# Patient Record
Sex: MALE | Race: WHITE | NOT HISPANIC OR LATINO | Employment: OTHER | URBAN - METROPOLITAN AREA
[De-identification: names, ages, dates, MRNs, and addresses within clinical notes are randomized per-mention and may not be internally consistent; named-entity substitution may affect disease eponyms.]

---

## 2024-03-27 ENCOUNTER — PREP FOR SURGERY (OUTPATIENT)
Dept: OTHER | Facility: HOSPITAL | Age: 69
End: 2024-03-27
Payer: MEDICARE

## 2024-03-27 ENCOUNTER — OFFICE VISIT (OUTPATIENT)
Dept: GASTROENTEROLOGY | Facility: CLINIC | Age: 69
End: 2024-03-27
Payer: MEDICARE

## 2024-03-27 VITALS
SYSTOLIC BLOOD PRESSURE: 170 MMHG | DIASTOLIC BLOOD PRESSURE: 90 MMHG | OXYGEN SATURATION: 93 % | HEIGHT: 72 IN | HEART RATE: 80 BPM

## 2024-03-27 DIAGNOSIS — R11.2 NAUSEA AND VOMITING, UNSPECIFIED VOMITING TYPE: Primary | ICD-10-CM

## 2024-03-27 DIAGNOSIS — R19.8 BORBORYGMI: ICD-10-CM

## 2024-03-27 DIAGNOSIS — K62.5 BLOOD PER RECTUM: ICD-10-CM

## 2024-03-27 DIAGNOSIS — R10.9 ABDOMINAL PAIN, UNSPECIFIED ABDOMINAL LOCATION: ICD-10-CM

## 2024-03-27 DIAGNOSIS — Z86.010 HISTORY OF COLONIC POLYPS: ICD-10-CM

## 2024-03-27 DIAGNOSIS — R12 HEARTBURN: ICD-10-CM

## 2024-03-27 DIAGNOSIS — K59.00 CONSTIPATION, UNSPECIFIED CONSTIPATION TYPE: Primary | ICD-10-CM

## 2024-03-27 DIAGNOSIS — R11.2 NAUSEA AND VOMITING, UNSPECIFIED VOMITING TYPE: ICD-10-CM

## 2024-03-27 PROBLEM — C44.91 BASAL CELL CARCINOMA OF SKIN: Status: ACTIVE | Noted: 2022-12-15

## 2024-03-27 PROBLEM — I10 ESSENTIAL HYPERTENSION: Status: ACTIVE | Noted: 2022-12-15

## 2024-03-27 PROBLEM — I48.91 ATRIAL FIBRILLATION: Status: ACTIVE | Noted: 2022-12-15

## 2024-03-27 PROBLEM — E87.6 HYPOKALEMIA: Status: ACTIVE | Noted: 2023-01-06

## 2024-03-27 PROBLEM — U09.9 CHRONIC POST-COVID-19 SYNDROME: Status: ACTIVE | Noted: 2023-05-02

## 2024-03-27 PROBLEM — J44.9 CHRONIC OBSTRUCTIVE LUNG DISEASE: Status: ACTIVE | Noted: 2023-05-02

## 2024-03-27 PROBLEM — R30.0 DIFFICULT OR PAINFUL URINATION: Status: ACTIVE | Noted: 2023-04-19

## 2024-03-27 PROBLEM — R00.1 BRADYCARDIA: Status: ACTIVE | Noted: 2024-03-13

## 2024-03-27 PROBLEM — M54.50 CHRONIC LOW BACK PAIN: Status: ACTIVE | Noted: 2023-05-02

## 2024-03-27 PROBLEM — J18.9 COMMUNITY ACQUIRED PNEUMONIA: Status: ACTIVE | Noted: 2023-01-06

## 2024-03-27 PROBLEM — G89.29 CHRONIC LOW BACK PAIN: Status: ACTIVE | Noted: 2023-05-02

## 2024-03-27 PROBLEM — R60.0 PERIPHERAL EDEMA: Status: ACTIVE | Noted: 2024-03-04

## 2024-03-27 PROBLEM — M17.9 OSTEOARTHRITIS OF KNEE: Status: ACTIVE | Noted: 2023-02-03

## 2024-03-27 PROBLEM — R07.81 RIB PAIN: Status: ACTIVE | Noted: 2023-04-19

## 2024-03-27 PROBLEM — B02.9 HERPES ZOSTER: Status: ACTIVE | Noted: 2023-02-03

## 2024-03-27 PROBLEM — K02.9 DENTAL CARIES: Status: ACTIVE | Noted: 2022-12-15

## 2024-03-27 PROBLEM — R14.0 ABDOMINAL BLOATING: Status: ACTIVE | Noted: 2023-01-06

## 2024-03-27 RX ORDER — ALBUTEROL SULFATE 2.5 MG/3ML
2.5 SOLUTION RESPIRATORY (INHALATION) EVERY 4 HOURS PRN
COMMUNITY

## 2024-03-27 RX ORDER — FLECAINIDE ACETATE 150 MG/1
0.5 TABLET ORAL 2 TIMES DAILY
COMMUNITY

## 2024-03-27 RX ORDER — BUDESONIDE AND FORMOTEROL FUMARATE DIHYDRATE 160; 4.5 UG/1; UG/1
2 AEROSOL RESPIRATORY (INHALATION)
COMMUNITY

## 2024-03-27 RX ORDER — POTASSIUM CHLORIDE 20 MEQ/1
20 TABLET, EXTENDED RELEASE ORAL DAILY
COMMUNITY

## 2024-03-27 RX ORDER — ROSUVASTATIN CALCIUM 40 MG/1
1 TABLET, COATED ORAL DAILY
COMMUNITY

## 2024-03-27 RX ORDER — OMEPRAZOLE 20 MG/1
20 CAPSULE, DELAYED RELEASE ORAL DAILY
COMMUNITY

## 2024-03-27 RX ORDER — LOSARTAN POTASSIUM 100 MG/1
1 TABLET ORAL DAILY
COMMUNITY

## 2024-03-27 RX ORDER — FUROSEMIDE 20 MG/1
1 TABLET ORAL DAILY
COMMUNITY

## 2024-03-27 RX ORDER — CHOLECALCIFEROL (VITAMIN D3) 125 MCG
500 CAPSULE ORAL DAILY
COMMUNITY

## 2024-03-27 RX ORDER — GABAPENTIN 300 MG/1
3 CAPSULE ORAL 3 TIMES DAILY
COMMUNITY

## 2024-03-27 RX ORDER — CARVEDILOL 25 MG/1
12.5 TABLET ORAL 2 TIMES DAILY WITH MEALS
COMMUNITY

## 2024-03-27 RX ORDER — FINASTERIDE 5 MG/1
5 TABLET, FILM COATED ORAL DAILY
COMMUNITY

## 2024-03-27 RX ORDER — POLYETHYLENE GLYCOL 3350 17 G/17G
17 POWDER, FOR SOLUTION ORAL DAILY
Qty: 1530 G | Refills: 3 | Status: SHIPPED | OUTPATIENT
Start: 2024-03-27

## 2024-03-27 RX ORDER — ISOSORBIDE MONONITRATE 30 MG/1
TABLET, EXTENDED RELEASE ORAL
COMMUNITY

## 2024-03-27 NOTE — Clinical Note
Please schedule EGD due to nausea with vomiting, heartburn at the hospital due to coexisting medical conditions. Thank you! Blood thinner NOT listed but please verify that he does not take blood thinner (aspirin is on med list which is okay) due to history of atrial fibrillation. Thank you!

## 2024-03-27 NOTE — PATIENT INSTRUCTIONS
Follow a diet as recommended by your health care provider. This may involve avoiding foods and drinks such as:  Coffee and tea (with or without caffeine).  Drinks that contain alcohol.  Energy drinks and sports drinks.  Carbonated drinks or sodas.  Chocolate and cocoa.  Peppermint and mint flavorings.  Garlic and onions.  Horseradish.  Spicy and acidic foods, including peppers, chili powder, chaney powder, vinegar, hot sauces, and barbecue sauce.  Citrus fruit juices and citrus fruits, such as oranges, glenn, and limes.  Tomato-based foods, such as red sauce, chili, salsa, and pizza with red sauce.  Fried and fatty foods, such as donuts, french fries, potato chips, and high-fat dressings.    Eat small, frequent meals instead of large meals.  Avoid drinking large amounts of liquid with your meals.  Avoid eating meals during the 2-3 hours before bedtime.  Avoid lying down right after you eat.    Take miralax daily. Mix in 8 ounce of any liquid. Continue senna as needed.     Continue omeprazole daily, 30 minutes prior to a meal.

## 2024-03-27 NOTE — PROGRESS NOTES
"GASTROENTEROLOGY OFFICE NOTE    Phan Daniels  [id number]  1955    CARE TEAM  Patient Care Team:  Cesia Diaz APRN as PCP - General (Nurse Practitioner)    Referring Provider: Cesia Diaz AP*    Chief Complaint   Patient presents with    Abdominal Pain     New patient in with c/o abdominal pain after eating fatty foods.        HISTORY OF PRESENT ILLNESS:   Phan Daniels is a 68 y.o. male who presents to the clinic today for as a referral from Cesia Diaz NP for evaluation regarding abdominal pain, nausea with vomiting, diarrhea alternating with constipation. Patient reported occasional blood per rectum, [facility], he is unsure of when he should repeat colonoscopy.  He reports prior imaging likely at [facility] that revealed gallstones or was concerning for gallstones, history is unclear but referral mentioned gallstones.    He expresses concern about \"knot\" in mid upper abdomen with prior evaluation in [place] approximately 5 to 7 years ago with conversation with provider that seems to suggest diastases recti.  He reports there was no treatment recommended at the time.  He reports occasional pain in the area.    He also reports rumbling noise from abdomen after eating with twisting sensation all over abdomen.  He has a bowel movement most days that is described as hard to pass at times, has history of skipping 3 days without a bowel movement, takes at least 1 dose of senna daily but at times takes senna twice daily due to constipation.  He has not previously tried MiraLAX.    He takes omeprazole 20 mg daily reports history of hiatal hernia, prior EGD 30 years ago, heartburn. Omeprazole is helpful for heartburn but he experiences heartburn when he has a trigger such as soda, juice    He reports overall GI symptoms seem worse over the past few months but he has history of GI symptoms that began prior to a few months ago.     He " is in a motorized chair during today's visit reportedly due to degenerative disc disease and left knee replacement.  He reports he is able to walk short distance and can bear weight but he does have breathing issues with history of COPD as well.    Past Medical History:   Diagnosis Date    Abdominal bloating 01/06/2023    Atrial fibrillation 12/15/2022    Basal cell carcinoma of skin 12/15/2022    Bradycardia 03/13/2024    Chronic low back pain 05/02/2023    Chronic obstructive lung disease 05/02/2023    Chronic post-COVID-19 syndrome 05/02/2023    Community acquired pneumonia 01/06/2023    Constipation 08/11/2023    COPD (chronic obstructive pulmonary disease)     Dental caries 12/15/2022    Difficult or painful urination 04/19/2023    Essential hypertension 12/15/2022    GERD (gastroesophageal reflux disease)     Herpes zoster 02/03/2023    Hiatal hernia     History of degenerative disc disease     uses motorized chair    Hyperlipidemia     Hypertension     Hypokalemia 01/06/2023    Osteoarthritis of knee 02/03/2023    Peripheral edema 03/04/2024    Rib pain 04/19/2023        Past Surgical History:   Procedure Laterality Date    CATARACT EXTRACTION      [facility]      [facility]    [facility] TOTAL KNEE Left         Current Outpatient Medications on File Prior to Visit   Medication Sig    albuterol (PROVENTIL) (2.5 MG/3ML) 0.083% nebulizer solution Take 2.5 mg by nebulization Every 4 (Four) Hours As Needed for Wheezing.    Albuterol Sulfate, sensor, 108 (90 Base) MCG/ACT aerosol powder  Inhale 2 puffs every 4 hours by inhalation route.    Aspirin 81 MG capsule Take 1 capsule by mouth Daily.    budesonide-formoterol (Symbicort) 160-4.5 MCG/ACT inhaler Inhale 2 puffs 2 (Two) Times a Day.    carvedilol (COREG) 25 MG tablet Take 0.5 tablets by mouth 2 (Two) Times a Day With Meals.    finasteride (PROSCAR) 5 MG tablet Take 1 tablet by mouth Daily.    flecainide (TAMBOCOR) 150 MG tablet Take 0.5 tablets by mouth 2 (Two)  "Times a Day.    furosemide (LASIX) 20 MG tablet Take 1 tablet by mouth Daily.    gabapentin (NEURONTIN) 300 MG capsule Take 3 capsules by mouth 3 (Three) Times a Day.    isosorbide mononitrate (IMDUR) 30 MG 24 hr tablet isosorbide mononitrate ER 30 mg tablet,extended release 24 hr    losartan (COZAAR) 100 MG tablet Take 1 tablet by mouth Daily.    omeprazole (priLOSEC) 20 MG capsule Take 1 capsule by mouth Daily.    potassium chloride (KLOR-CON M20) 20 MEQ CR tablet Take 1 tablet by mouth Daily.    rosuvastatin (CRESTOR) 40 MG tablet Take 1 tablet by mouth Daily.    vitamin B-12 (CYANOCOBALAMIN) 500 MCG tablet Take 1 tablet by mouth Daily.     No current facility-administered medications on file prior to visit.       Allergies   Allergen Reactions    Indomethacin Er Nausea And Vomiting    Isoniazid Rash    Penicillins Rash       History reviewed. No pertinent family history.    Social History     Socioeconomic History    Marital status:    Tobacco Use    Smoking status: Every Day     Types: Cigarettes    Smokeless tobacco: Never   Vaping Use    Vaping status: Never Used   Substance and Sexual Activity    Alcohol use: Yes     Comment: occasionally    Drug use: Never    Sexual activity: Defer       PHYSICAL EXAM   /90 (BP Location: Left arm, Patient Position: Sitting, Cuff Size: Adult)   Pulse 80   Ht 181.6 cm (71.5\")   SpO2 93%   Physical Exam  Constitutional:       General: He is not in acute distress.     Appearance: He is not toxic-appearing.   HENT:      Head: Normocephalic and atraumatic. No contusion.      Right Ear: External ear normal.      Left Ear: External ear normal.   Eyes:      General: Lids are normal. No scleral icterus.        Right eye: No discharge.         Left eye: No discharge.      Extraocular Movements: Extraocular movements intact.   Neck:      Trachea: Trachea normal.      Comments: No visible mass  No visible adenopathy  Cardiovascular:      Rate and Rhythm: Normal rate. "   Pulmonary:      Effort: No respiratory distress.      Comments: Symmetrical expansion    Abdominal:      Palpations: Abdomen is soft. There is no mass.   Musculoskeletal:      Comments: Symmetrical movement of upper extremities  Sitting in motorized chair during office visit   Skin:     General: Skin is warm and dry.      Coloration: Skin is not jaundiced.   Neurological:      General: No focal deficit present.      Mental Status: He is alert and oriented to person, place, and time.   Psychiatric:         Mood and Affect: Mood normal.         Behavior: Behavior normal.         Thought Content: Thought content normal.     Results Review:  2/16/2023  CBC okay.  B12 elevated.  PSA normal.  Hemoglobin A1c elevated at 6.6    ASSESSMENT / PLAN  1. Constipation, unspecified constipation type  2. Borborygmi  3. Abdominal pain, unspecified abdominal location  -Suspect likely irritable bowel syndrome with constipation for which I recommend patient start MiraLAX daily.  He has history of taking senna 1-2 times per day.  He may continue senna as needed with daily use of MiraLAX.  - polyethylene glycol (MiraLax) 17 GM/SCOOP powder; Take 17 g by mouth Daily. Mix in 8 oz of any liquid once daily  Dispense: 1530 g; Refill: 3      4. Nausea and vomiting, unspecified vomiting type  -Plan for EGD for additional evaluation.  Treat constipation in the event constipation is contributing to nausea with vomiting  -Continue omeprazole 20 mg daily as it does seem as though he has improvement with use of omeprazole  -Recommend lifestyle modifications for reflux  -He may have symptomatic cholelithiasis contributing, recommend he avoid greasy, fatty, fried foods.  Release of information form signed to review prior imaging that may have suggested gallstones, history unclear.    5. Blood per rectum  -I would like to review prior colonoscopy report.  Recommend patient start MiraLAX daily.  I am concerned that constipation is likely contributing  to blood per rectum.    6. Heartburn  -Omeprazole 20 mg daily seems helpful for heartburn unless he has triggers such as soda, juice.  Lifestyle modification for reflux provided in the office.  Continue omeprazole 20 mg daily 30 minutes prior to a meal.  Plan for EGD for additional evaluation.    7. History of colonic polyps  -Release of information authorization form signed during today's office visit to [facility] to obtain [facility] that was reportedly completed last [facility]    Phan Daniels  reports that he has been smoking cigarettes. He has never used smokeless tobacco. I have educated him on the risk of diseases from using tobacco products such as cancer, COPD, and heart disease. Due to concern for reflux, it was recommended he avoid tobacco.          Return in about 3 months (around 6/27/2024) for Follow-up after EGD.    Cari Panchal, APRN  03/27/2024

## 2024-03-28 PROBLEM — R12 HEARTBURN: Status: ACTIVE | Noted: 2024-03-27

## 2024-03-28 PROBLEM — R11.2 NAUSEA AND VOMITING: Status: ACTIVE | Noted: 2024-03-27

## 2024-06-06 ENCOUNTER — PRE-ADMISSION TESTING (OUTPATIENT)
Dept: PREADMISSION TESTING | Facility: HOSPITAL | Age: 69
End: 2024-06-06
Payer: MEDICARE

## 2024-06-06 VITALS — BODY MASS INDEX: 30.56 KG/M2 | HEIGHT: 72 IN | WEIGHT: 225.64 LBS

## 2024-06-06 LAB
DEPRECATED RDW RBC AUTO: 44.4 FL (ref 37–54)
ERYTHROCYTE [DISTWIDTH] IN BLOOD BY AUTOMATED COUNT: 14 % (ref 12.3–15.4)
HCT VFR BLD AUTO: 47.6 % (ref 37.5–51)
HGB BLD-MCNC: 15.5 G/DL (ref 13–17.7)
MCH RBC QN AUTO: 28 PG (ref 26.6–33)
MCHC RBC AUTO-ENTMCNC: 32.6 G/DL (ref 31.5–35.7)
MCV RBC AUTO: 86.1 FL (ref 79–97)
PLATELET # BLD AUTO: 205 10*3/MM3 (ref 140–450)
PMV BLD AUTO: 9.9 FL (ref 6–12)
POTASSIUM SERPL-SCNC: 3.5 MMOL/L (ref 3.5–5.2)
QT INTERVAL: 406 MS
QTC INTERVAL: 471 MS
RBC # BLD AUTO: 5.53 10*6/MM3 (ref 4.14–5.8)
WBC NRBC COR # BLD AUTO: 9.48 10*3/MM3 (ref 3.4–10.8)

## 2024-06-06 PROCEDURE — 84132 ASSAY OF SERUM POTASSIUM: CPT

## 2024-06-06 PROCEDURE — 93005 ELECTROCARDIOGRAM TRACING: CPT

## 2024-06-06 PROCEDURE — 85027 COMPLETE CBC AUTOMATED: CPT

## 2024-06-06 PROCEDURE — 36415 COLL VENOUS BLD VENIPUNCTURE: CPT

## 2024-06-06 PROCEDURE — 93010 ELECTROCARDIOGRAM REPORT: CPT | Performed by: INTERNAL MEDICINE

## 2024-06-06 NOTE — PAT
PATIENT HAD ABNORMAL EKG IN PAT, ATTEMPTED TO OBTAIN CARDIAC CLEARANCE AND PREVIOUS EKG FROM [facility] BUT COULD NOT REACH CARDIOLOGY DEPARTMENT AFTER SEVERAL ATTEMPTS. EKG THEN FAXED TO DR. SONG WHO WOULD NOT CLEAR PATIENT WITHOUT PREVIOUS EKG. NOTIFIED EULALIO IN JENNIFER'S OFFICE THAT PATIENT WILL NEED CLEARANCE.

## 2024-06-10 ENCOUNTER — TELEPHONE (OUTPATIENT)
Dept: GASTROENTEROLOGY | Facility: CLINIC | Age: 69
End: 2024-06-10
Payer: MEDICARE

## 2024-06-12 ENCOUNTER — ANESTHESIA EVENT (OUTPATIENT)
Dept: GASTROENTEROLOGY | Facility: HOSPITAL | Age: 69
End: 2024-06-12
Payer: MEDICARE

## 2024-06-12 RX ORDER — SODIUM CHLORIDE, SODIUM LACTATE, POTASSIUM CHLORIDE, CALCIUM CHLORIDE 600; 310; 30; 20 MG/100ML; MG/100ML; MG/100ML; MG/100ML
9 INJECTION, SOLUTION INTRAVENOUS CONTINUOUS
Status: CANCELLED | OUTPATIENT
Start: 2024-06-12

## 2024-06-12 RX ORDER — SODIUM CHLORIDE 0.9 % (FLUSH) 0.9 %
10 SYRINGE (ML) INJECTION AS NEEDED
Status: CANCELLED | OUTPATIENT
Start: 2024-06-12

## 2024-06-12 RX ORDER — MIDAZOLAM HYDROCHLORIDE 1 MG/ML
0.5 INJECTION INTRAMUSCULAR; INTRAVENOUS
Status: CANCELLED | OUTPATIENT
Start: 2024-06-12

## 2024-06-12 RX ORDER — LIDOCAINE HYDROCHLORIDE 10 MG/ML
0.5 INJECTION, SOLUTION EPIDURAL; INFILTRATION; INTRACAUDAL; PERINEURAL ONCE AS NEEDED
Status: CANCELLED | OUTPATIENT
Start: 2024-06-12

## 2024-06-12 RX ORDER — FAMOTIDINE 20 MG/1
20 TABLET, FILM COATED ORAL ONCE
Status: CANCELLED | OUTPATIENT
Start: 2024-06-12 | End: 2024-06-12

## 2024-06-12 RX ORDER — FAMOTIDINE 10 MG/ML
20 INJECTION, SOLUTION INTRAVENOUS ONCE
Status: CANCELLED | OUTPATIENT
Start: 2024-06-12 | End: 2024-06-12

## 2024-06-12 RX ORDER — SODIUM CHLORIDE 9 MG/ML
40 INJECTION, SOLUTION INTRAVENOUS AS NEEDED
Status: CANCELLED | OUTPATIENT
Start: 2024-06-12

## 2024-06-12 RX ORDER — SODIUM CHLORIDE 0.9 % (FLUSH) 0.9 %
10 SYRINGE (ML) INJECTION EVERY 12 HOURS SCHEDULED
Status: CANCELLED | OUTPATIENT
Start: 2024-06-12

## 2024-06-12 NOTE — PAT
Verified patient previously completed cardiology visit for cardiac risk assessment in preparation for upcoming procedure, completion of 12-lead ECG within six months, and risk assessment letter reviewed. No further interventions required.     Cleared by Dr Abbott on 6/10/24 as acceptable risk.  [facility]

## 2024-06-13 ENCOUNTER — ANESTHESIA (OUTPATIENT)
Dept: GASTROENTEROLOGY | Facility: HOSPITAL | Age: 69
End: 2024-06-13
Payer: MEDICARE

## 2024-06-13 ENCOUNTER — HOSPITAL ENCOUNTER (OUTPATIENT)
Facility: HOSPITAL | Age: 69
Setting detail: HOSPITAL OUTPATIENT SURGERY
Discharge: HOME | End: 2024-06-13
Attending: INTERNAL MEDICINE | Admitting: INTERNAL MEDICINE
Payer: MEDICARE

## 2024-06-13 VITALS
OXYGEN SATURATION: 93 % | SYSTOLIC BLOOD PRESSURE: 160 MMHG | DIASTOLIC BLOOD PRESSURE: 100 MMHG | RESPIRATION RATE: 16 BRPM | TEMPERATURE: 98.1 F | HEART RATE: 73 BPM

## 2024-06-13 DIAGNOSIS — R11.2 NAUSEA AND VOMITING, UNSPECIFIED VOMITING TYPE: ICD-10-CM

## 2024-06-13 DIAGNOSIS — R12 HEARTBURN: ICD-10-CM

## 2024-06-13 PROCEDURE — C1725 CATH, TRANSLUMIN NON-LASER: HCPCS | Performed by: INTERNAL MEDICINE

## 2024-06-13 PROCEDURE — 25010000002 PROPOFOL 10 MG/ML EMULSION

## 2024-06-13 PROCEDURE — 88305 TISSUE EXAM BY PATHOLOGIST: CPT | Performed by: INTERNAL MEDICINE

## 2024-06-13 PROCEDURE — 43249 ESOPH EGD DILATION <30 MM: CPT | Performed by: INTERNAL MEDICINE

## 2024-06-13 PROCEDURE — 43239 EGD BIOPSY SINGLE/MULTIPLE: CPT | Performed by: INTERNAL MEDICINE

## 2024-06-13 PROCEDURE — 25810000003 LACTATED RINGERS PER 1000 ML

## 2024-06-13 RX ORDER — SODIUM CHLORIDE, SODIUM LACTATE, POTASSIUM CHLORIDE, CALCIUM CHLORIDE 600; 310; 30; 20 MG/100ML; MG/100ML; MG/100ML; MG/100ML
INJECTION, SOLUTION INTRAVENOUS CONTINUOUS PRN
Status: DISCONTINUED | OUTPATIENT
Start: 2024-06-13 | End: 2024-06-13 | Stop reason: SURG

## 2024-06-13 RX ORDER — LIDOCAINE HYDROCHLORIDE 10 MG/ML
INJECTION, SOLUTION EPIDURAL; INFILTRATION; INTRACAUDAL; PERINEURAL AS NEEDED
Status: DISCONTINUED | OUTPATIENT
Start: 2024-06-13 | End: 2024-06-13 | Stop reason: SURG

## 2024-06-13 RX ORDER — ONDANSETRON 2 MG/ML
4 INJECTION INTRAMUSCULAR; INTRAVENOUS ONCE AS NEEDED
Status: DISCONTINUED | OUTPATIENT
Start: 2024-06-13 | End: 2024-06-19 | Stop reason: HOSPADM

## 2024-06-13 RX ORDER — IPRATROPIUM BROMIDE AND ALBUTEROL SULFATE 2.5; .5 MG/3ML; MG/3ML
3 SOLUTION RESPIRATORY (INHALATION) ONCE AS NEEDED
Status: DISCONTINUED | OUTPATIENT
Start: 2024-06-13 | End: 2024-06-19 | Stop reason: HOSPADM

## 2024-06-13 RX ORDER — OMEPRAZOLE 40 MG/1
40 CAPSULE, DELAYED RELEASE ORAL
Qty: 60 CAPSULE | Refills: 11 | Status: SHIPPED | OUTPATIENT
Start: 2024-06-13

## 2024-06-13 RX ORDER — PROPOFOL 10 MG/ML
VIAL (ML) INTRAVENOUS AS NEEDED
Status: DISCONTINUED | OUTPATIENT
Start: 2024-06-13 | End: 2024-06-13 | Stop reason: SURG

## 2024-06-13 RX ADMIN — PROPOFOL 50 MG: 10 INJECTION, EMULSION INTRAVENOUS at 14:33

## 2024-06-13 RX ADMIN — LIDOCAINE HYDROCHLORIDE 100 MG: 10 INJECTION, SOLUTION EPIDURAL; INFILTRATION; INTRACAUDAL; PERINEURAL at 14:28

## 2024-06-13 RX ADMIN — SODIUM CHLORIDE, POTASSIUM CHLORIDE, SODIUM LACTATE AND CALCIUM CHLORIDE: 600; 310; 30; 20 INJECTION, SOLUTION INTRAVENOUS at 14:27

## 2024-06-13 RX ADMIN — PROPOFOL 50 MG: 10 INJECTION, EMULSION INTRAVENOUS at 14:30

## 2024-06-13 RX ADMIN — PROPOFOL 100 MG: 10 INJECTION, EMULSION INTRAVENOUS at 14:28

## 2024-06-13 NOTE — ANESTHESIA POSTPROCEDURE EVALUATION
Patient: Phan Daniels    Procedure Summary       Date: 06/13/24 Room / Location:  [facility] /  [facility]    Anesthesia Start: 1427 Anesthesia Stop: 1443    Procedure: ESOPHAGOGASTRODUODENOSCOPY Diagnosis:       Nausea and vomiting, unspecified vomiting type      Heartburn      (Nausea and vomiting, unspecified vomiting type [R11.2])      (Heartburn [R12])    Surgeons: Morris Wynne MD Provider: Ashutosh Rodriguez MD    Anesthesia Type: general ASA Status: 3            Anesthesia Type: general    Vitals  Vitals Value Taken Time   /93 06/13/24 1443   Temp 98.1 °F (36.7 °C) 06/13/24 1443   Pulse 78 06/13/24 1443   Resp     SpO2 96 % 06/13/24 1443           Post Anesthesia Care and Evaluation    Patient location during evaluation: PACU  Patient participation: complete - patient participated  Level of consciousness: awake and alert  Pain management: adequate    Airway patency: patent  Anesthetic complications: No anesthetic complications  PONV Status: none  Cardiovascular status: hemodynamically stable and acceptable  Respiratory status: nonlabored ventilation, acceptable and room air  Hydration status: acceptable

## 2024-06-13 NOTE — H&P
GASTROENTEROLOGY OFFICE NOTE  Phan Daniels  [id number]  1955      CHIEF COMPLAINT  Nausea vomiting  GERD  Dysphagia    HISTORY OF PRESENT ILLNESS:  68-year-old white male who was recently seen in our GI clinic by GAGE Jay on March 27, 2024 when he presented with various GI complaints most notably recurrent problems with nausea and then vomiting sometimes to the point of dry heaving.  Some very mild intermittent problems with dysphagia to solids which she states he does feel like foods get stuck sometimes esophagus but very rarely.  He has chronic GERD and despite taking omeprazole 20 mg p.o. twice daily seems to be ineffective in resolving his reflux symptoms.    As part of his workup EGD was recommended and therefore he presents for that endoscopy.  He states his last upper endoscopy maybe 3 to years ago showed a small sliding hernia.    PAST MEDICAL HISTORY  Past Medical History:    Abdominal bloating    Atrial fibrillation    Basal cell carcinoma of skin    Bradycardia    Chronic low back pain    Chronic obstructive lung disease    Chronic post-COVID-19 syndrome    Community acquired pneumonia    Constipation    COPD (chronic obstructive pulmonary disease)    Dental caries    Difficult or painful urination    Essential hypertension    GERD (gastroesophageal reflux disease)    Herpes zoster    Hiatal hernia    History of degenerative disc disease    uses motorized chair    Hyperlipidemia    Hypertension    Hypokalemia    Osteoarthritis of knee    Peripheral edema    PONV (postoperative nausea and vomiting)    Rib pain        PAST SURGICAL HISTORY  Past Surgical History:    CATARACT EXTRACTION    [facility]    [facility] T[facility]EE        MEDICATIONS:  Prior to Admission medications    Medication Sig Start Date End Date Taking? Authorizing Provider   Aspirin 81 MG capsule Take 1 capsule by mouth Daily.   Yes Provider, MD Yarely   carvedilol (COREG) 25 MG tablet Take 0.5 tablets by  mouth 2 (Two) Times a Day With Meals.   Yes Yarely Brewster MD   Cholecalciferol (VITAMIN D-3 PO) Take 1 tablet/day by mouth Daily.   Yes Yarely Brewster MD   finasteride (PROSCAR) 5 MG tablet Take 1 tablet by mouth Daily.   Yes Yarely Brewster MD   flecainide (TAMBOCOR) 150 MG tablet Take 0.5 tablets by mouth 2 (Two) Times a Day.   Yes Yarely Brewster MD   furosemide (LASIX) 20 MG tablet Take 1 tablet by mouth Daily As Needed (SWELLING).   Yes Yarely Brewster MD   gabapentin (NEURONTIN) 300 MG capsule Take 3 capsules by mouth 3 (Three) Times a Day.   Yes Yarely Brewster MD   losartan (COZAAR) 100 MG tablet Take 1 tablet by mouth 2 (Two) Times a Day.   Yes Yarely Brewster MD   omeprazole (priLOSEC) 20 MG capsule Take 1 capsule by mouth 2 (Two) Times a Day.   Yes Yarely Brewster MD   vitamin B-12 (CYANOCOBALAMIN) 500 MCG tablet Take 1 tablet by mouth Daily.   Yes Yarely Brewster MD   albuterol (PROVENTIL) (2.5 MG/3ML) 0.083% nebulizer solution Take 2.5 mg by nebulization Every 4 (Four) Hours As Needed for Wheezing.    Yarely Brewster MD   Albuterol Sulfate, sensor, 108 (90 Base) MCG/ACT aerosol powder  Inhale 2 Puffs/kg Daily As Needed (SHORTNESS OF AIR).    Yarely Brewster MD   budesonide-formoterol (Symbicort) 160-4.5 MCG/ACT inhaler Inhale 2 puffs 2 (Two) Times a Day.    Yarely Brewster MD   rosuvastatin (CRESTOR) 40 MG tablet Take 1 tablet by mouth Daily.    Yarely Brewster MD        ALLERGIES  is allergic to indomethacin er, isoniazid, and penicillins.    FAMILY HISTORY:  Cancer-related family history is not on file.  Colon Cancer-related family history is not on file.    SOCIAL HISTORY  He  reports that he has been smoking cigarettes. He has never used smokeless tobacco. He reports current alcohol use. He reports that he does not use drugs.       REVIEW OF SYSTEMS  Cardiovascular, pulmonary and generalized review systems are pertinent  as reviewed above    PHYSICAL EXAM   BP (!) 173/101 (BP Location: Left arm, Patient Position: Lying)   Pulse 85   Temp 98.2 °F (36.8 °C) (Temporal)   Resp 16   SpO2 94%   General: Alert and oriented x 3. In no apparent or acute distress.  and No stigmata of chronic liver disease  HEENT: Anicteric sclerae. Normal oropharynx  Neck: Supple. Without lymphadenopathy  CV: Regular rate and rhythm, S1, S2  Lungs: Clear to ausculation. Without rales, rhonchi and wheezing  Abdomen:  Soft,non-distended without palpable masses or hepatosplenomeagaly, areas of rebound tenderness or guarding.   Extremeties: without clubbing, cyanosis or edema  Neurologic:  Alert and oriented x 3 without focal motor or sensory deficits  Rectal exam: deferred       ASSESSMENT  1.-Chronic GERD.  Medically refractory.  Would likely benefit from omeprazole 40 mg p.o. twice daily and if continues to have reflux with this consider trial of Voquezna  2.-Recurrent nausea vomiting.  EGD for occlusion of erosive esophagitis upper GI neoplasia, gastric outlet obstruction, chronic gastritis etc. is reasonable and therefore offered  3.-History of adenomatous colonic polyps.  Last colonoscopy was February 2023 at which time polyps were removed and for which a 3-year surveillance colonoscopy was recommended (February 2025    PLAN  1.-Proceed with EGD today.  Risk, benefits, options reviewed.  He is agreeable to proceeding with further recommendations deferred pending findings of today's upper endoscopy      Morris Wynne MD  6/13/2024   14:22 EDT    Addendum  EGD was unremarkable.  There is a very small sliding hiatal hernia.  Esophagus was empirically dilated to 20 mm with a through-the-scope balloon across upper and lower esophageal sphincters and proximal/distal esophagus.  Gastric antral biopsies taken to rule out H. pylori.  Recommendation is to increase omeprazole to 40 mg p.o. twice daily.  Await pathology report.  Further  recommendation deferred pending clinical progress

## 2024-06-17 LAB
CYTO UR: NORMAL
LAB AP CASE REPORT: NORMAL
LAB AP CLINICAL INFORMATION: NORMAL
PATH REPORT.FINAL DX SPEC: NORMAL
PATH REPORT.GROSS SPEC: NORMAL

## 2025-01-01 ENCOUNTER — APPOINTMENT (OUTPATIENT)
Dept: RESPIRATORY THERAPY | Facility: HOSPITAL | Age: 70
DRG: 870 | End: 2025-01-01
Payer: MEDICARE

## 2025-01-01 ENCOUNTER — APPOINTMENT (OUTPATIENT)
Dept: GENERAL RADIOLOGY | Facility: HOSPITAL | Age: 70
DRG: 870 | End: 2025-01-01
Payer: MEDICARE

## 2025-01-01 ENCOUNTER — APPOINTMENT (OUTPATIENT)
Dept: CARDIOLOGY | Facility: HOSPITAL | Age: 70
DRG: 870 | End: 2025-01-01
Payer: MEDICARE

## 2025-01-01 ENCOUNTER — APPOINTMENT (OUTPATIENT)
Dept: CT IMAGING | Facility: HOSPITAL | Age: 70
DRG: 870 | End: 2025-01-01
Payer: MEDICARE

## 2025-01-01 ENCOUNTER — HOSPITAL ENCOUNTER (INPATIENT)
Facility: HOSPITAL | Age: 70
LOS: 14 days | DRG: 870 | End: 2025-02-22
Attending: FAMILY MEDICINE | Admitting: INTERNAL MEDICINE
Payer: MEDICARE

## 2025-01-01 ENCOUNTER — APPOINTMENT (OUTPATIENT)
Dept: ULTRASOUND IMAGING | Facility: HOSPITAL | Age: 70
DRG: 870 | End: 2025-01-01
Payer: MEDICARE

## 2025-01-01 VITALS
OXYGEN SATURATION: 83 % | WEIGHT: 247.8 LBS | HEART RATE: 116 BPM | BODY MASS INDEX: 33.56 KG/M2 | RESPIRATION RATE: 28 BRPM | HEIGHT: 72 IN | SYSTOLIC BLOOD PRESSURE: 156 MMHG | DIASTOLIC BLOOD PRESSURE: 85 MMHG | TEMPERATURE: 98.8 F

## 2025-01-01 DIAGNOSIS — N17.9 ACUTE RENAL FAILURE, UNSPECIFIED ACUTE RENAL FAILURE TYPE: ICD-10-CM

## 2025-01-01 DIAGNOSIS — J18.9 MULTIFOCAL PNEUMONIA: ICD-10-CM

## 2025-01-01 DIAGNOSIS — A41.9 SEPSIS, DUE TO UNSPECIFIED ORGANISM, UNSPECIFIED WHETHER ACUTE ORGAN DYSFUNCTION PRESENT: Primary | ICD-10-CM

## 2025-01-01 DIAGNOSIS — R79.89 ELEVATED TROPONIN: ICD-10-CM

## 2025-01-01 LAB
25(OH)D3 SERPL-MCNC: 33 NG/ML (ref 30–100)
A-A DO2: 150.7 MMHG (ref 0–300)
A-A DO2: 155.1 MMHG (ref 0–300)
A-A DO2: 158.1 MMHG (ref 0–300)
A-A DO2: 190.2 MMHG (ref 0–300)
A-A DO2: 205.6 MMHG (ref 0–300)
A-A DO2: 268.7 MMHG (ref 0–300)
A-A DO2: 383.6 MMHG (ref 0–300)
A-A DO2: 520.9 MMHG (ref 0–300)
ALBUMIN SERPL-MCNC: 2.2 G/DL (ref 3.5–5.2)
ALBUMIN SERPL-MCNC: 2.4 G/DL (ref 3.5–5.2)
ALBUMIN SERPL-MCNC: 2.8 G/DL (ref 3.5–5.2)
ALBUMIN SERPL-MCNC: 3 G/DL (ref 3.5–5.2)
ALBUMIN SERPL-MCNC: 3.2 G/DL (ref 3.5–5.2)
ALBUMIN/GLOB SERPL: 0.5 G/DL
ALBUMIN/GLOB SERPL: 0.5 G/DL
ALBUMIN/GLOB SERPL: 0.6 G/DL
ALBUMIN/GLOB SERPL: 0.8 G/DL
ALBUMIN/GLOB SERPL: 1 G/DL
ALP SERPL-CCNC: 127 U/L (ref 39–117)
ALP SERPL-CCNC: 158 U/L (ref 39–117)
ALP SERPL-CCNC: 160 U/L (ref 39–117)
ALP SERPL-CCNC: 204 U/L (ref 39–117)
ALP SERPL-CCNC: 45 U/L (ref 39–117)
ALP SERPL-CCNC: 45 U/L (ref 39–117)
ALP SERPL-CCNC: 46 U/L (ref 39–117)
ALP SERPL-CCNC: 50 U/L (ref 39–117)
ALT SERPL W P-5'-P-CCNC: 24 U/L (ref 1–41)
ALT SERPL W P-5'-P-CCNC: 31 U/L (ref 1–41)
ALT SERPL W P-5'-P-CCNC: 31 U/L (ref 1–41)
ALT SERPL W P-5'-P-CCNC: 35 U/L (ref 1–41)
ALT SERPL W P-5'-P-CCNC: 37 U/L (ref 1–41)
ALT SERPL W P-5'-P-CCNC: 59 U/L (ref 1–41)
ALT SERPL W P-5'-P-CCNC: 70 U/L (ref 1–41)
ALT SERPL W P-5'-P-CCNC: 73 U/L (ref 1–41)
AMMONIA BLD-SCNC: 23 UMOL/L (ref 16–60)
AMMONIA BLD-SCNC: 40 UMOL/L (ref 16–60)
AMMONIA BLD-SCNC: 46 UMOL/L (ref 16–60)
AMPHET+METHAMPHET UR QL: NEGATIVE
AMPHETAMINES UR QL: NEGATIVE
ANION GAP SERPL CALCULATED.3IONS-SCNC: 14.7 MMOL/L (ref 5–15)
ANION GAP SERPL CALCULATED.3IONS-SCNC: 17 MMOL/L (ref 5–15)
ANION GAP SERPL CALCULATED.3IONS-SCNC: 17.4 MMOL/L (ref 5–15)
ANION GAP SERPL CALCULATED.3IONS-SCNC: 17.6 MMOL/L (ref 5–15)
ANION GAP SERPL CALCULATED.3IONS-SCNC: 17.8 MMOL/L (ref 5–15)
ANION GAP SERPL CALCULATED.3IONS-SCNC: 18.5 MMOL/L (ref 5–15)
ANION GAP SERPL CALCULATED.3IONS-SCNC: 19.2 MMOL/L (ref 5–15)
ANION GAP SERPL CALCULATED.3IONS-SCNC: 20.8 MMOL/L (ref 5–15)
ANION GAP SERPL CALCULATED.3IONS-SCNC: 20.9 MMOL/L (ref 5–15)
ANION GAP SERPL CALCULATED.3IONS-SCNC: 21.6 MMOL/L (ref 5–15)
ANION GAP SERPL CALCULATED.3IONS-SCNC: 22.7 MMOL/L (ref 5–15)
ANION GAP SERPL CALCULATED.3IONS-SCNC: 23.2 MMOL/L (ref 5–15)
ANION GAP SERPL CALCULATED.3IONS-SCNC: 24.7 MMOL/L (ref 5–15)
ANION GAP SERPL CALCULATED.3IONS-SCNC: 27 MMOL/L (ref 5–15)
ANISOCYTOSIS BLD QL: ABNORMAL
APTT PPP: 28.7 SECONDS (ref 24.5–35.9)
APTT PPP: 35.2 SECONDS (ref 24.5–35.9)
APTT PPP: 35.8 SECONDS (ref 24.5–35.9)
ARTERIAL PATENCY WRIST A: ABNORMAL
ARTERIAL PATENCY WRIST A: POSITIVE
AST SERPL-CCNC: 136 U/L (ref 1–40)
AST SERPL-CCNC: 238 U/L (ref 1–40)
AST SERPL-CCNC: 271 U/L (ref 1–40)
AST SERPL-CCNC: 44 U/L (ref 1–40)
AST SERPL-CCNC: 52 U/L (ref 1–40)
AST SERPL-CCNC: 57 U/L (ref 1–40)
AST SERPL-CCNC: 69 U/L (ref 1–40)
AST SERPL-CCNC: 72 U/L (ref 1–40)
ATMOSPHERIC PRESS: 718 MMHG
ATMOSPHERIC PRESS: 721 MMHG
ATMOSPHERIC PRESS: 722 MMHG
ATMOSPHERIC PRESS: 723 MMHG
ATMOSPHERIC PRESS: 724 MMHG
ATMOSPHERIC PRESS: 728 MMHG
ATMOSPHERIC PRESS: 730 MMHG
ATMOSPHERIC PRESS: 730 MMHG
ATMOSPHERIC PRESS: 731 MMHG
ATMOSPHERIC PRESS: 732 MMHG
ATMOSPHERIC PRESS: 733 MMHG
ATMOSPHERIC PRESS: 734 MMHG
ATMOSPHERIC PRESS: 739 MMHG
ATMOSPHERIC PRESS: 740 MMHG
AV MEAN PRESS GRAD SYS DOP V1V2: 4 MMHG
AV VMAX SYS DOP: 123 CM/SEC
B PARAPERT DNA SPEC QL NAA+PROBE: NOT DETECTED
B PERT DNA SPEC QL NAA+PROBE: NOT DETECTED
BACTERIA BLD CULT: ABNORMAL
BACTERIA BLD CULT: ABNORMAL
BACTERIA SPEC AEROBE CULT: ABNORMAL
BACTERIA SPEC AEROBE CULT: ABNORMAL
BACTERIA SPEC AEROBE CULT: NO GROWTH
BACTERIA SPEC AEROBE CULT: NORMAL
BACTERIA SPEC RESP CULT: ABNORMAL
BACTERIA UR QL AUTO: ABNORMAL /HPF
BACTERIA UR QL AUTO: ABNORMAL /HPF
BARBITURATES UR QL SCN: NEGATIVE
BASE EXCESS BLDA CALC-SCNC: -0.1 MMOL/L (ref 0–2)
BASE EXCESS BLDA CALC-SCNC: -1.5 MMOL/L (ref 0–2)
BASE EXCESS BLDA CALC-SCNC: -2.2 MMOL/L (ref 0–2)
BASE EXCESS BLDA CALC-SCNC: -2.3 MMOL/L (ref 0–2)
BASE EXCESS BLDA CALC-SCNC: -3.3 MMOL/L (ref 0–2)
BASE EXCESS BLDA CALC-SCNC: -6.4 MMOL/L (ref 0–2)
BASE EXCESS BLDA CALC-SCNC: 0.1 MMOL/L (ref 0–2)
BASE EXCESS BLDA CALC-SCNC: 0.5 MMOL/L (ref 0–2)
BASE EXCESS BLDV CALC-SCNC: -5.5 MMOL/L (ref 0–2)
BASE EXCESS BLDV CALC-SCNC: -5.8 MMOL/L (ref 0–2)
BASE EXCESS BLDV CALC-SCNC: -6.3 MMOL/L (ref 0–2)
BASE EXCESS BLDV CALC-SCNC: -8.4 MMOL/L (ref 0–2)
BASE EXCESS BLDV CALC-SCNC: 1.4 MMOL/L (ref 0–2)
BASE EXCESS BLDV CALC-SCNC: 2.6 MMOL/L (ref 0–2)
BASE EXCESS BLDV CALC-SCNC: 2.7 MMOL/L (ref 0–2)
BASE EXCESS BLDV CALC-SCNC: 3.8 MMOL/L (ref 0–2)
BASOPHILS # BLD AUTO: 0.01 10*3/MM3 (ref 0–0.2)
BASOPHILS # BLD AUTO: 0.02 10*3/MM3 (ref 0–0.2)
BASOPHILS # BLD AUTO: 0.03 10*3/MM3 (ref 0–0.2)
BASOPHILS # BLD AUTO: 0.03 10*3/MM3 (ref 0–0.2)
BASOPHILS # BLD AUTO: 0.04 10*3/MM3 (ref 0–0.2)
BASOPHILS NFR BLD AUTO: 0.1 % (ref 0–1.5)
BASOPHILS NFR BLD AUTO: 0.1 % (ref 0–1.5)
BASOPHILS NFR BLD AUTO: 0.2 % (ref 0–1.5)
BASOPHILS NFR BLD AUTO: 0.3 % (ref 0–1.5)
BDY SITE: ABNORMAL
BENZODIAZ UR QL SCN: NEGATIVE
BH CV ECHO MEAS - ACS: 1.7 CM
BH CV ECHO MEAS - AO MAX PG: 6.1 MMHG
BH CV ECHO MEAS - AO ROOT DIAM: 3.9 CM
BH CV ECHO MEAS - AO V2 VTI: 23.8 CM
BH CV ECHO MEAS - AVA(I,D): 2.8 CM2
BH CV ECHO MEAS - EDV(CUBED): 110.6 ML
BH CV ECHO MEAS - EDV(MOD-SP2): 147 ML
BH CV ECHO MEAS - EDV(MOD-SP4): 160 ML
BH CV ECHO MEAS - EF(MOD-SP2): 57.5 %
BH CV ECHO MEAS - EF(MOD-SP4): 57.6 %
BH CV ECHO MEAS - ESV(CUBED): 42.9 ML
BH CV ECHO MEAS - ESV(MOD-SP2): 62.5 ML
BH CV ECHO MEAS - ESV(MOD-SP4): 67.8 ML
BH CV ECHO MEAS - FS: 27.1 %
BH CV ECHO MEAS - IVS/LVPW: 1 CM
BH CV ECHO MEAS - IVSD: 1.2 CM
BH CV ECHO MEAS - LA DIMENSION: 3.3 CM
BH CV ECHO MEAS - LAT PEAK E' VEL: 14.4 CM/SEC
BH CV ECHO MEAS - LV DIASTOLIC VOL/BSA (35-75): 72.5 CM2
BH CV ECHO MEAS - LV MASS(C)D: 219.1 GRAMS
BH CV ECHO MEAS - LV MAX PG: 2.7 MMHG
BH CV ECHO MEAS - LV MEAN PG: 2 MMHG
BH CV ECHO MEAS - LV SYSTOLIC VOL/BSA (12-30): 30.7 CM2
BH CV ECHO MEAS - LV V1 MAX: 82.2 CM/SEC
BH CV ECHO MEAS - LV V1 VTI: 19 CM
BH CV ECHO MEAS - LVIDD: 4.8 CM
BH CV ECHO MEAS - LVIDS: 3.5 CM
BH CV ECHO MEAS - LVOT AREA: 3.5 CM2
BH CV ECHO MEAS - LVOT DIAM: 2.1 CM
BH CV ECHO MEAS - LVPWD: 1.2 CM
BH CV ECHO MEAS - MED PEAK E' VEL: 10.9 CM/SEC
BH CV ECHO MEAS - MR MAX PG: 26 MMHG
BH CV ECHO MEAS - MR MAX VEL: 255 CM/SEC
BH CV ECHO MEAS - MV A MAX VEL: 81.8 CM/SEC
BH CV ECHO MEAS - MV DEC SLOPE: 330 CM/SEC2
BH CV ECHO MEAS - MV DEC TIME: 0.27 SEC
BH CV ECHO MEAS - MV E MAX VEL: 89.1 CM/SEC
BH CV ECHO MEAS - MV E/A: 1.09
BH CV ECHO MEAS - MV MAX PG: 5 MMHG
BH CV ECHO MEAS - MV MEAN PG: 1 MMHG
BH CV ECHO MEAS - MV V2 VTI: 29.1 CM
BH CV ECHO MEAS - MVA(VTI): 2.26 CM2
BH CV ECHO MEAS - PA ACC TIME: 0.15 SEC
BH CV ECHO MEAS - PA V2 MAX: 80.3 CM/SEC
BH CV ECHO MEAS - RAP SYSTOLE: 10 MMHG
BH CV ECHO MEAS - RVSP: 45 MMHG
BH CV ECHO MEAS - SV(LVOT): 65.8 ML
BH CV ECHO MEAS - SV(MOD-SP2): 84.5 ML
BH CV ECHO MEAS - SV(MOD-SP4): 92.2 ML
BH CV ECHO MEAS - SVI(LVOT): 29.8 ML/M2
BH CV ECHO MEAS - SVI(MOD-SP2): 38.3 ML/M2
BH CV ECHO MEAS - SVI(MOD-SP4): 41.8 ML/M2
BH CV ECHO MEAS - TAPSE (>1.6): 2.41 CM
BH CV ECHO MEAS - TR MAX PG: 35 MMHG
BH CV ECHO MEAS - TR MAX VEL: 296 CM/SEC
BH CV ECHO MEASUREMENTS AVERAGE E/E' RATIO: 7.04
BILIRUB CONJ SERPL-MCNC: 0.4 MG/DL (ref 0–0.3)
BILIRUB INDIRECT SERPL-MCNC: 0.1 MG/DL
BILIRUB SERPL-MCNC: 0.5 MG/DL (ref 0–1.2)
BILIRUB SERPL-MCNC: 0.5 MG/DL (ref 0–1.2)
BILIRUB SERPL-MCNC: 0.6 MG/DL (ref 0–1.2)
BILIRUB SERPL-MCNC: 0.6 MG/DL (ref 0–1.2)
BILIRUB SERPL-MCNC: 0.8 MG/DL (ref 0–1.2)
BILIRUB SERPL-MCNC: 0.8 MG/DL (ref 0–1.2)
BILIRUB SERPL-MCNC: 1.1 MG/DL (ref 0–1.2)
BILIRUB SERPL-MCNC: 1.6 MG/DL (ref 0–1.2)
BILIRUB UR QL STRIP: ABNORMAL
BILIRUB UR QL STRIP: NEGATIVE
BOTTLE TYPE: ABNORMAL
BOTTLE TYPE: ABNORMAL
BUN SERPL-MCNC: 101 MG/DL (ref 8–23)
BUN SERPL-MCNC: 110 MG/DL (ref 8–23)
BUN SERPL-MCNC: 111 MG/DL (ref 8–23)
BUN SERPL-MCNC: 62 MG/DL (ref 8–23)
BUN SERPL-MCNC: 68 MG/DL (ref 8–23)
BUN SERPL-MCNC: 70 MG/DL (ref 8–23)
BUN SERPL-MCNC: 73 MG/DL (ref 8–23)
BUN SERPL-MCNC: 76 MG/DL (ref 8–23)
BUN SERPL-MCNC: 76 MG/DL (ref 8–23)
BUN SERPL-MCNC: 77 MG/DL (ref 8–23)
BUN SERPL-MCNC: 83 MG/DL (ref 8–23)
BUN SERPL-MCNC: 89 MG/DL (ref 8–23)
BUN SERPL-MCNC: 90 MG/DL (ref 8–23)
BUN SERPL-MCNC: 96 MG/DL (ref 8–23)
BUN/CREAT SERPL: 10.1 (ref 7–25)
BUN/CREAT SERPL: 11.1 (ref 7–25)
BUN/CREAT SERPL: 12.2 (ref 7–25)
BUN/CREAT SERPL: 13.4 (ref 7–25)
BUN/CREAT SERPL: 15.6 (ref 7–25)
BUN/CREAT SERPL: 17.6 (ref 7–25)
BUN/CREAT SERPL: 6.3 (ref 7–25)
BUN/CREAT SERPL: 6.4 (ref 7–25)
BUN/CREAT SERPL: 7.1 (ref 7–25)
BUN/CREAT SERPL: 7.4 (ref 7–25)
BUN/CREAT SERPL: 7.6 (ref 7–25)
BUN/CREAT SERPL: 7.9 (ref 7–25)
BUN/CREAT SERPL: 8.2 (ref 7–25)
BUN/CREAT SERPL: 9.8 (ref 7–25)
BUPRENORPHINE SERPL-MCNC: NEGATIVE NG/ML
C PNEUM DNA NPH QL NAA+NON-PROBE: NOT DETECTED
CALCIUM SPEC-SCNC: 5.9 MG/DL (ref 8.6–10.5)
CALCIUM SPEC-SCNC: 6.1 MG/DL (ref 8.6–10.5)
CALCIUM SPEC-SCNC: 6.5 MG/DL (ref 8.6–10.5)
CALCIUM SPEC-SCNC: 6.8 MG/DL (ref 8.6–10.5)
CALCIUM SPEC-SCNC: 6.9 MG/DL (ref 8.6–10.5)
CALCIUM SPEC-SCNC: 7 MG/DL (ref 8.6–10.5)
CALCIUM SPEC-SCNC: 7.4 MG/DL (ref 8.6–10.5)
CALCIUM SPEC-SCNC: 7.5 MG/DL (ref 8.6–10.5)
CALCIUM SPEC-SCNC: 7.5 MG/DL (ref 8.6–10.5)
CALCIUM SPEC-SCNC: 7.6 MG/DL (ref 8.6–10.5)
CALCIUM SPEC-SCNC: 8 MG/DL (ref 8.6–10.5)
CALCIUM SPEC-SCNC: 8.2 MG/DL (ref 8.6–10.5)
CALCIUM SPEC-SCNC: 8.4 MG/DL (ref 8.6–10.5)
CALCIUM SPEC-SCNC: 8.4 MG/DL (ref 8.6–10.5)
CANNABINOIDS SERPL QL: NEGATIVE
CHLORIDE SERPL-SCNC: 100 MMOL/L (ref 98–107)
CHLORIDE SERPL-SCNC: 101 MMOL/L (ref 98–107)
CHLORIDE SERPL-SCNC: 103 MMOL/L (ref 98–107)
CHLORIDE SERPL-SCNC: 93 MMOL/L (ref 98–107)
CHLORIDE SERPL-SCNC: 94 MMOL/L (ref 98–107)
CHLORIDE SERPL-SCNC: 95 MMOL/L (ref 98–107)
CHLORIDE SERPL-SCNC: 96 MMOL/L (ref 98–107)
CHLORIDE SERPL-SCNC: 97 MMOL/L (ref 98–107)
CHLORIDE SERPL-SCNC: 97 MMOL/L (ref 98–107)
CHLORIDE SERPL-SCNC: 98 MMOL/L (ref 98–107)
CHLORIDE SERPL-SCNC: 98 MMOL/L (ref 98–107)
CHLORIDE SERPL-SCNC: 99 MMOL/L (ref 98–107)
CK SERPL-CCNC: 2404 U/L (ref 20–200)
CK SERPL-CCNC: 2687 U/L (ref 20–200)
CK SERPL-CCNC: 3260 U/L (ref 20–200)
CK SERPL-CCNC: 3281 U/L (ref 20–200)
CLARITY UR: ABNORMAL
CLARITY UR: CLEAR
CO2 BLDA-SCNC: 20.7 MMOL/L (ref 22–33)
CO2 BLDA-SCNC: 21.1 MMOL/L (ref 22–33)
CO2 BLDA-SCNC: 22.2 MMOL/L (ref 22–33)
CO2 BLDA-SCNC: 22.3 MMOL/L (ref 22–33)
CO2 BLDA-SCNC: 22.4 MMOL/L (ref 22–33)
CO2 BLDA-SCNC: 24.5 MMOL/L (ref 22–33)
CO2 BLDA-SCNC: 24.8 MMOL/L (ref 22–33)
CO2 BLDA-SCNC: 24.8 MMOL/L (ref 22–33)
CO2 BLDA-SCNC: 25.5 MMOL/L (ref 22–33)
CO2 BLDA-SCNC: 25.8 MMOL/L (ref 22–33)
CO2 BLDA-SCNC: 26.6 MMOL/L (ref 22–33)
CO2 BLDA-SCNC: 27 MMOL/L (ref 22–33)
CO2 BLDA-SCNC: 28.2 MMOL/L (ref 22–33)
CO2 BLDA-SCNC: 28.6 MMOL/L (ref 22–33)
CO2 BLDA-SCNC: 29.4 MMOL/L (ref 22–33)
CO2 BLDA-SCNC: 31.4 MMOL/L (ref 22–33)
CO2 SERPL-SCNC: 16 MMOL/L (ref 22–29)
CO2 SERPL-SCNC: 16.3 MMOL/L (ref 22–29)
CO2 SERPL-SCNC: 18.3 MMOL/L (ref 22–29)
CO2 SERPL-SCNC: 21.4 MMOL/L (ref 22–29)
CO2 SERPL-SCNC: 21.8 MMOL/L (ref 22–29)
CO2 SERPL-SCNC: 22.2 MMOL/L (ref 22–29)
CO2 SERPL-SCNC: 22.2 MMOL/L (ref 22–29)
CO2 SERPL-SCNC: 22.4 MMOL/L (ref 22–29)
CO2 SERPL-SCNC: 22.5 MMOL/L (ref 22–29)
CO2 SERPL-SCNC: 24.1 MMOL/L (ref 22–29)
CO2 SERPL-SCNC: 24.3 MMOL/L (ref 22–29)
CO2 SERPL-SCNC: 24.8 MMOL/L (ref 22–29)
CO2 SERPL-SCNC: 25 MMOL/L (ref 22–29)
CO2 SERPL-SCNC: 26.6 MMOL/L (ref 22–29)
COCAINE UR QL: NEGATIVE
COHGB MFR BLD: 1.3 % (ref 0–5)
COHGB MFR BLD: 1.5 % (ref 0–5)
COHGB MFR BLD: 1.6 % (ref 0–5)
COHGB MFR BLD: 1.7 % (ref 0–5)
COHGB MFR BLD: 1.8 % (ref 0–5)
COHGB MFR BLD: 2.1 % (ref 0–5)
COHGB MFR BLD: 2.3 % (ref 0–5)
COLOR UR: ABNORMAL
COLOR UR: YELLOW
CREAT SERPL-MCNC: 10.21 MG/DL (ref 0.76–1.27)
CREAT SERPL-MCNC: 10.76 MG/DL (ref 0.76–1.27)
CREAT SERPL-MCNC: 10.76 MG/DL (ref 0.76–1.27)
CREAT SERPL-MCNC: 10.92 MG/DL (ref 0.76–1.27)
CREAT SERPL-MCNC: 11.82 MG/DL (ref 0.76–1.27)
CREAT SERPL-MCNC: 5.72 MG/DL (ref 0.76–1.27)
CREAT SERPL-MCNC: 6.25 MG/DL (ref 0.76–1.27)
CREAT SERPL-MCNC: 7.56 MG/DL (ref 0.76–1.27)
CREAT SERPL-MCNC: 7.57 MG/DL (ref 0.76–1.27)
CREAT SERPL-MCNC: 7.66 MG/DL (ref 0.76–1.27)
CREAT SERPL-MCNC: 8.49 MG/DL (ref 0.76–1.27)
CREAT SERPL-MCNC: 8.65 MG/DL (ref 0.76–1.27)
CREAT SERPL-MCNC: 9.12 MG/DL (ref 0.76–1.27)
CREAT SERPL-MCNC: 9.19 MG/DL (ref 0.76–1.27)
CRP SERPL-MCNC: 9.12 MG/DL (ref 0–0.5)
D DIMER PPP FEU-MCNC: 10.03 MCGFEU/ML (ref 0–0.68)
D-LACTATE SERPL-SCNC: 0.8 MMOL/L (ref 0.5–2)
D-LACTATE SERPL-SCNC: 0.8 MMOL/L (ref 0.5–2)
D-LACTATE SERPL-SCNC: 0.9 MMOL/L (ref 0.5–2)
D-LACTATE SERPL-SCNC: 1.2 MMOL/L (ref 0.5–2)
DEPRECATED RDW RBC AUTO: 48.8 FL (ref 37–54)
DEPRECATED RDW RBC AUTO: 49 FL (ref 37–54)
DEPRECATED RDW RBC AUTO: 50.2 FL (ref 37–54)
DEPRECATED RDW RBC AUTO: 51.3 FL (ref 37–54)
DEPRECATED RDW RBC AUTO: 51.3 FL (ref 37–54)
DEPRECATED RDW RBC AUTO: 51.7 FL (ref 37–54)
DEPRECATED RDW RBC AUTO: 52.3 FL (ref 37–54)
DEPRECATED RDW RBC AUTO: 53.5 FL (ref 37–54)
DEPRECATED RDW RBC AUTO: 55.6 FL (ref 37–54)
EGFRCR SERPLBLD CKD-EPI 2021: 10.1 ML/MIN/1.73
EGFRCR SERPLBLD CKD-EPI 2021: 4.2 ML/MIN/1.73
EGFRCR SERPLBLD CKD-EPI 2021: 4.7 ML/MIN/1.73
EGFRCR SERPLBLD CKD-EPI 2021: 5 ML/MIN/1.73
EGFRCR SERPLBLD CKD-EPI 2021: 5.7 ML/MIN/1.73
EGFRCR SERPLBLD CKD-EPI 2021: 5.8 ML/MIN/1.73
EGFRCR SERPLBLD CKD-EPI 2021: 6.2 ML/MIN/1.73
EGFRCR SERPLBLD CKD-EPI 2021: 6.3 ML/MIN/1.73
EGFRCR SERPLBLD CKD-EPI 2021: 7.1 ML/MIN/1.73
EGFRCR SERPLBLD CKD-EPI 2021: 7.2 ML/MIN/1.73
EGFRCR SERPLBLD CKD-EPI 2021: 7.2 ML/MIN/1.73
EGFRCR SERPLBLD CKD-EPI 2021: 9.1 ML/MIN/1.73
EOSINOPHIL # BLD AUTO: 0 10*3/MM3 (ref 0–0.4)
EOSINOPHIL # BLD AUTO: 0.01 10*3/MM3 (ref 0–0.4)
EOSINOPHIL # BLD AUTO: 0.03 10*3/MM3 (ref 0–0.4)
EOSINOPHIL # BLD AUTO: 0.12 10*3/MM3 (ref 0–0.4)
EOSINOPHIL # BLD AUTO: 0.18 10*3/MM3 (ref 0–0.4)
EOSINOPHIL # BLD MANUAL: 0.18 10*3/MM3 (ref 0–0.4)
EOSINOPHIL NFR BLD AUTO: 0 % (ref 0.3–6.2)
EOSINOPHIL NFR BLD AUTO: 0.1 % (ref 0.3–6.2)
EOSINOPHIL NFR BLD AUTO: 0.2 % (ref 0.3–6.2)
EOSINOPHIL NFR BLD AUTO: 1 % (ref 0.3–6.2)
EOSINOPHIL NFR BLD AUTO: 1.4 % (ref 0.3–6.2)
EOSINOPHIL NFR BLD MANUAL: 1 % (ref 0.3–6.2)
ERYTHROCYTE [DISTWIDTH] IN BLOOD BY AUTOMATED COUNT: 13.6 % (ref 12.3–15.4)
ERYTHROCYTE [DISTWIDTH] IN BLOOD BY AUTOMATED COUNT: 13.7 % (ref 12.3–15.4)
ERYTHROCYTE [DISTWIDTH] IN BLOOD BY AUTOMATED COUNT: 13.8 % (ref 12.3–15.4)
ERYTHROCYTE [DISTWIDTH] IN BLOOD BY AUTOMATED COUNT: 13.8 % (ref 12.3–15.4)
ERYTHROCYTE [DISTWIDTH] IN BLOOD BY AUTOMATED COUNT: 13.9 % (ref 12.3–15.4)
ERYTHROCYTE [DISTWIDTH] IN BLOOD BY AUTOMATED COUNT: 13.9 % (ref 12.3–15.4)
ERYTHROCYTE [DISTWIDTH] IN BLOOD BY AUTOMATED COUNT: 14.6 % (ref 12.3–15.4)
ERYTHROCYTE [DISTWIDTH] IN BLOOD BY AUTOMATED COUNT: 14.7 % (ref 12.3–15.4)
ERYTHROCYTE [DISTWIDTH] IN BLOOD BY AUTOMATED COUNT: 15 % (ref 12.3–15.4)
ETHANOL BLD-MCNC: <10 MG/DL (ref 0–10)
ETHANOL UR QL: <0.01 %
FENTANYL UR-MCNC: NEGATIVE NG/ML
FIBRINOGEN PPP-MCNC: 867 MG/DL (ref 173–524)
FINE GRAN CASTS URNS QL MICRO: ABNORMAL /LPF
FLUAV H3 RNA NPH QL NAA+PROBE: DETECTED
FLUBV RNA ISLT QL NAA+PROBE: NOT DETECTED
FOLATE SERPL-MCNC: 10.2 NG/ML (ref 4.78–24.2)
FSP PPP-MCNC: 10 UG/ML
GEN 5 1HR TROPONIN T REFLEX: 750 NG/L
GLOBULIN UR ELPH-MCNC: 3.2 GM/DL
GLOBULIN UR ELPH-MCNC: 3.6 GM/DL
GLOBULIN UR ELPH-MCNC: 3.9 GM/DL
GLOBULIN UR ELPH-MCNC: 4 GM/DL
GLOBULIN UR ELPH-MCNC: 4.1 GM/DL
GLOBULIN UR ELPH-MCNC: 4.2 GM/DL
GLOBULIN UR ELPH-MCNC: 4.6 GM/DL
GLUCOSE BLDC GLUCOMTR-MCNC: 100 MG/DL (ref 70–130)
GLUCOSE BLDC GLUCOMTR-MCNC: 106 MG/DL (ref 70–130)
GLUCOSE BLDC GLUCOMTR-MCNC: 110 MG/DL (ref 70–130)
GLUCOSE BLDC GLUCOMTR-MCNC: 110 MG/DL (ref 70–130)
GLUCOSE BLDC GLUCOMTR-MCNC: 111 MG/DL (ref 70–130)
GLUCOSE BLDC GLUCOMTR-MCNC: 112 MG/DL (ref 70–130)
GLUCOSE BLDC GLUCOMTR-MCNC: 112 MG/DL (ref 70–130)
GLUCOSE BLDC GLUCOMTR-MCNC: 116 MG/DL (ref 70–130)
GLUCOSE BLDC GLUCOMTR-MCNC: 117 MG/DL (ref 70–130)
GLUCOSE BLDC GLUCOMTR-MCNC: 118 MG/DL (ref 70–130)
GLUCOSE BLDC GLUCOMTR-MCNC: 119 MG/DL (ref 70–130)
GLUCOSE BLDC GLUCOMTR-MCNC: 120 MG/DL (ref 70–130)
GLUCOSE BLDC GLUCOMTR-MCNC: 121 MG/DL (ref 70–130)
GLUCOSE BLDC GLUCOMTR-MCNC: 121 MG/DL (ref 70–130)
GLUCOSE BLDC GLUCOMTR-MCNC: 122 MG/DL (ref 70–130)
GLUCOSE BLDC GLUCOMTR-MCNC: 123 MG/DL (ref 70–130)
GLUCOSE BLDC GLUCOMTR-MCNC: 124 MG/DL (ref 70–130)
GLUCOSE BLDC GLUCOMTR-MCNC: 125 MG/DL (ref 70–130)
GLUCOSE BLDC GLUCOMTR-MCNC: 126 MG/DL (ref 70–130)
GLUCOSE BLDC GLUCOMTR-MCNC: 128 MG/DL (ref 70–130)
GLUCOSE BLDC GLUCOMTR-MCNC: 131 MG/DL (ref 70–130)
GLUCOSE BLDC GLUCOMTR-MCNC: 136 MG/DL (ref 70–130)
GLUCOSE BLDC GLUCOMTR-MCNC: 136 MG/DL (ref 70–130)
GLUCOSE BLDC GLUCOMTR-MCNC: 139 MG/DL (ref 70–130)
GLUCOSE BLDC GLUCOMTR-MCNC: 141 MG/DL (ref 70–130)
GLUCOSE BLDC GLUCOMTR-MCNC: 141 MG/DL (ref 70–130)
GLUCOSE BLDC GLUCOMTR-MCNC: 143 MG/DL (ref 70–130)
GLUCOSE BLDC GLUCOMTR-MCNC: 144 MG/DL (ref 70–130)
GLUCOSE BLDC GLUCOMTR-MCNC: 147 MG/DL (ref 70–130)
GLUCOSE BLDC GLUCOMTR-MCNC: 152 MG/DL (ref 70–130)
GLUCOSE BLDC GLUCOMTR-MCNC: 152 MG/DL (ref 70–130)
GLUCOSE BLDC GLUCOMTR-MCNC: 154 MG/DL (ref 70–130)
GLUCOSE BLDC GLUCOMTR-MCNC: 156 MG/DL (ref 70–130)
GLUCOSE BLDC GLUCOMTR-MCNC: 157 MG/DL (ref 70–130)
GLUCOSE BLDC GLUCOMTR-MCNC: 157 MG/DL (ref 70–130)
GLUCOSE BLDC GLUCOMTR-MCNC: 168 MG/DL (ref 70–130)
GLUCOSE BLDC GLUCOMTR-MCNC: 170 MG/DL (ref 70–130)
GLUCOSE BLDC GLUCOMTR-MCNC: 170 MG/DL (ref 70–130)
GLUCOSE BLDC GLUCOMTR-MCNC: 171 MG/DL (ref 70–130)
GLUCOSE BLDC GLUCOMTR-MCNC: 175 MG/DL (ref 70–130)
GLUCOSE BLDC GLUCOMTR-MCNC: 177 MG/DL (ref 70–130)
GLUCOSE BLDC GLUCOMTR-MCNC: 178 MG/DL (ref 70–130)
GLUCOSE BLDC GLUCOMTR-MCNC: 181 MG/DL (ref 70–130)
GLUCOSE BLDC GLUCOMTR-MCNC: 184 MG/DL (ref 70–130)
GLUCOSE BLDC GLUCOMTR-MCNC: 185 MG/DL (ref 70–130)
GLUCOSE BLDC GLUCOMTR-MCNC: 187 MG/DL (ref 70–130)
GLUCOSE BLDC GLUCOMTR-MCNC: 188 MG/DL (ref 70–130)
GLUCOSE BLDC GLUCOMTR-MCNC: 189 MG/DL (ref 70–130)
GLUCOSE BLDC GLUCOMTR-MCNC: 192 MG/DL (ref 70–130)
GLUCOSE BLDC GLUCOMTR-MCNC: 193 MG/DL (ref 70–130)
GLUCOSE BLDC GLUCOMTR-MCNC: 199 MG/DL (ref 70–130)
GLUCOSE BLDC GLUCOMTR-MCNC: 199 MG/DL (ref 70–130)
GLUCOSE BLDC GLUCOMTR-MCNC: 203 MG/DL (ref 70–130)
GLUCOSE BLDC GLUCOMTR-MCNC: 206 MG/DL (ref 70–130)
GLUCOSE BLDC GLUCOMTR-MCNC: 210 MG/DL (ref 70–130)
GLUCOSE BLDC GLUCOMTR-MCNC: 219 MG/DL (ref 70–130)
GLUCOSE BLDC GLUCOMTR-MCNC: 227 MG/DL (ref 70–130)
GLUCOSE BLDC GLUCOMTR-MCNC: 263 MG/DL (ref 70–130)
GLUCOSE BLDC GLUCOMTR-MCNC: 308 MG/DL (ref 70–130)
GLUCOSE BLDC GLUCOMTR-MCNC: 326 MG/DL (ref 70–130)
GLUCOSE BLDC GLUCOMTR-MCNC: 346 MG/DL (ref 70–130)
GLUCOSE BLDC GLUCOMTR-MCNC: 347 MG/DL (ref 70–130)
GLUCOSE BLDC GLUCOMTR-MCNC: 350 MG/DL (ref 70–130)
GLUCOSE BLDC GLUCOMTR-MCNC: 85 MG/DL (ref 70–130)
GLUCOSE BLDC GLUCOMTR-MCNC: 85 MG/DL (ref 70–130)
GLUCOSE BLDC GLUCOMTR-MCNC: 86 MG/DL (ref 70–130)
GLUCOSE BLDC GLUCOMTR-MCNC: 89 MG/DL (ref 70–130)
GLUCOSE BLDC GLUCOMTR-MCNC: 90 MG/DL (ref 70–130)
GLUCOSE BLDC GLUCOMTR-MCNC: 92 MG/DL (ref 70–130)
GLUCOSE BLDC GLUCOMTR-MCNC: 92 MG/DL (ref 70–130)
GLUCOSE BLDC GLUCOMTR-MCNC: 98 MG/DL (ref 70–130)
GLUCOSE BLDC GLUCOMTR-MCNC: 99 MG/DL (ref 70–130)
GLUCOSE SERPL-MCNC: 111 MG/DL (ref 65–99)
GLUCOSE SERPL-MCNC: 121 MG/DL (ref 65–99)
GLUCOSE SERPL-MCNC: 132 MG/DL (ref 65–99)
GLUCOSE SERPL-MCNC: 132 MG/DL (ref 65–99)
GLUCOSE SERPL-MCNC: 134 MG/DL (ref 65–99)
GLUCOSE SERPL-MCNC: 137 MG/DL (ref 65–99)
GLUCOSE SERPL-MCNC: 176 MG/DL (ref 65–99)
GLUCOSE SERPL-MCNC: 179 MG/DL (ref 65–99)
GLUCOSE SERPL-MCNC: 189 MG/DL (ref 65–99)
GLUCOSE SERPL-MCNC: 215 MG/DL (ref 65–99)
GLUCOSE SERPL-MCNC: 282 MG/DL (ref 65–99)
GLUCOSE SERPL-MCNC: 308 MG/DL (ref 65–99)
GLUCOSE SERPL-MCNC: 94 MG/DL (ref 65–99)
GLUCOSE SERPL-MCNC: 99 MG/DL (ref 65–99)
GLUCOSE UR STRIP-MCNC: NEGATIVE MG/DL
GLUCOSE UR STRIP-MCNC: NEGATIVE MG/DL
GRAM STN SPEC: ABNORMAL
HADV DNA SPEC NAA+PROBE: NOT DETECTED
HAV IGM SERPL QL IA: ABNORMAL
HBV CORE IGM SERPL QL IA: ABNORMAL
HBV SURFACE AG SERPL QL IA: ABNORMAL
HCO3 BLDA-SCNC: 19.9 MMOL/L (ref 20–26)
HCO3 BLDA-SCNC: 23.2 MMOL/L (ref 20–26)
HCO3 BLDA-SCNC: 23.3 MMOL/L (ref 20–26)
HCO3 BLDA-SCNC: 23.5 MMOL/L (ref 20–26)
HCO3 BLDA-SCNC: 24.1 MMOL/L (ref 20–26)
HCO3 BLDA-SCNC: 24.6 MMOL/L (ref 20–26)
HCO3 BLDA-SCNC: 25.3 MMOL/L (ref 20–26)
HCO3 BLDA-SCNC: 25.7 MMOL/L (ref 20–26)
HCO3 BLDV-SCNC: 19.2 MMOL/L (ref 22–28)
HCO3 BLDV-SCNC: 20.9 MMOL/L (ref 22–28)
HCO3 BLDV-SCNC: 20.9 MMOL/L (ref 22–28)
HCO3 BLDV-SCNC: 21 MMOL/L (ref 22–28)
HCO3 BLDV-SCNC: 26.9 MMOL/L (ref 22–28)
HCO3 BLDV-SCNC: 27.3 MMOL/L (ref 22–28)
HCO3 BLDV-SCNC: 28 MMOL/L (ref 22–28)
HCO3 BLDV-SCNC: 29.8 MMOL/L (ref 22–28)
HCOV 229E RNA SPEC QL NAA+PROBE: NOT DETECTED
HCOV HKU1 RNA SPEC QL NAA+PROBE: NOT DETECTED
HCOV NL63 RNA SPEC QL NAA+PROBE: NOT DETECTED
HCOV OC43 RNA SPEC QL NAA+PROBE: NOT DETECTED
HCT VFR BLD AUTO: 26.1 % (ref 37.5–51)
HCT VFR BLD AUTO: 27 % (ref 37.5–51)
HCT VFR BLD AUTO: 29.6 % (ref 37.5–51)
HCT VFR BLD AUTO: 29.8 % (ref 37.5–51)
HCT VFR BLD AUTO: 31.1 % (ref 37.5–51)
HCT VFR BLD AUTO: 31.9 % (ref 37.5–51)
HCT VFR BLD AUTO: 33.5 % (ref 37.5–51)
HCT VFR BLD AUTO: 34.3 % (ref 37.5–51)
HCT VFR BLD AUTO: 35.1 % (ref 37.5–51)
HCT VFR BLD AUTO: 36.1 % (ref 37.5–51)
HCT VFR BLD AUTO: 36.3 % (ref 37.5–51)
HCT VFR BLD CALC: 23.5 % (ref 38–51)
HCT VFR BLD CALC: 26.2 % (ref 38–51)
HCT VFR BLD CALC: 26.4 % (ref 38–51)
HCT VFR BLD CALC: 26.4 % (ref 38–51)
HCT VFR BLD CALC: 27.4 % (ref 38–51)
HCT VFR BLD CALC: 33.4 % (ref 38–51)
HCT VFR BLD CALC: 35.3 % (ref 38–51)
HCT VFR BLD CALC: 37.6 % (ref 38–51)
HCV AB SER QL: REACTIVE
HGB BLD-MCNC: 10.3 G/DL (ref 13–17.7)
HGB BLD-MCNC: 10.6 G/DL (ref 13–17.7)
HGB BLD-MCNC: 11.2 G/DL (ref 13–17.7)
HGB BLD-MCNC: 11.3 G/DL (ref 13–17.7)
HGB BLD-MCNC: 11.8 G/DL (ref 13–17.7)
HGB BLD-MCNC: 11.9 G/DL (ref 13–17.7)
HGB BLD-MCNC: 12 G/DL (ref 13–17.7)
HGB BLD-MCNC: 8.3 G/DL (ref 13–17.7)
HGB BLD-MCNC: 8.5 G/DL (ref 13–17.7)
HGB BLD-MCNC: 9.4 G/DL (ref 13–17.7)
HGB BLD-MCNC: 9.4 G/DL (ref 13–17.7)
HGB BLDA-MCNC: 10.4 G/DL (ref 14–18)
HGB BLDA-MCNC: 10.5 G/DL (ref 14–18)
HGB BLDA-MCNC: 10.9 G/DL (ref 14–18)
HGB BLDA-MCNC: 11.4 G/DL (ref 14–18)
HGB BLDA-MCNC: 11.5 G/DL (ref 14–18)
HGB BLDA-MCNC: 11.7 G/DL (ref 14–18)
HGB BLDA-MCNC: 12 G/DL (ref 14–18)
HGB BLDA-MCNC: 12.2 G/DL (ref 14–18)
HGB BLDA-MCNC: 12.3 G/DL (ref 14–18)
HGB BLDA-MCNC: 12.4 G/DL (ref 14–18)
HGB BLDA-MCNC: 12.5 G/DL (ref 14–18)
HGB BLDA-MCNC: 7.7 G/DL (ref 14–18)
HGB BLDA-MCNC: 8.5 G/DL (ref 14–18)
HGB BLDA-MCNC: 8.6 G/DL (ref 14–18)
HGB BLDA-MCNC: 8.6 G/DL (ref 14–18)
HGB BLDA-MCNC: 8.9 G/DL (ref 14–18)
HGB UR QL STRIP.AUTO: ABNORMAL
HGB UR QL STRIP.AUTO: ABNORMAL
HMPV RNA NPH QL NAA+NON-PROBE: NOT DETECTED
HOLD SPECIMEN: NORMAL
HPIV1 RNA ISLT QL NAA+PROBE: NOT DETECTED
HPIV2 RNA SPEC QL NAA+PROBE: NOT DETECTED
HPIV3 RNA NPH QL NAA+PROBE: NOT DETECTED
HPIV4 P GENE NPH QL NAA+PROBE: NOT DETECTED
HYALINE CASTS UR QL AUTO: ABNORMAL /LPF
HYALINE CASTS UR QL AUTO: ABNORMAL /LPF
IMM GRANULOCYTES # BLD AUTO: 0.06 10*3/MM3 (ref 0–0.05)
IMM GRANULOCYTES # BLD AUTO: 0.2 10*3/MM3 (ref 0–0.05)
IMM GRANULOCYTES # BLD AUTO: 0.24 10*3/MM3 (ref 0–0.05)
IMM GRANULOCYTES # BLD AUTO: 0.29 10*3/MM3 (ref 0–0.05)
IMM GRANULOCYTES # BLD AUTO: 0.37 10*3/MM3 (ref 0–0.05)
IMM GRANULOCYTES NFR BLD AUTO: 0.7 % (ref 0–0.5)
IMM GRANULOCYTES NFR BLD AUTO: 1.1 % (ref 0–0.5)
IMM GRANULOCYTES NFR BLD AUTO: 1.4 % (ref 0–0.5)
IMM GRANULOCYTES NFR BLD AUTO: 1.7 % (ref 0–0.5)
IMM GRANULOCYTES NFR BLD AUTO: 1.7 % (ref 0–0.5)
IMM GRANULOCYTES NFR BLD AUTO: 1.9 % (ref 0–0.5)
IMM GRANULOCYTES NFR BLD AUTO: 2.3 % (ref 0–0.5)
INHALED O2 CONCENTRATION: 100 %
INHALED O2 CONCENTRATION: 40 %
INHALED O2 CONCENTRATION: 50 %
INHALED O2 CONCENTRATION: 60 %
INHALED O2 CONCENTRATION: 65 %
INHALED O2 CONCENTRATION: 65 %
INHALED O2 CONCENTRATION: 75 %
INHALED O2 CONCENTRATION: 80 %
INR PPP: 0.98 (ref 0.9–1.1)
INR PPP: 1.16 (ref 0.9–1.1)
INR PPP: 1.21 (ref 0.9–1.1)
IRON 24H UR-MRATE: 22 MCG/DL (ref 59–158)
IRON SATN MFR SERPL: 8 % (ref 20–50)
ISOLATED FROM: ABNORMAL
ISOLATED FROM: ABNORMAL
KETONES UR QL STRIP: NEGATIVE
KETONES UR QL STRIP: NEGATIVE
L PNEUMO1 AG UR QL IA: NEGATIVE
LEUKOCYTE ESTERASE UR QL STRIP.AUTO: ABNORMAL
LEUKOCYTE ESTERASE UR QL STRIP.AUTO: ABNORMAL
LV EF BIPLANE MOD: 58.4 %
LYMPHOCYTES # BLD AUTO: 0.44 10*3/MM3 (ref 0.7–3.1)
LYMPHOCYTES # BLD AUTO: 0.44 10*3/MM3 (ref 0.7–3.1)
LYMPHOCYTES # BLD AUTO: 0.52 10*3/MM3 (ref 0.7–3.1)
LYMPHOCYTES # BLD AUTO: 0.53 10*3/MM3 (ref 0.7–3.1)
LYMPHOCYTES # BLD AUTO: 0.64 10*3/MM3 (ref 0.7–3.1)
LYMPHOCYTES # BLD AUTO: 0.98 10*3/MM3 (ref 0.7–3.1)
LYMPHOCYTES # BLD AUTO: 1.01 10*3/MM3 (ref 0.7–3.1)
LYMPHOCYTES # BLD MANUAL: 0.19 10*3/MM3 (ref 0.7–3.1)
LYMPHOCYTES # BLD MANUAL: 0.41 10*3/MM3 (ref 0.7–3.1)
LYMPHOCYTES # BLD MANUAL: 1.27 10*3/MM3 (ref 0.7–3.1)
LYMPHOCYTES NFR BLD AUTO: 12.3 % (ref 19.6–45.3)
LYMPHOCYTES NFR BLD AUTO: 2.4 % (ref 19.6–45.3)
LYMPHOCYTES NFR BLD AUTO: 2.7 % (ref 19.6–45.3)
LYMPHOCYTES NFR BLD AUTO: 3.4 % (ref 19.6–45.3)
LYMPHOCYTES NFR BLD AUTO: 4.4 % (ref 19.6–45.3)
LYMPHOCYTES NFR BLD AUTO: 4.6 % (ref 19.6–45.3)
LYMPHOCYTES NFR BLD AUTO: 6.8 % (ref 19.6–45.3)
LYMPHOCYTES NFR BLD MANUAL: 3 % (ref 5–12)
LYMPHOCYTES NFR BLD MANUAL: 6 % (ref 5–12)
Lab: ABNORMAL
M PNEUMO IGG SER IA-ACNC: NOT DETECTED
MAGNESIUM SERPL-MCNC: 1.2 MG/DL (ref 1.6–2.4)
MAGNESIUM SERPL-MCNC: 1.9 MG/DL (ref 1.6–2.4)
MAGNESIUM SERPL-MCNC: 2 MG/DL (ref 1.6–2.4)
MCH RBC QN AUTO: 32.1 PG (ref 26.6–33)
MCH RBC QN AUTO: 32.1 PG (ref 26.6–33)
MCH RBC QN AUTO: 32.2 PG (ref 26.6–33)
MCH RBC QN AUTO: 32.3 PG (ref 26.6–33)
MCH RBC QN AUTO: 32.5 PG (ref 26.6–33)
MCH RBC QN AUTO: 32.6 PG (ref 26.6–33)
MCH RBC QN AUTO: 32.7 PG (ref 26.6–33)
MCH RBC QN AUTO: 32.8 PG (ref 26.6–33)
MCHC RBC AUTO-ENTMCNC: 31.5 G/DL (ref 31.5–35.7)
MCHC RBC AUTO-ENTMCNC: 31.5 G/DL (ref 31.5–35.7)
MCHC RBC AUTO-ENTMCNC: 31.8 G/DL (ref 31.5–35.7)
MCHC RBC AUTO-ENTMCNC: 31.8 G/DL (ref 31.5–35.7)
MCHC RBC AUTO-ENTMCNC: 32.3 G/DL (ref 31.5–35.7)
MCHC RBC AUTO-ENTMCNC: 32.9 G/DL (ref 31.5–35.7)
MCHC RBC AUTO-ENTMCNC: 33 G/DL (ref 31.5–35.7)
MCHC RBC AUTO-ENTMCNC: 33.1 G/DL (ref 31.5–35.7)
MCHC RBC AUTO-ENTMCNC: 33.4 G/DL (ref 31.5–35.7)
MCHC RBC AUTO-ENTMCNC: 33.6 G/DL (ref 31.5–35.7)
MCHC RBC AUTO-ENTMCNC: 34.1 G/DL (ref 31.5–35.7)
MCV RBC AUTO: 101.2 FL (ref 79–97)
MCV RBC AUTO: 102.7 FL (ref 79–97)
MCV RBC AUTO: 103.1 FL (ref 79–97)
MCV RBC AUTO: 103.5 FL (ref 79–97)
MCV RBC AUTO: 95.7 FL (ref 79–97)
MCV RBC AUTO: 97.1 FL (ref 79–97)
MCV RBC AUTO: 97.2 FL (ref 79–97)
MCV RBC AUTO: 97.4 FL (ref 79–97)
MCV RBC AUTO: 98.6 FL (ref 79–97)
MCV RBC AUTO: 98.9 FL (ref 79–97)
MCV RBC AUTO: 99.4 FL (ref 79–97)
METAMYELOCYTES NFR BLD MANUAL: 2 % (ref 0–0)
METAMYELOCYTES NFR BLD MANUAL: 2 % (ref 0–0)
METHADONE UR QL SCN: NEGATIVE
METHGB BLD QL: 0 % (ref 0–3)
METHGB BLD QL: 0.3 % (ref 0–3)
METHGB BLD QL: 0.3 % (ref 0–3)
METHGB BLD QL: 0.4 % (ref 0–3)
METHGB BLD QL: 0.4 % (ref 0–3)
METHGB BLD QL: 0.5 % (ref 0–3)
METHGB BLD QL: 0.6 % (ref 0–3)
METHGB BLD QL: 0.8 % (ref 0–3)
METHGB BLD QL: 0.9 % (ref 0–3)
METHGB BLD QL: 0.9 % (ref 0–3)
METHGB BLD QL: 1 % (ref 0–3)
METHGB BLD QL: 1 % (ref 0–3)
METHGB BLD QL: <-0.1 % (ref 0–3)
MODALITY: ABNORMAL
MONOCYTES # BLD AUTO: 0.59 10*3/MM3 (ref 0.1–0.9)
MONOCYTES # BLD AUTO: 0.6 10*3/MM3 (ref 0.1–0.9)
MONOCYTES # BLD AUTO: 0.65 10*3/MM3 (ref 0.1–0.9)
MONOCYTES # BLD AUTO: 0.66 10*3/MM3 (ref 0.1–0.9)
MONOCYTES # BLD AUTO: 0.98 10*3/MM3 (ref 0.1–0.9)
MONOCYTES # BLD AUTO: 1.02 10*3/MM3 (ref 0.1–0.9)
MONOCYTES # BLD AUTO: 1.25 10*3/MM3 (ref 0.1–0.9)
MONOCYTES # BLD: 0.49 10*3/MM3 (ref 0.1–0.9)
MONOCYTES # BLD: 0.55 10*3/MM3 (ref 0.1–0.9)
MONOCYTES NFR BLD AUTO: 3.3 % (ref 5–12)
MONOCYTES NFR BLD AUTO: 3.3 % (ref 5–12)
MONOCYTES NFR BLD AUTO: 4.1 % (ref 5–12)
MONOCYTES NFR BLD AUTO: 8 % (ref 5–12)
MONOCYTES NFR BLD AUTO: 8.1 % (ref 5–12)
MONOCYTES NFR BLD AUTO: 8.1 % (ref 5–12)
MONOCYTES NFR BLD AUTO: 8.9 % (ref 5–12)
MYELOCYTES NFR BLD MANUAL: 4 % (ref 0–0)
NEUTROPHILS # BLD AUTO: 15.83 10*3/MM3 (ref 1.7–7)
NEUTROPHILS # BLD AUTO: 18.68 10*3/MM3 (ref 1.7–7)
NEUTROPHILS # BLD AUTO: 6.8 10*3/MM3 (ref 1.7–7)
NEUTROPHILS NFR BLD AUTO: 10.23 10*3/MM3 (ref 1.7–7)
NEUTROPHILS NFR BLD AUTO: 10.81 10*3/MM3 (ref 1.7–7)
NEUTROPHILS NFR BLD AUTO: 11.81 10*3/MM3 (ref 1.7–7)
NEUTROPHILS NFR BLD AUTO: 12.48 10*3/MM3 (ref 1.7–7)
NEUTROPHILS NFR BLD AUTO: 17.17 10*3/MM3 (ref 1.7–7)
NEUTROPHILS NFR BLD AUTO: 18 10*3/MM3 (ref 1.7–7)
NEUTROPHILS NFR BLD AUTO: 6.44 10*3/MM3 (ref 1.7–7)
NEUTROPHILS NFR BLD AUTO: 78.8 % (ref 42.7–76)
NEUTROPHILS NFR BLD AUTO: 84.5 % (ref 42.7–76)
NEUTROPHILS NFR BLD AUTO: 84.6 % (ref 42.7–76)
NEUTROPHILS NFR BLD AUTO: 84.7 % (ref 42.7–76)
NEUTROPHILS NFR BLD AUTO: 87.2 % (ref 42.7–76)
NEUTROPHILS NFR BLD AUTO: 92 % (ref 42.7–76)
NEUTROPHILS NFR BLD AUTO: 93 % (ref 42.7–76)
NEUTROPHILS NFR BLD MANUAL: 78 % (ref 42.7–76)
NEUTROPHILS NFR BLD MANUAL: 86 % (ref 42.7–76)
NEUTROPHILS NFR BLD MANUAL: 94 % (ref 42.7–76)
NEUTS BAND NFR BLD MANUAL: 1 % (ref 0–5)
NEUTS BAND NFR BLD MANUAL: 5 % (ref 0–5)
NEUTS BAND NFR BLD MANUAL: 5 % (ref 0–5)
NITRITE UR QL STRIP: NEGATIVE
NITRITE UR QL STRIP: NEGATIVE
NOTIFIED BY: ABNORMAL
NOTIFIED WHO: ABNORMAL
NRBC BLD AUTO-RTO: 0 /100 WBC (ref 0–0.2)
NRBC BLD AUTO-RTO: 0.1 /100 WBC (ref 0–0.2)
NRBC BLD AUTO-RTO: 0.4 /100 WBC (ref 0–0.2)
NRBC BLD AUTO-RTO: 0.5 /100 WBC (ref 0–0.2)
OPIATES UR QL: NEGATIVE
OXYCODONE UR QL SCN: NEGATIVE
OXYHGB MFR BLDV: 67.7 % (ref 45–75)
OXYHGB MFR BLDV: 72 % (ref 45–75)
OXYHGB MFR BLDV: 73.6 % (ref 45–75)
OXYHGB MFR BLDV: 74.6 % (ref 45–75)
OXYHGB MFR BLDV: 75.5 % (ref 45–75)
OXYHGB MFR BLDV: 75.8 % (ref 45–75)
OXYHGB MFR BLDV: 78.7 % (ref 45–75)
OXYHGB MFR BLDV: 82 % (ref 45–75)
OXYHGB MFR BLDV: 90.2 % (ref 94–99)
OXYHGB MFR BLDV: 92.1 % (ref 94–99)
OXYHGB MFR BLDV: 92.2 % (ref 94–99)
OXYHGB MFR BLDV: 94.4 % (ref 94–99)
OXYHGB MFR BLDV: 94.4 % (ref 94–99)
OXYHGB MFR BLDV: 94.7 % (ref 94–99)
OXYHGB MFR BLDV: 94.9 % (ref 94–99)
OXYHGB MFR BLDV: 96.2 % (ref 94–99)
PCO2 BLDA: 38.6 MM HG (ref 35–45)
PCO2 BLDA: 40.2 MM HG (ref 35–45)
PCO2 BLDA: 41.4 MM HG (ref 35–45)
PCO2 BLDA: 42.2 MM HG (ref 35–45)
PCO2 BLDA: 42.9 MM HG (ref 35–45)
PCO2 BLDA: 43.9 MM HG (ref 35–45)
PCO2 BLDA: 46.9 MM HG (ref 35–45)
PCO2 BLDA: 49.2 MM HG (ref 35–45)
PCO2 BLDV: 41.9 MM HG (ref 41–51)
PCO2 BLDV: 43.4 MM HG (ref 41–51)
PCO2 BLDV: 45.1 MM HG (ref 41–51)
PCO2 BLDV: 45.2 MM HG (ref 41–51)
PCO2 BLDV: 45.5 MM HG (ref 41–51)
PCO2 BLDV: 47 MM HG (ref 41–51)
PCO2 BLDV: 47.3 MM HG (ref 41–51)
PCO2 BLDV: 50.6 MM HG (ref 41–51)
PCO2 TEMP ADJ BLD: ABNORMAL MM[HG]
PCP UR QL SCN: NEGATIVE
PEEP RESPIRATORY: 10 CM[H2O]
PEEP RESPIRATORY: 12 CM[H2O]
PEEP RESPIRATORY: 5 CM[H2O]
PEEP RESPIRATORY: 5 CM[H2O]
PEEP RESPIRATORY: 8 CM[H2O]
PF4 HEPARIN CMPLX IGG SERPL IA: 0.05 OD (ref 0–0.4)
PH BLDA: 7.28 PH UNITS (ref 7.35–7.45)
PH BLDA: 7.29 PH UNITS (ref 7.35–7.45)
PH BLDA: 7.32 PH UNITS (ref 7.35–7.45)
PH BLDA: 7.35 PH UNITS (ref 7.35–7.45)
PH BLDA: 7.37 PH UNITS (ref 7.35–7.45)
PH BLDA: 7.38 PH UNITS (ref 7.35–7.45)
PH BLDA: 7.39 PH UNITS (ref 7.35–7.45)
PH BLDA: 7.41 PH UNITS (ref 7.35–7.45)
PH BLDV: 7.22 PH UNITS (ref 7.32–7.42)
PH BLDV: 7.25 PH UNITS (ref 7.32–7.42)
PH BLDV: 7.27 PH UNITS (ref 7.32–7.42)
PH BLDV: 7.29 PH UNITS (ref 7.32–7.42)
PH BLDV: 7.38 PH UNITS (ref 7.32–7.42)
PH BLDV: 7.38 PH UNITS (ref 7.32–7.42)
PH BLDV: 7.4 PH UNITS (ref 7.32–7.42)
PH BLDV: 7.42 PH UNITS (ref 7.32–7.42)
PH UR STRIP.AUTO: 5.5 [PH] (ref 5–8)
PH UR STRIP.AUTO: 5.5 [PH] (ref 5–8)
PH, TEMP CORRECTED: ABNORMAL
PHOSPHATE SERPL-MCNC: 6.1 MG/DL (ref 2.5–4.5)
PHOSPHATE SERPL-MCNC: 6.7 MG/DL (ref 2.5–4.5)
PHOSPHATE SERPL-MCNC: 8.3 MG/DL (ref 2.5–4.5)
PLAT MORPH BLD: NORMAL
PLATELET # BLD AUTO: 115 10*3/MM3 (ref 140–450)
PLATELET # BLD AUTO: 117 10*3/MM3 (ref 140–450)
PLATELET # BLD AUTO: 142 10*3/MM3 (ref 140–450)
PLATELET # BLD AUTO: 144 10*3/MM3 (ref 140–450)
PLATELET # BLD AUTO: 155 10*3/MM3 (ref 140–450)
PLATELET # BLD AUTO: 179 10*3/MM3 (ref 140–450)
PLATELET # BLD AUTO: 180 10*3/MM3 (ref 140–450)
PLATELET # BLD AUTO: 189 10*3/MM3 (ref 140–450)
PLATELET # BLD AUTO: 190 10*3/MM3 (ref 140–450)
PLATELET # BLD AUTO: 82 10*3/MM3 (ref 140–450)
PLATELET # BLD AUTO: 89 10*3/MM3 (ref 140–450)
PMV BLD AUTO: 10.8 FL (ref 6–12)
PMV BLD AUTO: 10.9 FL (ref 6–12)
PMV BLD AUTO: 11 FL (ref 6–12)
PMV BLD AUTO: 11.1 FL (ref 6–12)
PMV BLD AUTO: 11.1 FL (ref 6–12)
PMV BLD AUTO: 11.2 FL (ref 6–12)
PMV BLD AUTO: 11.4 FL (ref 6–12)
PMV BLD AUTO: 11.5 FL (ref 6–12)
PMV BLD AUTO: 11.8 FL (ref 6–12)
PMV BLD AUTO: 12.2 FL (ref 6–12)
PMV BLD AUTO: 12.4 FL (ref 6–12)
PO2 BLDA: 106 MM HG (ref 83–108)
PO2 BLDA: 109 MM HG (ref 83–108)
PO2 BLDA: 68 MM HG (ref 83–108)
PO2 BLDA: 72.9 MM HG (ref 83–108)
PO2 BLDA: 74.8 MM HG (ref 83–108)
PO2 BLDA: 85.2 MM HG (ref 83–108)
PO2 BLDA: 90.8 MM HG (ref 83–108)
PO2 BLDA: 90.9 MM HG (ref 83–108)
PO2 BLDV: 40.9 MM HG (ref 27–53)
PO2 BLDV: 41.6 MM HG (ref 27–53)
PO2 BLDV: 42.6 MM HG (ref 27–53)
PO2 BLDV: 43.4 MM HG (ref 27–53)
PO2 BLDV: 45 MM HG (ref 27–53)
PO2 BLDV: 48.4 MM HG (ref 27–53)
PO2 BLDV: 48.7 MM HG (ref 27–53)
PO2 BLDV: 52.8 MM HG (ref 27–53)
PO2 TEMP ADJ BLD: ABNORMAL MM[HG]
POTASSIUM SERPL-SCNC: 2.2 MMOL/L (ref 3.5–5.2)
POTASSIUM SERPL-SCNC: 2.7 MMOL/L (ref 3.5–5.2)
POTASSIUM SERPL-SCNC: 3 MMOL/L (ref 3.5–5.2)
POTASSIUM SERPL-SCNC: 3.1 MMOL/L (ref 3.5–5.2)
POTASSIUM SERPL-SCNC: 3.3 MMOL/L (ref 3.5–5.2)
POTASSIUM SERPL-SCNC: 3.4 MMOL/L (ref 3.5–5.2)
POTASSIUM SERPL-SCNC: 3.5 MMOL/L (ref 3.5–5.2)
POTASSIUM SERPL-SCNC: 3.8 MMOL/L (ref 3.5–5.2)
POTASSIUM SERPL-SCNC: 3.9 MMOL/L (ref 3.5–5.2)
POTASSIUM SERPL-SCNC: 4 MMOL/L (ref 3.5–5.2)
POTASSIUM SERPL-SCNC: 4.1 MMOL/L (ref 3.5–5.2)
POTASSIUM SERPL-SCNC: 4.1 MMOL/L (ref 3.5–5.2)
POTASSIUM SERPL-SCNC: 4.2 MMOL/L (ref 3.5–5.2)
POTASSIUM SERPL-SCNC: 4.3 MMOL/L (ref 3.5–5.2)
POTASSIUM SERPL-SCNC: 4.8 MMOL/L (ref 3.5–5.2)
PROCALCITONIN SERPL-MCNC: 5.9 NG/ML (ref 0–0.25)
PROCALCITONIN SERPL-MCNC: 6.34 NG/ML (ref 0–0.25)
PROCALCITONIN SERPL-MCNC: 9.49 NG/ML (ref 0–0.25)
PROT SERPL-MCNC: 6.3 G/DL (ref 6–8.5)
PROT SERPL-MCNC: 6.3 G/DL (ref 6–8.5)
PROT SERPL-MCNC: 6.4 G/DL (ref 6–8.5)
PROT SERPL-MCNC: 6.6 G/DL (ref 6–8.5)
PROT SERPL-MCNC: 7 G/DL (ref 6–8.5)
PROT UR QL STRIP: ABNORMAL
PROT UR QL STRIP: ABNORMAL
PROTHROMBIN TIME: 13.1 SECONDS (ref 11.6–15.1)
PROTHROMBIN TIME: 15 SECONDS (ref 11.6–15.1)
PROTHROMBIN TIME: 15.4 SECONDS (ref 11.6–15.1)
QT INTERVAL: 330 MS
QT INTERVAL: 342 MS
QT INTERVAL: 344 MS
QT INTERVAL: 356 MS
QT INTERVAL: 398 MS
QT INTERVAL: 472 MS
QTC INTERVAL: 460 MS
QTC INTERVAL: 470 MS
QTC INTERVAL: 471 MS
QTC INTERVAL: 485 MS
QTC INTERVAL: 509 MS
QTC INTERVAL: 526 MS
RBC # BLD AUTO: 2.58 10*6/MM3 (ref 4.14–5.8)
RBC # BLD AUTO: 2.63 10*6/MM3 (ref 4.14–5.8)
RBC # BLD AUTO: 2.87 10*6/MM3 (ref 4.14–5.8)
RBC # BLD AUTO: 2.88 10*6/MM3 (ref 4.14–5.8)
RBC # BLD AUTO: 3.21 10*6/MM3 (ref 4.14–5.8)
RBC # BLD AUTO: 3.25 10*6/MM3 (ref 4.14–5.8)
RBC # BLD AUTO: 3.44 10*6/MM3 (ref 4.14–5.8)
RBC # BLD AUTO: 3.48 10*6/MM3 (ref 4.14–5.8)
RBC # BLD AUTO: 3.61 10*6/MM3 (ref 4.14–5.8)
RBC # BLD AUTO: 3.65 10*6/MM3 (ref 4.14–5.8)
RBC # BLD AUTO: 3.74 10*6/MM3 (ref 4.14–5.8)
RBC # UR STRIP: ABNORMAL /HPF
RBC # UR STRIP: ABNORMAL /HPF
REF LAB TEST METHOD: ABNORMAL
REF LAB TEST METHOD: ABNORMAL
REF LAB TEST METHOD: NORMAL
RHINOVIRUS RNA SPEC NAA+PROBE: NOT DETECTED
RSV RNA NPH QL NAA+NON-PROBE: NOT DETECTED
S PNEUM AG SPEC QL LA: NEGATIVE
SAO2 % BLDCOA: 91.2 % (ref 94–99)
SAO2 % BLDCOA: 94.4 % (ref 94–99)
SAO2 % BLDCOA: 94.5 % (ref 94–99)
SAO2 % BLDCOA: 95.8 % (ref 94–99)
SAO2 % BLDCOA: 96.4 % (ref 94–99)
SAO2 % BLDCOA: 97.1 % (ref 94–99)
SAO2 % BLDCOA: 97.2 % (ref 94–99)
SAO2 % BLDCOA: 98.3 % (ref 94–99)
SAO2 % BLDCOV: 69.5 % (ref 45–75)
SAO2 % BLDCOV: 73.6 % (ref 45–75)
SAO2 % BLDCOV: 75.4 % (ref 45–75)
SAO2 % BLDCOV: 76.4 % (ref 45–75)
SAO2 % BLDCOV: 77 % (ref 45–75)
SAO2 % BLDCOV: 77.4 % (ref 45–75)
SAO2 % BLDCOV: 80.6 % (ref 45–75)
SAO2 % BLDCOV: 83.9 % (ref 45–75)
SARS-COV-2 RNA RESP QL NAA+PROBE: NOT DETECTED
SET MECH RESP RATE: 18
SET MECH RESP RATE: 20
SET MECH RESP RATE: 20
SET MECH RESP RATE: 22
SET MECH RESP RATE: 24
SET MECH RESP RATE: 26
SET MECH RESP RATE: 28
SODIUM SERPL-SCNC: 134 MMOL/L (ref 136–145)
SODIUM SERPL-SCNC: 136 MMOL/L (ref 136–145)
SODIUM SERPL-SCNC: 138 MMOL/L (ref 136–145)
SODIUM SERPL-SCNC: 139 MMOL/L (ref 136–145)
SODIUM SERPL-SCNC: 140 MMOL/L (ref 136–145)
SODIUM SERPL-SCNC: 141 MMOL/L (ref 136–145)
SODIUM SERPL-SCNC: 141 MMOL/L (ref 136–145)
SODIUM SERPL-SCNC: 142 MMOL/L (ref 136–145)
SODIUM SERPL-SCNC: 142 MMOL/L (ref 136–145)
SODIUM SERPL-SCNC: 144 MMOL/L (ref 136–145)
SODIUM SERPL-SCNC: 146 MMOL/L (ref 136–145)
SP GR UR STRIP: 1.01 (ref 1–1.03)
SP GR UR STRIP: 1.01 (ref 1–1.03)
SQUAMOUS #/AREA URNS HPF: ABNORMAL /HPF
SQUAMOUS #/AREA URNS HPF: ABNORMAL /HPF
T4 FREE SERPL-MCNC: 0.85 NG/DL (ref 0.92–1.68)
TIBC SERPL-MCNC: 289 MCG/DL (ref 298–536)
TOTAL RATE: 25 BREATHS/MINUTE
TOTAL RATE: 28 BREATHS/MINUTE
TRANSFERRIN SERPL-MCNC: 194 MG/DL (ref 200–360)
TRICYCLICS UR QL SCN: NEGATIVE
TRIGL SERPL-MCNC: 225 MG/DL (ref 0–150)
TROPONIN T % DELTA: 1
TROPONIN T NUMERIC DELTA: 5 NG/L
TROPONIN T SERPL HS-MCNC: 745 NG/L
TSH SERPL DL<=0.05 MIU/L-ACNC: 0.2 UIU/ML (ref 0.27–4.2)
TSH SERPL DL<=0.05 MIU/L-ACNC: 0.32 UIU/ML (ref 0.27–4.2)
UFH PPP CHRO-ACNC: 0.1 IU/ML (ref 0.3–0.7)
UFH PPP CHRO-ACNC: 0.19 IU/ML (ref 0.3–0.7)
UFH PPP CHRO-ACNC: 0.2 IU/ML (ref 0.3–0.7)
UFH PPP CHRO-ACNC: 0.21 IU/ML (ref 0.3–0.7)
UFH PPP CHRO-ACNC: 0.23 IU/ML (ref 0.3–0.7)
UFH PPP CHRO-ACNC: 0.24 IU/ML (ref 0.3–0.7)
UFH PPP CHRO-ACNC: 0.25 IU/ML (ref 0.3–0.7)
UFH PPP CHRO-ACNC: 0.26 IU/ML (ref 0.3–0.7)
UFH PPP CHRO-ACNC: 0.3 IU/ML (ref 0.3–0.7)
UFH PPP CHRO-ACNC: 0.32 IU/ML (ref 0.3–0.7)
UFH PPP CHRO-ACNC: 0.41 IU/ML (ref 0.3–0.7)
UFH PPP CHRO-ACNC: 0.49 IU/ML (ref 0.3–0.7)
UFH PPP CHRO-ACNC: 0.53 IU/ML (ref 0.3–0.7)
UFH PPP CHRO-ACNC: 0.64 IU/ML (ref 0.3–0.7)
UFH PPP CHRO-ACNC: <0.1 IU/ML (ref 0.3–0.7)
UROBILINOGEN UR QL STRIP: ABNORMAL
UROBILINOGEN UR QL STRIP: ABNORMAL
VANCOMYCIN SERPL-MCNC: 27.9 MCG/ML (ref 5–40)
VANCOMYCIN SERPL-MCNC: 36.8 MCG/ML (ref 5–40)
VARIANT LYMPHS NFR BLD MANUAL: 1 % (ref 19.6–45.3)
VARIANT LYMPHS NFR BLD MANUAL: 5 % (ref 19.6–45.3)
VARIANT LYMPHS NFR BLD MANUAL: 7 % (ref 19.6–45.3)
VENTILATOR MODE: ABNORMAL
VIT B12 BLD-MCNC: >2000 PG/ML (ref 211–946)
VT ON VENT VENT: 500 ML
VT ON VENT VENT: 550 ML
WBC # UR STRIP: ABNORMAL /HPF
WBC # UR STRIP: ABNORMAL /HPF
WBC NRBC COR # BLD AUTO: 10.52 10*3/MM3 (ref 3.4–10.8)
WBC NRBC COR # BLD AUTO: 12.08 10*3/MM3 (ref 3.4–10.8)
WBC NRBC COR # BLD AUTO: 12.76 10*3/MM3 (ref 3.4–10.8)
WBC NRBC COR # BLD AUTO: 13.98 10*3/MM3 (ref 3.4–10.8)
WBC NRBC COR # BLD AUTO: 14.32 10*3/MM3 (ref 3.4–10.8)
WBC NRBC COR # BLD AUTO: 18.19 10*3/MM3 (ref 3.4–10.8)
WBC NRBC COR # BLD AUTO: 18.44 10*3/MM3 (ref 3.4–10.8)
WBC NRBC COR # BLD AUTO: 18.87 10*3/MM3 (ref 3.4–10.8)
WBC NRBC COR # BLD AUTO: 19.56 10*3/MM3 (ref 3.4–10.8)
WBC NRBC COR # BLD AUTO: 8.18 10*3/MM3 (ref 3.4–10.8)
WBC NRBC COR # BLD AUTO: 8.19 10*3/MM3 (ref 3.4–10.8)
WHOLE BLOOD HOLD COAG: NORMAL
WHOLE BLOOD HOLD SPECIMEN: NORMAL
YEAST URNS QL MICRO: ABNORMAL /HPF

## 2025-01-01 PROCEDURE — 94761 N-INVAS EAR/PLS OXIMETRY MLT: CPT

## 2025-01-01 PROCEDURE — 25010000002 HYDROCORTISONE SOD SUC (PF) 100 MG RECONSTITUTED SOLUTION: Performed by: INTERNAL MEDICINE

## 2025-01-01 PROCEDURE — 99222 1ST HOSP IP/OBS MODERATE 55: CPT | Performed by: PSYCHIATRY & NEUROLOGY

## 2025-01-01 PROCEDURE — 99222 1ST HOSP IP/OBS MODERATE 55: CPT | Performed by: INTERNAL MEDICINE

## 2025-01-01 PROCEDURE — 71045 X-RAY EXAM CHEST 1 VIEW: CPT | Performed by: RADIOLOGY

## 2025-01-01 PROCEDURE — 99233 SBSQ HOSP IP/OBS HIGH 50: CPT | Performed by: STUDENT IN AN ORGANIZED HEALTH CARE EDUCATION/TRAINING PROGRAM

## 2025-01-01 PROCEDURE — 71250 CT THORAX DX C-: CPT

## 2025-01-01 PROCEDURE — 94799 UNLISTED PULMONARY SVC/PX: CPT

## 2025-01-01 PROCEDURE — 83050 HGB METHEMOGLOBIN QUAN: CPT

## 2025-01-01 PROCEDURE — 85520 HEPARIN ASSAY: CPT | Performed by: INTERNAL MEDICINE

## 2025-01-01 PROCEDURE — 99232 SBSQ HOSP IP/OBS MODERATE 35: CPT | Performed by: INTERNAL MEDICINE

## 2025-01-01 PROCEDURE — 36600 WITHDRAWAL OF ARTERIAL BLOOD: CPT

## 2025-01-01 PROCEDURE — 99233 SBSQ HOSP IP/OBS HIGH 50: CPT | Performed by: INTERNAL MEDICINE

## 2025-01-01 PROCEDURE — 25010000002 HEPARIN (PORCINE) 25000-0.45 UT/250ML-% SOLUTION: Performed by: INTERNAL MEDICINE

## 2025-01-01 PROCEDURE — 25010000002 PROPOFOL 10 MG/ML EMULSION: Performed by: FAMILY MEDICINE

## 2025-01-01 PROCEDURE — 99291 CRITICAL CARE FIRST HOUR: CPT | Performed by: INTERNAL MEDICINE

## 2025-01-01 PROCEDURE — 85027 COMPLETE CBC AUTOMATED: CPT | Performed by: INTERNAL MEDICINE

## 2025-01-01 PROCEDURE — 25010000002 MIDAZOLAM PER 1 MG: Performed by: STUDENT IN AN ORGANIZED HEALTH CARE EDUCATION/TRAINING PROGRAM

## 2025-01-01 PROCEDURE — 74018 RADEX ABDOMEN 1 VIEW: CPT | Performed by: RADIOLOGY

## 2025-01-01 PROCEDURE — 82820 HEMOGLOBIN-OXYGEN AFFINITY: CPT

## 2025-01-01 PROCEDURE — 25810000003 LACTATED RINGERS PER 1000 ML: Performed by: INTERNAL MEDICINE

## 2025-01-01 PROCEDURE — 82805 BLOOD GASES W/O2 SATURATION: CPT

## 2025-01-01 PROCEDURE — 25010000002 MIDAZOLAM 1 MG/ML 100ML NS 100 MG/100ML SOLUTION: Performed by: INTERNAL MEDICINE

## 2025-01-01 PROCEDURE — 94003 VENT MGMT INPAT SUBQ DAY: CPT

## 2025-01-01 PROCEDURE — 63710000001 INSULIN GLARGINE PER 5 UNITS: Performed by: INTERNAL MEDICINE

## 2025-01-01 PROCEDURE — 70450 CT HEAD/BRAIN W/O DYE: CPT

## 2025-01-01 PROCEDURE — 76705 ECHO EXAM OF ABDOMEN: CPT

## 2025-01-01 PROCEDURE — 25010000002 CALCIUM GLUCONATE 2-0.675 GM/100ML-% SOLUTION: Performed by: INTERNAL MEDICINE

## 2025-01-01 PROCEDURE — 25810000003 SODIUM CHLORIDE 0.9 % SOLUTION: Performed by: INTERNAL MEDICINE

## 2025-01-01 PROCEDURE — 85025 COMPLETE CBC W/AUTO DIFF WBC: CPT | Performed by: INTERNAL MEDICINE

## 2025-01-01 PROCEDURE — 87147 CULTURE TYPE IMMUNOLOGIC: CPT | Performed by: INTERNAL MEDICINE

## 2025-01-01 PROCEDURE — 25010000002 POTASSIUM CHLORIDE 10 MEQ/100ML SOLUTION: Performed by: INTERNAL MEDICINE

## 2025-01-01 PROCEDURE — 71250 CT THORAX DX C-: CPT | Performed by: RADIOLOGY

## 2025-01-01 PROCEDURE — 25010000002 VANCOMYCIN 1.75-0.9 GM/500ML-% SOLUTION: Performed by: FAMILY MEDICINE

## 2025-01-01 PROCEDURE — 25010000002 FENTANYL CITRATE (PF) 50 MCG/ML SOLUTION: Performed by: INTERNAL MEDICINE

## 2025-01-01 PROCEDURE — 71045 X-RAY EXAM CHEST 1 VIEW: CPT

## 2025-01-01 PROCEDURE — 93005 ELECTROCARDIOGRAM TRACING: CPT | Performed by: INTERNAL MEDICINE

## 2025-01-01 PROCEDURE — 99291 CRITICAL CARE FIRST HOUR: CPT

## 2025-01-01 PROCEDURE — 80053 COMPREHEN METABOLIC PANEL: CPT | Performed by: INTERNAL MEDICINE

## 2025-01-01 PROCEDURE — 99223 1ST HOSP IP/OBS HIGH 75: CPT | Performed by: INTERNAL MEDICINE

## 2025-01-01 PROCEDURE — 84478 ASSAY OF TRIGLYCERIDES: CPT

## 2025-01-01 PROCEDURE — 25010000002 AMIODARONE IN DEXTROSE 5% 360-4.14 MG/200ML-% SOLUTION: Performed by: INTERNAL MEDICINE

## 2025-01-01 PROCEDURE — 25010000002 MIDAZOLAM PER 1 MG: Performed by: INTERNAL MEDICINE

## 2025-01-01 PROCEDURE — 25010000002 HEPARIN (PORCINE) 25000-0.45 UT/250ML-% SOLUTION

## 2025-01-01 PROCEDURE — 94664 DEMO&/EVAL PT USE INHALER: CPT

## 2025-01-01 PROCEDURE — 87154 CUL TYP ID BLD PTHGN 6+ TRGT: CPT | Performed by: FAMILY MEDICINE

## 2025-01-01 PROCEDURE — 82375 ASSAY CARBOXYHB QUANT: CPT

## 2025-01-01 PROCEDURE — 25010000002 CEFTRIAXONE PER 250 MG: Performed by: INTERNAL MEDICINE

## 2025-01-01 PROCEDURE — 84100 ASSAY OF PHOSPHORUS: CPT | Performed by: INTERNAL MEDICINE

## 2025-01-01 PROCEDURE — 83540 ASSAY OF IRON: CPT | Performed by: INTERNAL MEDICINE

## 2025-01-01 PROCEDURE — 82746 ASSAY OF FOLIC ACID SERUM: CPT | Performed by: INTERNAL MEDICINE

## 2025-01-01 PROCEDURE — 25010000002 AMIODARONE IN DEXTROSE 5% 360-4.14 MG/200ML-% SOLUTION: Performed by: NURSE PRACTITIONER

## 2025-01-01 PROCEDURE — 82140 ASSAY OF AMMONIA: CPT | Performed by: INTERNAL MEDICINE

## 2025-01-01 PROCEDURE — 85025 COMPLETE CBC W/AUTO DIFF WBC: CPT | Performed by: FAMILY MEDICINE

## 2025-01-01 PROCEDURE — 82550 ASSAY OF CK (CPK): CPT | Performed by: INTERNAL MEDICINE

## 2025-01-01 PROCEDURE — 85520 HEPARIN ASSAY: CPT

## 2025-01-01 PROCEDURE — 5A1955Z RESPIRATORY VENTILATION, GREATER THAN 96 CONSECUTIVE HOURS: ICD-10-PCS | Performed by: FAMILY MEDICINE

## 2025-01-01 PROCEDURE — 74176 CT ABD & PELVIS W/O CONTRAST: CPT | Performed by: RADIOLOGY

## 2025-01-01 PROCEDURE — 84484 ASSAY OF TROPONIN QUANT: CPT | Performed by: FAMILY MEDICINE

## 2025-01-01 PROCEDURE — 99221 1ST HOSP IP/OBS SF/LOW 40: CPT

## 2025-01-01 PROCEDURE — 25010000002 THIAMINE HCL 200 MG/2ML SOLUTION 2 ML VIAL: Performed by: INTERNAL MEDICINE

## 2025-01-01 PROCEDURE — 85730 THROMBOPLASTIN TIME PARTIAL: CPT | Performed by: FAMILY MEDICINE

## 2025-01-01 PROCEDURE — 25010000002 HYDROMORPHONE PER 4 MG: Performed by: NURSE PRACTITIONER

## 2025-01-01 PROCEDURE — 25010000002 CEFEPIME PER 500 MG: Performed by: INTERNAL MEDICINE

## 2025-01-01 PROCEDURE — 87070 CULTURE OTHR SPECIMN AEROBIC: CPT | Performed by: INTERNAL MEDICINE

## 2025-01-01 PROCEDURE — 84132 ASSAY OF SERUM POTASSIUM: CPT | Performed by: INTERNAL MEDICINE

## 2025-01-01 PROCEDURE — 85730 THROMBOPLASTIN TIME PARTIAL: CPT | Performed by: INTERNAL MEDICINE

## 2025-01-01 PROCEDURE — 25010000002 CALCIUM GLUCONATE-NACL 1-0.675 GM/50ML-% SOLUTION: Performed by: INTERNAL MEDICINE

## 2025-01-01 PROCEDURE — 82948 REAGENT STRIP/BLOOD GLUCOSE: CPT

## 2025-01-01 PROCEDURE — 63710000001 INSULIN REGULAR HUMAN PER 5 UNITS: Performed by: NURSE PRACTITIONER

## 2025-01-01 PROCEDURE — 02HV33Z INSERTION OF INFUSION DEVICE INTO SUPERIOR VENA CAVA, PERCUTANEOUS APPROACH: ICD-10-PCS

## 2025-01-01 PROCEDURE — 83735 ASSAY OF MAGNESIUM: CPT | Performed by: FAMILY MEDICINE

## 2025-01-01 PROCEDURE — 86140 C-REACTIVE PROTEIN: CPT | Performed by: FAMILY MEDICINE

## 2025-01-01 PROCEDURE — 25010000002 HEPARIN (PORCINE) PER 1000 UNITS: Performed by: INTERNAL MEDICINE

## 2025-01-01 PROCEDURE — 25010000002 SUCCINYLCHOLINE PER 20 MG: Performed by: FAMILY MEDICINE

## 2025-01-01 PROCEDURE — 85610 PROTHROMBIN TIME: CPT | Performed by: INTERNAL MEDICINE

## 2025-01-01 PROCEDURE — 80048 BASIC METABOLIC PNL TOTAL CA: CPT | Performed by: INTERNAL MEDICINE

## 2025-01-01 PROCEDURE — 93970 EXTREMITY STUDY: CPT | Performed by: RADIOLOGY

## 2025-01-01 PROCEDURE — 99231 SBSQ HOSP IP/OBS SF/LOW 25: CPT | Performed by: INTERNAL MEDICINE

## 2025-01-01 PROCEDURE — 82607 VITAMIN B-12: CPT | Performed by: INTERNAL MEDICINE

## 2025-01-01 PROCEDURE — 63710000001 INSULIN REGULAR HUMAN PER 5 UNITS: Performed by: INTERNAL MEDICINE

## 2025-01-01 PROCEDURE — 84466 ASSAY OF TRANSFERRIN: CPT | Performed by: INTERNAL MEDICINE

## 2025-01-01 PROCEDURE — 87154 CUL TYP ID BLD PTHGN 6+ TRGT: CPT | Performed by: INTERNAL MEDICINE

## 2025-01-01 PROCEDURE — 25010000002 PHENYLEPHRINE 10 MG/ML SOLUTION: Performed by: INTERNAL MEDICINE

## 2025-01-01 PROCEDURE — 87147 CULTURE TYPE IMMUNOLOGIC: CPT | Performed by: FAMILY MEDICINE

## 2025-01-01 PROCEDURE — 84145 PROCALCITONIN (PCT): CPT | Performed by: INTERNAL MEDICINE

## 2025-01-01 PROCEDURE — 87205 SMEAR GRAM STAIN: CPT | Performed by: INTERNAL MEDICINE

## 2025-01-01 PROCEDURE — 25010000002 VANCOMYCIN 2-0.9 GM/500ML-% SOLUTION: Performed by: INTERNAL MEDICINE

## 2025-01-01 PROCEDURE — 84443 ASSAY THYROID STIM HORMONE: CPT | Performed by: INTERNAL MEDICINE

## 2025-01-01 PROCEDURE — 74018 RADEX ABDOMEN 1 VIEW: CPT

## 2025-01-01 PROCEDURE — 93010 ELECTROCARDIOGRAM REPORT: CPT | Performed by: INTERNAL MEDICINE

## 2025-01-01 PROCEDURE — 25810000003 SODIUM CHLORIDE 0.9 % SOLUTION: Performed by: FAMILY MEDICINE

## 2025-01-01 PROCEDURE — 85384 FIBRINOGEN ACTIVITY: CPT | Performed by: INTERNAL MEDICINE

## 2025-01-01 PROCEDURE — 25010000002 METRONIDAZOLE 500 MG/100ML SOLUTION: Performed by: INTERNAL MEDICINE

## 2025-01-01 PROCEDURE — 80053 COMPREHEN METABOLIC PANEL: CPT | Performed by: FAMILY MEDICINE

## 2025-01-01 PROCEDURE — 0202U NFCT DS 22 TRGT SARS-COV-2: CPT | Performed by: INTERNAL MEDICINE

## 2025-01-01 PROCEDURE — 84145 PROCALCITONIN (PCT): CPT | Performed by: FAMILY MEDICINE

## 2025-01-01 PROCEDURE — 93306 TTE W/DOPPLER COMPLETE: CPT

## 2025-01-01 PROCEDURE — 99232 SBSQ HOSP IP/OBS MODERATE 35: CPT | Performed by: SPECIALIST

## 2025-01-01 PROCEDURE — 80307 DRUG TEST PRSMV CHEM ANLYZR: CPT | Performed by: FAMILY MEDICINE

## 2025-01-01 PROCEDURE — 83735 ASSAY OF MAGNESIUM: CPT | Performed by: INTERNAL MEDICINE

## 2025-01-01 PROCEDURE — 5A1D70Z PERFORMANCE OF URINARY FILTRATION, INTERMITTENT, LESS THAN 6 HOURS PER DAY: ICD-10-PCS | Performed by: INTERNAL MEDICINE

## 2025-01-01 PROCEDURE — 25010000002 AMIODARONE IN DEXTROSE 5% 150-4.21 MG/100ML-% SOLUTION: Performed by: NURSE PRACTITIONER

## 2025-01-01 PROCEDURE — C1751 CATH, INF, PER/CENT/MIDLINE: HCPCS

## 2025-01-01 PROCEDURE — 36556 INSERT NON-TUNNEL CV CATH: CPT

## 2025-01-01 PROCEDURE — 86022 PLATELET ANTIBODIES: CPT | Performed by: INTERNAL MEDICINE

## 2025-01-01 PROCEDURE — 0BH17EZ INSERTION OF ENDOTRACHEAL AIRWAY INTO TRACHEA, VIA NATURAL OR ARTIFICIAL OPENING: ICD-10-PCS | Performed by: FAMILY MEDICINE

## 2025-01-01 PROCEDURE — 25010000002 CALCIUM GLUCONATE-NACL 1-0.675 GM/50ML-% SOLUTION: Performed by: FAMILY MEDICINE

## 2025-01-01 PROCEDURE — 70450 CT HEAD/BRAIN W/O DYE: CPT | Performed by: RADIOLOGY

## 2025-01-01 PROCEDURE — 82077 ASSAY SPEC XCP UR&BREATH IA: CPT | Performed by: FAMILY MEDICINE

## 2025-01-01 PROCEDURE — 85007 BL SMEAR W/DIFF WBC COUNT: CPT | Performed by: INTERNAL MEDICINE

## 2025-01-01 PROCEDURE — 80202 ASSAY OF VANCOMYCIN: CPT

## 2025-01-01 PROCEDURE — 99232 SBSQ HOSP IP/OBS MODERATE 35: CPT | Performed by: PSYCHIATRY & NEUROLOGY

## 2025-01-01 PROCEDURE — 25010000002 CEFEPIME PER 500 MG: Performed by: FAMILY MEDICINE

## 2025-01-01 PROCEDURE — 25010000002 MAGNESIUM SULFATE IN D5W 1G/100ML (PREMIX) 1-5 GM/100ML-% SOLUTION: Performed by: FAMILY MEDICINE

## 2025-01-01 PROCEDURE — 31500 INSERT EMERGENCY AIRWAY: CPT

## 2025-01-01 PROCEDURE — 94002 VENT MGMT INPAT INIT DAY: CPT

## 2025-01-01 PROCEDURE — 25010000002 METRONIDAZOLE 500 MG/100ML SOLUTION: Performed by: FAMILY MEDICINE

## 2025-01-01 PROCEDURE — 95819 EEG AWAKE AND ASLEEP: CPT

## 2025-01-01 PROCEDURE — 25010000002 ONDANSETRON PER 1 MG: Performed by: NURSE PRACTITIONER

## 2025-01-01 PROCEDURE — 85362 FIBRIN DEGRADATION PRODUCTS: CPT | Performed by: INTERNAL MEDICINE

## 2025-01-01 PROCEDURE — 81001 URINALYSIS AUTO W/SCOPE: CPT | Performed by: FAMILY MEDICINE

## 2025-01-01 PROCEDURE — 84439 ASSAY OF FREE THYROXINE: CPT | Performed by: INTERNAL MEDICINE

## 2025-01-01 PROCEDURE — 74176 CT ABD & PELVIS W/O CONTRAST: CPT

## 2025-01-01 PROCEDURE — 76705 ECHO EXAM OF ABDOMEN: CPT | Performed by: RADIOLOGY

## 2025-01-01 PROCEDURE — 99232 SBSQ HOSP IP/OBS MODERATE 35: CPT | Performed by: SURGERY

## 2025-01-01 PROCEDURE — 80074 ACUTE HEPATITIS PANEL: CPT | Performed by: INTERNAL MEDICINE

## 2025-01-01 PROCEDURE — 36415 COLL VENOUS BLD VENIPUNCTURE: CPT

## 2025-01-01 PROCEDURE — 25010000002 FENTANYL CITRATE (PF) 2500 MCG/50ML SOLUTION: Performed by: FAMILY MEDICINE

## 2025-01-01 PROCEDURE — 85379 FIBRIN DEGRADATION QUANT: CPT | Performed by: INTERNAL MEDICINE

## 2025-01-01 PROCEDURE — 94640 AIRWAY INHALATION TREATMENT: CPT

## 2025-01-01 PROCEDURE — 83605 ASSAY OF LACTIC ACID: CPT | Performed by: FAMILY MEDICINE

## 2025-01-01 PROCEDURE — P9612 CATHETERIZE FOR URINE SPEC: HCPCS

## 2025-01-01 PROCEDURE — 99232 SBSQ HOSP IP/OBS MODERATE 35: CPT | Performed by: NURSE PRACTITIONER

## 2025-01-01 PROCEDURE — 93970 EXTREMITY STUDY: CPT

## 2025-01-01 PROCEDURE — 84100 ASSAY OF PHOSPHORUS: CPT | Performed by: FAMILY MEDICINE

## 2025-01-01 PROCEDURE — 25010000002 POTASSIUM CHLORIDE PER 2 MEQ: Performed by: INTERNAL MEDICINE

## 2025-01-01 PROCEDURE — 93005 ELECTROCARDIOGRAM TRACING: CPT | Performed by: FAMILY MEDICINE

## 2025-01-01 PROCEDURE — 84100 ASSAY OF PHOSPHORUS: CPT | Performed by: NURSE PRACTITIONER

## 2025-01-01 PROCEDURE — 25010000003 DEXTROSE 5 % SOLUTION 1,000 ML FLEX CONT: Performed by: INTERNAL MEDICINE

## 2025-01-01 PROCEDURE — 81001 URINALYSIS AUTO W/SCOPE: CPT | Performed by: INTERNAL MEDICINE

## 2025-01-01 PROCEDURE — 25010000002 CALCIUM GLUCONATE 2-0.675 GM/100ML-% SOLUTION: Performed by: FAMILY MEDICINE

## 2025-01-01 PROCEDURE — 76937 US GUIDE VASCULAR ACCESS: CPT

## 2025-01-01 PROCEDURE — 87040 BLOOD CULTURE FOR BACTERIA: CPT | Performed by: INTERNAL MEDICINE

## 2025-01-01 PROCEDURE — 83605 ASSAY OF LACTIC ACID: CPT | Performed by: INTERNAL MEDICINE

## 2025-01-01 PROCEDURE — 25010000002 GLYCOPYRROLATE 0.4 MG/2ML SOLUTION: Performed by: NURSE PRACTITIONER

## 2025-01-01 PROCEDURE — 36415 COLL VENOUS BLD VENIPUNCTURE: CPT | Performed by: INTERNAL MEDICINE

## 2025-01-01 PROCEDURE — 87186 SC STD MICRODIL/AGAR DIL: CPT | Performed by: INTERNAL MEDICINE

## 2025-01-01 PROCEDURE — 02HV33Z INSERTION OF INFUSION DEVICE INTO SUPERIOR VENA CAVA, PERCUTANEOUS APPROACH: ICD-10-PCS | Performed by: FAMILY MEDICINE

## 2025-01-01 PROCEDURE — 25010000002 CEFTRIAXONE PER 250 MG

## 2025-01-01 PROCEDURE — 82550 ASSAY OF CK (CPK): CPT

## 2025-01-01 PROCEDURE — 25010000002 POTASSIUM CHLORIDE 10 MEQ/100ML SOLUTION: Performed by: FAMILY MEDICINE

## 2025-01-01 PROCEDURE — 80076 HEPATIC FUNCTION PANEL: CPT | Performed by: INTERNAL MEDICINE

## 2025-01-01 PROCEDURE — 87086 URINE CULTURE/COLONY COUNT: CPT | Performed by: INTERNAL MEDICINE

## 2025-01-01 PROCEDURE — 95819 EEG AWAKE AND ASLEEP: CPT | Performed by: PSYCHIATRY & NEUROLOGY

## 2025-01-01 PROCEDURE — 87899 AGENT NOS ASSAY W/OPTIC: CPT | Performed by: NURSE PRACTITIONER

## 2025-01-01 PROCEDURE — 93306 TTE W/DOPPLER COMPLETE: CPT | Performed by: INTERNAL MEDICINE

## 2025-01-01 PROCEDURE — 87040 BLOOD CULTURE FOR BACTERIA: CPT | Performed by: FAMILY MEDICINE

## 2025-01-01 PROCEDURE — 82550 ASSAY OF CK (CPK): CPT | Performed by: FAMILY MEDICINE

## 2025-01-01 PROCEDURE — 25010000002 PROPOFOL 1000 MG/100ML EMULSION

## 2025-01-01 PROCEDURE — 85610 PROTHROMBIN TIME: CPT | Performed by: FAMILY MEDICINE

## 2025-01-01 PROCEDURE — 82140 ASSAY OF AMMONIA: CPT | Performed by: NURSE PRACTITIONER

## 2025-01-01 PROCEDURE — 82306 VITAMIN D 25 HYDROXY: CPT | Performed by: INTERNAL MEDICINE

## 2025-01-01 PROCEDURE — 87449 NOS EACH ORGANISM AG IA: CPT | Performed by: NURSE PRACTITIONER

## 2025-01-01 RX ORDER — NICOTINE POLACRILEX 4 MG
15 LOZENGE BUCCAL
Status: DISCONTINUED | OUTPATIENT
Start: 2025-01-01 | End: 2025-01-01

## 2025-01-01 RX ORDER — PANTOPRAZOLE SODIUM 40 MG/10ML
40 INJECTION, POWDER, LYOPHILIZED, FOR SOLUTION INTRAVENOUS
Status: DISCONTINUED | OUTPATIENT
Start: 2025-01-01 | End: 2025-01-01

## 2025-01-01 RX ORDER — NOREPINEPHRINE BITARTRATE 0.03 MG/ML
.02-.3 INJECTION, SOLUTION INTRAVENOUS
Status: DISCONTINUED | OUTPATIENT
Start: 2025-01-01 | End: 2025-01-01

## 2025-01-01 RX ORDER — SODIUM CHLORIDE 0.9 % (FLUSH) 0.9 %
10 SYRINGE (ML) INJECTION AS NEEDED
Status: DISCONTINUED | OUTPATIENT
Start: 2025-01-01 | End: 2025-01-01

## 2025-01-01 RX ORDER — FENTANYL CITRATE-0.9 % NACL/PF 10 MCG/ML
50-300 PLASTIC BAG, INJECTION (ML) INTRAVENOUS
Status: DISPENSED | OUTPATIENT
Start: 2025-01-01 | End: 2025-01-01

## 2025-01-01 RX ORDER — POTASSIUM CHLORIDE 7.45 MG/ML
10 INJECTION INTRAVENOUS
Status: COMPLETED | OUTPATIENT
Start: 2025-01-01 | End: 2025-01-01

## 2025-01-01 RX ORDER — LORATADINE 10 MG/1
10 TABLET ORAL DAILY PRN
COMMUNITY

## 2025-01-01 RX ORDER — DEXTROSE MONOHYDRATE 25 G/50ML
25 INJECTION, SOLUTION INTRAVENOUS
Status: DISCONTINUED | OUTPATIENT
Start: 2025-01-01 | End: 2025-01-01

## 2025-01-01 RX ORDER — OMEPRAZOLE 20 MG/1
40 CAPSULE, DELAYED RELEASE ORAL
COMMUNITY

## 2025-01-01 RX ORDER — SODIUM CHLORIDE 0.9 % (FLUSH) 0.9 %
10 SYRINGE (ML) INJECTION EVERY 12 HOURS SCHEDULED
Status: DISCONTINUED | OUTPATIENT
Start: 2025-01-01 | End: 2025-01-01

## 2025-01-01 RX ORDER — SODIUM CHLORIDE 9 MG/ML
40 INJECTION, SOLUTION INTRAVENOUS AS NEEDED
Status: DISCONTINUED | OUTPATIENT
Start: 2025-01-01 | End: 2025-01-01

## 2025-01-01 RX ORDER — AMIODARONE HYDROCHLORIDE 200 MG/1
200 TABLET ORAL
Status: DISCONTINUED | OUTPATIENT
Start: 2025-02-28 | End: 2025-01-01

## 2025-01-01 RX ORDER — GABAPENTIN 400 MG/1
1200 CAPSULE ORAL EVERY 8 HOURS SCHEDULED
Status: CANCELLED | OUTPATIENT
Start: 2025-01-01

## 2025-01-01 RX ORDER — SODIUM CHLORIDE 0.9 % (FLUSH) 0.9 %
20 SYRINGE (ML) INJECTION AS NEEDED
Status: DISCONTINUED | OUTPATIENT
Start: 2025-01-01 | End: 2025-01-01

## 2025-01-01 RX ORDER — POTASSIUM CHLORIDE 1.5 G/1.58G
40 POWDER, FOR SOLUTION ORAL ONCE
Status: COMPLETED | OUTPATIENT
Start: 2025-01-01 | End: 2025-01-01

## 2025-01-01 RX ORDER — PROPOFOL 10 MG/ML
INJECTION, EMULSION INTRAVENOUS
Status: COMPLETED
Start: 2025-01-01 | End: 2025-01-01

## 2025-01-01 RX ORDER — HEPARIN SODIUM 5000 [USP'U]/ML
4000 INJECTION, SOLUTION INTRAVENOUS; SUBCUTANEOUS ONCE
Status: COMPLETED | OUTPATIENT
Start: 2025-01-01 | End: 2025-01-01

## 2025-01-01 RX ORDER — METOPROLOL TARTRATE 1 MG/ML
5 INJECTION, SOLUTION INTRAVENOUS ONCE
Status: COMPLETED | OUTPATIENT
Start: 2025-01-01 | End: 2025-01-01

## 2025-01-01 RX ORDER — MIDAZOLAM HYDROCHLORIDE 1 MG/ML
2 INJECTION, SOLUTION INTRAMUSCULAR; INTRAVENOUS
Status: DISCONTINUED | OUTPATIENT
Start: 2025-01-01 | End: 2025-01-01

## 2025-01-01 RX ORDER — ACETAMINOPHEN 160 MG/5ML
650 SOLUTION ORAL ONCE
Status: COMPLETED | OUTPATIENT
Start: 2025-01-01 | End: 2025-01-01

## 2025-01-01 RX ORDER — LORAZEPAM 2 MG/ML
0.5 INJECTION INTRAMUSCULAR
Status: DISCONTINUED | OUTPATIENT
Start: 2025-01-01 | End: 2025-01-01 | Stop reason: HOSPADM

## 2025-01-01 RX ORDER — POTASSIUM CHLORIDE 7.45 MG/ML
10 INJECTION INTRAVENOUS ONCE
Status: COMPLETED | OUTPATIENT
Start: 2025-01-01 | End: 2025-01-01

## 2025-01-01 RX ORDER — DEXTROSE MONOHYDRATE 25 G/50ML
10-50 INJECTION, SOLUTION INTRAVENOUS
Status: DISCONTINUED | OUTPATIENT
Start: 2025-01-01 | End: 2025-01-01

## 2025-01-01 RX ORDER — SODIUM CHLORIDE, SODIUM LACTATE, POTASSIUM CHLORIDE, CALCIUM CHLORIDE 600; 310; 30; 20 MG/100ML; MG/100ML; MG/100ML; MG/100ML
250 INJECTION, SOLUTION INTRAVENOUS CONTINUOUS
Status: ACTIVE | OUTPATIENT
Start: 2025-01-01 | End: 2025-01-01

## 2025-01-01 RX ORDER — HEPARIN SODIUM 10000 [USP'U]/100ML
8.8 INJECTION, SOLUTION INTRAVENOUS
Status: DISCONTINUED | OUTPATIENT
Start: 2025-01-01 | End: 2025-01-01

## 2025-01-01 RX ORDER — SENNOSIDES A AND B 8.6 MG/1
1 TABLET, FILM COATED ORAL DAILY PRN
Status: DISCONTINUED | OUTPATIENT
Start: 2025-01-01 | End: 2025-01-01

## 2025-01-01 RX ORDER — MIDAZOLAM HYDROCHLORIDE 1 MG/ML
2 INJECTION, SOLUTION INTRAMUSCULAR; INTRAVENOUS EVERY 4 HOURS PRN
Status: DISCONTINUED | OUTPATIENT
Start: 2025-01-01 | End: 2025-01-01

## 2025-01-01 RX ORDER — CETIRIZINE HYDROCHLORIDE 10 MG/1
5 TABLET ORAL DAILY
Status: DISCONTINUED | OUTPATIENT
Start: 2025-01-01 | End: 2025-01-01

## 2025-01-01 RX ORDER — VANCOMYCIN 1.75 GRAM/500 ML IN 0.9 % SODIUM CHLORIDE INTRAVENOUS
20 ONCE
Status: COMPLETED | OUTPATIENT
Start: 2025-01-01 | End: 2025-01-01

## 2025-01-01 RX ORDER — POTASSIUM CHLORIDE 1.5 G/1.58G
20 POWDER, FOR SOLUTION ORAL ONCE
Status: COMPLETED | OUTPATIENT
Start: 2025-01-01 | End: 2025-01-01

## 2025-01-01 RX ORDER — HYDROCORTISONE SODIUM SUCCINATE 100 MG/2ML
50 INJECTION INTRAMUSCULAR; INTRAVENOUS EVERY 8 HOURS
Status: DISCONTINUED | OUTPATIENT
Start: 2025-01-01 | End: 2025-01-01

## 2025-01-01 RX ORDER — OSELTAMIVIR PHOSPHATE 30 MG/1
30 CAPSULE ORAL 3 TIMES WEEKLY
Status: COMPLETED | OUTPATIENT
Start: 2025-01-01 | End: 2025-01-01

## 2025-01-01 RX ORDER — LOSARTAN POTASSIUM 50 MG/1
50 TABLET ORAL DAILY
Status: CANCELLED | OUTPATIENT
Start: 2025-01-01

## 2025-01-01 RX ORDER — HEPARIN SODIUM 5000 [USP'U]/ML
2000 INJECTION, SOLUTION INTRAVENOUS; SUBCUTANEOUS ONCE
Status: COMPLETED | OUTPATIENT
Start: 2025-01-01 | End: 2025-01-01

## 2025-01-01 RX ORDER — SODIUM CHLORIDE 0.9 % (FLUSH) 0.9 %
10 SYRINGE (ML) INJECTION EVERY 12 HOURS SCHEDULED
Status: DISCONTINUED | OUTPATIENT
Start: 2025-01-01 | End: 2025-01-01 | Stop reason: HOSPADM

## 2025-01-01 RX ORDER — FLECAINIDE ACETATE 50 MG/1
75 TABLET ORAL 2 TIMES DAILY
Status: DISCONTINUED | OUTPATIENT
Start: 2025-01-01 | End: 2025-01-01

## 2025-01-01 RX ORDER — GABAPENTIN 600 MG/1
1200 TABLET ORAL 3 TIMES DAILY
COMMUNITY

## 2025-01-01 RX ORDER — DULOXETIN HYDROCHLORIDE 30 MG/1
30 CAPSULE, DELAYED RELEASE ORAL DAILY
Status: CANCELLED | OUTPATIENT
Start: 2025-01-01

## 2025-01-01 RX ORDER — ETOMIDATE 2 MG/ML
20 INJECTION INTRAVENOUS ONCE
Status: COMPLETED | OUTPATIENT
Start: 2025-01-01 | End: 2025-01-01

## 2025-01-01 RX ORDER — AMOXICILLIN 250 MG
2 CAPSULE ORAL 2 TIMES DAILY PRN
Status: DISCONTINUED | OUTPATIENT
Start: 2025-01-01 | End: 2025-01-01

## 2025-01-01 RX ORDER — CALCIUM GLUCONATE 20 MG/ML
2000 INJECTION, SOLUTION INTRAVENOUS
Status: COMPLETED | OUTPATIENT
Start: 2025-01-01 | End: 2025-01-01

## 2025-01-01 RX ORDER — CALCIUM GLUCONATE 20 MG/ML
1000 INJECTION, SOLUTION INTRAVENOUS
Status: COMPLETED | OUTPATIENT
Start: 2025-01-01 | End: 2025-01-01

## 2025-01-01 RX ORDER — HEPARIN SODIUM 5000 [USP'U]/ML
4000 INJECTION, SOLUTION INTRAVENOUS; SUBCUTANEOUS AS NEEDED
Status: DISCONTINUED | OUTPATIENT
Start: 2025-01-01 | End: 2025-01-01

## 2025-01-01 RX ORDER — ONDANSETRON 4 MG/1
4 TABLET, ORALLY DISINTEGRATING ORAL EVERY 4 HOURS PRN
Status: DISCONTINUED | OUTPATIENT
Start: 2025-01-01 | End: 2025-01-01 | Stop reason: HOSPADM

## 2025-01-01 RX ORDER — POTASSIUM CHLORIDE 29.8 MG/ML
20 INJECTION INTRAVENOUS
Status: COMPLETED | OUTPATIENT
Start: 2025-01-01 | End: 2025-01-01

## 2025-01-01 RX ORDER — HEPARIN SODIUM 10000 [USP'U]/100ML
17.8 INJECTION, SOLUTION INTRAVENOUS
Status: DISCONTINUED | OUTPATIENT
Start: 2025-01-01 | End: 2025-01-01

## 2025-01-01 RX ORDER — PANTOPRAZOLE SODIUM 40 MG/1
40 TABLET, DELAYED RELEASE ORAL
Status: DISCONTINUED | OUTPATIENT
Start: 2025-01-01 | End: 2025-01-01 | Stop reason: SDUPTHER

## 2025-01-01 RX ORDER — FLUTICASONE PROPIONATE AND SALMETEROL 250; 50 UG/1; UG/1
1 POWDER RESPIRATORY (INHALATION)
COMMUNITY

## 2025-01-01 RX ORDER — PHENYLEPHRINE HCL IN 0.9% NACL 0.5 MG/5ML
.5-3 SYRINGE (ML) INTRAVENOUS
Status: DISCONTINUED | OUTPATIENT
Start: 2025-01-01 | End: 2025-01-01

## 2025-01-01 RX ORDER — MIDODRINE HYDROCHLORIDE 2.5 MG/1
10 TABLET ORAL
Status: DISCONTINUED | OUTPATIENT
Start: 2025-01-01 | End: 2025-01-01

## 2025-01-01 RX ORDER — HYDROCORTISONE SODIUM SUCCINATE 100 MG/2ML
25 INJECTION INTRAMUSCULAR; INTRAVENOUS EVERY 8 HOURS
Status: DISCONTINUED | OUTPATIENT
Start: 2025-01-01 | End: 2025-01-01

## 2025-01-01 RX ORDER — VANCOMYCIN 2 GRAM/500 ML IN 0.9 % SODIUM CHLORIDE INTRAVENOUS
20 ONCE
Status: COMPLETED | OUTPATIENT
Start: 2025-01-01 | End: 2025-01-01

## 2025-01-01 RX ORDER — POTASSIUM CHLORIDE 1500 MG/1
20 TABLET, EXTENDED RELEASE ORAL ONCE
Status: COMPLETED | OUTPATIENT
Start: 2025-01-01 | End: 2025-01-01

## 2025-01-01 RX ORDER — IPRATROPIUM BROMIDE AND ALBUTEROL SULFATE 2.5; .5 MG/3ML; MG/3ML
3 SOLUTION RESPIRATORY (INHALATION)
Status: DISCONTINUED | OUTPATIENT
Start: 2025-01-01 | End: 2025-01-01

## 2025-01-01 RX ORDER — GLUCAGON 1 MG/ML
1 KIT INJECTION
Status: DISCONTINUED | OUTPATIENT
Start: 2025-01-01 | End: 2025-01-01

## 2025-01-01 RX ORDER — SENNOSIDES A AND B 8.6 MG/1
1 TABLET, FILM COATED ORAL DAILY PRN
COMMUNITY

## 2025-01-01 RX ORDER — ACETAMINOPHEN 325 MG/1
650 TABLET ORAL 3 TIMES DAILY PRN
COMMUNITY

## 2025-01-01 RX ORDER — ROSUVASTATIN CALCIUM 20 MG/1
10 TABLET, COATED ORAL NIGHTLY
Status: DISCONTINUED | OUTPATIENT
Start: 2025-01-01 | End: 2025-01-01

## 2025-01-01 RX ORDER — VECURONIUM BROMIDE 1 MG/ML
5 INJECTION, POWDER, LYOPHILIZED, FOR SOLUTION INTRAVENOUS ONCE
Status: COMPLETED | OUTPATIENT
Start: 2025-01-01 | End: 2025-01-01

## 2025-01-01 RX ORDER — ROCURONIUM BROMIDE 10 MG/ML
50 INJECTION, SOLUTION INTRAVENOUS ONCE
Status: COMPLETED | OUTPATIENT
Start: 2025-01-01 | End: 2025-01-01

## 2025-01-01 RX ORDER — FENTANYL CITRATE 50 UG/ML
25 INJECTION, SOLUTION INTRAMUSCULAR; INTRAVENOUS
Status: DISCONTINUED | OUTPATIENT
Start: 2025-01-01 | End: 2025-01-01 | Stop reason: HOSPADM

## 2025-01-01 RX ORDER — FUROSEMIDE 20 MG/1
20 TABLET ORAL DAILY PRN
Status: CANCELLED | OUTPATIENT
Start: 2025-01-01

## 2025-01-01 RX ORDER — BISACODYL 5 MG/1
5 TABLET, DELAYED RELEASE ORAL DAILY PRN
Status: DISCONTINUED | OUTPATIENT
Start: 2025-01-01 | End: 2025-01-01

## 2025-01-01 RX ORDER — METHOCARBAMOL 750 MG/1
750 TABLET, FILM COATED ORAL 2 TIMES DAILY PRN
Status: CANCELLED | OUTPATIENT
Start: 2025-01-01

## 2025-01-01 RX ORDER — HYOSCYAMINE SULFATE 0.12 MG/1
125 TABLET SUBLINGUAL EVERY 4 HOURS PRN
Status: DISCONTINUED | OUTPATIENT
Start: 2025-01-01 | End: 2025-01-01 | Stop reason: HOSPADM

## 2025-01-01 RX ORDER — HEPARIN SODIUM 5000 [USP'U]/ML
2000 INJECTION, SOLUTION INTRAVENOUS; SUBCUTANEOUS AS NEEDED
Status: DISCONTINUED | OUTPATIENT
Start: 2025-01-01 | End: 2025-01-01

## 2025-01-01 RX ORDER — METRONIDAZOLE 500 MG/100ML
500 INJECTION, SOLUTION INTRAVENOUS EVERY 8 HOURS
Status: COMPLETED | OUTPATIENT
Start: 2025-01-01 | End: 2025-01-01

## 2025-01-01 RX ORDER — METRONIDAZOLE 500 MG/100ML
500 INJECTION, SOLUTION INTRAVENOUS ONCE
Status: COMPLETED | OUTPATIENT
Start: 2025-01-01 | End: 2025-01-01

## 2025-01-01 RX ORDER — MAGNESIUM SULFATE 1 G/100ML
1 INJECTION INTRAVENOUS
Status: COMPLETED | OUTPATIENT
Start: 2025-01-01 | End: 2025-01-01

## 2025-01-01 RX ORDER — ALBUTEROL SULFATE 90 UG/1
2 INHALANT RESPIRATORY (INHALATION) EVERY 6 HOURS PRN
COMMUNITY

## 2025-01-01 RX ORDER — METOPROLOL TARTRATE 1 MG/ML
5 INJECTION, SOLUTION INTRAVENOUS EVERY 6 HOURS PRN
Status: DISCONTINUED | OUTPATIENT
Start: 2025-01-01 | End: 2025-01-01 | Stop reason: HOSPADM

## 2025-01-01 RX ORDER — FLUTICASONE PROPIONATE 50 MCG
1 SPRAY, SUSPENSION (ML) NASAL 2 TIMES DAILY PRN
COMMUNITY

## 2025-01-01 RX ORDER — MIDAZOLAM HYDROCHLORIDE 1 MG/ML
4 INJECTION, SOLUTION INTRAMUSCULAR; INTRAVENOUS ONCE
Status: COMPLETED | OUTPATIENT
Start: 2025-01-01 | End: 2025-01-01

## 2025-01-01 RX ORDER — CARVEDILOL 6.25 MG/1
12.5 TABLET ORAL 2 TIMES DAILY WITH MEALS
Status: CANCELLED | OUTPATIENT
Start: 2025-01-01

## 2025-01-01 RX ORDER — POTASSIUM CHLORIDE 29.8 MG/ML
20 INJECTION INTRAVENOUS ONCE
Status: COMPLETED | OUTPATIENT
Start: 2025-01-01 | End: 2025-01-01

## 2025-01-01 RX ORDER — METOPROLOL TARTRATE 25 MG/1
12.5 TABLET, FILM COATED ORAL EVERY 12 HOURS SCHEDULED
Status: DISCONTINUED | OUTPATIENT
Start: 2025-01-01 | End: 2025-01-01

## 2025-01-01 RX ORDER — BISACODYL 10 MG
10 SUPPOSITORY, RECTAL RECTAL DAILY PRN
Status: DISCONTINUED | OUTPATIENT
Start: 2025-01-01 | End: 2025-01-01 | Stop reason: HOSPADM

## 2025-01-01 RX ORDER — HYDROMORPHONE HYDROCHLORIDE 1 MG/ML
0.5 INJECTION, SOLUTION INTRAMUSCULAR; INTRAVENOUS; SUBCUTANEOUS
Status: DISCONTINUED | OUTPATIENT
Start: 2025-01-01 | End: 2025-01-01 | Stop reason: HOSPADM

## 2025-01-01 RX ORDER — MIDODRINE HYDROCHLORIDE 2.5 MG/1
5 TABLET ORAL
Status: DISCONTINUED | OUTPATIENT
Start: 2025-01-01 | End: 2025-01-01

## 2025-01-01 RX ORDER — FLUTICASONE PROPIONATE 50 MCG
1 SPRAY, SUSPENSION (ML) NASAL 2 TIMES DAILY PRN
Status: DISCONTINUED | OUTPATIENT
Start: 2025-01-01 | End: 2025-01-01

## 2025-01-01 RX ORDER — LANOLIN ALCOHOL/MO/W.PET/CERES
1000 CREAM (GRAM) TOPICAL DAILY
Status: DISCONTINUED | OUTPATIENT
Start: 2025-01-01 | End: 2025-01-01

## 2025-01-01 RX ORDER — HEPARIN SODIUM 5000 [USP'U]/ML
5000 INJECTION, SOLUTION INTRAVENOUS; SUBCUTANEOUS EVERY 8 HOURS SCHEDULED
Status: DISCONTINUED | OUTPATIENT
Start: 2025-01-01 | End: 2025-01-01

## 2025-01-01 RX ORDER — TRAMADOL HYDROCHLORIDE 50 MG/1
50 TABLET ORAL 3 TIMES DAILY PRN
Status: CANCELLED | OUTPATIENT
Start: 2025-01-01

## 2025-01-01 RX ORDER — FINASTERIDE 5 MG/1
5 TABLET, FILM COATED ORAL DAILY
Status: DISCONTINUED | OUTPATIENT
Start: 2025-01-01 | End: 2025-01-01

## 2025-01-01 RX ORDER — SODIUM CHLORIDE, SODIUM LACTATE, POTASSIUM CHLORIDE, CALCIUM CHLORIDE 600; 310; 30; 20 MG/100ML; MG/100ML; MG/100ML; MG/100ML
150 INJECTION, SOLUTION INTRAVENOUS CONTINUOUS
Status: DISCONTINUED | OUTPATIENT
Start: 2025-01-01 | End: 2025-01-01

## 2025-01-01 RX ORDER — FLUMAZENIL 0.1 MG/ML
0.5 INJECTION INTRAVENOUS ONCE
Status: DISCONTINUED | OUTPATIENT
Start: 2025-01-01 | End: 2025-01-01

## 2025-01-01 RX ORDER — ONDANSETRON 2 MG/ML
4 INJECTION INTRAMUSCULAR; INTRAVENOUS EVERY 4 HOURS PRN
Status: DISCONTINUED | OUTPATIENT
Start: 2025-01-01 | End: 2025-01-01 | Stop reason: HOSPADM

## 2025-01-01 RX ORDER — AMIODARONE HYDROCHLORIDE 200 MG/1
400 TABLET ORAL
Status: DISCONTINUED | OUTPATIENT
Start: 2025-01-01 | End: 2025-01-01

## 2025-01-01 RX ORDER — METRONIDAZOLE 500 MG/100ML
500 INJECTION, SOLUTION INTRAVENOUS EVERY 8 HOURS
Status: DISCONTINUED | OUTPATIENT
Start: 2025-01-01 | End: 2025-01-01

## 2025-01-01 RX ORDER — HYDROCORTISONE SODIUM SUCCINATE 100 MG/2ML
100 INJECTION INTRAMUSCULAR; INTRAVENOUS EVERY 8 HOURS
Status: DISCONTINUED | OUTPATIENT
Start: 2025-01-01 | End: 2025-01-01

## 2025-01-01 RX ORDER — POLYETHYLENE GLYCOL 3350 17 G/17G
17 POWDER, FOR SOLUTION ORAL DAILY PRN
Status: DISCONTINUED | OUTPATIENT
Start: 2025-01-01 | End: 2025-01-01

## 2025-01-01 RX ORDER — TRAMADOL HYDROCHLORIDE 50 MG/1
50 TABLET ORAL 3 TIMES DAILY PRN
COMMUNITY

## 2025-01-01 RX ORDER — ALBUTEROL SULFATE 90 UG/1
2 INHALANT RESPIRATORY (INHALATION) EVERY 6 HOURS PRN
Status: CANCELLED | OUTPATIENT
Start: 2025-01-01

## 2025-01-01 RX ORDER — AMLODIPINE BESYLATE 5 MG/1
5 TABLET ORAL EVERY MORNING
COMMUNITY

## 2025-01-01 RX ORDER — POLYVINYL ALCOHOL 14 MG/ML
1 SOLUTION/ DROPS OPHTHALMIC 4 TIMES DAILY PRN
COMMUNITY

## 2025-01-01 RX ORDER — ACETAMINOPHEN 325 MG/1
650 TABLET ORAL EVERY 6 HOURS PRN
Status: DISCONTINUED | OUTPATIENT
Start: 2025-01-01 | End: 2025-01-01

## 2025-01-01 RX ORDER — AMLODIPINE BESYLATE 5 MG/1
5 TABLET ORAL EVERY MORNING
Status: CANCELLED | OUTPATIENT
Start: 2025-01-01

## 2025-01-01 RX ORDER — DULOXETIN HYDROCHLORIDE 30 MG/1
30 CAPSULE, DELAYED RELEASE ORAL DAILY
COMMUNITY

## 2025-01-01 RX ORDER — FOLIC ACID 1 MG/1
1 TABLET ORAL DAILY
Status: DISCONTINUED | OUTPATIENT
Start: 2025-01-01 | End: 2025-01-01

## 2025-01-01 RX ORDER — MIDODRINE HYDROCHLORIDE 2.5 MG/1
15 TABLET ORAL
Status: DISCONTINUED | OUTPATIENT
Start: 2025-01-01 | End: 2025-01-01

## 2025-01-01 RX ORDER — NOREPINEPHRINE BITARTRATE 0.03 MG/ML
INJECTION, SOLUTION INTRAVENOUS
Status: COMPLETED
Start: 2025-01-01 | End: 2025-01-01

## 2025-01-01 RX ORDER — MIDAZOLAM IN NACL,ISO-OSMOT/PF 50 MG/50ML
1-10 INFUSION BOTTLE (ML) INTRAVENOUS
Status: DISCONTINUED | OUTPATIENT
Start: 2025-01-01 | End: 2025-01-01

## 2025-01-01 RX ORDER — AMIODARONE HYDROCHLORIDE 200 MG/1
200 TABLET ORAL
Status: DISCONTINUED | OUTPATIENT
Start: 2025-02-26 | End: 2025-01-01

## 2025-01-01 RX ORDER — ASPIRIN 81 MG/1
81 TABLET ORAL DAILY
Status: CANCELLED | OUTPATIENT
Start: 2025-01-01

## 2025-01-01 RX ORDER — CHLORHEXIDINE GLUCONATE 0.12 MG/ML
15 RINSE ORAL EVERY 12 HOURS SCHEDULED
Status: DISCONTINUED | OUTPATIENT
Start: 2025-01-01 | End: 2025-01-01 | Stop reason: HOSPADM

## 2025-01-01 RX ORDER — METHOCARBAMOL 750 MG/1
750 TABLET, FILM COATED ORAL 2 TIMES DAILY PRN
COMMUNITY

## 2025-01-01 RX ORDER — LOSARTAN POTASSIUM 50 MG/1
50 TABLET ORAL DAILY
COMMUNITY

## 2025-01-01 RX ORDER — SCOPOLAMINE 1 MG/3D
1 PATCH, EXTENDED RELEASE TRANSDERMAL
Status: DISCONTINUED | OUTPATIENT
Start: 2025-01-01 | End: 2025-01-01 | Stop reason: HOSPADM

## 2025-01-01 RX ORDER — OXYCODONE HYDROCHLORIDE 5 MG/1
5 TABLET ORAL EVERY 8 HOURS PRN
Status: DISCONTINUED | OUTPATIENT
Start: 2025-01-01 | End: 2025-01-01

## 2025-01-01 RX ORDER — GLYCOPYRROLATE 0.2 MG/ML
0.4 INJECTION INTRAMUSCULAR; INTRAVENOUS EVERY 4 HOURS PRN
Status: DISCONTINUED | OUTPATIENT
Start: 2025-01-01 | End: 2025-01-01 | Stop reason: HOSPADM

## 2025-01-01 RX ORDER — SUCCINYLCHOLINE CHLORIDE 20 MG/ML
70 INJECTION INTRAMUSCULAR; INTRAVENOUS ONCE
Status: COMPLETED | OUTPATIENT
Start: 2025-01-01 | End: 2025-01-01

## 2025-01-01 RX ADMIN — IPRATROPIUM BROMIDE AND ALBUTEROL SULFATE 3 ML: .5; 2.5 SOLUTION RESPIRATORY (INHALATION) at 06:30

## 2025-01-01 RX ADMIN — GLYCOPYRROLATE 0.4 MG: 0.2 INJECTION INTRAMUSCULAR; INTRAVENOUS at 14:42

## 2025-01-01 RX ADMIN — HEPARIN SODIUM 4000 UNITS: 5000 INJECTION INTRAVENOUS; SUBCUTANEOUS at 16:41

## 2025-01-01 RX ADMIN — AMIODARONE HYDROCHLORIDE 0.5 MG/MIN: 1.8 INJECTION, SOLUTION INTRAVENOUS at 07:13

## 2025-01-01 RX ADMIN — SODIUM CHLORIDE 2 MG/KG/HR: 9 INJECTION, SOLUTION INTRAVENOUS at 22:14

## 2025-01-01 RX ADMIN — SODIUM CHLORIDE 1.5 MCG/KG/HR: 9 INJECTION, SOLUTION INTRAVENOUS at 04:30

## 2025-01-01 RX ADMIN — METOPROLOL TARTRATE 5 MG: 1 INJECTION, SOLUTION INTRAVENOUS at 14:24

## 2025-01-01 RX ADMIN — CETIRIZINE HYDROCHLORIDE 5 MG: 10 TABLET, FILM COATED ORAL at 08:00

## 2025-01-01 RX ADMIN — HYDROMORPHONE HYDROCHLORIDE 0.5 MG: 1 INJECTION, SOLUTION INTRAMUSCULAR; INTRAVENOUS; SUBCUTANEOUS at 04:01

## 2025-01-01 RX ADMIN — CHLORHEXIDINE GLUCONATE 15 ML: 1.2 RINSE ORAL at 08:34

## 2025-01-01 RX ADMIN — HEPARIN SODIUM 5000 UNITS: 5000 INJECTION INTRAVENOUS; SUBCUTANEOUS at 06:29

## 2025-01-01 RX ADMIN — FOLIC ACID 1 MG: 1 TABLET ORAL at 08:25

## 2025-01-01 RX ADMIN — HYDROCORTISONE SODIUM SUCCINATE 100 MG: 100 INJECTION, POWDER, FOR SOLUTION INTRAMUSCULAR; INTRAVENOUS at 00:18

## 2025-01-01 RX ADMIN — PROPOFOL 25 MCG/KG/MIN: 10 INJECTION, EMULSION INTRAVENOUS at 18:43

## 2025-01-01 RX ADMIN — Medication 200 MG: at 08:00

## 2025-01-01 RX ADMIN — CETIRIZINE HYDROCHLORIDE 5 MG: 10 TABLET, FILM COATED ORAL at 09:11

## 2025-01-01 RX ADMIN — MAGNESIUM SULFATE HEPTAHYDRATE 1 G: 1 INJECTION, SOLUTION INTRAVENOUS at 18:40

## 2025-01-01 RX ADMIN — IPRATROPIUM BROMIDE AND ALBUTEROL SULFATE 3 ML: .5; 2.5 SOLUTION RESPIRATORY (INHALATION) at 00:12

## 2025-01-01 RX ADMIN — Medication 10 ML: at 09:02

## 2025-01-01 RX ADMIN — PROPOFOL 20 MCG/KG/MIN: 10 INJECTION, EMULSION INTRAVENOUS at 03:11

## 2025-01-01 RX ADMIN — INSULIN GLARGINE 20 UNITS: 100 INJECTION, SOLUTION SUBCUTANEOUS at 17:26

## 2025-01-01 RX ADMIN — CHLORHEXIDINE GLUCONATE 15 ML: 1.2 RINSE ORAL at 20:37

## 2025-01-01 RX ADMIN — MUPIROCIN 1 APPLICATION: 20 OINTMENT TOPICAL at 21:53

## 2025-01-01 RX ADMIN — THIAMINE HYDROCHLORIDE 500 MG: 100 INJECTION, SOLUTION INTRAMUSCULAR; INTRAVENOUS at 20:38

## 2025-01-01 RX ADMIN — HEPARIN SODIUM 5000 UNITS: 5000 INJECTION INTRAVENOUS; SUBCUTANEOUS at 21:05

## 2025-01-01 RX ADMIN — SODIUM CHLORIDE 1.5 MCG/KG/HR: 9 INJECTION, SOLUTION INTRAVENOUS at 18:26

## 2025-01-01 RX ADMIN — PHENYLEPHRINE HYDROCHLORIDE 0.8 MCG/KG/MIN: 10 INJECTION INTRAVENOUS at 06:01

## 2025-01-01 RX ADMIN — Medication 10 ML: at 08:20

## 2025-01-01 RX ADMIN — Medication 10 ML: at 20:55

## 2025-01-01 RX ADMIN — CALCIUM GLUCONATE 2000 MG: 20 INJECTION, SOLUTION INTRAVENOUS at 21:29

## 2025-01-01 RX ADMIN — NOREPINEPHRINE BITARTRATE 0.2 MCG/KG/MIN: 0.03 INJECTION, SOLUTION INTRAVENOUS at 15:16

## 2025-01-01 RX ADMIN — Medication 10 ML: at 21:24

## 2025-01-01 RX ADMIN — HYDROCORTISONE SODIUM SUCCINATE 100 MG: 100 INJECTION, POWDER, FOR SOLUTION INTRAMUSCULAR; INTRAVENOUS at 00:15

## 2025-01-01 RX ADMIN — MIDODRINE HYDROCHLORIDE 15 MG: 2.5 TABLET ORAL at 16:55

## 2025-01-01 RX ADMIN — IPRATROPIUM BROMIDE AND ALBUTEROL SULFATE 3 ML: .5; 2.5 SOLUTION RESPIRATORY (INHALATION) at 00:54

## 2025-01-01 RX ADMIN — MIDODRINE HYDROCHLORIDE 15 MG: 2.5 TABLET ORAL at 17:07

## 2025-01-01 RX ADMIN — ONDANSETRON 4 MG: 2 INJECTION INTRAMUSCULAR; INTRAVENOUS at 14:42

## 2025-01-01 RX ADMIN — Medication 10 ML: at 20:12

## 2025-01-01 RX ADMIN — IPRATROPIUM BROMIDE AND ALBUTEROL SULFATE 3 ML: .5; 2.5 SOLUTION RESPIRATORY (INHALATION) at 06:52

## 2025-01-01 RX ADMIN — AMIODARONE HYDROCHLORIDE 400 MG: 200 TABLET ORAL at 08:30

## 2025-01-01 RX ADMIN — INSULIN HUMAN 1 UNITS/HR: 1 INJECTION, SOLUTION INTRAVENOUS at 10:58

## 2025-01-01 RX ADMIN — CHLORHEXIDINE GLUCONATE 15 ML: 1.2 RINSE ORAL at 08:30

## 2025-01-01 RX ADMIN — PHENYLEPHRINE HYDROCHLORIDE 1.4 MCG/KG/MIN: 10 INJECTION INTRAVENOUS at 18:53

## 2025-01-01 RX ADMIN — IPRATROPIUM BROMIDE AND ALBUTEROL SULFATE 3 ML: .5; 2.5 SOLUTION RESPIRATORY (INHALATION) at 12:14

## 2025-01-01 RX ADMIN — Medication 10 ML: at 08:48

## 2025-01-01 RX ADMIN — FOLIC ACID 1 MG: 1 TABLET ORAL at 16:32

## 2025-01-01 RX ADMIN — IPRATROPIUM BROMIDE AND ALBUTEROL SULFATE 3 ML: .5; 2.5 SOLUTION RESPIRATORY (INHALATION) at 12:44

## 2025-01-01 RX ADMIN — CALCIUM GLUCONATE 1000 MG: 20 INJECTION, SOLUTION INTRAVENOUS at 11:42

## 2025-01-01 RX ADMIN — DOXYCYCLINE 100 MG: 100 INJECTION, POWDER, LYOPHILIZED, FOR SOLUTION INTRAVENOUS at 21:18

## 2025-01-01 RX ADMIN — SODIUM CHLORIDE 1.5 MCG/KG/HR: 9 INJECTION, SOLUTION INTRAVENOUS at 08:09

## 2025-01-01 RX ADMIN — SODIUM CHLORIDE 1 MCG/KG/HR: 9 INJECTION, SOLUTION INTRAVENOUS at 21:59

## 2025-01-01 RX ADMIN — CEFEPIME HYDROCHLORIDE 1000 MG: 1 INJECTION, POWDER, FOR SOLUTION INTRAMUSCULAR; INTRAVENOUS at 05:49

## 2025-01-01 RX ADMIN — PROPOFOL 25 MCG/KG/MIN: 10 INJECTION, EMULSION INTRAVENOUS at 05:14

## 2025-01-01 RX ADMIN — FINASTERIDE 5 MG: 5 TABLET, FILM COATED ORAL at 08:18

## 2025-01-01 RX ADMIN — THIAMINE HYDROCHLORIDE 500 MG: 100 INJECTION, SOLUTION INTRAMUSCULAR; INTRAVENOUS at 16:17

## 2025-01-01 RX ADMIN — Medication 1750 MG: at 15:52

## 2025-01-01 RX ADMIN — INSULIN HUMAN 2 UNITS: 100 INJECTION, SOLUTION PARENTERAL at 13:14

## 2025-01-01 RX ADMIN — INSULIN HUMAN 2 UNITS: 100 INJECTION, SOLUTION PARENTERAL at 17:36

## 2025-01-01 RX ADMIN — INSULIN HUMAN 2 UNITS: 100 INJECTION, SOLUTION PARENTERAL at 05:48

## 2025-01-01 RX ADMIN — POTASSIUM CHLORIDE 20 MEQ: 1.5 POWDER, FOR SOLUTION ORAL at 22:40

## 2025-01-01 RX ADMIN — CEFTRIAXONE 1000 MG: 1 INJECTION, POWDER, FOR SOLUTION INTRAMUSCULAR; INTRAVENOUS at 08:46

## 2025-01-01 RX ADMIN — INSULIN HUMAN 4 UNITS: 100 INJECTION, SOLUTION PARENTERAL at 13:23

## 2025-01-01 RX ADMIN — Medication 10 ML: at 08:27

## 2025-01-01 RX ADMIN — IPRATROPIUM BROMIDE AND ALBUTEROL SULFATE 3 ML: .5; 2.5 SOLUTION RESPIRATORY (INHALATION) at 00:18

## 2025-01-01 RX ADMIN — Medication 0.5 MCG/KG/MIN: at 13:44

## 2025-01-01 RX ADMIN — FENTANYL CITRATE 25 MCG: 50 INJECTION, SOLUTION INTRAMUSCULAR; INTRAVENOUS at 19:33

## 2025-01-01 RX ADMIN — CEFTRIAXONE 1000 MG: 1 INJECTION, POWDER, FOR SOLUTION INTRAMUSCULAR; INTRAVENOUS at 09:12

## 2025-01-01 RX ADMIN — MAGNESIUM SULFATE HEPTAHYDRATE 1 G: 1 INJECTION, SOLUTION INTRAVENOUS at 17:29

## 2025-01-01 RX ADMIN — PANTOPRAZOLE SODIUM 40 MG: 40 INJECTION, POWDER, LYOPHILIZED, FOR SOLUTION INTRAVENOUS at 08:30

## 2025-01-01 RX ADMIN — CEFEPIME HYDROCHLORIDE 1000 MG: 1 INJECTION, POWDER, FOR SOLUTION INTRAMUSCULAR; INTRAVENOUS at 05:45

## 2025-01-01 RX ADMIN — METRONIDAZOLE 500 MG: 500 INJECTION, SOLUTION INTRAVENOUS at 12:43

## 2025-01-01 RX ADMIN — FOLIC ACID 1 MG: 1 TABLET ORAL at 08:26

## 2025-01-01 RX ADMIN — IPRATROPIUM BROMIDE AND ALBUTEROL SULFATE 3 ML: .5; 2.5 SOLUTION RESPIRATORY (INHALATION) at 06:39

## 2025-01-01 RX ADMIN — MIDODRINE HYDROCHLORIDE 5 MG: 2.5 TABLET ORAL at 16:30

## 2025-01-01 RX ADMIN — Medication 10 ML: at 08:34

## 2025-01-01 RX ADMIN — INSULIN HUMAN 2.4 UNITS/HR: 1 INJECTION, SOLUTION INTRAVENOUS at 05:35

## 2025-01-01 RX ADMIN — POTASSIUM CHLORIDE 10 MEQ: 7.46 INJECTION, SOLUTION INTRAVENOUS at 16:51

## 2025-01-01 RX ADMIN — SCOPOLAMINE 1 PATCH: 1.5 PATCH, EXTENDED RELEASE TRANSDERMAL at 14:41

## 2025-01-01 RX ADMIN — PANTOPRAZOLE SODIUM 40 MG: 40 INJECTION, POWDER, LYOPHILIZED, FOR SOLUTION INTRAVENOUS at 17:48

## 2025-01-01 RX ADMIN — Medication 10 ML: at 08:19

## 2025-01-01 RX ADMIN — POTASSIUM CHLORIDE 40 MEQ: 1.5 POWDER, FOR SOLUTION ORAL at 16:32

## 2025-01-01 RX ADMIN — FINASTERIDE 5 MG: 5 TABLET, FILM COATED ORAL at 08:33

## 2025-01-01 RX ADMIN — Medication 10 ML: at 20:15

## 2025-01-01 RX ADMIN — IPRATROPIUM BROMIDE AND ALBUTEROL SULFATE 3 ML: .5; 2.5 SOLUTION RESPIRATORY (INHALATION) at 06:14

## 2025-01-01 RX ADMIN — PANTOPRAZOLE SODIUM 40 MG: 40 INJECTION, POWDER, LYOPHILIZED, FOR SOLUTION INTRAVENOUS at 08:20

## 2025-01-01 RX ADMIN — Medication 10 ML: at 20:14

## 2025-01-01 RX ADMIN — ROSUVASTATIN 10 MG: 20 TABLET, FILM COATED ORAL at 21:52

## 2025-01-01 RX ADMIN — INSULIN HUMAN 2 UNITS: 100 INJECTION, SOLUTION PARENTERAL at 11:52

## 2025-01-01 RX ADMIN — DOXYCYCLINE 100 MG: 100 INJECTION, POWDER, LYOPHILIZED, FOR SOLUTION INTRAVENOUS at 08:30

## 2025-01-01 RX ADMIN — INSULIN HUMAN 16 UNITS: 100 INJECTION, SOLUTION PARENTERAL at 05:49

## 2025-01-01 RX ADMIN — INSULIN HUMAN 2 UNITS: 100 INJECTION, SOLUTION PARENTERAL at 05:45

## 2025-01-01 RX ADMIN — Medication 10 ML: at 09:12

## 2025-01-01 RX ADMIN — Medication 10 ML: at 21:20

## 2025-01-01 RX ADMIN — Medication 10 ML: at 08:45

## 2025-01-01 RX ADMIN — HYDROCORTISONE SODIUM SUCCINATE 100 MG: 100 INJECTION, POWDER, FOR SOLUTION INTRAMUSCULAR; INTRAVENOUS at 08:45

## 2025-01-01 RX ADMIN — Medication 10 ML: at 20:22

## 2025-01-01 RX ADMIN — SODIUM CHLORIDE 1.5 MCG/KG/HR: 9 INJECTION, SOLUTION INTRAVENOUS at 11:16

## 2025-01-01 RX ADMIN — INSULIN HUMAN 4 UNITS: 100 INJECTION, SOLUTION PARENTERAL at 23:39

## 2025-01-01 RX ADMIN — Medication 10 ML: at 08:35

## 2025-01-01 RX ADMIN — PHENYLEPHRINE HYDROCHLORIDE 0.8 MCG/KG/MIN: 10 INJECTION INTRAVENOUS at 18:37

## 2025-01-01 RX ADMIN — METOPROLOL TARTRATE 5 MG: 1 INJECTION, SOLUTION INTRAVENOUS at 11:00

## 2025-01-01 RX ADMIN — THIAMINE HYDROCHLORIDE 500 MG: 100 INJECTION, SOLUTION INTRAMUSCULAR; INTRAVENOUS at 08:26

## 2025-01-01 RX ADMIN — IPRATROPIUM BROMIDE AND ALBUTEROL SULFATE 3 ML: .5; 2.5 SOLUTION RESPIRATORY (INHALATION) at 00:20

## 2025-01-01 RX ADMIN — SODIUM CHLORIDE 1.5 MCG/KG/HR: 9 INJECTION, SOLUTION INTRAVENOUS at 00:12

## 2025-01-01 RX ADMIN — CEFTRIAXONE 2000 MG: 2 INJECTION, POWDER, FOR SOLUTION INTRAMUSCULAR; INTRAVENOUS at 08:32

## 2025-01-01 RX ADMIN — Medication 10 ML: at 08:43

## 2025-01-01 RX ADMIN — PROPOFOL 20 MCG/KG/MIN: 10 INJECTION, EMULSION INTRAVENOUS at 19:26

## 2025-01-01 RX ADMIN — CETIRIZINE HYDROCHLORIDE 5 MG: 10 TABLET, FILM COATED ORAL at 08:30

## 2025-01-01 RX ADMIN — ACETAMINOPHEN 650 MG: 325 TABLET ORAL at 17:07

## 2025-01-01 RX ADMIN — Medication 2000 MG: at 21:45

## 2025-01-01 RX ADMIN — SODIUM CHLORIDE 1.46 MG/KG/HR: 9 INJECTION, SOLUTION INTRAVENOUS at 00:08

## 2025-01-01 RX ADMIN — Medication 10 ML: at 21:53

## 2025-01-01 RX ADMIN — HYDROCORTISONE SODIUM SUCCINATE 100 MG: 100 INJECTION, POWDER, FOR SOLUTION INTRAMUSCULAR; INTRAVENOUS at 00:42

## 2025-01-01 RX ADMIN — OSELTAMIVIR PHOSPHATE 30 MG: 30 CAPSULE ORAL at 15:51

## 2025-01-01 RX ADMIN — CALCIUM GLUCONATE 2000 MG: 20 INJECTION, SOLUTION INTRAVENOUS at 15:02

## 2025-01-01 RX ADMIN — PROPOFOL 25 MCG/KG/MIN: 10 INJECTION, EMULSION INTRAVENOUS at 04:57

## 2025-01-01 RX ADMIN — IPRATROPIUM BROMIDE AND ALBUTEROL SULFATE 3 ML: .5; 2.5 SOLUTION RESPIRATORY (INHALATION) at 13:10

## 2025-01-01 RX ADMIN — MIDODRINE HYDROCHLORIDE 15 MG: 2.5 TABLET ORAL at 12:30

## 2025-01-01 RX ADMIN — SUCCINYLCHOLINE CHLORIDE 70 MG: 20 INJECTION, SOLUTION INTRAMUSCULAR; INTRAVENOUS at 13:25

## 2025-01-01 RX ADMIN — Medication 10 ML: at 08:32

## 2025-01-01 RX ADMIN — CALCIUM GLUCONATE 2000 MG: 20 INJECTION, SOLUTION INTRAVENOUS at 18:40

## 2025-01-01 RX ADMIN — SODIUM CHLORIDE 2 MG/KG/HR: 9 INJECTION, SOLUTION INTRAVENOUS at 08:59

## 2025-01-01 RX ADMIN — IPRATROPIUM BROMIDE AND ALBUTEROL SULFATE 3 ML: .5; 2.5 SOLUTION RESPIRATORY (INHALATION) at 00:41

## 2025-01-01 RX ADMIN — AMIODARONE HYDROCHLORIDE 400 MG: 200 TABLET ORAL at 09:11

## 2025-01-01 RX ADMIN — Medication 10 ML: at 20:13

## 2025-01-01 RX ADMIN — INSULIN HUMAN 16 UNITS: 100 INJECTION, SOLUTION PARENTERAL at 17:26

## 2025-01-01 RX ADMIN — THIAMINE HYDROCHLORIDE 500 MG: 100 INJECTION, SOLUTION INTRAMUSCULAR; INTRAVENOUS at 08:45

## 2025-01-01 RX ADMIN — MUPIROCIN 1 APPLICATION: 20 OINTMENT TOPICAL at 20:40

## 2025-01-01 RX ADMIN — PROPOFOL 20 MCG/KG/MIN: 10 INJECTION, EMULSION INTRAVENOUS at 04:26

## 2025-01-01 RX ADMIN — MIDODRINE HYDROCHLORIDE 5 MG: 2.5 TABLET ORAL at 11:17

## 2025-01-01 RX ADMIN — PROPOFOL 40 MCG/KG/MIN: 10 INJECTION, EMULSION INTRAVENOUS at 23:39

## 2025-01-01 RX ADMIN — OXYCODONE 5 MG: 5 TABLET ORAL at 10:46

## 2025-01-01 RX ADMIN — SODIUM CHLORIDE, SODIUM LACTATE, POTASSIUM CHLORIDE, CALCIUM CHLORIDE 150 ML/HR: 20; 30; 600; 310 INJECTION, SOLUTION INTRAVENOUS at 05:59

## 2025-01-01 RX ADMIN — HYDROCORTISONE SODIUM SUCCINATE 100 MG: 100 INJECTION, POWDER, FOR SOLUTION INTRAMUSCULAR; INTRAVENOUS at 16:33

## 2025-01-01 RX ADMIN — POTASSIUM CHLORIDE 20 MEQ: 29.8 INJECTION, SOLUTION INTRAVENOUS at 21:52

## 2025-01-01 RX ADMIN — HEPARIN SODIUM 9.8 UNITS/KG/HR: 10000 INJECTION, SOLUTION INTRAVENOUS at 18:43

## 2025-01-01 RX ADMIN — Medication 100 MCG/HR: at 10:45

## 2025-01-01 RX ADMIN — POTASSIUM CHLORIDE 10 MEQ: 7.46 INJECTION, SOLUTION INTRAVENOUS at 14:46

## 2025-01-01 RX ADMIN — ETOMIDATE 20 MG: 2 INJECTION, SOLUTION INTRAVENOUS at 13:24

## 2025-01-01 RX ADMIN — MIDODRINE HYDROCHLORIDE 15 MG: 2.5 TABLET ORAL at 17:14

## 2025-01-01 RX ADMIN — HEPARIN SODIUM 5000 UNITS: 5000 INJECTION INTRAVENOUS; SUBCUTANEOUS at 21:23

## 2025-01-01 RX ADMIN — FINASTERIDE 5 MG: 5 TABLET, FILM COATED ORAL at 08:46

## 2025-01-01 RX ADMIN — HYDROMORPHONE HYDROCHLORIDE 0.5 MG: 1 INJECTION, SOLUTION INTRAMUSCULAR; INTRAVENOUS; SUBCUTANEOUS at 14:42

## 2025-01-01 RX ADMIN — PANTOPRAZOLE SODIUM 40 MG: 40 INJECTION, POWDER, LYOPHILIZED, FOR SOLUTION INTRAVENOUS at 07:08

## 2025-01-01 RX ADMIN — SODIUM CHLORIDE 1 MCG/KG/HR: 9 INJECTION, SOLUTION INTRAVENOUS at 00:47

## 2025-01-01 RX ADMIN — AMIODARONE HYDROCHLORIDE 150 MG: 1.5 INJECTION, SOLUTION INTRAVENOUS at 16:32

## 2025-01-01 RX ADMIN — IPRATROPIUM BROMIDE AND ALBUTEROL SULFATE 3 ML: .5; 2.5 SOLUTION RESPIRATORY (INHALATION) at 18:29

## 2025-01-01 RX ADMIN — PROPOFOL 40 MCG/KG/MIN: 10 INJECTION, EMULSION INTRAVENOUS at 15:46

## 2025-01-01 RX ADMIN — PANTOPRAZOLE SODIUM 40 MG: 40 INJECTION, POWDER, LYOPHILIZED, FOR SOLUTION INTRAVENOUS at 16:41

## 2025-01-01 RX ADMIN — OXYCODONE 5 MG: 5 TABLET ORAL at 14:30

## 2025-01-01 RX ADMIN — METRONIDAZOLE 500 MG: 500 INJECTION, SOLUTION INTRAVENOUS at 21:00

## 2025-01-01 RX ADMIN — PROPOFOL 25 MCG/KG/MIN: 10 INJECTION, EMULSION INTRAVENOUS at 10:52

## 2025-01-01 RX ADMIN — AMIODARONE HYDROCHLORIDE 1 MG/MIN: 1.8 INJECTION, SOLUTION INTRAVENOUS at 17:01

## 2025-01-01 RX ADMIN — IPRATROPIUM BROMIDE AND ALBUTEROL SULFATE 3 ML: .5; 2.5 SOLUTION RESPIRATORY (INHALATION) at 18:44

## 2025-01-01 RX ADMIN — FOLIC ACID 1 MG: 1 TABLET ORAL at 08:33

## 2025-01-01 RX ADMIN — Medication 10 ML: at 20:40

## 2025-01-01 RX ADMIN — METRONIDAZOLE 500 MG: 500 INJECTION, SOLUTION INTRAVENOUS at 19:27

## 2025-01-01 RX ADMIN — HYDROCORTISONE SODIUM SUCCINATE 100 MG: 100 INJECTION, POWDER, FOR SOLUTION INTRAMUSCULAR; INTRAVENOUS at 18:40

## 2025-01-01 RX ADMIN — CYANOCOBALAMIN TAB 1000 MCG 1000 MCG: 1000 TAB at 08:19

## 2025-01-01 RX ADMIN — POTASSIUM CHLORIDE 40 MEQ: 1.5 POWDER, FOR SOLUTION ORAL at 04:48

## 2025-01-01 RX ADMIN — MUPIROCIN 1 APPLICATION: 20 OINTMENT TOPICAL at 20:22

## 2025-01-01 RX ADMIN — PANTOPRAZOLE SODIUM 40 MG: 40 INJECTION, POWDER, LYOPHILIZED, FOR SOLUTION INTRAVENOUS at 08:46

## 2025-01-01 RX ADMIN — POTASSIUM CHLORIDE 20 MEQ: 29.8 INJECTION, SOLUTION INTRAVENOUS at 02:40

## 2025-01-01 RX ADMIN — Medication 10 ML: at 08:00

## 2025-01-01 RX ADMIN — METRONIDAZOLE 500 MG: 500 INJECTION, SOLUTION INTRAVENOUS at 04:26

## 2025-01-01 RX ADMIN — MIDODRINE HYDROCHLORIDE 10 MG: 2.5 TABLET ORAL at 16:58

## 2025-01-01 RX ADMIN — HEPARIN SODIUM 2000 UNITS: 5000 INJECTION, SOLUTION INTRAVENOUS; SUBCUTANEOUS at 05:06

## 2025-01-01 RX ADMIN — MUPIROCIN 1 APPLICATION: 20 OINTMENT TOPICAL at 08:34

## 2025-01-01 RX ADMIN — INSULIN HUMAN 2 UNITS: 100 INJECTION, SOLUTION PARENTERAL at 11:33

## 2025-01-01 RX ADMIN — PANTOPRAZOLE SODIUM 40 MG: 40 INJECTION, POWDER, LYOPHILIZED, FOR SOLUTION INTRAVENOUS at 17:36

## 2025-01-01 RX ADMIN — FENTANYL CITRATE 25 MCG: 50 INJECTION, SOLUTION INTRAMUSCULAR; INTRAVENOUS at 19:52

## 2025-01-01 RX ADMIN — FOLIC ACID 1 MG: 1 TABLET ORAL at 08:47

## 2025-01-01 RX ADMIN — SODIUM CHLORIDE 1.06 MG/KG/HR: 9 INJECTION, SOLUTION INTRAVENOUS at 15:27

## 2025-01-01 RX ADMIN — FINASTERIDE 5 MG: 5 TABLET, FILM COATED ORAL at 08:26

## 2025-01-01 RX ADMIN — FINASTERIDE 5 MG: 5 TABLET, FILM COATED ORAL at 08:20

## 2025-01-01 RX ADMIN — PHENYLEPHRINE HYDROCHLORIDE 0.5 MCG/KG/MIN: 10 INJECTION INTRAVENOUS at 06:03

## 2025-01-01 RX ADMIN — IPRATROPIUM BROMIDE AND ALBUTEROL SULFATE 3 ML: .5; 2.5 SOLUTION RESPIRATORY (INHALATION) at 18:28

## 2025-01-01 RX ADMIN — MIDODRINE HYDROCHLORIDE 10 MG: 2.5 TABLET ORAL at 07:09

## 2025-01-01 RX ADMIN — CHLORHEXIDINE GLUCONATE 15 ML: 1.2 RINSE ORAL at 20:12

## 2025-01-01 RX ADMIN — Medication 10 ML: at 08:26

## 2025-01-01 RX ADMIN — INSULIN HUMAN 4 UNITS: 100 INJECTION, SOLUTION PARENTERAL at 00:15

## 2025-01-01 RX ADMIN — POTASSIUM CHLORIDE 40 MEQ: 1.5 POWDER, FOR SOLUTION ORAL at 16:39

## 2025-01-01 RX ADMIN — Medication 0.2 MCG/KG/MIN: at 15:16

## 2025-01-01 RX ADMIN — MIDAZOLAM HYDROCHLORIDE 2 MG: 1 INJECTION, SOLUTION INTRAMUSCULAR; INTRAVENOUS at 21:38

## 2025-01-01 RX ADMIN — SODIUM CHLORIDE 0.06 MG/KG/HR: 9 INJECTION, SOLUTION INTRAVENOUS at 21:20

## 2025-01-01 RX ADMIN — IPRATROPIUM BROMIDE AND ALBUTEROL SULFATE 3 ML: .5; 2.5 SOLUTION RESPIRATORY (INHALATION) at 00:25

## 2025-01-01 RX ADMIN — CALCIUM GLUCONATE 2000 MG: 20 INJECTION, SOLUTION INTRAVENOUS at 16:27

## 2025-01-01 RX ADMIN — SODIUM CHLORIDE 2.1 MG/KG/HR: 9 INJECTION, SOLUTION INTRAVENOUS at 11:29

## 2025-01-01 RX ADMIN — INSULIN HUMAN 2 UNITS: 100 INJECTION, SOLUTION PARENTERAL at 17:49

## 2025-01-01 RX ADMIN — PANTOPRAZOLE SODIUM 40 MG: 40 INJECTION, POWDER, LYOPHILIZED, FOR SOLUTION INTRAVENOUS at 16:30

## 2025-01-01 RX ADMIN — CYANOCOBALAMIN TAB 1000 MCG 1000 MCG: 1000 TAB at 08:47

## 2025-01-01 RX ADMIN — VECURONIUM BROMIDE 5 MG: 1 INJECTION, POWDER, LYOPHILIZED, FOR SOLUTION INTRAVENOUS at 00:21

## 2025-01-01 RX ADMIN — THIAMINE HYDROCHLORIDE 500 MG: 100 INJECTION, SOLUTION INTRAMUSCULAR; INTRAVENOUS at 16:33

## 2025-01-01 RX ADMIN — SODIUM CHLORIDE, SODIUM LACTATE, POTASSIUM CHLORIDE, CALCIUM CHLORIDE 150 ML/HR: 20; 30; 600; 310 INJECTION, SOLUTION INTRAVENOUS at 21:46

## 2025-01-01 RX ADMIN — METRONIDAZOLE 500 MG: 500 INJECTION, SOLUTION INTRAVENOUS at 04:04

## 2025-01-01 RX ADMIN — MIDAZOLAM HYDROCHLORIDE 1 MG/HR: 1 INJECTION, SOLUTION INTRAVENOUS at 22:51

## 2025-01-01 RX ADMIN — PANTOPRAZOLE SODIUM 40 MG: 40 INJECTION, POWDER, LYOPHILIZED, FOR SOLUTION INTRAVENOUS at 06:30

## 2025-01-01 RX ADMIN — THIAMINE HYDROCHLORIDE 500 MG: 100 INJECTION, SOLUTION INTRAMUSCULAR; INTRAVENOUS at 21:51

## 2025-01-01 RX ADMIN — SODIUM CHLORIDE 0.76 MG/KG/HR: 9 INJECTION, SOLUTION INTRAVENOUS at 23:07

## 2025-01-01 RX ADMIN — IPRATROPIUM BROMIDE AND ALBUTEROL SULFATE 3 ML: .5; 2.5 SOLUTION RESPIRATORY (INHALATION) at 07:10

## 2025-01-01 RX ADMIN — PROPOFOL 20 MCG/KG/MIN: 10 INJECTION, EMULSION INTRAVENOUS at 12:42

## 2025-01-01 RX ADMIN — Medication 200 MG: at 08:31

## 2025-01-01 RX ADMIN — POTASSIUM CHLORIDE 40 MEQ: 1.5 POWDER, FOR SOLUTION ORAL at 20:59

## 2025-01-01 RX ADMIN — SODIUM CHLORIDE 1.5 MCG/KG/HR: 9 INJECTION, SOLUTION INTRAVENOUS at 18:06

## 2025-01-01 RX ADMIN — Medication 200 MG: at 09:11

## 2025-01-01 RX ADMIN — PROPOFOL 25 MCG/KG/MIN: 10 INJECTION, EMULSION INTRAVENOUS at 00:29

## 2025-01-01 RX ADMIN — MUPIROCIN 1 APPLICATION: 20 OINTMENT TOPICAL at 08:43

## 2025-01-01 RX ADMIN — MIDODRINE HYDROCHLORIDE 15 MG: 2.5 TABLET ORAL at 08:32

## 2025-01-01 RX ADMIN — SODIUM CHLORIDE 1.5 MCG/KG/HR: 9 INJECTION, SOLUTION INTRAVENOUS at 21:19

## 2025-01-01 RX ADMIN — HEPARIN SODIUM 5000 UNITS: 5000 INJECTION INTRAVENOUS; SUBCUTANEOUS at 22:04

## 2025-01-01 RX ADMIN — AMIODARONE HYDROCHLORIDE 0.5 MG/MIN: 1.8 INJECTION, SOLUTION INTRAVENOUS at 11:29

## 2025-01-01 RX ADMIN — Medication 10 ML: at 20:29

## 2025-01-01 RX ADMIN — POTASSIUM CHLORIDE 10 MEQ: 7.46 INJECTION, SOLUTION INTRAVENOUS at 13:08

## 2025-01-01 RX ADMIN — PANTOPRAZOLE SODIUM 40 MG: 40 INJECTION, POWDER, LYOPHILIZED, FOR SOLUTION INTRAVENOUS at 18:18

## 2025-01-01 RX ADMIN — CALCIUM GLUCONATE 2000 MG: 20 INJECTION, SOLUTION INTRAVENOUS at 13:29

## 2025-01-01 RX ADMIN — INSULIN HUMAN 2 UNITS: 100 INJECTION, SOLUTION PARENTERAL at 00:21

## 2025-01-01 RX ADMIN — PROPOFOL 20 MCG/KG/MIN: 10 INJECTION, EMULSION INTRAVENOUS at 19:56

## 2025-01-01 RX ADMIN — METRONIDAZOLE 500 MG: 500 INJECTION, SOLUTION INTRAVENOUS at 03:33

## 2025-01-01 RX ADMIN — Medication 10 ML: at 21:54

## 2025-01-01 RX ADMIN — DOXYCYCLINE 100 MG: 100 INJECTION, POWDER, LYOPHILIZED, FOR SOLUTION INTRAVENOUS at 09:12

## 2025-01-01 RX ADMIN — POTASSIUM CHLORIDE 20 MEQ: 1.5 POWDER, FOR SOLUTION ORAL at 12:31

## 2025-01-01 RX ADMIN — SODIUM CHLORIDE 2 MG/KG/HR: 9 INJECTION, SOLUTION INTRAVENOUS at 06:27

## 2025-01-01 RX ADMIN — IPRATROPIUM BROMIDE AND ALBUTEROL SULFATE 3 ML: .5; 2.5 SOLUTION RESPIRATORY (INHALATION) at 12:24

## 2025-01-01 RX ADMIN — SODIUM CHLORIDE 75 MEQ: 450 INJECTION, SOLUTION INTRAVENOUS at 00:37

## 2025-01-01 RX ADMIN — CEFEPIME HYDROCHLORIDE 1000 MG: 1 INJECTION, POWDER, FOR SOLUTION INTRAMUSCULAR; INTRAVENOUS at 05:52

## 2025-01-01 RX ADMIN — AMIODARONE HYDROCHLORIDE 400 MG: 200 TABLET ORAL at 08:20

## 2025-01-01 RX ADMIN — PROPOFOL 15 MCG/KG/MIN: 10 INJECTION, EMULSION INTRAVENOUS at 17:36

## 2025-01-01 RX ADMIN — METRONIDAZOLE 500 MG: 500 INJECTION, SOLUTION INTRAVENOUS at 11:43

## 2025-01-01 RX ADMIN — IPRATROPIUM BROMIDE AND ALBUTEROL SULFATE 3 ML: .5; 2.5 SOLUTION RESPIRATORY (INHALATION) at 18:38

## 2025-01-01 RX ADMIN — HEPARIN SODIUM 5000 UNITS: 5000 INJECTION INTRAVENOUS; SUBCUTANEOUS at 05:50

## 2025-01-01 RX ADMIN — CHLORHEXIDINE GLUCONATE 15 ML: 1.2 RINSE ORAL at 20:22

## 2025-01-01 RX ADMIN — Medication 10 ML: at 20:38

## 2025-01-01 RX ADMIN — SODIUM CHLORIDE 0.36 MG/KG/HR: 9 INJECTION, SOLUTION INTRAVENOUS at 20:15

## 2025-01-01 RX ADMIN — PHENYLEPHRINE HYDROCHLORIDE 0.8 MCG/KG/MIN: 10 INJECTION INTRAVENOUS at 20:14

## 2025-01-01 RX ADMIN — SODIUM CHLORIDE 1.5 MCG/KG/HR: 9 INJECTION, SOLUTION INTRAVENOUS at 10:44

## 2025-01-01 RX ADMIN — FINASTERIDE 5 MG: 5 TABLET, FILM COATED ORAL at 08:00

## 2025-01-01 RX ADMIN — THIAMINE HYDROCHLORIDE 500 MG: 100 INJECTION, SOLUTION INTRAMUSCULAR; INTRAVENOUS at 15:43

## 2025-01-01 RX ADMIN — FOLIC ACID 1 MG: 1 TABLET ORAL at 08:00

## 2025-01-01 RX ADMIN — CHLORHEXIDINE GLUCONATE 15 ML: 1.2 RINSE ORAL at 08:26

## 2025-01-01 RX ADMIN — FOLIC ACID 1 MG: 1 TABLET ORAL at 09:11

## 2025-01-01 RX ADMIN — IPRATROPIUM BROMIDE AND ALBUTEROL SULFATE 3 ML: .5; 2.5 SOLUTION RESPIRATORY (INHALATION) at 07:05

## 2025-01-01 RX ADMIN — MIDAZOLAM HYDROCHLORIDE 4 MG: 1 INJECTION, SOLUTION INTRAMUSCULAR; INTRAVENOUS at 22:03

## 2025-01-01 RX ADMIN — MIDODRINE HYDROCHLORIDE 10 MG: 2.5 TABLET ORAL at 18:41

## 2025-01-01 RX ADMIN — CYANOCOBALAMIN TAB 1000 MCG 1000 MCG: 1000 TAB at 08:00

## 2025-01-01 RX ADMIN — HEPARIN SODIUM 5000 UNITS: 5000 INJECTION INTRAVENOUS; SUBCUTANEOUS at 21:45

## 2025-01-01 RX ADMIN — PANTOPRAZOLE SODIUM 40 MG: 40 INJECTION, POWDER, LYOPHILIZED, FOR SOLUTION INTRAVENOUS at 17:47

## 2025-01-01 RX ADMIN — IPRATROPIUM BROMIDE AND ALBUTEROL SULFATE 3 ML: .5; 2.5 SOLUTION RESPIRATORY (INHALATION) at 06:47

## 2025-01-01 RX ADMIN — CEFTRIAXONE 2000 MG: 2 INJECTION, POWDER, FOR SOLUTION INTRAMUSCULAR; INTRAVENOUS at 06:04

## 2025-01-01 RX ADMIN — IPRATROPIUM BROMIDE AND ALBUTEROL SULFATE 3 ML: .5; 2.5 SOLUTION RESPIRATORY (INHALATION) at 00:15

## 2025-01-01 RX ADMIN — MIDAZOLAM HYDROCHLORIDE 2 MG: 1 INJECTION, SOLUTION INTRAMUSCULAR; INTRAVENOUS at 20:12

## 2025-01-01 RX ADMIN — FINASTERIDE 5 MG: 5 TABLET, FILM COATED ORAL at 09:11

## 2025-01-01 RX ADMIN — SODIUM CHLORIDE 1.5 MCG/KG/HR: 9 INJECTION, SOLUTION INTRAVENOUS at 02:55

## 2025-01-01 RX ADMIN — METRONIDAZOLE 500 MG: 500 INJECTION, SOLUTION INTRAVENOUS at 21:29

## 2025-01-01 RX ADMIN — MIDODRINE HYDROCHLORIDE 15 MG: 2.5 TABLET ORAL at 08:25

## 2025-01-01 RX ADMIN — OSELTAMIVIR PHOSPHATE 30 MG: 30 CAPSULE ORAL at 08:33

## 2025-01-01 RX ADMIN — IPRATROPIUM BROMIDE AND ALBUTEROL SULFATE 3 ML: .5; 2.5 SOLUTION RESPIRATORY (INHALATION) at 00:09

## 2025-01-01 RX ADMIN — POTASSIUM CHLORIDE 10 MEQ: 7.46 INJECTION, SOLUTION INTRAVENOUS at 13:37

## 2025-01-01 RX ADMIN — PANTOPRAZOLE SODIUM 40 MG: 40 INJECTION, POWDER, LYOPHILIZED, FOR SOLUTION INTRAVENOUS at 17:53

## 2025-01-01 RX ADMIN — METRONIDAZOLE 500 MG: 500 INJECTION, SOLUTION INTRAVENOUS at 20:36

## 2025-01-01 RX ADMIN — HYDROCORTISONE SODIUM SUCCINATE 100 MG: 100 INJECTION, POWDER, FOR SOLUTION INTRAMUSCULAR; INTRAVENOUS at 00:22

## 2025-01-01 RX ADMIN — PANTOPRAZOLE SODIUM 40 MG: 40 INJECTION, POWDER, LYOPHILIZED, FOR SOLUTION INTRAVENOUS at 08:33

## 2025-01-01 RX ADMIN — METRONIDAZOLE 500 MG: 5 INJECTION, SOLUTION INTRAVENOUS at 14:11

## 2025-01-01 RX ADMIN — PANTOPRAZOLE SODIUM 40 MG: 40 INJECTION, POWDER, LYOPHILIZED, FOR SOLUTION INTRAVENOUS at 16:58

## 2025-01-01 RX ADMIN — Medication 200 MG: at 08:19

## 2025-01-01 RX ADMIN — PHENYLEPHRINE HYDROCHLORIDE 1.1 MCG/KG/MIN: 10 INJECTION INTRAVENOUS at 01:08

## 2025-01-01 RX ADMIN — CALCIUM GLUCONATE 1000 MG: 20 INJECTION, SOLUTION INTRAVENOUS at 15:24

## 2025-01-01 RX ADMIN — DOXYCYCLINE 100 MG: 100 INJECTION, POWDER, LYOPHILIZED, FOR SOLUTION INTRAVENOUS at 21:23

## 2025-01-01 RX ADMIN — AMIODARONE HYDROCHLORIDE 400 MG: 200 TABLET ORAL at 17:26

## 2025-01-01 RX ADMIN — Medication 200 MG: at 11:20

## 2025-01-01 RX ADMIN — POLYETHYLENE GLYCOL (3350) 17 G: 17 POWDER, FOR SOLUTION ORAL at 02:18

## 2025-01-01 RX ADMIN — IPRATROPIUM BROMIDE AND ALBUTEROL SULFATE 3 ML: .5; 2.5 SOLUTION RESPIRATORY (INHALATION) at 06:37

## 2025-01-01 RX ADMIN — OSELTAMIVIR PHOSPHATE 30 MG: 30 CAPSULE ORAL at 12:31

## 2025-01-01 RX ADMIN — HEPARIN SODIUM 5000 UNITS: 5000 INJECTION INTRAVENOUS; SUBCUTANEOUS at 05:49

## 2025-01-01 RX ADMIN — SODIUM CHLORIDE 1.5 MCG/KG/HR: 9 INJECTION, SOLUTION INTRAVENOUS at 03:54

## 2025-01-01 RX ADMIN — CEFEPIME HYDROCHLORIDE 1000 MG: 1 INJECTION, POWDER, FOR SOLUTION INTRAMUSCULAR; INTRAVENOUS at 21:45

## 2025-01-01 RX ADMIN — INSULIN HUMAN 2 UNITS: 100 INJECTION, SOLUTION PARENTERAL at 12:43

## 2025-01-01 RX ADMIN — MUPIROCIN 1 APPLICATION: 20 OINTMENT TOPICAL at 20:37

## 2025-01-01 RX ADMIN — Medication 200 MCG/HR: at 23:28

## 2025-01-01 RX ADMIN — FOLIC ACID 1 MG: 1 TABLET ORAL at 08:31

## 2025-01-01 RX ADMIN — MUPIROCIN 1 APPLICATION: 20 OINTMENT TOPICAL at 20:14

## 2025-01-01 RX ADMIN — POTASSIUM CHLORIDE 20 MEQ: 1500 TABLET, EXTENDED RELEASE ORAL at 15:14

## 2025-01-01 RX ADMIN — CHLORHEXIDINE GLUCONATE 15 ML: 1.2 RINSE ORAL at 09:12

## 2025-01-01 RX ADMIN — FENTANYL CITRATE 25 MCG: 50 INJECTION, SOLUTION INTRAMUSCULAR; INTRAVENOUS at 10:46

## 2025-01-01 RX ADMIN — MIDAZOLAM HYDROCHLORIDE 6 MG/HR: 1 INJECTION, SOLUTION INTRAVENOUS at 04:46

## 2025-01-01 RX ADMIN — HEPARIN SODIUM 5000 UNITS: 5000 INJECTION INTRAVENOUS; SUBCUTANEOUS at 14:47

## 2025-01-01 RX ADMIN — SODIUM CHLORIDE 2 MG/KG/HR: 9 INJECTION, SOLUTION INTRAVENOUS at 03:39

## 2025-01-01 RX ADMIN — SODIUM CHLORIDE 1.3 MCG/KG/HR: 9 INJECTION, SOLUTION INTRAVENOUS at 21:10

## 2025-01-01 RX ADMIN — DOXYCYCLINE 100 MG: 100 INJECTION, POWDER, LYOPHILIZED, FOR SOLUTION INTRAVENOUS at 08:18

## 2025-01-01 RX ADMIN — INSULIN HUMAN 2 UNITS: 100 INJECTION, SOLUTION PARENTERAL at 12:30

## 2025-01-01 RX ADMIN — GLYCOPYRROLATE 0.4 MG: 0.2 INJECTION INTRAMUSCULAR; INTRAVENOUS at 04:01

## 2025-01-01 RX ADMIN — SODIUM CHLORIDE 75 MEQ: 450 INJECTION, SOLUTION INTRAVENOUS at 13:43

## 2025-01-01 RX ADMIN — CHLORHEXIDINE GLUCONATE 15 ML: 1.2 RINSE ORAL at 08:17

## 2025-01-01 RX ADMIN — IPRATROPIUM BROMIDE AND ALBUTEROL SULFATE 3 ML: .5; 2.5 SOLUTION RESPIRATORY (INHALATION) at 18:46

## 2025-01-01 RX ADMIN — ACETAMINOPHEN 650 MG: 160 SOLUTION ORAL at 13:45

## 2025-01-01 RX ADMIN — FENTANYL CITRATE 25 MCG: 50 INJECTION, SOLUTION INTRAMUSCULAR; INTRAVENOUS at 12:48

## 2025-01-01 RX ADMIN — DOXYCYCLINE 100 MG: 100 INJECTION, POWDER, LYOPHILIZED, FOR SOLUTION INTRAVENOUS at 20:12

## 2025-01-01 RX ADMIN — PANTOPRAZOLE SODIUM 40 MG: 40 INJECTION, POWDER, LYOPHILIZED, FOR SOLUTION INTRAVENOUS at 17:24

## 2025-01-01 RX ADMIN — MIDODRINE HYDROCHLORIDE 15 MG: 2.5 TABLET ORAL at 11:43

## 2025-01-01 RX ADMIN — HYDROMORPHONE HYDROCHLORIDE 0.5 MG: 1 INJECTION, SOLUTION INTRAMUSCULAR; INTRAVENOUS; SUBCUTANEOUS at 16:33

## 2025-01-01 RX ADMIN — IPRATROPIUM BROMIDE AND ALBUTEROL SULFATE 3 ML: .5; 2.5 SOLUTION RESPIRATORY (INHALATION) at 18:26

## 2025-01-01 RX ADMIN — CHLORHEXIDINE GLUCONATE 15 ML: 1.2 RINSE ORAL at 21:21

## 2025-01-01 RX ADMIN — Medication 200 MG: at 08:33

## 2025-01-01 RX ADMIN — ROSUVASTATIN 10 MG: 20 TABLET, FILM COATED ORAL at 20:36

## 2025-01-01 RX ADMIN — HYDROCORTISONE SODIUM SUCCINATE 100 MG: 100 INJECTION, POWDER, FOR SOLUTION INTRAMUSCULAR; INTRAVENOUS at 16:27

## 2025-01-01 RX ADMIN — CETIRIZINE HYDROCHLORIDE 5 MG: 10 TABLET, FILM COATED ORAL at 08:33

## 2025-01-01 RX ADMIN — Medication 100 MCG/HR: at 12:42

## 2025-01-01 RX ADMIN — Medication 0.14 MCG/KG/MIN: at 21:46

## 2025-01-01 RX ADMIN — Medication 100 MG: at 22:49

## 2025-01-01 RX ADMIN — POTASSIUM CHLORIDE 10 MEQ: 7.46 INJECTION, SOLUTION INTRAVENOUS at 15:47

## 2025-01-01 RX ADMIN — POTASSIUM CHLORIDE 10 MEQ: 7.46 INJECTION, SOLUTION INTRAVENOUS at 14:40

## 2025-01-01 RX ADMIN — IPRATROPIUM BROMIDE AND ALBUTEROL SULFATE 3 ML: .5; 2.5 SOLUTION RESPIRATORY (INHALATION) at 12:09

## 2025-01-01 RX ADMIN — SODIUM CHLORIDE 2 MG/KG/HR: 9 INJECTION, SOLUTION INTRAVENOUS at 00:36

## 2025-01-01 RX ADMIN — SODIUM CHLORIDE, POTASSIUM CHLORIDE, SODIUM LACTATE AND CALCIUM CHLORIDE 250 ML/HR: 600; 310; 30; 20 INJECTION, SOLUTION INTRAVENOUS at 17:35

## 2025-01-01 RX ADMIN — MAGNESIUM SULFATE HEPTAHYDRATE 1 G: 1 INJECTION, SOLUTION INTRAVENOUS at 15:58

## 2025-01-01 RX ADMIN — INSULIN HUMAN 2 UNITS: 100 INJECTION, SOLUTION PARENTERAL at 18:05

## 2025-01-01 RX ADMIN — HEPARIN SODIUM 5000 UNITS: 5000 INJECTION INTRAVENOUS; SUBCUTANEOUS at 21:52

## 2025-01-01 RX ADMIN — POTASSIUM CHLORIDE 20 MEQ: 1.5 POWDER, FOR SOLUTION ORAL at 21:59

## 2025-01-01 RX ADMIN — SODIUM CHLORIDE 1.46 MG/KG/HR: 9 INJECTION, SOLUTION INTRAVENOUS at 03:44

## 2025-01-01 RX ADMIN — CHLORHEXIDINE GLUCONATE 15 ML: 1.2 RINSE ORAL at 21:24

## 2025-01-01 RX ADMIN — AMIODARONE HYDROCHLORIDE 400 MG: 200 TABLET ORAL at 08:18

## 2025-01-01 RX ADMIN — MIDODRINE HYDROCHLORIDE 15 MG: 2.5 TABLET ORAL at 17:26

## 2025-01-01 RX ADMIN — IPRATROPIUM BROMIDE AND ALBUTEROL SULFATE 3 ML: .5; 2.5 SOLUTION RESPIRATORY (INHALATION) at 06:40

## 2025-01-01 RX ADMIN — SODIUM BICARBONATE 50 MEQ: 84 INJECTION INTRAVENOUS at 08:29

## 2025-01-01 RX ADMIN — SODIUM CHLORIDE 1.5 MCG/KG/HR: 9 INJECTION, SOLUTION INTRAVENOUS at 10:59

## 2025-01-01 RX ADMIN — MUPIROCIN 1 APPLICATION: 20 OINTMENT TOPICAL at 05:52

## 2025-01-01 RX ADMIN — CETIRIZINE HYDROCHLORIDE 5 MG: 10 TABLET, FILM COATED ORAL at 08:18

## 2025-01-01 RX ADMIN — IPRATROPIUM BROMIDE AND ALBUTEROL SULFATE 3 ML: .5; 2.5 SOLUTION RESPIRATORY (INHALATION) at 00:03

## 2025-01-01 RX ADMIN — MIDODRINE HYDROCHLORIDE 15 MG: 2.5 TABLET ORAL at 17:24

## 2025-01-01 RX ADMIN — IPRATROPIUM BROMIDE AND ALBUTEROL SULFATE 3 ML: .5; 2.5 SOLUTION RESPIRATORY (INHALATION) at 12:55

## 2025-01-01 RX ADMIN — SODIUM CHLORIDE 0.76 MG/KG/HR: 9 INJECTION, SOLUTION INTRAVENOUS at 06:09

## 2025-01-01 RX ADMIN — POTASSIUM CHLORIDE 10 MEQ: 7.46 INJECTION, SOLUTION INTRAVENOUS at 12:43

## 2025-01-01 RX ADMIN — PROPOFOL 20 MCG/KG/MIN: 10 INJECTION, EMULSION INTRAVENOUS at 05:51

## 2025-01-01 RX ADMIN — MIDAZOLAM HYDROCHLORIDE 2 MG: 1 INJECTION, SOLUTION INTRAMUSCULAR; INTRAVENOUS at 06:56

## 2025-01-01 RX ADMIN — HYDROCORTISONE SODIUM SUCCINATE 25 MG: 100 INJECTION, POWDER, FOR SOLUTION INTRAMUSCULAR; INTRAVENOUS at 16:15

## 2025-01-01 RX ADMIN — PANTOPRAZOLE SODIUM 40 MG: 40 INJECTION, POWDER, LYOPHILIZED, FOR SOLUTION INTRAVENOUS at 17:26

## 2025-01-01 RX ADMIN — Medication 200 MG: at 08:48

## 2025-01-01 RX ADMIN — CETIRIZINE HYDROCHLORIDE 5 MG: 10 TABLET, FILM COATED ORAL at 08:20

## 2025-01-01 RX ADMIN — HEPARIN SODIUM 8.8 UNITS/KG/HR: 10000 INJECTION, SOLUTION INTRAVENOUS at 11:33

## 2025-01-01 RX ADMIN — PANTOPRAZOLE SODIUM 40 MG: 40 INJECTION, POWDER, LYOPHILIZED, FOR SOLUTION INTRAVENOUS at 09:12

## 2025-01-01 RX ADMIN — PHENYLEPHRINE HYDROCHLORIDE 1.4 MCG/KG/MIN: 10 INJECTION INTRAVENOUS at 11:51

## 2025-01-01 RX ADMIN — INSULIN HUMAN 16 UNITS: 100 INJECTION, SOLUTION PARENTERAL at 11:20

## 2025-01-01 RX ADMIN — PANTOPRAZOLE SODIUM 40 MG: 40 INJECTION, POWDER, LYOPHILIZED, FOR SOLUTION INTRAVENOUS at 16:55

## 2025-01-01 RX ADMIN — HYDROCORTISONE SODIUM SUCCINATE 25 MG: 100 INJECTION, POWDER, FOR SOLUTION INTRAMUSCULAR; INTRAVENOUS at 00:12

## 2025-01-01 RX ADMIN — FENTANYL CITRATE 25 MCG: 50 INJECTION, SOLUTION INTRAMUSCULAR; INTRAVENOUS at 00:34

## 2025-01-01 RX ADMIN — IPRATROPIUM BROMIDE AND ALBUTEROL SULFATE 3 ML: .5; 2.5 SOLUTION RESPIRATORY (INHALATION) at 00:16

## 2025-01-01 RX ADMIN — INSULIN HUMAN 2 UNITS: 100 INJECTION, SOLUTION PARENTERAL at 23:15

## 2025-01-01 RX ADMIN — SODIUM CHLORIDE, SODIUM LACTATE, POTASSIUM CHLORIDE, CALCIUM CHLORIDE 150 ML/HR: 20; 30; 600; 310 INJECTION, SOLUTION INTRAVENOUS at 14:21

## 2025-01-01 RX ADMIN — GLYCOPYRROLATE 0.4 MG: 0.2 INJECTION INTRAMUSCULAR; INTRAVENOUS at 20:20

## 2025-01-01 RX ADMIN — CHLORHEXIDINE GLUCONATE 15 ML: 1.2 RINSE ORAL at 20:55

## 2025-01-01 RX ADMIN — CYANOCOBALAMIN TAB 1000 MCG 1000 MCG: 1000 TAB at 09:11

## 2025-01-01 RX ADMIN — INSULIN HUMAN 16 UNITS: 100 INJECTION, SOLUTION PARENTERAL at 00:12

## 2025-01-01 RX ADMIN — CYANOCOBALAMIN TAB 1000 MCG 1000 MCG: 1000 TAB at 08:20

## 2025-01-01 RX ADMIN — SODIUM CHLORIDE 1.5 MCG/KG/HR: 9 INJECTION, SOLUTION INTRAVENOUS at 18:04

## 2025-01-01 RX ADMIN — MIDODRINE HYDROCHLORIDE 15 MG: 2.5 TABLET ORAL at 08:20

## 2025-01-01 RX ADMIN — CHLORHEXIDINE GLUCONATE 15 ML: 1.2 RINSE ORAL at 20:40

## 2025-01-01 RX ADMIN — ACETAMINOPHEN 650 MG: 325 TABLET ORAL at 06:09

## 2025-01-01 RX ADMIN — IPRATROPIUM BROMIDE AND ALBUTEROL SULFATE 3 ML: .5; 2.5 SOLUTION RESPIRATORY (INHALATION) at 18:30

## 2025-01-01 RX ADMIN — CEFTRIAXONE 2000 MG: 2 INJECTION, POWDER, FOR SOLUTION INTRAMUSCULAR; INTRAVENOUS at 08:18

## 2025-01-01 RX ADMIN — Medication 10 ML: at 10:55

## 2025-01-01 RX ADMIN — IPRATROPIUM BROMIDE AND ALBUTEROL SULFATE 3 ML: .5; 2.5 SOLUTION RESPIRATORY (INHALATION) at 07:07

## 2025-01-01 RX ADMIN — IPRATROPIUM BROMIDE AND ALBUTEROL SULFATE 3 ML: .5; 2.5 SOLUTION RESPIRATORY (INHALATION) at 18:39

## 2025-01-01 RX ADMIN — IPRATROPIUM BROMIDE AND ALBUTEROL SULFATE 3 ML: .5; 2.5 SOLUTION RESPIRATORY (INHALATION) at 00:17

## 2025-01-01 RX ADMIN — IPRATROPIUM BROMIDE AND ALBUTEROL SULFATE 3 ML: .5; 2.5 SOLUTION RESPIRATORY (INHALATION) at 13:04

## 2025-01-01 RX ADMIN — Medication 200 MG: at 08:26

## 2025-01-01 RX ADMIN — IPRATROPIUM BROMIDE AND ALBUTEROL SULFATE 3 ML: .5; 2.5 SOLUTION RESPIRATORY (INHALATION) at 18:36

## 2025-01-01 RX ADMIN — CETIRIZINE HYDROCHLORIDE 5 MG: 10 TABLET, FILM COATED ORAL at 08:25

## 2025-01-01 RX ADMIN — POTASSIUM CHLORIDE 20 MEQ: 1.5 POWDER, FOR SOLUTION ORAL at 04:03

## 2025-01-01 RX ADMIN — PROPOFOL 40 MCG/KG/MIN: 10 INJECTION, EMULSION INTRAVENOUS at 20:38

## 2025-01-01 RX ADMIN — AMIODARONE HYDROCHLORIDE 0.5 MG/MIN: 1.8 INJECTION, SOLUTION INTRAVENOUS at 23:39

## 2025-01-01 RX ADMIN — SODIUM CHLORIDE 0.76 MG/KG/HR: 9 INJECTION, SOLUTION INTRAVENOUS at 10:24

## 2025-01-01 RX ADMIN — PROPOFOL 30 MCG/KG/MIN: 10 INJECTION, EMULSION INTRAVENOUS at 10:31

## 2025-01-01 RX ADMIN — SODIUM CHLORIDE 2.2 MG/KG/HR: 9 INJECTION, SOLUTION INTRAVENOUS at 13:53

## 2025-01-01 RX ADMIN — IPRATROPIUM BROMIDE AND ALBUTEROL SULFATE 3 ML: .5; 2.5 SOLUTION RESPIRATORY (INHALATION) at 06:11

## 2025-01-01 RX ADMIN — HEPARIN SODIUM 5000 UNITS: 5000 INJECTION INTRAVENOUS; SUBCUTANEOUS at 05:08

## 2025-01-01 RX ADMIN — SODIUM CHLORIDE 75 MEQ: 450 INJECTION, SOLUTION INTRAVENOUS at 10:11

## 2025-01-01 RX ADMIN — CYANOCOBALAMIN TAB 1000 MCG 1000 MCG: 1000 TAB at 08:25

## 2025-01-01 RX ADMIN — HEPARIN SODIUM 5000 UNITS: 5000 INJECTION INTRAVENOUS; SUBCUTANEOUS at 21:17

## 2025-01-01 RX ADMIN — FENTANYL CITRATE 25 MCG: 50 INJECTION, SOLUTION INTRAMUSCULAR; INTRAVENOUS at 23:07

## 2025-01-01 RX ADMIN — AMIODARONE HYDROCHLORIDE 400 MG: 200 TABLET ORAL at 08:48

## 2025-01-01 RX ADMIN — SODIUM CHLORIDE 1.66 MG/KG/HR: 9 INJECTION, SOLUTION INTRAVENOUS at 18:42

## 2025-01-01 RX ADMIN — MIDODRINE HYDROCHLORIDE 15 MG: 2.5 TABLET ORAL at 11:20

## 2025-01-01 RX ADMIN — HEPARIN SODIUM 9.8 UNITS/KG/HR: 10000 INJECTION, SOLUTION INTRAVENOUS at 08:45

## 2025-01-01 RX ADMIN — DOXYCYCLINE 100 MG: 100 INJECTION, POWDER, LYOPHILIZED, FOR SOLUTION INTRAVENOUS at 08:48

## 2025-01-01 RX ADMIN — MIDAZOLAM HYDROCHLORIDE 2 MG: 1 INJECTION, SOLUTION INTRAMUSCULAR; INTRAVENOUS at 03:59

## 2025-01-01 RX ADMIN — THIAMINE HYDROCHLORIDE 500 MG: 100 INJECTION, SOLUTION INTRAMUSCULAR; INTRAVENOUS at 20:14

## 2025-01-01 RX ADMIN — IPRATROPIUM BROMIDE AND ALBUTEROL SULFATE 3 ML: .5; 2.5 SOLUTION RESPIRATORY (INHALATION) at 12:45

## 2025-01-01 RX ADMIN — CHLORHEXIDINE GLUCONATE 15 ML: 1.2 RINSE ORAL at 08:46

## 2025-01-01 RX ADMIN — MIDODRINE HYDROCHLORIDE 10 MG: 2.5 TABLET ORAL at 08:53

## 2025-01-01 RX ADMIN — CHLORHEXIDINE GLUCONATE 15 ML: 1.2 RINSE ORAL at 08:20

## 2025-01-01 RX ADMIN — AMIODARONE HYDROCHLORIDE 400 MG: 200 TABLET ORAL at 12:31

## 2025-01-01 RX ADMIN — INSULIN GLARGINE 20 UNITS: 100 INJECTION, SOLUTION SUBCUTANEOUS at 08:01

## 2025-01-01 RX ADMIN — PHENYLEPHRINE HYDROCHLORIDE 0.8 MCG/KG/MIN: 10 INJECTION INTRAVENOUS at 09:51

## 2025-01-01 RX ADMIN — POTASSIUM CHLORIDE 20 MEQ: 29.8 INJECTION, SOLUTION INTRAVENOUS at 04:26

## 2025-01-01 RX ADMIN — IPRATROPIUM BROMIDE AND ALBUTEROL SULFATE 3 ML: .5; 2.5 SOLUTION RESPIRATORY (INHALATION) at 12:17

## 2025-01-01 RX ADMIN — FOLIC ACID 1 MG: 1 TABLET ORAL at 08:18

## 2025-01-01 RX ADMIN — FINASTERIDE 5 MG: 5 TABLET, FILM COATED ORAL at 08:30

## 2025-01-01 RX ADMIN — CEFTRIAXONE 2000 MG: 2 INJECTION, POWDER, FOR SOLUTION INTRAMUSCULAR; INTRAVENOUS at 05:09

## 2025-01-01 RX ADMIN — ACETAMINOPHEN 650 MG: 325 TABLET ORAL at 11:45

## 2025-01-01 RX ADMIN — ROSUVASTATIN 10 MG: 20 TABLET, FILM COATED ORAL at 20:14

## 2025-01-01 RX ADMIN — CYANOCOBALAMIN TAB 1000 MCG 1000 MCG: 1000 TAB at 08:33

## 2025-01-01 RX ADMIN — PANTOPRAZOLE SODIUM 40 MG: 40 INJECTION, POWDER, LYOPHILIZED, FOR SOLUTION INTRAVENOUS at 08:01

## 2025-01-01 RX ADMIN — SODIUM CHLORIDE 75 MEQ: 450 INJECTION, SOLUTION INTRAVENOUS at 01:53

## 2025-01-01 RX ADMIN — Medication 150 MCG/HR: at 11:45

## 2025-01-01 RX ADMIN — SODIUM CHLORIDE 1.5 MCG/KG/HR: 9 INJECTION, SOLUTION INTRAVENOUS at 04:41

## 2025-01-01 RX ADMIN — HYDROMORPHONE HYDROCHLORIDE 0.5 MG: 1 INJECTION, SOLUTION INTRAMUSCULAR; INTRAVENOUS; SUBCUTANEOUS at 23:27

## 2025-01-01 RX ADMIN — PROPOFOL 5 MCG/KG/MIN: 10 INJECTION, EMULSION INTRAVENOUS at 13:40

## 2025-01-01 RX ADMIN — SODIUM CHLORIDE 2601 ML: 9 INJECTION, SOLUTION INTRAVENOUS at 13:28

## 2025-01-01 RX ADMIN — BISACODYL 10 MG: 10 SUPPOSITORY RECTAL at 02:12

## 2025-01-01 RX ADMIN — POTASSIUM CHLORIDE 20 MEQ: 29.8 INJECTION, SOLUTION INTRAVENOUS at 01:52

## 2025-01-01 RX ADMIN — OXYCODONE 5 MG: 5 TABLET ORAL at 21:17

## 2025-01-01 RX ADMIN — Medication 50 MCG/HR: at 13:44

## 2025-01-01 RX ADMIN — CHLORHEXIDINE GLUCONATE 15 ML: 1.2 RINSE ORAL at 21:10

## 2025-01-01 RX ADMIN — HEPARIN SODIUM 5000 UNITS: 5000 INJECTION INTRAVENOUS; SUBCUTANEOUS at 15:07

## 2025-01-01 RX ADMIN — HEPARIN SODIUM 5000 UNITS: 5000 INJECTION INTRAVENOUS; SUBCUTANEOUS at 06:31

## 2025-01-01 RX ADMIN — HYDROMORPHONE HYDROCHLORIDE 0.5 MG: 1 INJECTION, SOLUTION INTRAMUSCULAR; INTRAVENOUS; SUBCUTANEOUS at 20:20

## 2025-01-01 RX ADMIN — CETIRIZINE HYDROCHLORIDE 5 MG: 10 TABLET, FILM COATED ORAL at 08:26

## 2025-01-01 RX ADMIN — SODIUM CHLORIDE 1.5 MCG/KG/HR: 9 INJECTION, SOLUTION INTRAVENOUS at 15:06

## 2025-01-01 RX ADMIN — PANTOPRAZOLE SODIUM 40 MG: 40 INJECTION, POWDER, LYOPHILIZED, FOR SOLUTION INTRAVENOUS at 08:25

## 2025-01-01 RX ADMIN — PANTOPRAZOLE SODIUM 40 MG: 40 INJECTION, POWDER, LYOPHILIZED, FOR SOLUTION INTRAVENOUS at 08:43

## 2025-01-01 RX ADMIN — SODIUM CHLORIDE 1.5 MCG/KG/HR: 9 INJECTION, SOLUTION INTRAVENOUS at 19:38

## 2025-01-01 RX ADMIN — HYDROCORTISONE SODIUM SUCCINATE 100 MG: 100 INJECTION, POWDER, FOR SOLUTION INTRAMUSCULAR; INTRAVENOUS at 16:25

## 2025-01-01 RX ADMIN — PROPOFOL 20 MCG/KG/MIN: 10 INJECTION, EMULSION INTRAVENOUS at 18:57

## 2025-01-01 RX ADMIN — MIDAZOLAM HYDROCHLORIDE 1 MG/HR: 1 INJECTION, SOLUTION INTRAVENOUS at 10:52

## 2025-01-01 RX ADMIN — HEPARIN SODIUM 5000 UNITS: 5000 INJECTION INTRAVENOUS; SUBCUTANEOUS at 13:24

## 2025-01-01 RX ADMIN — POTASSIUM CHLORIDE 20 MEQ: 1.5 POWDER, FOR SOLUTION ORAL at 15:51

## 2025-01-01 RX ADMIN — HEPARIN SODIUM 5000 UNITS: 5000 INJECTION INTRAVENOUS; SUBCUTANEOUS at 13:14

## 2025-01-01 RX ADMIN — SODIUM CHLORIDE 0.2 MCG/KG/HR: 9 INJECTION, SOLUTION INTRAVENOUS at 07:36

## 2025-01-01 RX ADMIN — AMIODARONE HYDROCHLORIDE 0.5 MG/MIN: 1.8 INJECTION, SOLUTION INTRAVENOUS at 04:45

## 2025-01-01 RX ADMIN — HEPARIN SODIUM 9.8 UNITS/KG/HR: 10000 INJECTION, SOLUTION INTRAVENOUS at 09:32

## 2025-01-01 RX ADMIN — AMIODARONE HYDROCHLORIDE 400 MG: 200 TABLET ORAL at 08:32

## 2025-01-01 RX ADMIN — AMIODARONE HYDROCHLORIDE 0.5 MG/MIN: 1.8 INJECTION, SOLUTION INTRAVENOUS at 18:43

## 2025-01-01 RX ADMIN — MIDODRINE HYDROCHLORIDE 15 MG: 2.5 TABLET ORAL at 17:47

## 2025-01-01 RX ADMIN — Medication 200 MG: at 08:20

## 2025-01-01 RX ADMIN — FENTANYL CITRATE 25 MCG: 50 INJECTION, SOLUTION INTRAMUSCULAR; INTRAVENOUS at 14:30

## 2025-01-01 RX ADMIN — INSULIN HUMAN 20 UNITS: 100 INJECTION, SOLUTION PARENTERAL at 04:03

## 2025-01-01 RX ADMIN — IPRATROPIUM BROMIDE AND ALBUTEROL SULFATE 3 ML: .5; 2.5 SOLUTION RESPIRATORY (INHALATION) at 19:02

## 2025-01-01 RX ADMIN — MUPIROCIN 1 APPLICATION: 20 OINTMENT TOPICAL at 08:01

## 2025-01-01 RX ADMIN — ACETAMINOPHEN 650 MG: 325 TABLET ORAL at 15:47

## 2025-01-01 RX ADMIN — SODIUM CHLORIDE 0.7 MCG/KG/HR: 9 INJECTION, SOLUTION INTRAVENOUS at 10:24

## 2025-01-01 RX ADMIN — CEFEPIME 2000 MG: 2 INJECTION, POWDER, FOR SOLUTION INTRAVENOUS at 14:25

## 2025-01-01 RX ADMIN — ROCURONIUM BROMIDE 50 MG: 10 SOLUTION INTRAVENOUS at 00:57

## 2025-01-01 RX ADMIN — POTASSIUM CHLORIDE 40 MEQ: 1.5 POWDER, FOR SOLUTION ORAL at 05:41

## 2025-01-01 RX ADMIN — CETIRIZINE HYDROCHLORIDE 5 MG: 10 TABLET, FILM COATED ORAL at 08:47

## 2025-01-01 RX ADMIN — MIDAZOLAM HYDROCHLORIDE 2 MG: 1 INJECTION, SOLUTION INTRAMUSCULAR; INTRAVENOUS at 15:35

## 2025-01-01 RX ADMIN — ACETAMINOPHEN 650 MG: 325 TABLET ORAL at 05:24

## 2025-01-01 RX ADMIN — PANTOPRAZOLE SODIUM 40 MG: 40 INJECTION, POWDER, LYOPHILIZED, FOR SOLUTION INTRAVENOUS at 17:14

## 2025-01-01 RX ADMIN — SODIUM CHLORIDE 0.5 MCG/KG/HR: 9 INJECTION, SOLUTION INTRAVENOUS at 03:03

## 2025-01-01 RX ADMIN — SODIUM CHLORIDE 1.5 MCG/KG/HR: 9 INJECTION, SOLUTION INTRAVENOUS at 12:17

## 2025-01-01 RX ADMIN — MIDODRINE HYDROCHLORIDE 15 MG: 2.5 TABLET ORAL at 11:06

## 2025-01-01 RX ADMIN — IPRATROPIUM BROMIDE AND ALBUTEROL SULFATE 3 ML: .5; 2.5 SOLUTION RESPIRATORY (INHALATION) at 07:00

## 2025-01-01 RX ADMIN — PANTOPRAZOLE SODIUM 40 MG: 40 INJECTION, POWDER, FOR SOLUTION INTRAVENOUS at 05:51

## 2025-01-01 RX ADMIN — CHLORHEXIDINE GLUCONATE 15 ML: 1.2 RINSE ORAL at 20:38

## 2025-01-01 RX ADMIN — SODIUM CHLORIDE 1.5 MCG/KG/HR: 9 INJECTION, SOLUTION INTRAVENOUS at 01:51

## 2025-01-01 RX ADMIN — ACETAMINOPHEN 650 MG: 325 TABLET ORAL at 21:18

## 2025-01-01 RX ADMIN — HEPARIN SODIUM 5000 UNITS: 5000 INJECTION INTRAVENOUS; SUBCUTANEOUS at 05:45

## 2025-01-01 RX ADMIN — MIDAZOLAM HYDROCHLORIDE 2 MG: 1 INJECTION, SOLUTION INTRAMUSCULAR; INTRAVENOUS at 08:08

## 2025-01-01 RX ADMIN — IPRATROPIUM BROMIDE AND ALBUTEROL SULFATE 3 ML: .5; 2.5 SOLUTION RESPIRATORY (INHALATION) at 12:37

## 2025-01-01 RX ADMIN — SODIUM CHLORIDE 0.46 MG/KG/HR: 9 INJECTION, SOLUTION INTRAVENOUS at 15:59

## 2025-01-01 RX ADMIN — Medication 10 ML: at 21:57

## 2025-01-01 RX ADMIN — IPRATROPIUM BROMIDE AND ALBUTEROL SULFATE 3 ML: .5; 2.5 SOLUTION RESPIRATORY (INHALATION) at 12:46

## 2025-01-01 RX ADMIN — AMIODARONE HYDROCHLORIDE 0.5 MG/MIN: 1.8 INJECTION, SOLUTION INTRAVENOUS at 10:57

## 2025-01-01 RX ADMIN — MUPIROCIN 1 APPLICATION: 20 OINTMENT TOPICAL at 08:25

## 2025-01-01 RX ADMIN — OXYCODONE 5 MG: 5 TABLET ORAL at 09:11

## 2025-01-01 RX ADMIN — SODIUM CHLORIDE 0.36 MG/KG/HR: 9 INJECTION, SOLUTION INTRAVENOUS at 05:41

## 2025-01-01 RX ADMIN — IPRATROPIUM BROMIDE AND ALBUTEROL SULFATE 3 ML: .5; 2.5 SOLUTION RESPIRATORY (INHALATION) at 00:19

## 2025-01-01 RX ADMIN — POTASSIUM CHLORIDE 10 MEQ: 7.46 INJECTION, SOLUTION INTRAVENOUS at 12:12

## 2025-01-01 RX ADMIN — METRONIDAZOLE 500 MG: 500 INJECTION, SOLUTION INTRAVENOUS at 12:12

## 2025-01-01 RX ADMIN — HEPARIN SODIUM 13.8 UNITS/KG/HR: 10000 INJECTION, SOLUTION INTRAVENOUS at 08:55

## 2025-01-01 RX ADMIN — FLECAINIDE ACETATE 75 MG: 50 TABLET ORAL at 21:55

## 2025-01-01 RX ADMIN — SODIUM CHLORIDE 1 MCG/KG/HR: 9 INJECTION, SOLUTION INTRAVENOUS at 10:55

## 2025-01-01 RX ADMIN — SODIUM BICARBONATE 150 MEQ: 84 INJECTION, SOLUTION INTRAVENOUS at 03:11

## 2025-01-01 RX ADMIN — HYDROCORTISONE SODIUM SUCCINATE 100 MG: 100 INJECTION, POWDER, FOR SOLUTION INTRAMUSCULAR; INTRAVENOUS at 08:43

## 2025-01-01 RX ADMIN — CEFTRIAXONE 2000 MG: 2 INJECTION, POWDER, FOR SOLUTION INTRAMUSCULAR; INTRAVENOUS at 05:41

## 2025-01-01 RX ADMIN — CHLORHEXIDINE GLUCONATE 15 ML: 1.2 RINSE ORAL at 20:29

## 2025-01-01 RX ADMIN — MIDODRINE HYDROCHLORIDE 15 MG: 2.5 TABLET ORAL at 08:33

## 2025-01-01 RX ADMIN — CHLORHEXIDINE GLUCONATE 15 ML: 1.2 RINSE ORAL at 09:02

## 2025-01-01 RX ADMIN — AMIODARONE HYDROCHLORIDE 0.5 MG/MIN: 1.8 INJECTION, SOLUTION INTRAVENOUS at 20:37

## 2025-01-01 RX ADMIN — SODIUM CHLORIDE 1.06 MG/KG/HR: 9 INJECTION, SOLUTION INTRAVENOUS at 19:56

## 2025-01-01 RX ADMIN — CEFTRIAXONE 2000 MG: 2 INJECTION, POWDER, FOR SOLUTION INTRAMUSCULAR; INTRAVENOUS at 05:06

## 2025-01-01 RX ADMIN — HEPARIN SODIUM 5000 UNITS: 5000 INJECTION INTRAVENOUS; SUBCUTANEOUS at 14:31

## 2025-01-01 RX ADMIN — SODIUM BICARBONATE 100 MEQ: 84 INJECTION INTRAVENOUS at 06:10

## 2025-01-01 RX ADMIN — FOLIC ACID 1 MG: 1 TABLET ORAL at 08:20

## 2025-01-01 RX ADMIN — THIAMINE HYDROCHLORIDE 500 MG: 100 INJECTION, SOLUTION INTRAMUSCULAR; INTRAVENOUS at 16:56

## 2025-01-01 RX ADMIN — METOPROLOL TARTRATE 5 MG: 1 INJECTION, SOLUTION INTRAVENOUS at 00:58

## 2025-01-01 RX ADMIN — AMIODARONE HYDROCHLORIDE 1 MG/MIN: 1.8 INJECTION, SOLUTION INTRAVENOUS at 22:41

## 2025-01-01 RX ADMIN — PANTOPRAZOLE SODIUM 40 MG: 40 INJECTION, POWDER, LYOPHILIZED, FOR SOLUTION INTRAVENOUS at 09:11

## 2025-01-01 RX ADMIN — SODIUM BICARBONATE 100 MEQ: 84 INJECTION INTRAVENOUS at 15:05

## 2025-01-01 RX ADMIN — INSULIN HUMAN 2 UNITS: 100 INJECTION, SOLUTION PARENTERAL at 12:40

## 2025-01-01 RX ADMIN — SODIUM BICARBONATE 50 MEQ: 84 INJECTION INTRAVENOUS at 03:11

## 2025-01-01 RX ADMIN — SODIUM BICARBONATE 150 MEQ: 84 INJECTION, SOLUTION INTRAVENOUS at 16:59

## 2025-01-01 RX ADMIN — CYANOCOBALAMIN TAB 1000 MCG 1000 MCG: 1000 TAB at 08:31

## 2025-01-01 RX ADMIN — CALCIUM GLUCONATE 2000 MG: 20 INJECTION, SOLUTION INTRAVENOUS at 16:24

## 2025-01-01 RX ADMIN — MIDAZOLAM HYDROCHLORIDE 2 MG: 1 INJECTION, SOLUTION INTRAMUSCULAR; INTRAVENOUS at 22:00

## 2025-01-01 RX ADMIN — THIAMINE HYDROCHLORIDE 500 MG: 100 INJECTION, SOLUTION INTRAMUSCULAR; INTRAVENOUS at 08:43

## 2025-01-01 RX ADMIN — PROPOFOL 25 MCG/KG/MIN: 10 INJECTION, EMULSION INTRAVENOUS at 04:49

## 2025-01-01 RX ADMIN — INSULIN HUMAN 2 UNITS: 100 INJECTION, SOLUTION PARENTERAL at 23:40

## 2025-01-01 RX ADMIN — HYDROCORTISONE SODIUM SUCCINATE 100 MG: 100 INJECTION, POWDER, FOR SOLUTION INTRAMUSCULAR; INTRAVENOUS at 08:33

## 2025-01-01 RX ADMIN — PROPOFOL 20 MCG/KG/MIN: 10 INJECTION, EMULSION INTRAVENOUS at 10:43

## 2025-01-01 RX ADMIN — VECURONIUM BROMIDE 5 MG: 1 INJECTION, POWDER, LYOPHILIZED, FOR SOLUTION INTRAVENOUS at 05:28

## 2025-01-01 RX ADMIN — CHLORHEXIDINE GLUCONATE 15 ML: 1.2 RINSE ORAL at 08:01

## 2025-01-01 RX ADMIN — METOPROLOL TARTRATE 5 MG: 1 INJECTION, SOLUTION INTRAVENOUS at 01:40

## 2025-01-01 RX ADMIN — MIDODRINE HYDROCHLORIDE 5 MG: 2.5 TABLET ORAL at 08:31

## 2025-01-01 RX ADMIN — HYDROCORTISONE SODIUM SUCCINATE 100 MG: 100 INJECTION, POWDER, FOR SOLUTION INTRAMUSCULAR; INTRAVENOUS at 08:26

## 2025-02-08 PROBLEM — A41.9 SEPTIC SHOCK: Status: ACTIVE | Noted: 2025-01-01

## 2025-02-08 PROBLEM — R65.21 SEPTIC SHOCK: Status: ACTIVE | Noted: 2025-01-01

## 2025-02-08 NOTE — CASE MANAGEMENT/SOCIAL WORK
Discharge Planning Assessment   [place]     Patient Name: Phan Daniels  MRN: [id number]  Today's Date: 2/8/2025    Admit Date: 2/8/2025    Plan: Unable to speak to pt at this time due to pt being on vent and no family available at this time.   Discharge Needs Assessment    No documentation.                  Discharge Plan       Row Name 02/08/25 1704       Plan    Plan Unable to speak to pt at this time due to pt being on vent and no family available at this time.    Patient/Family in Agreement with Plan unable to assess                  Continued Care and Services - Admitted Since 2/8/2025    No active coordination exists for this encounter.       Expected Discharge Date and Time       Expected Discharge Date Expected Discharge Time    Feb 12, 2025            Demographic Summary       Row Name 02/08/25 1706       General Information    Admission Type inpatient    Arrived From home    Referral Source emergency department    Reason for Consult discharge planning                    Tayler Stallings RN

## 2025-02-08 NOTE — CONSULTS
Nephrology Consultation Note    Referring Provider: Dr. Bowman  Reason for Consultation: Acute renal failure with acute tubular necrosis    Chief complaint brought in unresponsive    Subjective .     History of present illness: There is no history available.  There are no notes on the chart.  Dr. Bowman informed me that the patient was brought in unresponsive.  They do not know how long he has been unresponsive.  At the outset it appeared that his blood pressure was okay but since then the patient despite getting sepsis fluid protocol bolus, has become hypotensive and is currently on pressors.  He is of course intubated and on the ventilator.    Hemodynamic data reviewed and blood pressure when he came was 126/61 and subsequently 75/52.  Patient documented a fever of 104 °F.  I have personally reviewed the chest x-ray and it shows bilateral pneumonia and a formal report was pending when I saw the x-ray.    Labs showed an anion gap of 23 with a normal lactic acid of 1.2 and no significant leukocytosis but the procalcitonin was markedly elevated.  Patient was hypokalemic.  pH was 7.318 and pCO2 was 46 with a bicarb of 24.  The patient's creatinine was 10.9 with a BUN of 70 and calcium 6.1 and phosphorus 6.1    Nurse reports no hematemesis or melena.    ROS not possible      Reviewed labs and comments on pertinent: As above  Radiology reports/image review: Reviewed chest x-ray images and noted CT chest report    History  Past Medical History:   Diagnosis Date    Abdominal bloating 01/06/2023    Atrial fibrillation 12/15/2022    Basal cell carcinoma of skin 12/15/2022    Bradycardia 03/13/2024    Chronic low back pain 05/02/2023    Chronic obstructive lung disease 05/02/2023    Chronic post-COVID-19 syndrome 05/02/2023    Community acquired pneumonia 01/06/2023    Constipation 08/11/2023    COPD (chronic obstructive pulmonary disease)     Dental caries 12/15/2022     Difficult or painful urination 04/19/2023    Essential hypertension 12/15/2022    GERD (gastroesophageal reflux disease)     Herpes zoster 02/03/2023    Hiatal hernia     History of degenerative disc disease     uses motorized chair    Hyperlipidemia     Hypertension     Hypokalemia 01/06/2023    Osteoarthritis of knee 02/03/2023    Peripheral edema 03/04/2024    PONV (postoperative nausea and vomiting)     Rib pain 04/19/2023   ,   Past Surgical History:   Procedure Laterality Date    CATARACT EXTRACTION      [facility]    Procedure: ESOPHAGOGASTRODUODENOSCOPY;  Surgeon: Morris Wynne MD;  Location: [facility] END[facility];  Service: Gastroenterology;  Laterality: N/A;  DISTAL ESOPHAGUS DILATED WITH 18-20 MM BALLOON DILATOR.    REPLACEMENT TOTAL KNEE Left    , No family history on file.,   Social History     Tobacco Use    Smoking status: Every Day     Current packs/day: 1.00     Types: Cigarettes    Smokeless tobacco: Never   Vaping Use    Vaping status: Never Used   Substance Use Topics    Alcohol use: Yes     Comment: occasionally    Drug use: Never    and Allergies:  Indomethacin er, Isoniazid, and Penicillins      Vital Signs   Temp:  [104 °F (40 °C)] 104 °F (40 °C)  Heart Rate:  [86-95] 95  Resp:  [20-28] 24  BP: ()/(52-61) 75/52  FiO2 (%):  [70 %-100 %] 70 %    Physical Exam:     General Appearance:  Sedated   Head:  No facial asymmetry   Eyes:            Conjunctivae and sclerae normal,   Ears:    Ears appear intact    Throat: Intubated   Neck:   No JVD       Lungs:   Breath sounds equal bilateral with coarse scattered crackles    Heart:  Heart rate regular.  No pericardial rub   Chest Wall:    No abnormalities observed   Abdomen:     Normal bowel sounds, no  tenderness.  Not distended   Rectal:     Deferred   Extremities: No edema       Skin:   No petechiae, no purpura on the lower extremities             Laboratory Data :     CBC and coagulation:  Results  from last 7 days   Lab Units 02/08/25  1349   PROCALCITONIN ng/mL 5.90*   LACTATE mmol/L 1.2   CRP mg/dL 9.12*   WBC 10*3/mm3 8.18   HEMOGLOBIN g/dL 10.6*   HEMATOCRIT % 31.1*   MCV fL 95.7   MCHC g/dL 34.1   PLATELETS 10*3/mm3 82*   INR  1.21*     Acid/base balance:  Results from last 7 days   Lab Units 02/08/25  1348   PH, ARTERIAL pH units 7.318*   PO2 ART mm Hg 106.0   PCO2, ARTERIAL mm Hg 46.9*   HCO3 ART mmol/L 24.1     Renal and electrolytes:    Results from last 7 days   Lab Units 02/08/25  1349   SODIUM mmol/L 144   POTASSIUM mmol/L 2.2*   MAGNESIUM mg/dL 1.2*   CHLORIDE mmol/L 99   CO2 mmol/L 21.8*   BUN mg/dL 70*   CREATININE mg/dL 10.92*   CALCIUM mg/dL 6.1*   PHOSPHORUS mg/dL 6.1*     Estimated Creatinine Clearance: 7.9 mL/min (A) (by C-G formula based on SCr of 10.92 mg/dL (H)).  @GFRCG:3@   Liver and pancreatic function:  Results from last 7 days   Lab Units 02/08/25  1349   ALBUMIN g/dL 3.2*   BILIRUBIN mg/dL 1.1   ALK PHOS U/L 45   AST (SGOT) U/L 271*   ALT (SGPT) U/L 70*         Cardiac:      Liver and pancreatic function:  Results from last 7 days   Lab Units 02/08/25  1349   ALBUMIN g/dL 3.2*   BILIRUBIN mg/dL 1.1   ALK PHOS U/L 45   AST (SGOT) U/L 271*   ALT (SGPT) U/L 70*       Medications :     calcium gluconate, 1,000 mg, Intravenous, Q1H   Followed by  calcium gluconate, 2,000 mg, Intravenous, Q2H  magnesium sulfate, 1 g, Intravenous, Q1H  potassium chloride, 40 mEq, Oral, Once  potassium chloride, 10 mEq, Intravenous, Once  potassium chloride, 10 mEq, Intravenous, Once  vancomycin, 20 mg/kg (Adjusted), Intravenous, Once      fentanyl 10 mcg/mL,  mcg/hr, Last Rate: 100 mcg/hr (02/08/25 1418)  norepinephrine, 0.02-0.3 mcg/kg/min (Dosing Weight), Last Rate: 0.2 mcg/kg/min (02/08/25 1516)  propofol, 5-50 mcg/kg/min (Dosing Weight), Last Rate: 5 mcg/kg/min (02/08/25 1340)                 Assessment and Plan:    1.  Acute renal failure with acute tubular necrosis  2.  Oliguria  3.  Acute  hypercapnic hypoxic respiratory failure  4.  Anion gap metabolic acidosis  5.  Pneumonia with sepsis  6.  Hypokalemia  7.  Transaminitis  8.  Hyperpyrexia  9.  Rhabdomyolysis    The patient is anuric currently.  This is probably on account of sepsis with shock.  I expect the CPK to rise and I do think that this may be contributing to his oliguric renal failure  Will hydrate the patient at least at 150 mL/h for now after initial liter of 250 mL/h.  I will use lactated Ringer's as a solution of choice  Need to support his blood pressure and keep systolic BP more than 90 and MAP more than 65 with both parameters to be met.  Nebulizer treatments will be added  I will add high-dose thiamine as we do not know if he drinks alcohol or not  His condition is critical and prognosis very guarded and he is at high risk for dialysis  Discussed with Dr. Bowman and discussed with Dr. Blankenship          Complexity: High complexity    Daniel Martin MD  02/08/25  15:57 EST

## 2025-02-08 NOTE — ED PROVIDER NOTES
Subjective   History of Present Illness  69-year-old male presents the emergency room after being found down unresponsive by neighbors/friends.  EMS reports the patient was found by friends today on the ground unresponsive covered in feces as well as vomit.  Patient was down for unknown amount of time.  Patient was noted to be unresponsive he was given Narcan without improvement of mental status.  Oral airway was placed and then patient was transferred to ER for further evaluation treatment.    Altered Mental Status  Presenting symptoms: partial responsiveness    Episode history:  Continuous  Chronicity:  New      Review of Systems   Unable to perform ROS: Patient unresponsive   All other systems reviewed and are negative.      Past Medical History:   Diagnosis Date    Abdominal bloating 01/06/2023    Atrial fibrillation 12/15/2022    Basal cell carcinoma of skin 12/15/2022    Bradycardia 03/13/2024    Chronic low back pain 05/02/2023    Chronic obstructive lung disease 05/02/2023    Chronic post-COVID-19 syndrome 05/02/2023    Community acquired pneumonia 01/06/2023    Constipation 08/11/2023    COPD (chronic obstructive pulmonary disease)     Dental caries 12/15/2022    Difficult or painful urination 04/19/2023    Essential hypertension 12/15/2022    GERD (gastroesophageal reflux disease)     Herpes zoster 02/03/2023    Hiatal hernia     History of degenerative disc disease     uses motorized chair    Hyperlipidemia     Hypertension     Hypokalemia 01/06/2023    Osteoarthritis of knee 02/03/2023    Peripheral edema 03/04/2024    PONV (postoperative nausea and vomiting)     Rib pain 04/19/2023       Allergies   Allergen Reactions    Indomethacin Er Nausea And Vomiting    Isoniazid Rash    Penicillins Rash       Past Surgical History:   Procedure Laterality Date    CATARACT EXTRACTION      [facility]      [facility]24    Procedure: ESOPHAGOGASTRODUODENOSCOPY;  Surgeon: Morris Wynne,  MD;  Location: [facility] ENDOSCOPY;  Service: Gastroenterology;  Laterality: N/A;  DISTAL ESOPHAGUS DILATED WITH 18-20 MM BALLOON DILATOR.    REPLACEMENT TOTAL KNEE Left        No family history on file.    Social History     Socioeconomic History    Marital status:    Tobacco Use    Smoking status: Every Day     Current packs/day: 1.00     Types: Cigarettes    Smokeless tobacco: Never   Vaping Use    Vaping status: Never Used   Substance and Sexual Activity    Alcohol use: Yes     Comment: occasionally    Drug use: Never    Sexual activity: Defer           Objective   Physical Exam  Vitals and nursing note reviewed.   Constitutional:       Appearance: He is ill-appearing and toxic-appearing.   HENT:      Head: Normocephalic.      Comments: Left eye deviated medially.  No conjunctival injection.     Mouth/Throat:      Comments: Thick yellow-green discharge around mouth.  Oral airway in place.  Eyes:      Conjunctiva/sclera: Conjunctivae normal.   Cardiovascular:      Rate and Rhythm: Regular rhythm. Tachycardia present.   Pulmonary:      Comments: Coarse rhonchi bilateral lungs.  Abdominal:      General: Bowel sounds are normal.      Palpations: Abdomen is soft.      Tenderness: There is no abdominal tenderness. There is no guarding.   Musculoskeletal:      Right lower leg: No edema.      Left lower leg: No edema.   Neurological:      GCS: GCS eye subscore is 1. GCS verbal subscore is 1. GCS motor subscore is 3.   Psychiatric:         Mood and Affect: Mood normal.         Intubation    Date/Time: 2/8/2025 1:37 PM    Performed by: Darren Bowman MD  Authorized by: Darren Bowman MD    Consent:     Consent obtained:  Emergent situation  Universal protocol:     Patient identity confirmed:  Anonymous protocol, patient vented/unresponsive  Pre-procedure details:     Indications: airway protection and altered consciousness      Patient status:  Unresponsive    Look externally: facial hair      Mouth opening  - incisor distance:  3 or more finger widths    Hyoid-mental distance: 2 finger widths      Hyoid-thyroid distance: 1 finger width      Mallampati score:  II    Obstruction comment:  Gastric contents in airway    Pharmacologic strategy: DSI      Induction agents:  Etomidate    Paralytics:  Succinylcholine  Procedure details:     Preoxygenation:  Bag valve mask    CPR in progress: no      Number of attempts:  1  Successful intubation attempt details:     Intubation method:  Oral    Intubation technique: direct      Laryngoscope blade:  Mac 3    Bougie used: no      Tube size (mm):  7.5    Tube type:  Cuffed    Tube visualized through cords: yes    Placement assessment:     ETT at teeth/gumline (cm):  23    Tube secured with:  ETT odell    Breath sounds:  Equal    Placement verification: chest rise, colorimetric ETCO2 and CXR verification    Post-procedure details:     Procedure completion:  Tolerated  Critical Care    Performed by: Darren Bowman MD  Authorized by: Darren Bowman MD    Critical care provider statement:     Critical care time (minutes):  31    Critical care was necessary to treat or prevent imminent or life-threatening deterioration of the following conditions:  Sepsis and respiratory failure    Critical care was time spent personally by me on the following activities:  Blood draw for specimens, ordering and performing treatments and interventions, ordering and review of laboratory studies, examination of patient, ordering and review of radiographic studies, re-evaluation of patient's condition, pulse oximetry and discussions with consultants    Care discussed with: admitting provider    Central Line At Bedside    Date/Time: 2/8/2025 4:08 PM    Performed by: Darren Bowman MD  Authorized by: Darren Bowman MD    Consent:     Consent obtained:  Emergent situation  Universal protocol:     Patient identity confirmed:  Anonymous protocol, patient vented/unresponsive  Pre-procedure  details:     Indication(s): central venous access and insufficient peripheral access      Hand hygiene: Hand hygiene performed prior to insertion      Sterile barrier technique: All elements of maximal sterile technique followed      Skin preparation:  Chlorhexidine  Sedation:     Sedation type:  Deep  Anesthesia:     Anesthesia method:  None  Procedure details:     Location:  R internal jugular    Patient position:  Supine    Procedural supplies:  Triple lumen    Catheter size:  7 Fr    Landmarks identified: yes      Ultrasound guidance: yes      Ultrasound guidance timing: real time      Sterile ultrasound techniques: Sterile gel and sterile probe covers were used      Number of attempts:  1    Successful placement: yes    Post-procedure details:     Post-procedure:  Dressing applied    Assessment:  Blood return through all ports, free fluid flow, placement verified by x-ray and no pneumothorax on x-ray    Procedure completion:  Tolerated             ED Course  ED Course as of 02/08/25 1631   Sat Feb 08, 2025   1335 Sinus tachycardia ventricular 109  QRS 98 QTc 460 T wave inversion inferior lateral leads. [BB]   1335 Patient was noted to be agonal breathing upon presentation to the emergency room patient GCS less than 8 in light of concern for ability to protect his airway have subsequently performed drug assisted intubation.  Patient tolerated procedure. [BB]   1339 Accu-Chek 156.  Patient to be given septic workup.  Will give 30 mL/kg bolus of fluids.  Will initiate sedation.  Will initiate cefepime Vanco and Flagyl for empiric coverage.  Have ordered Tylenol for fever. [BB]   1355 ABG shows pH 7.318 pCO2 46 pO2 106 [BB]   1409 CBC white blood count 8.18 hemoglobin 10.6 hematocrit 31.1. [BB]   1413 INR. 1.21 [BB]   1414 Lactic acid 1.2 [BB]   1420 CRP 9.12. [BB]   1420 Patient's troponin is 745. [BB]   1421 Urinalysis shows small amount of blood trace leukocytes [BB]   1423 Patient chest x-ray shows right  sided infiltrate.  ET tube in place.  G-tube in place. [BB]   1427 Procalcitonin 5.90. [BB]   1431 Patient's potassium 2.2 have initiated electrolyte replacement protocol. [BB]   1434 Patient appears to be in acute renal failure have contacted nephrology awaiting callback. [BB]   1435 Have spoken to Dr. Martin who reports no acute interventions other than electrolyte replacement and fluid support at this time.  [BB]   1439 1+ bacteria on urinalysis. [BB]   1455 CK 2697. [BB]   1536 Patient is noted to have low blood pressure of subsequently placed patient on Levophed.  Have placed central line to right IJ patient tolerated procedure well.  Dr. Martin at bedside  [BB]   1541 Have contacted hospitalist awaiting callback [BB]   1549 Central line in place.  No pneumothorax appreciated. [BB]   1556 Have spoken to Dr. Blankenship who states he will talk to nephrology and callback [BB]      ED Course User Index  [BB] Darren Bowman MD                                                       Medical Decision Making  Problems Addressed:  Acute renal failure, unspecified acute renal failure type: complicated acute illness or injury  Elevated troponin: complicated acute illness or injury  Multifocal pneumonia: complicated acute illness or injury  Sepsis, due to unspecified organism, unspecified whether acute organ dysfunction present: complicated acute illness or injury    Amount and/or Complexity of Data Reviewed  Labs: ordered.  Radiology: ordered and independent interpretation performed.  ECG/medicine tests: ordered and independent interpretation performed.    Risk  OTC drugs.  Prescription drug management.  Decision regarding hospitalization.        Final diagnoses:   Sepsis, due to unspecified organism, unspecified whether acute organ dysfunction present   Acute renal failure, unspecified acute renal failure type   Elevated troponin   Multifocal pneumonia       ED Disposition  ED Disposition       ED Disposition   Decision to  Admit    Condition   --    Comment   --               No follow-up provider specified.       Medication List      No changes were made to your prescriptions during this visit.            Darren Bowman MD  02/08/25 1657

## 2025-02-08 NOTE — ED NOTES
Patient presents to the ER via [facility] after patient was found down in the floor. EMS states that patient was found by patient's neighbors that came and checked on him, reports that patient was covered in stool with irregular breathing and unresponsive. EMS states that patient's oxygen saturation was initially in the 50's, states that patient nonrebreather was placed on patient and patient became combative. EMS states that patient's mental status deteriorated and an oral airway placed. Patient arrived on nonrebreather at 15 L/min, oral airway in place. Patient's GCS is a 5. Dr. Bowman at bedside as well as respiratory therapist.

## 2025-02-08 NOTE — H&P
[facility] HOSPITALIST HISTORY AND PHYSICAL    Patient Identification:  Name:  Phan Daniels  Age:  69 y.o.  Sex:  male  :  1955  MRN:  [id number]   Visit Number:  [id number]  Admit Date: 2025   Room number:  101/01  Primary Care Physician:  Cesia Diaz APRN     Subjective     Chief complaint:    Chief Complaint   Patient presents with    Respiratory Distress       History of presenting illness:  69 y.o. male with hx of afib?, COPD, HTN, HLD, GERD, ETOH abuse who presented after being found unresponsive. History obtained from multiple family members who where bedside and EMS report. Patient was recently released from assisted where he was for 44 years. His residence is in [place] but he has been visiting the last few weeks with his girlfriend who lives in [place]. Patient drinks heavily with reports of over 1/5 of liquor per day. Family also reports that he has frequent hospital visits for pneumonias since being released from assisted. Unfortunately none of the family have talked to patient in last 2 weeks. Patient tried to call his nephew 3 days ago. EMS called today and found patient unresponsive. Patient down for unknown amount of time as no other people were present when EMS arrived per report. Patient with dried feces and appeared to have been down for some time. Patient intubated on arrival to ED for airway protection and hypoxia. Patient also started on levophed for shock. Central line placed in ED as well, personally reviewed chest x-ray appears to be in appropriate location. Sepsis bolus given in ED as well. No reported seizure activity. Nephrology evaluated patient in ED and recommended to our facility based on current hemodynamics. Blood pressure improved and levophed being weaned at current rate of 0.18. Ventilator also weaned to 80%FiO2 and PEEP of 10. Patient has been overbreathing the vent at times per ED staff but no purposeful movements.       ---------------------------------------------------------------------------------------------------------------------   Review of Systems   Constitutional: Negative.    HENT: Negative.     Eyes: Negative.    Respiratory: Negative.     Cardiovascular: Negative.    Gastrointestinal: Negative.    Endocrine: Negative.    Genitourinary: Negative.    Musculoskeletal: Negative.    Skin: Negative.    Allergic/Immunologic: Negative.    Neurological: Negative.    Hematological: Negative.    Psychiatric/Behavioral: Negative.       ---------------------------------------------------------------------------------------------------------------------   Past Medical History:   Diagnosis Date    Abdominal bloating 01/06/2023    Atrial fibrillation 12/15/2022    Basal cell carcinoma of skin 12/15/2022    Bradycardia 03/13/2024    Chronic low back pain 05/02/2023    Chronic obstructive lung disease 05/02/2023    Chronic post-COVID-19 syndrome 05/02/2023    Community acquired pneumonia 01/06/2023    Constipation 08/11/2023    COPD (chronic obstructive pulmonary disease)     Dental caries 12/15/2022    Difficult or painful urination 04/19/2023    Essential hypertension 12/15/2022    GERD (gastroesophageal reflux disease)     Herpes zoster 02/03/2023    Hiatal hernia     History of degenerative disc disease     uses motorized chair    Hyperlipidemia     Hypertension     Hypokalemia 01/06/2023    Osteoarthritis of knee 02/03/2023    Peripheral edema 03/04/2024    PONV (postoperative nausea and vomiting)     Rib pain 04/19/2023     Past Surgical History:   Procedure Laterality Date    CATARACT EXTRACTION      [facility]ONOSCOPY      [facility]13/2024    Procedure: ESOPHAGOGASTRODUODENOSCOPY;  Surgeon: Morris Wynne MD;  Location: [facility] END[facility]COPY;  Service: Gastroenterology;  Laterality: N/A;  DISTAL ESOPHAGUS DILATED WITH 18-20 MM BALLOON DILATOR.    REPLACEMENT TOTAL KNEE Left      No family history on file.  Social  History     Socioeconomic History    Marital status:    Tobacco Use    Smoking status: Every Day     Current packs/day: 1.00     Types: Cigarettes    Smokeless tobacco: Never   Vaping Use    Vaping status: Never Used   Substance and Sexual Activity    Alcohol use: Yes     Comment: occasionally    Drug use: Never    Sexual activity: Defer     ---------------------------------------------------------------------------------------------------------------------   Allergies:  Indomethacin er, Isoniazid, and Penicillins  ---------------------------------------------------------------------------------------------------------------------   Medications below are reported home medications pulling from within the system; at this time, these medications have not been reconciled unless otherwise specified and are in the verification process for further verifcation as current home medications.    Prior to Admission Medications       Prescriptions Last Dose Informant Patient Reported? Taking?    albuterol (PROVENTIL) (2.5 MG/3ML) 0.083% nebulizer solution  Self Yes No    Take 2.5 mg by nebulization Every 4 (Four) Hours As Needed for Wheezing.    Albuterol Sulfate, sensor, 108 (90 Base) MCG/ACT aerosol powder   Self Yes No    Inhale 2 Puffs/kg Daily As Needed (SHORTNESS OF AIR).    Aspirin 81 MG capsule  Self Yes No    Take 1 capsule by mouth Daily.    budesonide-formoterol (Symbicort) 160-4.5 MCG/ACT inhaler  Self Yes No    Inhale 2 puffs 2 (Two) Times a Day.    carvedilol (COREG) 25 MG tablet  Self Yes No    Take 0.5 tablets by mouth 2 (Two) Times a Day With Meals.    Cholecalciferol (VITAMIN D-3 PO)  Self Yes No    Take 1 tablet/day by mouth Daily.    finasteride (PROSCAR) 5 MG tablet  Self Yes No    Take 1 tablet by mouth Daily.    flecainide (TAMBOCOR) 150 MG tablet  Self Yes No    Take 0.5 tablets by mouth 2 (Two) Times a Day.    furosemide (LASIX) 20 MG tablet  Self Yes No    Take 1 tablet by mouth Daily As Needed  (SWELLING).    gabapentin (NEURONTIN) 300 MG capsule  Self Yes No    Take 3 capsules by mouth 3 (Three) Times a Day.    losartan (COZAAR) 100 MG tablet  Self Yes No    Take 1 tablet by mouth 2 (Two) Times a Day.    omeprazole (priLOSEC) 40 MG capsule   No No    Take 1 capsule by mouth 2 (Two) Times a Day Before Meals. Take a half hour before breakfast    rosuvastatin (CRESTOR) 40 MG tablet  Self Yes No    Take 1 tablet by mouth Daily.    vitamin B-12 (CYANOCOBALAMIN) 500 MCG tablet  Self Yes No    Take 1 tablet by mouth Daily.          Objective     Vital Signs:  Temp:  [104 °F (40 °C)] 104 °F (40 °C)  Heart Rate:  [] 78  Resp:  [20-28] 20  BP: ()/(46-79) 117/68  FiO2 (%):  [70 %-100 %] 80 %    Mean Arterial Pressure (Non-Invasive) for the past 24 hrs (Last 3 readings):   Noninvasive MAP (mmHg)   02/08/25 1605 84   02/08/25 1600 87   02/08/25 1555 85     SpO2:  [78 %-100 %] 94 %  on  Flow (L/min) (Oxygen Therapy):  [10-15] 15;   Device (Oxygen Therapy): ventilator  Body mass index is 30.93 kg/m².    Wt Readings from Last 3 Encounters:   02/08/25 102 kg (224 lb 13.9 oz)   06/06/24 102 kg (225 lb 10.3 oz)      ---------------------------------------------------------------------------------------------------------------------   Physical Exam:  GENERAL:  Patient intubated and sedated.   SKIN:  Warm, dry without rashes, purpura or petechiae.  EYES:  Pupils equal, round and reactive to light.  Conjunctivae normal.  HEAD:  Normocephalic. Atraumatic.   EARS/NOSE/MOUTH/THROAT:  Septum midline.  No excoriations or nasal discharge. No stomatitis or ulcers.  Lips normal. Oropharynx without lesions or exudates.  NECK:  Supple with good range of motion; no thyromegaly or masses  LYMPHATICS:  No cervical, supraclavicular, axillary adenopathy.  RESP:  Decreased coarse breath sounds on right.   CARDIAC:  Regular rate and rhythm without murmurs, rubs or gallops. Normal S1,S2. No edema  GI:  Soft, nontender, no  hepatosplenomegaly, normal bowel sounds  MSK:  No clubbing or cyanosis, No joint swelling noted in hands  NEUROLOGICAL:  No focal deficit. Pupils equal and reactive.     ---------------------------------------------------------------------------------------------------------------------  EKG:    ECG 12 Lead Other; Sepsis   Final Result   Test Reason : Other~   Blood Pressure :   */*   mmHG   Vent. Rate : 109 BPM     Atrial Rate : 109 BPM      P-R Int : 168 ms          QRS Dur :  98 ms       QT Int : 342 ms       P-R-T Axes :  36  76 253 degrees     QTcB Int : 460 ms      Sinus tachycardia   ST & T wave abnormality, consider inferior ischemia   ST & T wave abnormality, consider anterolateral ischemia   Abnormal ECG   When compared with ECG of 06-Jun-2024 12:05,   Inverted T waves have replaced nonspecific T wave abnormality in Inferior   leads   Inverted T waves have replaced nonspecific T wave abnormality in Lateral   leads   Confirmed by Jasiel Lema ([id number]) on 2/8/2025 2:38:41 PM      Referred By: SEYMOUR           Confirmed By: Jasiel Lema          Telemetry:      I have personally looked at both the EKG and the telemetry strips.    Last echocardiogram:    --------------------------------------------------------------------------------------------------------------------  Labs:  Results from last 7 days   Lab Units 02/08/25  1349   PROCALCITONIN ng/mL 5.90*   LACTATE mmol/L 1.2   CRP mg/dL 9.12*   WBC 10*3/mm3 8.18   HEMOGLOBIN g/dL 10.6*   HEMATOCRIT % 31.1*   MCV fL 95.7   MCHC g/dL 34.1   PLATELETS 10*3/mm3 82*   INR  1.21*     Results from last 7 days   Lab Units 02/08/25  1348   PH, ARTERIAL pH units 7.318*   PO2 ART mm Hg 106.0   PCO2, ARTERIAL mm Hg 46.9*   HCO3 ART mmol/L 24.1     Results from last 7 days   Lab Units 02/08/25  1349   SODIUM mmol/L 144   POTASSIUM mmol/L 2.2*   MAGNESIUM mg/dL 1.2*   CHLORIDE mmol/L 99   CO2 mmol/L 21.8*   BUN mg/dL 70*   CREATININE mg/dL 10.92*   CALCIUM mg/dL  "6.1*   PHOSPHORUS mg/dL 6.1*   GLUCOSE mg/dL 134*   ALBUMIN g/dL 3.2*   BILIRUBIN mg/dL 1.1   ALK PHOS U/L 45   AST (SGOT) U/L 271*   ALT (SGPT) U/L 70*   Estimated Creatinine Clearance: 7.9 mL/min (A) (by C-G formula based on SCr of 10.92 mg/dL (H)).    No results found for: \"AMMONIA\"  Results from last 7 days   Lab Units 02/08/25  1504 02/08/25  1349   CK TOTAL U/L  --  2,687*   HSTROP T ng/L 750* 745*         Glucose   Date/Time Value Ref Range Status   02/08/2025 1532 152 (H) 70 - 130 mg/dL Final   02/08/2025 1332 156 (H) 70 - 130 mg/dL Final     No results found for: \"TSH\", \"FREET4\"  No results found for: \"PREGTESTUR\", \"PREGSERUM\", \"HCG\", \"HCGQUANT\"  Pain Management Panel          Latest Ref Rng & Units 2/8/2025   Pain Management Panel   Amphetamine, Urine Qual Negative Negative    Barbiturates Screen, Urine Negative Negative    Benzodiazepine Screen, Urine Negative Negative    Buprenorphine, Screen, Urine Negative Negative    Cocaine Screen, Urine Negative Negative    Fentanyl, Urine Negative Negative    Methadone Screen , Urine Negative Negative    Methamphetamine, Ur Negative Negative       Details                 Brief Urine Lab Results  (Last result in the past 365 days)        Color   Clarity   Blood   Leuk Est   Nitrite   Protein   CREAT   Urine HCG        02/08/25 1351 Dark Yellow   Cloudy   Small (1+)   Trace   Negative   100 mg/dL (2+)                 No results found for: \"BLOODCX\"  No results found for: \"URINECX\"  No results found for: \"WOUNDCX\"  No results found for: \"STOOLCX\"    I have personally looked at the labs and they are summarized above.  ----------------------------------------------------------------------------------------------------------------------  Detailed radiology reports for the last 24 hours:    Imaging Results (Last 24 Hours)       Procedure Component Value Units Date/Time    XR Chest AP [[id number]] Resulted: 02/08/25 1604     Updated: 02/08/25 1605    CT Abdomen Pelvis " Without Contrast [[id number]] Collected: 02/08/25 1511     Updated: 02/08/25 1515    Narrative:      INDICATION: Shortness of breath. Abdominal pain.     COMPARISON: No relevant priors available.     TECHNIQUE: Axial CT images of the chest, abdomen and pelvis were  obtained without IV contrast. Coronal and sagittal reformations were  reviewed.      FINDINGS:  Chest:  The thoracic aorta appears normal in caliber. The heart is mildly  enlarged. No pericardial or pleural effusion. No pneumothorax. No  pathologically enlarged mediastinal or hilar lymph nodes. Endotracheal  tube tip approximately 3 cm above the lissette.     Emphysema. Multifocal infiltrates most pronounced in the right upper  lobe.     No acute osseous abnormality evident.     Abdomen and pelvis:  Hepatic steatosis. Gallstones in the gallbladder. The liver, spleen,  pancreas and adrenal glands appear unremarkable. No hydronephrosis.  Right renal cyst measuring 4.3 cm. Left renal cyst measuring 1.6 cm.  Enteric tube in the stomach.     No evidence of bowel obstruction/colitis/appendicitis. No free air. No  drainable fluid collection.     Collazo catheter in the urinary bladder. Small fat-containing bilateral  inguinal hernias.     No acute osseous abnormality evident.       Impression:      1.  Findings concerning for multifocal pneumonia.  2.  Hepatic steatosis.  3.  Cholelithiasis.     This report was finalized on 2/8/2025 3:13 PM by Alex Pallas, DO.       CT Chest Without Contrast Diagnostic [[id number]] Collected: 02/08/25 1511     Updated: 02/08/25 1515    Narrative:      INDICATION: Shortness of breath. Abdominal pain.     COMPARISON: No relevant priors available.     TECHNIQUE: Axial CT images of the chest, abdomen and pelvis were  obtained without IV contrast. Coronal and sagittal reformations were  reviewed.      FINDINGS:  Chest:  The thoracic aorta appears normal in caliber. The heart is mildly  enlarged. No pericardial or pleural effusion. No  pneumothorax. No  pathologically enlarged mediastinal or hilar lymph nodes. Endotracheal  tube tip approximately 3 cm above the lissette.     Emphysema. Multifocal infiltrates most pronounced in the right upper  lobe.     No acute osseous abnormality evident.     Abdomen and pelvis:  Hepatic steatosis. Gallstones in the gallbladder. The liver, spleen,  pancreas and adrenal glands appear unremarkable. No hydronephrosis.  Right renal cyst measuring 4.3 cm. Left renal cyst measuring 1.6 cm.  Enteric tube in the stomach.     No evidence of bowel obstruction/colitis/appendicitis. No free air. No  drainable fluid collection.     Collazo catheter in the urinary bladder. Small fat-containing bilateral  inguinal hernias.     No acute osseous abnormality evident.       Impression:      1.  Findings concerning for multifocal pneumonia.  2.  Hepatic steatosis.  3.  Cholelithiasis.     This report was finalized on 2/8/2025 3:13 PM by Alex Pallas, DO.       CT Head Without Contrast [[id number]] Collected: 02/08/25 1510     Updated: 02/08/25 1513    Narrative:      INDICATION: Altered mental status.     COMPARISON: No relevant priors available     TECHNIQUE: Axial CT images of the head were obtained without IV  contrast. Coronal and sagittal reformations were reviewed.     FINDINGS:  Gray-white differentiation is relatively preserved. No mass, mass effect  or midline shift. And chronic ischemic white matter changes. No evidence  of acute large territorial infarction or acute intracranial hemorrhage.  Ventricles appear normal in size. Basal cisterns are patent. No  depressed calvarial fracture.       Impression:      No acute intracranial process.     This report was finalized on 2/8/2025 3:11 PM by Alex Pallas, DO.       XR Chest 1 View [[id number]] Collected: 02/08/25 1453     Updated: 02/08/25 1457    Narrative:      INDICATION: Tube placement     TECHNIQUE: Frontal radiograph of the chest.     COMPARISON: None.       Impression:       FINDINGS/IMPRESSION:   Endotracheal tube tip approximately 5 cm above the lissette. Enteric tube  in stomach. Mild pulmonary vascular congestion. Right upper lobe  infiltrate concerning for pneumonia. Elevation of the right  hemidiaphragm. No pleural effusion or pneumothorax. No acute fracture.         This report was finalized on 2/8/2025 2:55 PM by Alex Pallas, DO.             Final impressions for the last 30 days of radiology reports:    CT Abdomen Pelvis Without Contrast    Result Date: 2/8/2025  1.  Findings concerning for multifocal pneumonia. 2.  Hepatic steatosis. 3.  Cholelithiasis.  This report was finalized on 2/8/2025 3:13 PM by Alex Pallas, DO.      CT Chest Without Contrast Diagnostic    Result Date: 2/8/2025  1.  Findings concerning for multifocal pneumonia. 2.  Hepatic steatosis. 3.  Cholelithiasis.  This report was finalized on 2/8/2025 3:13 PM by Alex Pallas, DO.      CT Head Without Contrast    Result Date: 2/8/2025  No acute intracranial process.  This report was finalized on 2/8/2025 3:11 PM by Alex Pallas, DO.      XR Chest 1 View    Result Date: 2/8/2025  FINDINGS/IMPRESSION: Endotracheal tube tip approximately 5 cm above the lissette. Enteric tube in stomach. Mild pulmonary vascular congestion. Right upper lobe infiltrate concerning for pneumonia. Elevation of the right hemidiaphragm. No pleural effusion or pneumothorax. No acute fracture.   This report was finalized on 2/8/2025 2:55 PM by Alex Pallas, DO.     I have personally looked at the radiology images and read the final radiology report.    Assessment & Plan       #Septic shock 2/2 multifocal pneumonia   #Acute hypoxic respiratory failure   #ARDS?  - R sided predominance on CT.   - Suspect patient aspirated and was down for a time. Initial insult unclear but possibly related to ETOH use. UDS negative.   - Continue vanc/cefepime/flagyl  - Will get respiratory culture and follow blood cultures.   - Current ventilator settings 80% FiO2, 10  PEEP, , rate 16. P/F ratio appears to be around 100. Repeat ABG in AM.   - Will consult pulmonology. Stress dose steroids started on admission.     #Acute renal failure   #HAGMA  #Mild rhabdomyolysis   - Unclear Cr baseline. Creatinine on presentation 10.92. BUN 70. K. 2.2. Lactate 1.2.   - Creatinine kinase 2.6k. Less than 5k and current level likely not contributing to renal failure.   - Sepsis bolus in ED and nephrology has ordered mIVF  - Repeat labs in AM. Avoid nephrotoxins as able.     #NSTEMI  - HS troponin 745=>750. Not a significant delta.   - ST depressions in later leads  - Unable to ascertain if patient is having chest pain   - Suspect type II NSTEMI. Will consult cardiology.   - Will get echo    #Severe hypokalemia  #Severe hypomagnesemia  #Severe hypokalemia  - Replaced in ED. Will repeat and continue to replace conservatively given renal failure.   - Nephrology consulted     #Reported ETOH abuse  - High dose IV thiamine. Will order CIWA when appropriate.     #Anemia  - Limited data points but appears baseline hemoglobin over 15 and presents with 10. Will get iron studies, vitamin B12, folate, TSH.     F: TFs  A: Fentanyl  S: Propofol  T: SubQ heparin   H: HOB elevated  U: Famotidine   G: PRN  S: Not ready   B: PRN  I: RIJ central line 2/8, ETT 2/8  D: Vanc, cefepime, flagyl      Code status: Full     Dispo: Admit to CCU    Talat Blankenship MD  [facility]ist  02/08/25  16:54 EST

## 2025-02-09 NOTE — PROGRESS NOTES
Nephrology Progress Note    Interval History:     Patient Complaints: Patient on the ventilator and on pressors.  FiO2 70%.  Oligoanuric.    Reviewed notes by hospitalists/consultants: Noted patient on stress steroids and did get a low-dose of metoprolol IV by cardiology and is on flecainide.  Dr. Blankenship as her on cefepime and vancomycin and Flagyl for septic shock with multifocal pneumonia and noted addition of bicarb drip for acidosis with conservative replacement of potassium and magnesium  Reviewed labs and comments on pertinent: non-MRSA/Staph aureus species in 1 blood culture  Radiology reports/image review:    Vital Signs  Temp:  [97.7 °F (36.5 °C)-98.7 °F (37.1 °C)] 98.2 °F (36.8 °C)  Heart Rate:  [] 120  Resp:  [22-28] 28  BP: ()/(47-94) 103/90  FiO2 (%):  [70 %-80 %] 70 %    Physical Exam:    General:           Sedated      HEENT: Intubated               Neck: No JVD       Lungs:   Scattered crackles   Heart:  Regular,  no rub.       Abdomen:   Nondistended and soft with questionable bowel sounds       Extremities: No edema.  Blister burn on right thigh which is as big as my palm       Skin: No petechiae, no new rash.                Laboratory Data :     CBC and coagulation:  Results from last 7 days   Lab Units 02/09/25  0010 02/08/25  1349   PROCALCITONIN ng/mL  --  5.90*   LACTATE mmol/L  --  1.2   CRP mg/dL  --  9.12*   WBC 10*3/mm3 14.32* 8.18   HEMOGLOBIN g/dL 11.8* 10.6*   HEMATOCRIT % 35.1* 31.1*   MCV fL 97.2* 95.7   MCHC g/dL 33.6 34.1   PLATELETS 10*3/mm3 89* 82*   INR   --  1.21*     Acid/base balance:  Results from last 7 days   Lab Units 02/09/25  0742 02/08/25  1348   PH, ARTERIAL pH units 7.289* 7.318*   PO2 ART mm Hg 85.2 106.0   PCO2, ARTERIAL mm Hg 41.4 46.9*   HCO3 ART mmol/L 19.9* 24.1     Renal and electrolytes:    Results from last 7 days   Lab Units 02/09/25  0800 02/09/25  0010 02/08/25  1349   SODIUM mmol/L   --  141 144   POTASSIUM mmol/L 3.0* 2.7* 2.2*   MAGNESIUM mg/dL  --  2.0 1.2*   CHLORIDE mmol/L  --  100 99   CO2 mmol/L  --  16.3* 21.8*   BUN mg/dL  --  68* 70*   CREATININE mg/dL  --  10.76* 10.92*   CALCIUM mg/dL  --  7.4* 6.1*   PHOSPHORUS mg/dL  --  8.3* 6.1*     Estimated Creatinine Clearance: 8 mL/min (A) (by C-G formula based on SCr of 10.76 mg/dL (H)).  @GFRCG:3@   Liver and pancreatic function:  Results from last 7 days   Lab Units 02/09/25  0010 02/08/25  1349   ALBUMIN g/dL 2.8* 3.2*   BILIRUBIN mg/dL 1.6* 1.1   ALK PHOS U/L 45 45   AST (SGOT) U/L 238* 271*   ALT (SGPT) U/L 73* 70*   AMMONIA umol/L 40  --          Cardiac:      Liver and pancreatic function:  Results from last 7 days   Lab Units 02/09/25  0010 02/08/25  1349   ALBUMIN g/dL 2.8* 3.2*   BILIRUBIN mg/dL 1.6* 1.1   ALK PHOS U/L 45 45   AST (SGOT) U/L 238* 271*   ALT (SGPT) U/L 73* 70*   AMMONIA umol/L 40  --        Medications :     cefepime, 1,000 mg, Intravenous, Q24H  cetirizine, 5 mg, Oral, Daily  finasteride, 5 mg, Oral, Daily  flecainide, 75 mg, Oral, BID  heparin (porcine), 5,000 Units, Subcutaneous, Q8H  hydrocortisone sodium succinate, 100 mg, Intravenous, Q8H  insulin regular, 2-7 Units, Subcutaneous, Q6H  ipratropium-albuterol, 3 mL, Nebulization, 4x Daily - RT  metroNIDAZOLE, 500 mg, Intravenous, Q8H  midodrine, 10 mg, Oral, TID AC  mupirocin, 1 Application, Each Nare, BID  [START ON 2/10/2025] pantoprazole, 40 mg, Oral, Q AM  pantoprazole, 40 mg, Intravenous, BID AC  rosuvastatin, 10 mg, Oral, Nightly  sodium chloride, 10 mL, Intravenous, Q12H  sodium chloride, 10 mL, Intravenous, Q12H  sodium chloride, 10 mL, Intravenous, Q12H  sodium chloride, 10 mL, Intravenous, Q12H  sodium chloride, 10 mL, Intravenous, Q12H  thiamine (B-1) IV, 500 mg, Intravenous, TID  Vancomycin Pharmacy Intermittent/Pulse Dosing, , Not Applicable, Daily  vitamin B-12, 1,000 mcg, Oral, Daily      fentanyl 10 mcg/mL,  mcg/hr, Last Rate: 100 mcg/hr  (02/09/25 1242)  norepinephrine, 0.02-0.3 mcg/kg/min (Dosing Weight), Last Rate: Stopped (02/09/25 0724)  Pharmacy Consult - Pharmacy to dose,   phenylephrine, 0.5-3 mcg/kg/min (Dosing Weight), Last Rate: 1.4 mcg/kg/min (02/09/25 1151)  propofol, 5-50 mcg/kg/min (Dosing Weight), Last Rate: 20 mcg/kg/min (02/09/25 1242)  sodium bicarbonate 8.4 % 75 mEq in sodium chloride 0.45 % 1,000 mL infusion (less than/equal to 100 mEq), 75 mEq, Last Rate: 75 mEq (02/09/25 1011)        Assessment and Plan:    1.  Acute renal failure with acute tubular necrosis  2.  Oliguria  3.  Acute hypercapnic hypoxic respiratory failure  4.  Anion gap metabolic acidosis  5.  Pneumonia with sepsis  6.  Hypokalemia  7.  Transaminitis  8.  Hyperpyrexia  9.  Rhabdomyolysis  10.  Non-ST MI  11.  Paroxysmal atrial fibrillation    There is no immediate need for dialysis  We will watch for now  Agree with bicarbonate drip  Stop the lactated Ringer's  Patient will complete 3 days of high-dose thiamine tomorrow at which point a lower dose may be started if indicated  Discussed with nephew        Complexity: Moderate complexity    Daniel Martin MD  02/09/25  18:31 EST

## 2025-02-09 NOTE — PROGRESS NOTES
[facility] HOSPITALIST PROGRESS NOTE     Patient Identification:  Name:  Phan Daniels  Age:  69 y.o.  Sex:  male  :  1955  MRN:  [id number]  Visit Number:  [id number]  ROOM: [address]     Primary Care Provider:  Cesia Diaz APRN    Length of stay in inpatient status:  1    Subjective     Chief Compliant:    Chief Complaint   Patient presents with    Respiratory Distress       History of Presenting Illness:    Overnight patient went into afib w/ RVR. Patient transitioned from levophed to phenylephrine with improvement with heart rates. Patient's nephew supportive bedside. Updated on plan of care and prognosis.     ROS:  Otherwise 10 point ROS negative other than documented above in HPI.     Objective     Current Hospital Meds:cefepime, 1,000 mg, Intravenous, Q24H  heparin (porcine), 5,000 Units, Subcutaneous, Q8H  hydrocortisone sodium succinate, 100 mg, Intravenous, Q8H  ipratropium-albuterol, 3 mL, Nebulization, 4x Daily - RT  metroNIDAZOLE, 500 mg, Intravenous, Q8H  midodrine, 10 mg, Oral, TID AC  mupirocin, 1 Application, Each Nare, BID  pantoprazole, 40 mg, Intravenous, BID AC  sodium chloride, 10 mL, Intravenous, Q12H  sodium chloride, 10 mL, Intravenous, Q12H  sodium chloride, 10 mL, Intravenous, Q12H  sodium chloride, 10 mL, Intravenous, Q12H  sodium chloride, 10 mL, Intravenous, Q12H  thiamine (B-1) IV, 500 mg, Intravenous, TID  Vancomycin Pharmacy Intermittent/Pulse Dosing, , Not Applicable, Daily    fentanyl 10 mcg/mL,  mcg/hr, Last Rate: 100 mcg/hr (25 1418)  lactated ringers, 150 mL/hr, Last Rate: 150 mL/hr (25 0559)  norepinephrine, 0.02-0.3 mcg/kg/min (Dosing Weight), Last Rate: Stopped (25 07)  Pharmacy Consult - Pharmacy to dose,   Pharmacy to dose vancomycin,   phenylephrine, 0.5-3 mcg/kg/min (Dosing Weight), Last Rate: 1.4 mcg/kg/min (25 0810)  propofol, 5-50 mcg/kg/min (Dosing Weight), Last Rate: 20 mcg/kg/min (25  0551)        Current Antimicrobial Therapy:  Anti-Infectives (From admission, onward)      Ordered     Dose/Rate Route Frequency Start Stop    02/08/25 2054  Vancomycin Pharmacy Intermittent/Pulse Dosing        Ordering Provider: Mignon Hanley, PharmD     Not Applicable Daily 02/09/25 1200 02/13/25 0859    02/08/25 1846  cefepime 1000 mg IVPB in 100 mL NS (VTB)        Ordering Provider: Talat Blankenship MD    1,000 mg  over 30 Minutes Intravenous Every 24 Hours 02/09/25 0600 02/14/25 0559    02/08/25 1853  vancomycin IVPB 2000 mg in 0.9% Sodium Chloride 500 mL        Ordering Provider: Talat Blankenship MD    20 mg/kg × 98.8 kg  250 mL/hr over 120 Minutes Intravenous Once 02/08/25 2100 02/08/25 2345    02/08/25 1831  metroNIDAZOLE (FLAGYL) IVPB 500 mg        Ordering Provider: Talat Blankenship MD    500 mg  200 mL/hr over 30 Minutes Intravenous Every 8 Hours 02/08/25 2000 02/13/25 1959    02/08/25 1846  cefepime 1000 mg IVPB in 100 mL NS (VTB)        Ordering Provider: Talat Blankenship MD    1,000 mg  over 30 Minutes Intravenous Once 02/08/25 1945 02/08/25 2215    02/08/25 1831  Pharmacy to dose vancomycin        Ordering Provider: Talat Blankenship MD     Not Applicable Continuous PRN 02/08/25 1831 02/13/25 1830    02/08/25 1331  cefepime 2000 mg IVPB in 100 mL NS (VTB)        Ordering Provider: Darren Bowman MD    2,000 mg  over 30 Minutes Intravenous Once 02/08/25 1347 02/08/25 1501    02/08/25 1331  metroNIDAZOLE (FLAGYL) IVPB 500 mg        Ordering Provider: Darren Bowman MD    500 mg  200 mL/hr over 30 Minutes Intravenous Once 02/08/25 1347 02/08/25 1458    02/08/25 1331  vancomycin IVPB 1750 mg in 0.9% Sodium Chloride (premix) 500 mL        Ordering Provider: Darren Bowman MD    20 mg/kg × 86.7 kg (Adjusted)  285.7 mL/hr over 105 Minutes Intravenous Once 02/08/25 1347 02/08/25 1948          Current Diuretic Therapy:  Diuretics (From admission, onward)      None           ----------------------------------------------------------------------------------------------------------------------  Vital Signs:  Temp:  [97.7 °F (36.5 °C)-104 °F (40 °C)] 98.2 °F (36.8 °C)  Heart Rate:  [] 114  Resp:  [20-28] 26  BP: ()/(46-90) 84/60  FiO2 (%):  [70 %-100 %] 75 %  SpO2:  [78 %-100 %] 99 %  on  Flow (L/min) (Oxygen Therapy):  [10-15] 15;   Device (Oxygen Therapy): ventilator  Body mass index is 29.58 kg/m².    Wt Readings from Last 3 Encounters:   02/09/25 98.9 kg (218 lb 1.1 oz)   06/06/24 102 kg (225 lb 10.3 oz)     Intake & Output (last 3 days)         02/06 0701  02/07 0700 02/07 0701  02/08 0700 02/08 0701  02/09 0700 02/09 0701  02/10 0700    I.V. (mL/kg)   2013.8 (20.4)     IV Piggyback   4551     Total Intake(mL/kg)   6564.8 (66.4)     Urine (mL/kg/hr)   75     Total Output   75     Net   +6489.8                   NPO Diet NPO Type: Strict NPO  ----------------------------------------------------------------------------------------------------------------------  Physical exam:  GENERAL:  Patient intubated and sedated.   SKIN:  Warm, dry without rashes, purpura or petechiae.  EYES:  Pupils equal, round and reactive to light.  Conjunctivae normal.  HEAD:  Normocephalic. Atraumatic.   EARS/NOSE/MOUTH/THROAT:  Septum midline.  No excoriations or nasal discharge. No stomatitis or ulcers.  Lips normal. Oropharynx without lesions or exudates.  NECK:  Supple with good range of motion; no thyromegaly or masses  LYMPHATICS:  No cervical, supraclavicular, axillary adenopathy.  RESP:  Decreased coarse breath sounds on right.   CARDIAC:  Regular rate and rhythm without murmurs, rubs or gallops. Normal S1,S2. No edema  GI:  Soft, nontender, no hepatosplenomegaly, normal bowel sounds  MSK:  No clubbing or cyanosis, No joint swelling noted in hands  NEUROLOGICAL:  No focal deficit. Pupils equal and reactive.     "  ----------------------------------------------------------------------------------------------------------------------  Tele:    ----------------------------------------------------------------------------------------------------------------------  Results from last 7 days   Lab Units 02/09/25 0010 02/08/25  1349   CRP mg/dL  --  9.12*   LACTATE mmol/L  --  1.2   WBC 10*3/mm3 14.32* 8.18   HEMOGLOBIN g/dL 11.8* 10.6*   HEMATOCRIT % 35.1* 31.1*   MCV fL 97.2* 95.7   MCHC g/dL 33.6 34.1   PLATELETS 10*3/mm3 89* 82*   INR   --  1.21*     Results from last 7 days   Lab Units 02/09/25  0742   PH, ARTERIAL pH units 7.289*   PO2 ART mm Hg 85.2   PCO2, ARTERIAL mm Hg 41.4   HCO3 ART mmol/L 19.9*     Results from last 7 days   Lab Units 02/09/25  0800 02/09/25  0010 02/08/25  1349   SODIUM mmol/L  --  141 144   POTASSIUM mmol/L 3.0* 2.7* 2.2*   MAGNESIUM mg/dL  --  2.0 1.2*   CHLORIDE mmol/L  --  100 99   CO2 mmol/L  --  16.3* 21.8*   BUN mg/dL  --  68* 70*   CREATININE mg/dL  --  10.76* 10.92*   CALCIUM mg/dL  --  7.4* 6.1*   PHOSPHORUS mg/dL  --  8.3* 6.1*   GLUCOSE mg/dL  --  215* 134*   ALBUMIN g/dL  --  2.8* 3.2*   BILIRUBIN mg/dL  --  1.6* 1.1   ALK PHOS U/L  --  45 45   AST (SGOT) U/L  --  238* 271*   ALT (SGPT) U/L  --  73* 70*   Estimated Creatinine Clearance: 8 mL/min (A) (by C-G formula based on SCr of 10.76 mg/dL (H)).  Ammonia   Date Value Ref Range Status   02/09/2025 40 16 - 60 umol/L Final     Results from last 7 days   Lab Units 02/09/25  0010 02/08/25  1504 02/08/25  1349   CK TOTAL U/L 3,281*  --  2,687*   HSTROP T ng/L  --  750* 745*             Glucose   Date/Time Value Ref Range Status   02/08/2025 1532 152 (H) 70 - 130 mg/dL Final   02/08/2025 1332 156 (H) 70 - 130 mg/dL Final     Lab Results   Component Value Date    TSH 0.196 (L) 02/09/2025     No results found for: \"PREGTESTUR\", \"PREGSERUM\", \"HCG\", \"HCGQUANT\"  Pain Management Panel          Latest Ref Rng & Units 2/8/2025   Pain Management Panel " "  Amphetamine, Urine Qual Negative Negative    Barbiturates Screen, Urine Negative Negative    Benzodiazepine Screen, Urine Negative Negative    Buprenorphine, Screen, Urine Negative Negative    Cocaine Screen, Urine Negative Negative    Fentanyl, Urine Negative Negative    Methadone Screen , Urine Negative Negative    Methamphetamine, Ur Negative Negative       Details                 Brief Urine Lab Results  (Last result in the past 365 days)        Color   Clarity   Blood   Leuk Est   Nitrite   Protein   CREAT   Urine HCG        02/08/25 1351 Dark Yellow   Cloudy   Small (1+)   Trace   Negative   100 mg/dL (2+)                 No results found for: \"BLOODCX\"  No results found for: \"URINECX\"  No results found for: \"WOUNDCX\"  No results found for: \"STOOLCX\"  No results found for: \"RESPCX\"  No results found for: \"AFBCX\"  Results from last 7 days   Lab Units 02/08/25  1349   PROCALCITONIN ng/mL 5.90*   LACTATE mmol/L 1.2   CRP mg/dL 9.12*       I have personally looked at the labs and they are summarized above.  ----------------------------------------------------------------------------------------------------------------------  Detailed radiology reports for the last 24 hours:    Imaging Results (Last 24 Hours)       Procedure Component Value Units Date/Time    US Abdomen Limited [[id number]] Resulted: 02/09/25 0711     Updated: 02/09/25 0841    XR Chest AP [[id number]] Collected: 02/08/25 1920     Updated: 02/08/25 1924    Narrative:      PROCEDURE: Portable chest x-ray examination performed on February 28, 2025. 2 films.     HISTORY: Post central vascular catheter placement.     COMPARISON: None.     FINDINGS:     Normal heart size.  Enteric drain in place with tip to the stomach  The sidehole is in the distal esophageal lumen.  Endotracheal tube in place with tip 6.3 cm above the lissette.  Right neck central vascular catheter with tip to the SVC.  Coarsened bronchovascular pattern to the lungs  Central pulmonary " vascular congestion.  Hazy opacities in the right upper lobe consistent with right upper lobe  pneumonia.  No pleural effusion or pneumothorax.          Impression:         Normal heart size  Satisfactory position of the endotracheal tube with tip 6.3 cm above the  lissette.  Right neck central vascular catheter with tip to the SVC.  Enteric drain in place with tip to the stomach however the sidehole is  in the distal esophageal lumen.  Multifocal pneumonia with focal consolidation in the right upper lobe.  Slightly elevated right hemidiaphragm.  No pleural effusion. No pneumothorax        This report was finalized on 2/8/2025 7:22 PM by Scott Campbell MD.       CT Abdomen Pelvis Without Contrast [[id number]] Collected: 02/08/25 1511     Updated: 02/08/25 1515    Narrative:      INDICATION: Shortness of breath. Abdominal pain.     COMPARISON: No relevant priors available.     TECHNIQUE: Axial CT images of the chest, abdomen and pelvis were  obtained without IV contrast. Coronal and sagittal reformations were  reviewed.      FINDINGS:  Chest:  The thoracic aorta appears normal in caliber. The heart is mildly  enlarged. No pericardial or pleural effusion. No pneumothorax. No  pathologically enlarged mediastinal or hilar lymph nodes. Endotracheal  tube tip approximately 3 cm above the lissette.     Emphysema. Multifocal infiltrates most pronounced in the right upper  lobe.     No acute osseous abnormality evident.     Abdomen and pelvis:  Hepatic steatosis. Gallstones in the gallbladder. The liver, spleen,  pancreas and adrenal glands appear unremarkable. No hydronephrosis.  Right renal cyst measuring 4.3 cm. Left renal cyst measuring 1.6 cm.  Enteric tube in the stomach.     No evidence of bowel obstruction/colitis/appendicitis. No free air. No  drainable fluid collection.     Collazo catheter in the urinary bladder. Small fat-containing bilateral  inguinal hernias.     No acute osseous abnormality evident.       Impression:       1.  Findings concerning for multifocal pneumonia.  2.  Hepatic steatosis.  3.  Cholelithiasis.     This report was finalized on 2/8/2025 3:13 PM by Alex Pallas, DO.       CT Chest Without Contrast Diagnostic [[id number]] Collected: 02/08/25 1511     Updated: 02/08/25 1515    Narrative:      INDICATION: Shortness of breath. Abdominal pain.     COMPARISON: No relevant priors available.     TECHNIQUE: Axial CT images of the chest, abdomen and pelvis were  obtained without IV contrast. Coronal and sagittal reformations were  reviewed.      FINDINGS:  Chest:  The thoracic aorta appears normal in caliber. The heart is mildly  enlarged. No pericardial or pleural effusion. No pneumothorax. No  pathologically enlarged mediastinal or hilar lymph nodes. Endotracheal  tube tip approximately 3 cm above the lissette.     Emphysema. Multifocal infiltrates most pronounced in the right upper  lobe.     No acute osseous abnormality evident.     Abdomen and pelvis:  Hepatic steatosis. Gallstones in the gallbladder. The liver, spleen,  pancreas and adrenal glands appear unremarkable. No hydronephrosis.  Right renal cyst measuring 4.3 cm. Left renal cyst measuring 1.6 cm.  Enteric tube in the stomach.     No evidence of bowel obstruction/colitis/appendicitis. No free air. No  drainable fluid collection.     Collazo catheter in the urinary bladder. Small fat-containing bilateral  inguinal hernias.     No acute osseous abnormality evident.       Impression:      1.  Findings concerning for multifocal pneumonia.  2.  Hepatic steatosis.  3.  Cholelithiasis.     This report was finalized on 2/8/2025 3:13 PM by Alex Pallas, DO.       CT Head Without Contrast [[id number]] Collected: 02/08/25 1510     Updated: 02/08/25 1513    Narrative:      INDICATION: Altered mental status.     COMPARISON: No relevant priors available     TECHNIQUE: Axial CT images of the head were obtained without IV  contrast. Coronal and sagittal reformations were  reviewed.     FINDINGS:  Gray-white differentiation is relatively preserved. No mass, mass effect  or midline shift. And chronic ischemic white matter changes. No evidence  of acute large territorial infarction or acute intracranial hemorrhage.  Ventricles appear normal in size. Basal cisterns are patent. No  depressed calvarial fracture.       Impression:      No acute intracranial process.     This report was finalized on 2/8/2025 3:11 PM by Alex Pallas, DO.       XR Chest 1 View [[id number]] Collected: 02/08/25 1453     Updated: 02/08/25 1457    Narrative:      INDICATION: Tube placement     TECHNIQUE: Frontal radiograph of the chest.     COMPARISON: None.       Impression:      FINDINGS/IMPRESSION:   Endotracheal tube tip approximately 5 cm above the lissette. Enteric tube  in stomach. Mild pulmonary vascular congestion. Right upper lobe  infiltrate concerning for pneumonia. Elevation of the right  hemidiaphragm. No pleural effusion or pneumothorax. No acute fracture.         This report was finalized on 2/8/2025 2:55 PM by Alex Pallas, DO.             Assessment & Plan    #Septic shock 2/2 multifocal pneumonia   #Acute hypoxic respiratory failure   #ARDS?  - R sided predominance on CT.   - Suspect patient aspirated and was down for a time. Initial insult unclear but possibly related to ETOH use. UDS negative.   - Continue vanc/cefepime/flagyl  - Will get respiratory culture and follow blood cultures.   - Current ventilator settings 75% FiO2, 10 PEEP, , rate 28. P/F ratio appears to be slightly improved around 115. Repeat ABG in AM.   - Will consult pulmonology. Stress dose steroids started on admission. Appreciate recs.   - Wean phenylephrine as able, currently at 1.4.      #Acute renal failure   #HAGMA  #Mild rhabdomyolysis   - Unclear Cr baseline. Creatinine on presentation 10.92. BUN 70. K. 2.2. Lactate 1.2.   - Creatinine kinase 2.6=>3.3k. Less than 5k and current level likely not contributing to renal  failure.   - Sepsis bolus in ED.   - Metabolic acidosis noted on ABG this AM. Will start 1/2bicarb 1/2 NS fluid at 100 cc/hr.  - Labs this AM stable. Cr 10.76. Potassium 2.7. BUM 68. Only 75 cc of output recorded overnight.   - Repeat labs in AM. Avoid nephrotoxins as able.   - Nephrology consulted. Appreciate recs.      #NSTEMI  - HS troponin 745=>750. Not a significant delta.   - ST depressions in later leads  - Unable to ascertain if patient is having chest pain   - Suspect type II NSTEMI. Will consult cardiology.   - Echo revealed normal LVEF. No pericardial effusion.     #Afib w/ RVR  - BB initiated by cardiology. Rate improved now after switching levophed to phenylephrine   - TSH slightly decreased, will get free T4.   - Unable to do heparin gtt currently for signs of bleeding.      #Severe hypokalemia  #Severe hypomagnesemia  #Severe hypokalemia  - Improved. Continue to replace conservatively given renal failure.   - Nephrology consulted      #Reported ETOH abuse  - High dose IV thiamine. Will order CIWA when appropriate.      #ALEKSANDR  - Patient's iron studies consistent ALEKSANDR and AOCD  - Vitamin B12 and folate pending   - Limited data points but appears baseline hemoglobin over 15 and presents with 10.  - When NG tube was hooked to suction 300 cc of dark red coffee ground output.   - Will start IV protonix. Will hold anticoagulation.   - Hemoglobin today improved at 11.8. Any signs of active bleeding will consult surgery for urgent endoscopy.     #PreDM  - A1C 6.4%-  - SSI     F: TFs  A: Fentanyl  S: Propofol  T: SCDs  H: HOB elevated  U: Famotidine   G: PRN  S: Not ready   B: PRN  I: RIJ central line 2/8, ETT 2/8  D: Vanc, cefepime, flagyl        Code status: Full      Dispo: Admit to CCU       Talat Blankenship MD  [facility] Hospitalist  02/09/25  09:16 EST

## 2025-02-09 NOTE — PROGRESS NOTES
Nurse Practitioner - Brief Progress Note  PERMANENT  02/08/2025 20:06    [facility] [place] [place] [facility] [place] [facility] [place] ([facility])    NIRAV WHITNEY.    Date of Service 02/08/2025 20:06    HPI/Events of Note [facility] Provider Assessment Note    69 year old male with PMH of  ETOH abuse (drinks a fifth per day- 750 mls) possible Atrial Fib, COPD, HTN, HLD, GERD,  recurrent pneumonias, admitted to ICU from ED for management of Sepsis with shock,  Respiratory failure, Pneumonia,  ASHLEY /ATN , HAGMA,   Rhabdo,  Severe hypokalemia, Hypomagnesemia, ETOH abuse, Anemia.    Found unresponsive with rregular breathing . Hypoxic. Placed on NRBM and then oral airway placed  Per family he was recently released from care home after 44 years. Not seen by family for the past 2 weeks   Agonal respirations in ED . Intubated VENT SETTINGS: AC/VC 22/500/80%/10+  Bolused 30 mls/KG,  2.6 liters  Central line placed and started on Levophed for goal MAP > 65  Started on IV Hydrocortisone  Sedation:  Propofol + Fentanyl   Started on Thiamine  Potassium, Magnesium and Calcium supplemented  Cultures: Blood CX drawn : Sputum pending   Antibiotics: Vanc, Cefepime + Flagyl    Consults to : Cardiology, Pulmonology, Nephrology    ABG 7.31/46.9/106/07.2  WBC 8.8  HGB 10.6  Platelets 82  Creatinine 10.92  Bicarb 21.8   Potassium 2.2  Mag 1.2  Calcium 6.1    ALT 70  Bili 1  CPK 2687  INR 1.2  Trop 745--750     ETOH < 10   UDS - negative     CT CHEST/ ABDO    1.  Findings concerning for multifocal pneumonia.  2.  Hepatic steatosis.  3.  Cholelithiasis.    CT HEAD : No acute intracranial process.    CXR: Endotracheal tube tip approximately 5 cm above the lissette. Enteric tube  in stomach. Mild pulmonary vascular congestion. Right upper lobe  infiltrate concerning for pneumonia. Elevation of the right  hemidiaphragm. No pleural effusion or pneumothorax. No acute fracture.     Assessment and Plan:  Sepsis with shock,   -s/p sepsis  fluid bolus  - Lactic  normal   - Levophed for goal MAP > 65  - IV Hydrocortisone  - Cultures in progress  -  Vanc, Cefepime + Flagyl     Respiratory failure, Pneumonia,  VENT SETTINGS: AC/VC 22/500/80%/10+  - LTVV for lung protective strategy . 6-8 mls /kg ideal BW - currently 6.4   - Cultures in progress  -  Vanc, Cefepime + Flagyl     ASHLEY /ATN , HAGMA, Rhabdo,   - Nephrology consulted  - Monitor urine output , electrolytes and creatinine trend  - Adjust meds as needed for current CrCl  - Avoid nephrotoxins ( NSAIDS, IV contrast)   - monitor Vanco levels    AMS- secondary to above issues   - CT head negative   - Check ammonia     Severe hypokalemia, Hypomagnesemia,  - supplemented     ETOH abuse  - Thiamine   - check ammonia level     Anemia - iron panel pending  - monitor for possible bleeding  - check stool for blood  - follow transfusion guidelines    Thromboytopenia- ? liver disease and sepsis  - Monitor platelets while on Heparin : hold if platelets < 50,000 or any concern for bleeding     ____y_   Video Assessment performed  __y___   Most recent labs reviewed  ___y__   Vital Signs reviewed  __y___   Best Practices addressed:                 VTE prophylaxis:Heparin                  SUP (when indicated): add PPI                  Current Glucose:156                      Please notify bedside physician when present or [facility] if glc > 180 X 2                 Sepsis guidelines:y                 Lung protective strategy; Y               Contact [facility] for any needs if bedside physician is not present.  Brianne Ellison MSN,ANP-BC,AGACNP-BC      Interventions Intermediate-Other: Sepsis with shock,  Respiratory failure, Pneumonia,  ASHLEY /ATN , HAGMA, Rhabdo,  Severe hypokalemia, Hypomagnesemia, ETOH abuse, Anemia.        Electronically Signed by: Brianne Ellison (NP) on 02/08/2025 21:20    Annotated By: Elan Covarrubias)    Date: 02/08/25 21:26  I have reviewed and agree with the findings,  assessment, and plan of care as documented.

## 2025-02-09 NOTE — PROGRESS NOTES
Pulmonary critical care progress note    Referring Provider: Dr Blankenship  Reason for Consultation: Respiratory failure and ventilator management      Chief complaint -could not be obtained as patient was intubated and sedated on my assessment      Sub-overnight events reviewed.  All the labs medications ins and outs and vitals reviewed.    Bedside rounds done with patient's RT and RN.  Case discussed with patient's family at bedside and answered his questions to his satisfaction.    Vent settings graphics reviewed.  Plateau pressure and auto PEEP reviewed.  Gave 2 A of sodium bicarbonate  Adjusted drips-from Levophed to Saúl-Synephrine  Discontinued beta-blocker  Ordered repeat blood gas  Review of Systems  Could not be obtained as patient on my assessment was intubated and sedated.      History  Past Medical History:   Diagnosis Date    Abdominal bloating 01/06/2023    Atrial fibrillation 12/15/2022    Basal cell carcinoma of skin 12/15/2022    Bradycardia 03/13/2024    Chronic low back pain 05/02/2023    Chronic obstructive lung disease 05/02/2023    Chronic post-COVID-19 syndrome 05/02/2023    Community acquired pneumonia 01/06/2023    Constipation 08/11/2023    COPD (chronic obstructive pulmonary disease)     Dental caries 12/15/2022    Difficult or painful urination 04/19/2023    Essential hypertension 12/15/2022    GERD (gastroesophageal reflux disease)     Herpes zoster 02/03/2023    Hiatal hernia     History of degenerative disc disease     uses motorized chair    Hyperlipidemia     Hypertension     Hypokalemia 01/06/2023    Osteoarthritis of knee 02/03/2023    Peripheral edema 03/04/2024    PONV (postoperative nausea and vomiting)     Rib pain 04/19/2023   ,   Past Surgical History:   Procedure Laterality Date    CATARACT EXTRACTION      COLONOSCOPY      [facility]    Procedure: ESOPHAGOGASTRODUODENOSCOPY;  Surgeon: Morris Wynne MD;  Location: [facility] ENDOSCOPY;  Service:  Gastroenterology;  Laterality: N/A;  DISTAL ESOPHAGUS DILATED WITH 18-20 MM BALLOON DILATOR.    REPLACEMENT TOTAL KNEE Left    , History reviewed. No pertinent family history.,   Social History     Tobacco Use    Smoking status: Every Day     Current packs/day: 2.00     Average packs/day: 2.0 packs/day for 2.1 years (4.2 ttl pk-yrs)     Types: Cigarettes     Start date: 1/1/2023    Smokeless tobacco: Never   Vaping Use    Vaping status: Never Used   Substance Use Topics    Alcohol use: Yes     Comment: 2 pints liquor/day    Drug use: Never   ,   Medications Prior to Admission   Medication Sig Dispense Refill Last Dose/Taking    acetaminophen (TYLENOL) 325 MG tablet Take 2 tablets by mouth 3 (Three) Times a Day As Needed for Mild Pain.   Unknown    albuterol sulfate  (90 Base) MCG/ACT inhaler Inhale 2 puffs Every 6 (Six) Hours As Needed for Shortness of Air.   Unknown    amLODIPine (NORVASC) 5 MG tablet Take 1 tablet by mouth Every Morning.   Unknown    aspirin 81 MG EC tablet Take 1 tablet by mouth Daily.   Unknown    carvedilol (COREG) 25 MG tablet Take 0.5 tablets by mouth 2 (Two) Times a Day With Meals.   Unknown    Diclofenac Sodium (VOLTAREN) 1 % gel gel Apply 4 g topically to the appropriate area as directed Every 6 (Six) Hours As Needed (Joint Pain).   Unknown    DULoxetine (CYMBALTA) 30 MG capsule Take 1 capsule by mouth Daily. For MOOD   Unknown    finasteride (PROSCAR) 5 MG tablet Take 1 tablet by mouth Daily.   Unknown    flecainide (TAMBOCOR) 150 MG tablet Take 0.5 tablets by mouth 2 (Two) Times a Day.   Unknown    fluticasone (FLONASE) 50 MCG/ACT nasal spray Administer 1 spray into the nostril(s) as directed by provider 2 (Two) Times a Day As Needed for Allergies.   Unknown    Fluticasone-Salmeterol (ADVAIR/WIXELA) 250-50 MCG/ACT DISKUS Inhale 1 puff 2 (Two) Times a Day.   Unknown    furosemide (LASIX) 20 MG tablet Take 1 tablet by mouth Daily As Needed (SWELLING).   Unknown    gabapentin  (NEURONTIN) 600 MG tablet Take 2 tablets by mouth 3 times a day.   Unknown    loratadine (CLARITIN) 10 MG tablet Take 1 tablet by mouth Daily As Needed for Allergies.   Unknown    losartan (COZAAR) 50 MG tablet Take 1 tablet by mouth Daily.   Unknown    methocarbamol (ROBAXIN) 750 MG tablet Take 1 tablet by mouth 2 (Two) Times a Day As Needed for Muscle Spasms.   Unknown    nicotine (NICOTROL) 10 MG/ML solution nasal solution 10 sprays by Each Nare route Every 1 (One) Hour As Needed (Smoking Cessation). MAX OF 10 sprays per hour and 80 sprays per day   Unknown    omeprazole (priLOSEC) 20 MG capsule Take 2 capsules by mouth 2 (Two) Times a Day Before Meals.   Unknown    polyvinyl alcohol (LIQUIFILM) 1.4 % ophthalmic solution Administer 1 drop to both eyes 4 (Four) Times a Day As Needed for Dry Eyes.   Unknown    rosuvastatin (CRESTOR) 40 MG tablet Take 1 tablet by mouth Every Night.   Unknown    senna 8.6 MG tablet Take 1 tablet by mouth Daily As Needed for Constipation.   Unknown    tiotropium bromide monohydrate (SPIRIVA RESPIMAT) 2.5 MCG/ACT aerosol solution inhaler Inhale 2 puffs Daily.   Unknown    traMADol (ULTRAM) 50 MG tablet Take 1 tablet by mouth 3 (Three) Times a Day As Needed for Moderate Pain.   Unknown    vitamin B-12 (CYANOCOBALAMIN) 1000 MCG tablet Take 1 tablet by mouth Daily.   Unknown   , Scheduled Meds:  cefepime, 1,000 mg, Intravenous, Q24H  heparin (porcine), 5,000 Units, Subcutaneous, Q8H  hydrocortisone sodium succinate, 100 mg, Intravenous, Q8H  ipratropium-albuterol, 3 mL, Nebulization, 4x Daily - RT  metoprolol tartrate, 12.5 mg, Nasogastric, Q12H  metroNIDAZOLE, 500 mg, Intravenous, Q8H  midodrine, 10 mg, Oral, TID AC  mupirocin, 1 Application, Each Nare, BID  pantoprazole, 40 mg, Intravenous, Q AM  sodium bicarbonate, 100 mEq, Intravenous, Once  sodium chloride, 10 mL, Intravenous, Q12H  sodium chloride, 10 mL, Intravenous, Q12H  sodium chloride, 10 mL, Intravenous, Q12H  sodium chloride,  10 mL, Intravenous, Q12H  sodium chloride, 10 mL, Intravenous, Q12H  thiamine (B-1) IV, 500 mg, Intravenous, TID  Vancomycin Pharmacy Intermittent/Pulse Dosing, , Not Applicable, Daily    , Continuous Infusions:  fentanyl 10 mcg/mL,  mcg/hr, Last Rate: 100 mcg/hr (02/08/25 1418)  lactated ringers, 150 mL/hr, Last Rate: 150 mL/hr (02/09/25 0559)  norepinephrine, 0.02-0.3 mcg/kg/min (Dosing Weight), Last Rate: 0.14 mcg/kg/min (02/08/25 2146)  Pharmacy Consult - Pharmacy to dose,   Pharmacy to dose vancomycin,   phenylephrine, 0.5-3 mcg/kg/min (Dosing Weight), Last Rate: 0.5 mcg/kg/min (02/09/25 0603)  propofol, 5-50 mcg/kg/min (Dosing Weight), Last Rate: 20 mcg/kg/min (02/09/25 0551)     and Allergies:  Indomethacin er, Isoniazid, and Penicillins    Objective     Vital Signs   Temp:  [97.7 °F (36.5 °C)-104 °F (40 °C)] 98.2 °F (36.8 °C)  Heart Rate:  [] 113  Resp:  [20-28] 25  BP: ()/(46-82) 96/73  FiO2 (%):  [70 %-100 %] 80 %    Physical Exam:             Patient was seen and examined via telemedicine.     General-acutely ill in appearance, not in any acute distress, intubated sedated    HEENT- no scleral icterus    Neck-supple    Respiratory-intubated sedated synchronous with the ventilator  Cardiovascular-  NSR-on monitor    GI-grossly normal    CNS-intubated sedated    Musculoskeletal -no visible edema  Extremities- no obvious deformity noticed     Psychiatric-cannot be evaluated  Skin- no visible rash                                                                     Results Review:    LABS:    Lab Results   Component Value Date    GLUCOSE 215 (H) 02/09/2025    BUN 68 (H) 02/09/2025    CREATININE 10.76 (H) 02/09/2025    BCR 6.3 (L) 02/09/2025    CO2 16.3 (L) 02/09/2025    CALCIUM 7.4 (L) 02/09/2025    ALBUMIN 2.8 (L) 02/09/2025     (H) 02/09/2025    ALT 73 (H) 02/09/2025    WBC 14.32 (H) 02/09/2025    HGB 11.8 (L) 02/09/2025    HCT 35.1 (L) 02/09/2025    MCV 97.2 (H) 02/09/2025    PLT 89  (L) 02/09/2025     02/09/2025    K 2.7 (L) 02/09/2025     02/09/2025    ANIONGAP 24.7 (H) 02/09/2025       Lab Results   Component Value Date    INR 1.21 (H) 02/08/2025    PROTIME 15.4 (H) 02/08/2025       Results from last 7 days   Lab Units 02/08/25  1349   INR  1.21*   APTT seconds 35.2          I reviewed the patient's new clinical results.  I reviewed the patient's new imaging results and agree with the interpretation.     Latest Reference Range & Units 02/08/25 13:48   pH, Arterial 7.350 - 7.450 pH units 7.318 (L)   pCO2, Arterial 35.0 - 45.0 mm Hg 46.9 (H)   pO2, Arterial 83.0 - 108.0 mm Hg 106.0   HCO3, Arterial 20.0 - 26.0 mmol/L 24.1   Base Excess 0.0 - 2.0 mmol/L -2.2 (L)   O2 Saturation, Arterial 94.0 - 99.0 % 97.2   CO2 Content 22 - 33 mmol/L 25.5   A-a DO2 0.0 - 300.0 mmHg 520.9 (H)   Carboxyhemoglobin 0 - 5 % 2.3   Methemoglobin 0.00 - 3.00 % 0.30   Oxyhemoglobin 94 - 99 % 94.7   Hematocrit, Blood Gas 38.0 - 51.0 % 33.4 (L)   Hemoglobin, Blood Gas 14 - 18 g/dL 10.9 (L)   Site  Right Radial   Adolph's Test  Positive   Modality  Ventilator   FIO2 % 100   Ventilator Mode  VC/AC   Set Tidal Volume  500.00   Set Mech Resp Rate  20.0   PEEP  10.0   Barometric Pressure for Blood Gas mmHg 721   (L): Data is abnormally low  (H): Data is abnormally high     Latest Reference Range & Units 02/08/25 13:51 02/08/25 15:04   Ethanol % %  <0.010   Ethanol 0 - 10 mg/dL  <10   Amphetamine, Urine Qual Negative  Negative    Barbiturates Screen, Urine Negative  Negative    Benzodiazepine Screen, Urine Negative  Negative    Buprenorphine, Screen, Urine Negative  Negative    Cocaine Screen, Urine Negative  Negative    Fentanyl, Urine Negative  Negative    Methamphetamine, Ur Negative  Negative    Methadone Screen , Urine Negative  Negative    Opiate Screen Negative  Negative    Oxycodone Screen, Urine Negative  Negative    Phencyclidine (PCP), Urine Negative  Negative    THC Screen, Urine Negative  Negative     Tricyclic Antidepressants Screen Negative  Negative      Assessment & Plan     Neurology-intubated and sedated.  Sedated drips reviewed and adjusted for RASS goal of 0 to -1    Will do SAT in the morning to assess patient's mental status-plan discussed with patient's nurse and family members at bedside.   CT of the head reviewed and does not show any acute changes.  Sedation vacation     Narrative & Impression   INDICATION: Altered mental status.     COMPARISON: No relevant priors available     TECHNIQUE: Axial CT images of the head were obtained without IV  contrast. Coronal and sagittal reformations were reviewed.     FINDINGS:  Gray-white differentiation is relatively preserved. No mass, mass effect  or midline shift. And chronic ischemic white matter changes. No evidence  of acute large territorial infarction or acute intracranial hemorrhage.  Ventricles appear normal in size. Basal cisterns are patent. No  depressed calvarial fracture.     IMPRESSION:  No acute intracranial process.      Latest Reference Range & Units 02/09/25 00:10   Ammonia 16 - 60 umol/L 40         Respiratory-acute hypoxic and hypercarbic respiratory failure-likely due to aspiration pneumonia and ongoing metabolic stress.  Was not able to protect airway and was intubated.  ABG reviewed and vent settings were adjusted.    Blood gas obtained and post 2 A of sodium bicarbonate.  Will repeat blood gas in the morning.  Not making any urine.  Patient is not metabolic acidosis.  With patient's COPD patient is not able to compensate properly in spite of changing the ventilator settings.  Nephrology on case  Checked ventilator settings and graphics and adjusted.  Ventilator settings graphics reviewed.    Peak and plateau pressures reviewed.    Auto PEEP reviewed.  Continue steroids  Continue nebs  Vent Settings          Resp Rate (Set): 26     FiO2 (%): 80 %  PEEP/CPAP (cm H2O): 10 cm H20    Minute Ventilation (L/min) (Obs): 14.4 L/min  Resp Rate  (Observed) Vent: 26     I:E Ratio (Obs): 1:2    PIP Observed (cm H2O): 30 cm H2O           VAP- precautions  Head end - 30 %  Mouth care   DVT prophylaxis-continue    Chest x-ray-reviewed    ABG-status reviewed    Aspiration pneumonia-respiratory culture obtained.  Continue broad-spectrum antibiotics.    If needed will do bronchoscopy to get specific cultures.  Latest microbiology reviewed  Continue aspiration precautions.    COPD-longtime smoker.  Continue steroids continue nebs  Continue to titrate FiO2 down to maintain saturation 88 to 92%.    ABG showed respiratory acidosis-ventilator settings adjusted.  Blood gas in the morning  Latest blood gas reviewed and vent settings adjusted.      Cardiology- hemodynamically -unstable.  Currently on vasopressors.  Discontinued due to blockers    Discussed that he can be switched to Saúl-Synephrine.  Ordered echo  EKG reviewed and is abnormal.   Started stress dose steroids.    Continue to monitor HR- rate and rhythm, BP     Nephrology-creatinine high-continue fluids.    Nephrology on case      GI- PPI prophylaxis  Start tube feeds  Watch for refeeding syndrome.  Patient is electrolytes are already very low.    Hematology- latest CBC reviewed.  Transfuse for hemoglobin less than 7    ID-sepsis with septic shock-likely due to aspiration pneumonia.  Continue broad-spectrum antibiotics.  If needed will do bronchoscopy-specific cultures.    Latest microbiology reviewed.  Antibiotics reviewed    Endrocrinology- Maintain Blood sugar 140 -180  Added stress dose steroids.    Electrolytes-electrolytes are on the very lower side continue replacement and continue monitoring them.  Low likely due to poor nutritional status    DVT prophylaxis-continue    Bedside rounds were done with RT and patient's nurse. All the lab and clinical findings were discussed with them and plan was also discussed in great detail.            Echo-pending  Results for orders placed during the hospital  encounter of 02/08/25    Adult Transthoracic Echo Complete W/ Cont if Necessary Per Protocol    Interpretation Summary    Left ventricle is normal in size and function with estimate ejection fraction of 55 to 60%. Normal diastolic function.    Right ventricle appears normal in size and function.    Normal left atrial cavity size noted. No evidence of a patent foramen ovale. No evidence of an atrial septal defect present.    Normal right atrial cavity size noted. The inferior vena cava is dilated. Partial IVC inspiratory collapse of less than 50% noted.    Aortic valve appears trileaflet. There is no significant aortic stenosis or regurgitation.    There is mild mitral regurgitation.    Estimated right ventricular systolic pressure from tricuspid regurgitation is moderately elevated (45-55 mmHg). Mild tricuspid regurgitation. Estimated RVSP is 45 mmHg with estimated RA pressure of 10 mmHg.    There is no evidence of pericardial effusion. . There is evidence of a fat pad present.    The aortic root measures 3.9 cm.   Patient is currently critically ill due to septic severe sepsis with septic shock end organ failure respiratory, renal and cardiovascular, hypercarbic respiratory failure, renal failure and change in mental status.  I reviewed patient's drips and adjusted them.  Reviewed ventilator settings and adjusted.  Case discussed with primary team patient's nurse and RT.    Critical Care time spent in direct patient care: 50 minutes (excluding procedure time, if applicable) including high complexity decision making to assess, manipulate, and support vital organ system failure in this individual who has impairment of one or more vital organ systems such that there is a high probability of imminent or life threatening deterioration in the patient’s condition.  Patient is critically ill and is at higher risk for further mortality/morbidity. Continue ICU care .         This was an audio and video enabled telemedicine  encounter.   consent for telemedicine is obtained by the hospital at the time of admission from patient or nok and is on chart     I am in [place] and patient is in [facility]        Septic shock          Dangelo Ledezma MD  02/09/25  06:05 EST

## 2025-02-09 NOTE — PLAN OF CARE
Problem: Adult Inpatient Plan of Care  Goal: Plan of Care Review  Outcome: Not Progressing     Problem: Adult Inpatient Plan of Care  Goal: Absence of Hospital-Acquired Illness or Injury  Intervention: Identify and Manage Fall Risk  Intervention: Prevent Skin Injury  Intervention: Prevent and Manage VTE (Venous Thromboembolism) Risk  Goal: Optimal Comfort and Wellbeing  Intervention: Provide Person-Centered Care     Problem: Violence Risk or Actual  Goal: Anger and Impulse Control  Intervention: Minimize Safety Risk  Intervention: Promote Self-Control     Problem: Fall Injury Risk  Goal: Absence of Fall and Fall-Related Injury  Intervention: Identify and Manage Contributors  Intervention: Promote Injury-Free Environment     Problem: Skin Injury Risk Increased  Goal: Skin Health and Integrity  Intervention: Optimize Skin Protection     Problem: Comorbidity Management  Goal: Maintenance of COPD Symptom Control  Intervention: Maintain COPD (Chronic Obstructive Pulmonary Disease) Symptom Control   Goal Outcome Evaluation:  Plan of Care Reviewed With: patient        Progress: no change

## 2025-02-09 NOTE — ED NOTES
Patient transported to CCU at this time, transported on vent with respiratory therapist assisting transport, IV infusions continued with no signs of infiltration. Patient's ETT remains in place and secured at 23 cm at the lip, even and equal rise and fall of chest, skin is pale, warm, and dry, NADN on transport to CCU.

## 2025-02-09 NOTE — PROGRESS NOTES
Pharmacokinetics Service Note:    Phan Daniels is a 69 y.o. male being managed by Pharmacy for vancomycin dosing.    Indication for Therapy: septic shock, RUL pneumonia, CoNS bacteremia   Current Regimen: intermittent dosing due to ClCr <10 mL/min with unknown baseline.  Received 1.75 gm at 1552 2/8 and 2 gm at 2145 2/8.   Current Day of Therapy and Ordered Duration: day 2 of 5  Level Reported and Timing with Respect to Previous Dose: 36.8 mcg/mL collected 12.5 hrs following the last infusion's end    Assessment/Plan: No vancomycin dosing is needed today as levels should remain therapeutic    Monitoring Planned: Plan to repeat a random level Tuesday morning to guide further dosing or sooner if the Cr improves quickly.     Thank you.  Mignon Hanley, Pharm.D.  2/9/2025  14:10 EST

## 2025-02-09 NOTE — CONSULTS
Patient Identification:  Name:  Phan Daniels  Age:  69 y.o.  Sex:  male  :  1955  MRN:  [id number]  Visit Number:  [id number]  Date of Admit: 2025  Date of Consult: 25  No ref. provider found    Chief Complaint:   Unresponsive    Subjective:      69-year-old male was admitted on 2025 after he was found unresponsive on the floor for unknown amount of time.  Patient actually came to visit his girlfriend from [place].  He was found apparently by a neighbor and was covered in stool with irregular breathing with O2 sat in the 50s.  Patient was put on a nonrebreather and was subsequently intubated in the ER.  He was given Narcan with no improvement in mental status.    In the ER his pH was 7.31 pCO2 is 46.9 pO2 106 base excess -2.2.  Sodium 144 potassium 2.2 chloride 99 bicarb 21.8 BUN 70 creatinine 10.9 lactate 1.2 procalcitonin 5.9 INR 1.2 WBC 8 H&H 10.6/31.1 with a platelet count of 82.  Troponin trended 745 and 750.  Urine for drug screen was negative.    Chest x-ray showed mild pulmonary vascular congestion with right upper lobe infiltrate concerning for pneumonia.  CT head showed no acute intracranial process.  CT chest abdomen and pelvis without contrast showed multifocal pneumonia with hepatic steatosis and cholelithiasis.  Initial EKG showed sinus tachycardia with ST-T wave abnormality concerning for inferior and anterolateral ischemia.  An echocardiogram showed preserved ejection fraction with no significant valvular abnormalities but with moderate pulmonary hypertension.  Repeat EKG early this morning showed atrial fibrillation with rapid ventricular response.    His past medical history is significant for A-fib, COPD, hypertension, dyslipidemia, GERD, alcohol abuse and smoking.  He was found to be hypotensive as well and started on Levophed.  Central line was placed in the ED.      Past Medical History:   Diagnosis Date    Abdominal bloating 2023    Atrial fibrillation  12/15/2022    Basal cell carcinoma of skin 12/15/2022    Bradycardia 03/13/2024    Chronic low back pain 05/02/2023    Chronic obstructive lung disease 05/02/2023    Chronic post-COVID-19 syndrome 05/02/2023    Community acquired pneumonia 01/06/2023    Constipation 08/11/2023    COPD (chronic obstructive pulmonary disease)     Dental caries 12/15/2022    Difficult or painful urination 04/19/2023    Essential hypertension 12/15/2022    GERD (gastroesophageal reflux disease)     Herpes zoster 02/03/2023    Hiatal hernia     History of degenerative disc disease     uses motorized chair    Hyperlipidemia     Hypertension     Hypokalemia 01/06/2023    Osteoarthritis of knee 02/03/2023    Peripheral edema 03/04/2024    PONV (postoperative nausea and vomiting)     Rib pain 04/19/2023     Past Surgical History:   Procedure Laterality Date    CATARACT EXTRACTION      [facility]      [facility]    Procedure: ESOPHAGOGASTRODUODENOSCOPY;  Surgeon: Morris Wynne MD;  Location: [facility] ENDOSCOPY;  Service: Gastroenterology;  Laterality: N/A;  DISTAL ESOPHAGUS DILATED WITH 18-20 MM BALLOON DILATOR.    REPLACEMENT TOTAL KNEE Left      History reviewed. No pertinent family history.  Social History     Tobacco Use    Smoking status: Every Day     Current packs/day: 2.00     Average packs/day: 2.0 packs/day for 2.1 years (4.2 ttl pk-yrs)     Types: Cigarettes     Start date: 1/1/2023    Smokeless tobacco: Never   Vaping Use    Vaping status: Never Used   Substance Use Topics    Alcohol use: Yes     Comment: 2 pints liquor/day    Drug use: Never     Medications Prior to Admission   Medication Sig Dispense Refill Last Dose/Taking    acetaminophen (TYLENOL) 325 MG tablet Take 2 tablets by mouth 3 (Three) Times a Day As Needed for Mild Pain.   Unknown    albuterol sulfate  (90 Base) MCG/ACT inhaler Inhale 2 puffs Every 6 (Six) Hours As Needed for Shortness of Air.   Unknown    amLODIPine  (NORVASC) 5 MG tablet Take 1 tablet by mouth Every Morning.   Unknown    aspirin 81 MG EC tablet Take 1 tablet by mouth Daily.   Unknown    carvedilol (COREG) 25 MG tablet Take 0.5 tablets by mouth 2 (Two) Times a Day With Meals.   Unknown    Diclofenac Sodium (VOLTAREN) 1 % gel gel Apply 4 g topically to the appropriate area as directed Every 6 (Six) Hours As Needed (Joint Pain).   Unknown    DULoxetine (CYMBALTA) 30 MG capsule Take 1 capsule by mouth Daily. For MOOD   Unknown    finasteride (PROSCAR) 5 MG tablet Take 1 tablet by mouth Daily.   Unknown    flecainide (TAMBOCOR) 150 MG tablet Take 0.5 tablets by mouth 2 (Two) Times a Day.   Unknown    fluticasone (FLONASE) 50 MCG/ACT nasal spray Administer 1 spray into the nostril(s) as directed by provider 2 (Two) Times a Day As Needed for Allergies.   Unknown    Fluticasone-Salmeterol (ADVAIR/WIXELA) 250-50 MCG/ACT DISKUS Inhale 1 puff 2 (Two) Times a Day.   Unknown    furosemide (LASIX) 20 MG tablet Take 1 tablet by mouth Daily As Needed (SWELLING).   Unknown    gabapentin (NEURONTIN) 600 MG tablet Take 2 tablets by mouth 3 times a day.   Unknown    loratadine (CLARITIN) 10 MG tablet Take 1 tablet by mouth Daily As Needed for Allergies.   Unknown    losartan (COZAAR) 50 MG tablet Take 1 tablet by mouth Daily.   Unknown    methocarbamol (ROBAXIN) 750 MG tablet Take 1 tablet by mouth 2 (Two) Times a Day As Needed for Muscle Spasms.   Unknown    nicotine (NICOTROL) 10 MG/ML solution nasal solution 10 sprays by Each Nare route Every 1 (One) Hour As Needed (Smoking Cessation). MAX OF 10 sprays per hour and 80 sprays per day   Unknown    omeprazole (priLOSEC) 20 MG capsule Take 2 capsules by mouth 2 (Two) Times a Day Before Meals.   Unknown    polyvinyl alcohol (LIQUIFILM) 1.4 % ophthalmic solution Administer 1 drop to both eyes 4 (Four) Times a Day As Needed for Dry Eyes.   Unknown    rosuvastatin (CRESTOR) 40 MG tablet Take 1 tablet by mouth Every Night.   Unknown     senna 8.6 MG tablet Take 1 tablet by mouth Daily As Needed for Constipation.   Unknown    tiotropium bromide monohydrate (SPIRIVA RESPIMAT) 2.5 MCG/ACT aerosol solution inhaler Inhale 2 puffs Daily.   Unknown    traMADol (ULTRAM) 50 MG tablet Take 1 tablet by mouth 3 (Three) Times a Day As Needed for Moderate Pain.   Unknown    vitamin B-12 (CYANOCOBALAMIN) 1000 MCG tablet Take 1 tablet by mouth Daily.   Unknown     Allergies:  Indomethacin er, Isoniazid, and Penicillins    ROS:   Unable to obtain    ----------------------------------------------------------------------------------------------------------------------  [facility] Meds:  cefepime, 1,000 mg, Intravenous, Q24H  heparin (porcine), 5,000 Units, Subcutaneous, Q8H  hydrocortisone sodium succinate, 100 mg, Intravenous, Q8H  ipratropium-albuterol, 3 mL, Nebulization, 4x Daily - RT  metroNIDAZOLE, 500 mg, Intravenous, Q8H  midodrine, 10 mg, Oral, TID AC  mupirocin, 1 Application, Each Nare, BID  pantoprazole, 40 mg, Intravenous, Q AM  sodium chloride, 10 mL, Intravenous, Q12H  sodium chloride, 10 mL, Intravenous, Q12H  sodium chloride, 10 mL, Intravenous, Q12H  sodium chloride, 10 mL, Intravenous, Q12H  sodium chloride, 10 mL, Intravenous, Q12H  thiamine (B-1) IV, 500 mg, Intravenous, TID  Vancomycin Pharmacy Intermittent/Pulse Dosing, , Not Applicable, Daily      fentanyl 10 mcg/mL,  mcg/hr, Last Rate: 100 mcg/hr (02/08/25 6378)  lactated ringers, 150 mL/hr, Last Rate: 150 mL/hr (02/08/25 6390)  norepinephrine, 0.02-0.3 mcg/kg/min (Dosing Weight), Last Rate: 0.14 mcg/kg/min (02/08/25 2146)  Pharmacy Consult - Pharmacy to dose,   Pharmacy to dose vancomycin,   propofol, 5-50 mcg/kg/min (Dosing Weight), Last Rate: 20 mcg/kg/min (02/1955)      ----------------------------------------------------------------------------------------------------------------------  Vital Signs:  Temp:  [97.7 °F (36.5 °C)-104 °F (40 °C)] 98.2 °F (36.8 °C)  Heart  Rate:  [] 113  Resp:  [20-28] 25  BP: ()/(46-82) 98/67  FiO2 (%):  [70 %-100 %] 80 %      02/08/25  1322 02/08/25  1800   Weight: 102 kg (224 lb 13.9 oz) 98.8 kg (217 lb 13 oz)     Body mass index is 29.54 kg/m².    Intake/Output Summary (Last 24 hours) at 2/9/2025 0535  Last data filed at 2/9/2025 0332  Gross per 24 hour   Intake 4551 ml   Output --   Net 4551 ml     NPO Diet NPO Type: Strict NPO  ----------------------------------------------------------------------------------------------------------------------  Physical exam:  Constitutional:    HENT:  Head:  Normocephalic and atraumatic.    Neck:  Neck supple.  No JVD present.    Cardiovascular: Normal rate, irregularly irregular rhythm, S1 S2+, NO S3 / S4  Pulmonary/Chest:  Vesicular breath sounds B/L  Abdominal:  Soft.  Bowel sounds are normal.  No distension and no tenderness.   Neurological: Sedated and intubated on vent.  Skin:  Skin is warm and dry. No rash noted. No pallor.   Musculoskeletal: Positive edema, no tenderness, and no deformity.  No red or swollen joints anywhere.     ----------------------------------------------------------------------------------------------------------------------    ----------------------------------------------------------------------------------------------------------------------  Results from last 7 days   Lab Units 02/09/25  0010 02/08/25  1504 02/08/25  1349   CK TOTAL U/L 3,281*  --  2,687*   HSTROP T ng/L  --  750* 745*     Results from last 7 days   Lab Units 02/09/25  0010 02/08/25  1349   CRP mg/dL  --  9.12*   LACTATE mmol/L  --  1.2   WBC 10*3/mm3 14.32* 8.18   HEMOGLOBIN g/dL 11.8* 10.6*   HEMATOCRIT % 35.1* 31.1*   MCV fL 97.2* 95.7   MCHC g/dL 33.6 34.1   PLATELETS 10*3/mm3 89* 82*   INR   --  1.21*     Results from last 7 days   Lab Units 02/08/25  1348   PH, ARTERIAL pH units 7.318*   PO2 ART mm Hg 106.0   PCO2, ARTERIAL mm Hg 46.9*   HCO3 ART mmol/L 24.1     Results from last 7 days   Lab  "Units 02/09/25  0010 02/08/25  1349   SODIUM mmol/L 141 144   POTASSIUM mmol/L 2.7* 2.2*   MAGNESIUM mg/dL 2.0 1.2*   CHLORIDE mmol/L 100 99   CO2 mmol/L 16.3* 21.8*   BUN mg/dL 68* 70*   CREATININE mg/dL 10.76* 10.92*   CALCIUM mg/dL 7.4* 6.1*   GLUCOSE mg/dL 215* 134*   ALBUMIN g/dL 2.8* 3.2*   BILIRUBIN mg/dL 1.6* 1.1   ALK PHOS U/L 45 45   AST (SGOT) U/L 238* 271*   ALT (SGPT) U/L 73* 70*   Estimated Creatinine Clearance: 8 mL/min (A) (by C-G formula based on SCr of 10.76 mg/dL (H)).    Ammonia   Date Value Ref Range Status   02/09/2025 40 16 - 60 umol/L Final         No results found for: \"BLOODCX\"  No results found for: \"URINECX\"  No results found for: \"WOUNDCX\"  No results found for: \"STOOLCX\"  Echo:  Results for orders placed during the hospital encounter of 02/08/25    Adult Transthoracic Echo Complete W/ Cont if Necessary Per Protocol    Interpretation Summary    Left ventricle is normal in size and function with estimate ejection fraction of 55 to 60%. Normal diastolic function.    Right ventricle appears normal in size and function.    Normal left atrial cavity size noted. No evidence of a patent foramen ovale. No evidence of an atrial septal defect present.    Normal right atrial cavity size noted. The inferior vena cava is dilated. Partial IVC inspiratory collapse of less than 50% noted.    Aortic valve appears trileaflet. There is no significant aortic stenosis or regurgitation.    There is mild mitral regurgitation.    Estimated right ventricular systolic pressure from tricuspid regurgitation is moderately elevated (45-55 mmHg). Mild tricuspid regurgitation. Estimated RVSP is 45 mmHg with estimated RA pressure of 10 mmHg.    There is no evidence of pericardial effusion. . There is evidence of a fat pad present.    The aortic root measures 3.9 cm.    ECG/EMG Results (last 24 hours)       Procedure Component Value Units Date/Time    ECG 12 Lead Other; Sepsis [[id number]] Collected: 02/08/25 1333     " Updated: 02/08/25 1438     QT Interval 342 ms      QTC Interval 460 ms     Narrative:      Test Reason : Other~  Blood Pressure :   */*   mmHG  Vent. Rate : 109 BPM     Atrial Rate : 109 BPM     P-R Int : 168 ms          QRS Dur :  98 ms      QT Int : 342 ms       P-R-T Axes :  36  76 253 degrees    QTcB Int : 460 ms    Sinus tachycardia  ST & T wave abnormality, consider inferior ischemia  ST & T wave abnormality, consider anterolateral ischemia  Abnormal ECG  When compared with ECG of 06-Jun-2024 12:05,  Inverted T waves have replaced nonspecific T wave abnormality in Inferior  leads  Inverted T waves have replaced nonspecific T wave abnormality in Lateral  leads  Confirmed by Jasiel Lema ([id number]) on 2/8/2025 2:38:41 PM    Referred By: SEYMOUR           Confirmed By: Jasiel Lema    ECG 12 Lead Rhythm Change [[id number]] Collected: 02/09/25 0119     Updated: 02/09/25 0120     QT Interval 330 ms      QTC Interval 485 ms     Narrative:      Test Reason : Rhythm Change  Blood Pressure :   */*   mmHG  Vent. Rate : 130 BPM     Atrial Rate : 174 BPM     P-R Int :   * ms          QRS Dur :  94 ms      QT Int : 330 ms       P-R-T Axes :   *  83 -78 degrees    QTcB Int : 485 ms    Atrial fibrillation with rapid ventricular response  ST & T wave abnormality, consider inferolateral ischemia  Abnormal ECG  When compared with ECG of 08-Feb-2025 13:33,  Atrial fibrillation has replaced Sinus rhythm  T wave inversion less evident in Anterior leads    Referred By:            Confirmed By:     Adult Transthoracic Echo Complete W/ Cont if Necessary Per Protocol [[id number]] Resulted: 02/09/25 0503     Updated: 02/09/25 0503     EF(MOD-bp) 58.4 %      LVIDd 4.8 cm      LVIDs 3.5 cm      IVSd 1.20 cm      LVPWd 1.20 cm      FS 27.1 %      IVS/LVPW 1.00 cm      ESV(cubed) 42.9 ml      LV Sys Vol (BSA corrected) 30.7 cm2      EDV(cubed) 110.6 ml      LV Mccallum Vol (BSA corrected) 72.5 cm2      LV mass(C)d 219.1 grams      LVOT area  3.5 cm2      LVOT diam 2.10 cm      EDV(MOD-sp2) 147.0 ml      EDV(MOD-sp4) 160.0 ml      ESV(MOD-sp2) 62.5 ml      ESV(MOD-sp4) 67.8 ml      SV(MOD-sp2) 84.5 ml      SV(MOD-sp4) 92.2 ml      SVi(MOD-SP2) 38.3 ml/m2      SVi(MOD-SP4) 41.8 ml/m2      SVi (LVOT) 29.8 ml/m2      EF(MOD-sp2) 57.5 %      EF(MOD-sp4) 57.6 %      MV E max dio 89.1 cm/sec      MV A max dio 81.8 cm/sec      MV dec time 0.27 sec      MV E/A 1.09     Med Peak E' Dio 10.9 cm/sec      Lat Peak E' Dio 14.4 cm/sec      TR max dio 296.0 cm/sec      Avg E/e' ratio 7.04     SV(LVOT) 65.8 ml      TAPSE (>1.6) 2.41 cm      LA dimension (2D)  3.3 cm      LV V1 max 82.2 cm/sec      LV V1 max PG 2.7 mmHg      LV V1 mean PG 2.00 mmHg      LV V1 VTI 19.0 cm      Ao pk dio 123.0 cm/sec      Ao max PG 6.1 mmHg      Ao mean PG 4.0 mmHg      Ao V2 VTI 23.8 cm      JOSEP(I,D) 2.8 cm2      MV max PG 5.0 mmHg      MV mean PG 1.00 mmHg      MV V2 VTI 29.1 cm      MVA(VTI) 2.26 cm2      MV dec slope 330.0 cm/sec2      MR max dio 255.0 cm/sec      MR max PG 26.0 mmHg      TR max PG 35.0 mmHg      RVSP(TR) 45.0 mmHg      RAP systole 10.0 mmHg      PA V2 max 80.3 cm/sec      PA acc time 0.15 sec      Ao root diam 3.9 cm      ACS 1.70 cm     Narrative:        Left ventricle is normal in size and function with estimate ejection   fraction of 55 to 60%. Normal diastolic function.    Right ventricle appears normal in size and function.    Normal left atrial cavity size noted. No evidence of a patent foramen   ovale. No evidence of an atrial septal defect present.    Normal right atrial cavity size noted. The inferior vena cava is   dilated. Partial IVC inspiratory collapse of less than 50% noted.    Aortic valve appears trileaflet. There is no significant aortic   stenosis or regurgitation.    There is mild mitral regurgitation.    Estimated right ventricular systolic pressure from tricuspid   regurgitation is moderately elevated (45-55 mmHg). Mild tricuspid   regurgitation.  Estimated RVSP is 45 mmHg with estimated RA pressure of 10   mmHg.    There is no evidence of pericardial effusion. . There is evidence of a   fat pad present.    The aortic root measures 3.9 cm.      Telemetry Scan [[id number]] Resulted: 02/08/25     Updated: 02/09/25 0511    Telemetry Scan [[id number]] Resulted: 02/08/25     Updated: 02/09/25 0511          Imaging Results (Last 72 Hours)       Procedure Component Value Units Date/Time    XR Chest AP [[id number]] Collected: 02/08/25 1920     Updated: 02/08/25 1924    Narrative:      PROCEDURE: Portable chest x-ray examination performed on February 28, 2025. 2 films.     HISTORY: Post central vascular catheter placement.     COMPARISON: None.     FINDINGS:     Normal heart size.  Enteric drain in place with tip to the stomach  The sidehole is in the distal esophageal lumen.  Endotracheal tube in place with tip 6.3 cm above the lissette.  Right neck central vascular catheter with tip to the SVC.  Coarsened bronchovascular pattern to the lungs  Central pulmonary vascular congestion.  Hazy opacities in the right upper lobe consistent with right upper lobe  pneumonia.  No pleural effusion or pneumothorax.          Impression:         Normal heart size  Satisfactory position of the endotracheal tube with tip 6.3 cm above the  lissette.  Right neck central vascular catheter with tip to the SVC.  Enteric drain in place with tip to the stomach however the sidehole is  in the distal esophageal lumen.  Multifocal pneumonia with focal consolidation in the right upper lobe.  Slightly elevated right hemidiaphragm.  No pleural effusion. No pneumothorax        This report was finalized on 2/8/2025 7:22 PM by Scott Campbell MD.       CT Abdomen Pelvis Without Contrast [[id number]] Collected: 02/08/25 1511     Updated: 02/08/25 1515    Narrative:      INDICATION: Shortness of breath. Abdominal pain.     COMPARISON: No relevant priors available.     TECHNIQUE: Axial CT images of the chest,  abdomen and pelvis were  obtained without IV contrast. Coronal and sagittal reformations were  reviewed.      FINDINGS:  Chest:  The thoracic aorta appears normal in caliber. The heart is mildly  enlarged. No pericardial or pleural effusion. No pneumothorax. No  pathologically enlarged mediastinal or hilar lymph nodes. Endotracheal  tube tip approximately 3 cm above the lissette.     Emphysema. Multifocal infiltrates most pronounced in the right upper  lobe.     No acute osseous abnormality evident.     Abdomen and pelvis:  Hepatic steatosis. Gallstones in the gallbladder. The liver, spleen,  pancreas and adrenal glands appear unremarkable. No hydronephrosis.  Right renal cyst measuring 4.3 cm. Left renal cyst measuring 1.6 cm.  Enteric tube in the stomach.     No evidence of bowel obstruction/colitis/appendicitis. No free air. No  drainable fluid collection.     Collazo catheter in the urinary bladder. Small fat-containing bilateral  inguinal hernias.     No acute osseous abnormality evident.       Impression:      1.  Findings concerning for multifocal pneumonia.  2.  Hepatic steatosis.  3.  Cholelithiasis.     This report was finalized on 2/8/2025 3:13 PM by Alex Pallas, DO.       CT Chest Without Contrast Diagnostic [[id number]] Collected: 02/08/25 1511     Updated: 02/08/25 1515    Narrative:      INDICATION: Shortness of breath. Abdominal pain.     COMPARISON: No relevant priors available.     TECHNIQUE: Axial CT images of the chest, abdomen and pelvis were  obtained without IV contrast. Coronal and sagittal reformations were  reviewed.      FINDINGS:  Chest:  The thoracic aorta appears normal in caliber. The heart is mildly  enlarged. No pericardial or pleural effusion. No pneumothorax. No  pathologically enlarged mediastinal or hilar lymph nodes. Endotracheal  tube tip approximately 3 cm above the lissette.     Emphysema. Multifocal infiltrates most pronounced in the right upper  lobe.     No acute osseous  abnormality evident.     Abdomen and pelvis:  Hepatic steatosis. Gallstones in the gallbladder. The liver, spleen,  pancreas and adrenal glands appear unremarkable. No hydronephrosis.  Right renal cyst measuring 4.3 cm. Left renal cyst measuring 1.6 cm.  Enteric tube in the stomach.     No evidence of bowel obstruction/colitis/appendicitis. No free air. No  drainable fluid collection.     Collazo catheter in the urinary bladder. Small fat-containing bilateral  inguinal hernias.     No acute osseous abnormality evident.       Impression:      1.  Findings concerning for multifocal pneumonia.  2.  Hepatic steatosis.  3.  Cholelithiasis.     This report was finalized on 2/8/2025 3:13 PM by Alex Pallas, DO.       CT Head Without Contrast [[id number]] Collected: 02/08/25 1510     Updated: 02/08/25 1513    Narrative:      INDICATION: Altered mental status.     COMPARISON: No relevant priors available     TECHNIQUE: Axial CT images of the head were obtained without IV  contrast. Coronal and sagittal reformations were reviewed.     FINDINGS:  Gray-white differentiation is relatively preserved. No mass, mass effect  or midline shift. And chronic ischemic white matter changes. No evidence  of acute large territorial infarction or acute intracranial hemorrhage.  Ventricles appear normal in size. Basal cisterns are patent. No  depressed calvarial fracture.       Impression:      No acute intracranial process.     This report was finalized on 2/8/2025 3:11 PM by Alex Pallas, DO.       XR Chest 1 View [[id number]] Collected: 02/08/25 1453     Updated: 02/08/25 1457    Narrative:      INDICATION: Tube placement     TECHNIQUE: Frontal radiograph of the chest.     COMPARISON: None.       Impression:      FINDINGS/IMPRESSION:   Endotracheal tube tip approximately 5 cm above the lissette. Enteric tube  in stomach. Mild pulmonary vascular congestion. Right upper lobe  infiltrate concerning for pneumonia. Elevation of the  right  hemidiaphragm. No pleural effusion or pneumothorax. No acute fracture.         This report was finalized on 2/8/2025 2:55 PM by Alex Pallas, DO.             Cardiolite (Tc-99m Sestamibi) stress test       I have personally looked at the labs and they are summarized above.  ----------------------------------------------------------------------------------------------------------------------  Imaging Results (Last 24 Hours)       Procedure Component Value Units Date/Time    XR Chest AP [[id number]] Collected: 02/08/25 1920     Updated: 02/08/25 1924    Narrative:      PROCEDURE: Portable chest x-ray examination performed on February 28, 2025. 2 films.     HISTORY: Post central vascular catheter placement.     COMPARISON: None.     FINDINGS:     Normal heart size.  Enteric drain in place with tip to the stomach  The sidehole is in the distal esophageal lumen.  Endotracheal tube in place with tip 6.3 cm above the lissette.  Right neck central vascular catheter with tip to the SVC.  Coarsened bronchovascular pattern to the lungs  Central pulmonary vascular congestion.  Hazy opacities in the right upper lobe consistent with right upper lobe  pneumonia.  No pleural effusion or pneumothorax.          Impression:         Normal heart size  Satisfactory position of the endotracheal tube with tip 6.3 cm above the  lissette.  Right neck central vascular catheter with tip to the SVC.  Enteric drain in place with tip to the stomach however the sidehole is  in the distal esophageal lumen.  Multifocal pneumonia with focal consolidation in the right upper lobe.  Slightly elevated right hemidiaphragm.  No pleural effusion. No pneumothorax        This report was finalized on 2/8/2025 7:22 PM by Scott Campbell MD.       CT Abdomen Pelvis Without Contrast [[id number]] Collected: 02/08/25 1511     Updated: 02/08/25 1515    Narrative:      INDICATION: Shortness of breath. Abdominal pain.     COMPARISON: No relevant priors available.      TECHNIQUE: Axial CT images of the chest, abdomen and pelvis were  obtained without IV contrast. Coronal and sagittal reformations were  reviewed.      FINDINGS:  Chest:  The thoracic aorta appears normal in caliber. The heart is mildly  enlarged. No pericardial or pleural effusion. No pneumothorax. No  pathologically enlarged mediastinal or hilar lymph nodes. Endotracheal  tube tip approximately 3 cm above the lissette.     Emphysema. Multifocal infiltrates most pronounced in the right upper  lobe.     No acute osseous abnormality evident.     Abdomen and pelvis:  Hepatic steatosis. Gallstones in the gallbladder. The liver, spleen,  pancreas and adrenal glands appear unremarkable. No hydronephrosis.  Right renal cyst measuring 4.3 cm. Left renal cyst measuring 1.6 cm.  Enteric tube in the stomach.     No evidence of bowel obstruction/colitis/appendicitis. No free air. No  drainable fluid collection.     Collazo catheter in the urinary bladder. Small fat-containing bilateral  inguinal hernias.     No acute osseous abnormality evident.       Impression:      1.  Findings concerning for multifocal pneumonia.  2.  Hepatic steatosis.  3.  Cholelithiasis.     This report was finalized on 2/8/2025 3:13 PM by Alex Pallas, DO.       CT Chest Without Contrast Diagnostic [[id number]] Collected: 02/08/25 1511     Updated: 02/08/25 1515    Narrative:      INDICATION: Shortness of breath. Abdominal pain.     COMPARISON: No relevant priors available.     TECHNIQUE: Axial CT images of the chest, abdomen and pelvis were  obtained without IV contrast. Coronal and sagittal reformations were  reviewed.      FINDINGS:  Chest:  The thoracic aorta appears normal in caliber. The heart is mildly  enlarged. No pericardial or pleural effusion. No pneumothorax. No  pathologically enlarged mediastinal or hilar lymph nodes. Endotracheal  tube tip approximately 3 cm above the lissette.     Emphysema. Multifocal infiltrates most pronounced in the right  upper  lobe.     No acute osseous abnormality evident.     Abdomen and pelvis:  Hepatic steatosis. Gallstones in the gallbladder. The liver, spleen,  pancreas and adrenal glands appear unremarkable. No hydronephrosis.  Right renal cyst measuring 4.3 cm. Left renal cyst measuring 1.6 cm.  Enteric tube in the stomach.     No evidence of bowel obstruction/colitis/appendicitis. No free air. No  drainable fluid collection.     Collazo catheter in the urinary bladder. Small fat-containing bilateral  inguinal hernias.     No acute osseous abnormality evident.       Impression:      1.  Findings concerning for multifocal pneumonia.  2.  Hepatic steatosis.  3.  Cholelithiasis.     This report was finalized on 2/8/2025 3:13 PM by Alex Pallas, DO.       CT Head Without Contrast [[id number]] Collected: 02/08/25 1510     Updated: 02/08/25 1513    Narrative:      INDICATION: Altered mental status.     COMPARISON: No relevant priors available     TECHNIQUE: Axial CT images of the head were obtained without IV  contrast. Coronal and sagittal reformations were reviewed.     FINDINGS:  Gray-white differentiation is relatively preserved. No mass, mass effect  or midline shift. And chronic ischemic white matter changes. No evidence  of acute large territorial infarction or acute intracranial hemorrhage.  Ventricles appear normal in size. Basal cisterns are patent. No  depressed calvarial fracture.       Impression:      No acute intracranial process.     This report was finalized on 2/8/2025 3:11 PM by Alex Pallas, DO.       XR Chest 1 View [[id number]] Collected: 02/08/25 1453     Updated: 02/08/25 1457    Narrative:      INDICATION: Tube placement     TECHNIQUE: Frontal radiograph of the chest.     COMPARISON: None.       Impression:      FINDINGS/IMPRESSION:   Endotracheal tube tip approximately 5 cm above the lissette. Enteric tube  in stomach. Mild pulmonary vascular congestion. Right upper lobe  infiltrate concerning for pneumonia.  Elevation of the right  hemidiaphragm. No pleural effusion or pneumothorax. No acute fracture.         This report was finalized on 2/8/2025 2:55 PM by Alex Pallas, DO.             ----------------------------------------------------------------------------------------------------------------------    Assessment:  Septic shock  Altered mental status  Respiratory failure  Pneumonia  Kidney failure  Elevated troponin type I versus type II.  Paroxysmal A-fib with RVR  Hypokalemia  History of alcohol abuse  Cigarette smoking    Plan:  -We will add low-dose beta-blocker.  -Start heparin if no contraindication.  -Ischemic eval once he is extubated.  -Recommend aspirin statin.    This document has been electronically signed by Jasiel Lema MD [facility], Interventional Cardiology  February 9, 2025 05:35 EST

## 2025-02-09 NOTE — ED NOTES
Patient covered in stool on arrival, patient cleaned up at this time, rectal tube placed. Clean chucks placed under patient with clean gown on patient at this time.

## 2025-02-09 NOTE — CONSULTS
Pulmonary critical care consultation note    Referring Provider: Dr Blankenship  Reason for Consultation: Respiratory failure and ventilator management      Chief complaint -could not be obtained as patient was intubated and sedated on my assessment      History of present illness: Most of the history is obtained from patient's chart, patient's nurse and Dr. Ji.  On my assessment patient was intubated sedated and the family member present at bedside was not able to provide much history.  Patient is a 69-year-old male with past medical history positive for COPD, hypertension, atrial fibrillation, hyperlipidemia, GERD and alcohol abuse presented after he was found unresponsive.  HPI from the time of admission reviewed.  As per the HPI patient drinks heavily and recently has been taking 2 of hospital with pneumonia.   On my assessment patient was intubated sedated.  Drips reviewed.  Ventilator settings and graphics reviewed.  All the labs medications ins and outs and vitals reviewed.  Treatment given in the ER then in the ICU reviewed.    Review of Systems  Could not be obtained as patient on my assessment was intubated and sedated.      History  Past Medical History:   Diagnosis Date    Abdominal bloating 01/06/2023    Atrial fibrillation 12/15/2022    Basal cell carcinoma of skin 12/15/2022    Bradycardia 03/13/2024    Chronic low back pain 05/02/2023    Chronic obstructive lung disease 05/02/2023    Chronic post-COVID-19 syndrome 05/02/2023    Community acquired pneumonia 01/06/2023    Constipation 08/11/2023    COPD (chronic obstructive pulmonary disease)     Dental caries 12/15/2022    Difficult or painful urination 04/19/2023    Essential hypertension 12/15/2022    GERD (gastroesophageal reflux disease)     Herpes zoster 02/03/2023    Hiatal hernia     History of degenerative disc disease     uses motorized chair    Hyperlipidemia     Hypertension     Hypokalemia 01/06/2023    Osteoarthritis of knee 02/03/2023     Peripheral edema 03/04/2024    PONV (postoperative nausea and vomiting)     Rib pain 04/19/2023   ,   Past Surgical History:   Procedure Laterality Date    CATARACT EXTRACTION      [facility]      [facility]    Procedure: ESOPHAGOGASTRODUODENOSCOPY;  Surgeon: Morris Wynne MD;  Location: [facility] ENDOSCOPY;  Service: Gastroenterology;  Laterality: N/A;  DISTAL ESOPHAGUS DILATED WITH 18-20 MM BALLOON DILATOR.    REPLACEMENT TOTAL KNEE Left    , History reviewed. No pertinent family history.,   Social History     Tobacco Use    Smoking status: Every Day     Current packs/day: 2.00     Average packs/day: 2.0 packs/day for 2.1 years (4.2 ttl pk-yrs)     Types: Cigarettes     Start date: 1/1/2023    Smokeless tobacco: Never   Vaping Use    Vaping status: Never Used   Substance Use Topics    Alcohol use: Yes     Comment: 2 pints liquor/day    Drug use: Never   ,   Medications Prior to Admission   Medication Sig Dispense Refill Last Dose/Taking    acetaminophen (TYLENOL) 325 MG tablet Take 2 tablets by mouth 3 (Three) Times a Day As Needed for Mild Pain.   Unknown    albuterol sulfate  (90 Base) MCG/ACT inhaler Inhale 2 puffs Every 6 (Six) Hours As Needed for Shortness of Air.   Unknown    amLODIPine (NORVASC) 5 MG tablet Take 1 tablet by mouth Every Morning.   Unknown    aspirin 81 MG EC tablet Take 1 tablet by mouth Daily.   Unknown    carvedilol (COREG) 25 MG tablet Take 0.5 tablets by mouth 2 (Two) Times a Day With Meals.   Unknown    Diclofenac Sodium (VOLTAREN) 1 % gel gel Apply 4 g topically to the appropriate area as directed Every 6 (Six) Hours As Needed (Joint Pain).   Unknown    DULoxetine (CYMBALTA) 30 MG capsule Take 1 capsule by mouth Daily. For MOOD   Unknown    finasteride (PROSCAR) 5 MG tablet Take 1 tablet by mouth Daily.   Unknown    flecainide (TAMBOCOR) 150 MG tablet Take 0.5 tablets by mouth 2 (Two) Times a Day.   Unknown    fluticasone (FLONASE) 50 MCG/ACT  nasal spray Administer 1 spray into the nostril(s) as directed by provider 2 (Two) Times a Day As Needed for Allergies.   Unknown    Fluticasone-Salmeterol (ADVAIR/WIXELA) 250-50 MCG/ACT DISKUS Inhale 1 puff 2 (Two) Times a Day.   Unknown    furosemide (LASIX) 20 MG tablet Take 1 tablet by mouth Daily As Needed (SWELLING).   Unknown    gabapentin (NEURONTIN) 600 MG tablet Take 2 tablets by mouth 3 times a day.   Unknown    loratadine (CLARITIN) 10 MG tablet Take 1 tablet by mouth Daily As Needed for Allergies.   Unknown    losartan (COZAAR) 50 MG tablet Take 1 tablet by mouth Daily.   Unknown    methocarbamol (ROBAXIN) 750 MG tablet Take 1 tablet by mouth 2 (Two) Times a Day As Needed for Muscle Spasms.   Unknown    nicotine (NICOTROL) 10 MG/ML solution nasal solution 10 sprays by Each Nare route Every 1 (One) Hour As Needed (Smoking Cessation). MAX OF 10 sprays per hour and 80 sprays per day   Unknown    omeprazole (priLOSEC) 20 MG capsule Take 2 capsules by mouth 2 (Two) Times a Day Before Meals.   Unknown    polyvinyl alcohol (LIQUIFILM) 1.4 % ophthalmic solution Administer 1 drop to both eyes 4 (Four) Times a Day As Needed for Dry Eyes.   Unknown    rosuvastatin (CRESTOR) 40 MG tablet Take 1 tablet by mouth Every Night.   Unknown    senna 8.6 MG tablet Take 1 tablet by mouth Daily As Needed for Constipation.   Unknown    tiotropium bromide monohydrate (SPIRIVA RESPIMAT) 2.5 MCG/ACT aerosol solution inhaler Inhale 2 puffs Daily.   Unknown    traMADol (ULTRAM) 50 MG tablet Take 1 tablet by mouth 3 (Three) Times a Day As Needed for Moderate Pain.   Unknown    vitamin B-12 (CYANOCOBALAMIN) 1000 MCG tablet Take 1 tablet by mouth Daily.   Unknown   , Scheduled Meds:  calcium gluconate, 2,000 mg, Intravenous, Q2H  cefepime, 1,000 mg, Intravenous, Once  [START ON 2/9/2025] cefepime, 1,000 mg, Intravenous, Q24H  heparin (porcine), 5,000 Units, Subcutaneous, Q8H  hydrocortisone sodium succinate, 100 mg, Intravenous,  Q8H  ipratropium-albuterol, 3 mL, Nebulization, 4x Daily - RT  metroNIDAZOLE, 500 mg, Intravenous, Q8H  midodrine, 10 mg, Oral, TID AC  [START ON 2/9/2025] pantoprazole, 40 mg, Intravenous, Q AM  sodium chloride, 10 mL, Intravenous, Q12H  [START ON 2/9/2025] thiamine (B-1) IV, 500 mg, Intravenous, TID  vancomycin, 20 mg/kg, Intravenous, Once  [START ON 2/9/2025] Vancomycin Pharmacy Intermittent/Pulse Dosing, , Not Applicable, Daily    , Continuous Infusions:  fentanyl 10 mcg/mL,  mcg/hr, Last Rate: 100 mcg/hr (02/08/25 1418)  lactated ringers, 250 mL/hr, Last Rate: 250 mL/hr (02/08/25 1735)   Followed by  lactated ringers, 150 mL/hr  norepinephrine, 0.02-0.3 mcg/kg/min (Dosing Weight), Last Rate: 0.18 mcg/kg/min (02/08/25 1735)  Pharmacy Consult - Pharmacy to dose,   Pharmacy to dose vancomycin,   propofol, 5-50 mcg/kg/min (Dosing Weight), Last Rate: 20 mcg/kg/min (02/1956)     and Allergies:  Indomethacin er, Isoniazid, and Penicillins    Objective     Vital Signs   Temp:  [99.9 °F (37.7 °C)-104 °F (40 °C)] 99.9 °F (37.7 °C)  Heart Rate:  [] 66  Resp:  [20-28] 25  BP: ()/(46-79) 95/59  FiO2 (%):  [70 %-100 %] 80 %    Physical Exam:             Patient was seen and examined via telemedicine.     General-acutely ill in appearance, not in any acute distress, intubated sedated    HEENT- no scleral icterus    Neck-supple    Respiratory-intubated sedated synchronous with the ventilator  Cardiovascular-  NSR-on monitor    GI-grossly normal    CNS-intubated sedated    Musculoskeletal -no visible edema  Extremities- no obvious deformity noticed     Psychiatric-cannot be evaluated  Skin- no visible rash                                                                     Results Review:    LABS:    Lab Results   Component Value Date    GLUCOSE 134 (H) 02/08/2025    BUN 70 (H) 02/08/2025    CREATININE 10.92 (H) 02/08/2025    BCR 6.4 (L) 02/08/2025    CO2 21.8 (L) 02/08/2025    CALCIUM 6.1 (L) 02/08/2025     ALBUMIN 3.2 (L) 02/08/2025     (H) 02/08/2025    ALT 70 (H) 02/08/2025    WBC 8.18 02/08/2025    HGB 10.6 (L) 02/08/2025    HCT 31.1 (L) 02/08/2025    MCV 95.7 02/08/2025    PLT 82 (L) 02/08/2025     02/08/2025    K 2.2 (C) 02/08/2025    CL 99 02/08/2025    ANIONGAP 23.2 (H) 02/08/2025       Lab Results   Component Value Date    INR 1.21 (H) 02/08/2025    PROTIME 15.4 (H) 02/08/2025       Results from last 7 days   Lab Units 02/08/25  1349   INR  1.21*   APTT seconds 35.2          I reviewed the patient's new clinical results.  I reviewed the patient's new imaging results and agree with the interpretation.     Latest Reference Range & Units 02/08/25 13:48   pH, Arterial 7.350 - 7.450 pH units 7.318 (L)   pCO2, Arterial 35.0 - 45.0 mm Hg 46.9 (H)   pO2, Arterial 83.0 - 108.0 mm Hg 106.0   HCO3, Arterial 20.0 - 26.0 mmol/L 24.1   Base Excess 0.0 - 2.0 mmol/L -2.2 (L)   O2 Saturation, Arterial 94.0 - 99.0 % 97.2   CO2 Content 22 - 33 mmol/L 25.5   A-a DO2 0.0 - 300.0 mmHg 520.9 (H)   Carboxyhemoglobin 0 - 5 % 2.3   Methemoglobin 0.00 - 3.00 % 0.30   Oxyhemoglobin 94 - 99 % 94.7   Hematocrit, Blood Gas 38.0 - 51.0 % 33.4 (L)   Hemoglobin, Blood Gas 14 - 18 g/dL 10.9 (L)   Site  Right Radial   Adolph's Test  Positive   Modality  Ventilator   FIO2 % 100   Ventilator Mode  VC/AC   Set Tidal Volume  500.00   Set Mech Resp Rate  20.0   PEEP  10.0   Barometric Pressure for Blood Gas mmHg 721   (L): Data is abnormally low  (H): Data is abnormally high     Latest Reference Range & Units 02/08/25 13:51 02/08/25 15:04   Ethanol % %  <0.010   Ethanol 0 - 10 mg/dL  <10   Amphetamine, Urine Qual Negative  Negative    Barbiturates Screen, Urine Negative  Negative    Benzodiazepine Screen, Urine Negative  Negative    Buprenorphine, Screen, Urine Negative  Negative    Cocaine Screen, Urine Negative  Negative    Fentanyl, Urine Negative  Negative    Methamphetamine, Ur Negative  Negative    Methadone Screen , Urine  Negative  Negative    Opiate Screen Negative  Negative    Oxycodone Screen, Urine Negative  Negative    Phencyclidine (PCP), Urine Negative  Negative    THC Screen, Urine Negative  Negative    Tricyclic Antidepressants Screen Negative  Negative      Assessment & Plan     Neurology-intubated and sedated.  Sedated drips reviewed and adjusted for RASS goal of -1 to -2.    Will do SAT in the morning to assess patient's mental status.  CT of the head reviewed and does not show any acute changes.  Sedation vacation     Narrative & Impression   INDICATION: Altered mental status.     COMPARISON: No relevant priors available     TECHNIQUE: Axial CT images of the head were obtained without IV  contrast. Coronal and sagittal reformations were reviewed.     FINDINGS:  Gray-white differentiation is relatively preserved. No mass, mass effect  or midline shift. And chronic ischemic white matter changes. No evidence  of acute large territorial infarction or acute intracranial hemorrhage.  Ventricles appear normal in size. Basal cisterns are patent. No  depressed calvarial fracture.     IMPRESSION:  No acute intracranial process.      Latest Reference Range & Units 02/09/25 00:10   Ammonia 16 - 60 umol/L 40         Respiratory-acute hypoxic and hypercarbic respiratory failure-likely due to aspiration pneumonia and ongoing metabolic stress.  Was not able to protect airway and was intubated.  ABG reviewed and vent settings were adjusted.    Will get venous blood gas in the morning.  Checked ventilator settings and graphics and adjusted.  No auto PEEP noticed.  Continue steroids  Continue nebs  Vent Settings          Resp Rate (Set): 26     FiO2 (%): 80 %  PEEP/CPAP (cm H2O): 10 cm H20    Minute Ventilation (L/min) (Obs): 14.4 L/min  Resp Rate (Observed) Vent: 26     I:E Ratio (Obs): 1:2    PIP Observed (cm H2O): 30 cm H2O           VAP- precautions  Head end - 30 %  Mouth care   DVT prophylaxis-continue    Chest  x-ray-reviewed    ABG-status reviewed    Aspiration pneumonia-respiratory culture obtained.  Continue broad-spectrum antibiotics.  If needed will do bronchoscopy to get specific cultures.  Continue aspiration precautions.    COPD-longtime smoker.  Continue steroids continue nebs  Continue to titrate FiO2 down to maintain saturation 88 to 92%.    ABG showed respiratory acidosis-ventilator settings adjusted.  Will repeat venous blood gas in the morning.      Cardiology- hemodynamically -unstable.  Currently on Levophed.  Discussed that he can be switched to Saúl-Synephrine.  Ordered echo  EKG reviewed and is abnormal.  Continue to trend cardiac enzymes and consider cardiology consult.  Started stress dose steroids.    Continue to monitor HR- rate and rhythm, BP     Nephrology-creatinine high-continue fluids.  Nephrology on case.  Continue management as per them      GI- PPI prophylaxis  Currently NPO.  Watch for refeeding syndrome.  Patient is electrolytes are already very low.    Hematology- latest CBC reviewed.  Transfuse for hemoglobin less than 7    ID-sepsis with septic shock-likely due to aspiration pneumonia.  Continue broad-spectrum antibiotics.  If needed will do bronchoscopy-specific cultures.    Latest microbiology reviewed.  Antibiotics reviewed    Endrocrinology- Maintain Blood sugar 140 -180  Added stress dose steroids.    Electrolytes-electrolytes are on the very lower side continue replacement and continue monitoring them.  Low likely due to poor nutritional status    DVT prophylaxis-continue    Bedside rounds were done with RT and patient's nurse. All the lab and clinical findings were discussed with them and plan was also discussed in great detail.            Echo-pending   Patient is currently critically ill due to septic severe sepsis with septic shock end organ failure respiratory, renal and cardiovascular, hypercarbic respiratory failure, renal failure and change in mental status.  I reviewed  patient's drips and adjusted them.  Reviewed ventilator settings and adjusted.  Case discussed with primary team patient's nurse and RT.  Critical Care time spent in direct patient care: 50 minutes (excluding procedure time, if applicable) including high complexity decision making to assess, manipulate, and support vital organ system failure in this individual who has impairment of one or more vital organ systems such that there is a high probability of imminent or life threatening deterioration in the patient’s condition.  Patient is critically ill and is at higher risk for further mortality/morbidity. Continue ICU care .         This was an audio and video enabled telemedicine encounter.   consent for telemedicine is obtained by the hospital at the time of admission from patient or nok and is on chart     I am in [place] and patient is in [place]        Septic shock          Dangelo Ledezma MD  02/08/25  21:08 EST

## 2025-02-10 NOTE — CASE MANAGEMENT/SOCIAL WORK
Discharge Planning Assessment   [place]     Patient Name: Phan Daniels  MRN: [id number]  Today's Date: 2/10/2025    Admit Date: 2025    Plan: SS received nursing  consult for dc planning. Pt is intubated. SS spoke with pt korintushar Pearly at bedside on this date. Pt lives alone prior to admission. Pt does not utilize home health services. [facility] Pt  [facility], walker and cane [facility]. [facility] (not [facility]), no POA. Per family pt is independent with all ADL's prior to admission. Pt niece pt has no children and parents are . Niece provided SS with siblings numbers and contact information was placed in chart. SS to follow and assist with discharge planning.   Discharge Needs Assessment       Row Name 02/10/25 1548       Living Environment    People in Home alone    Current Living Arrangements apartment    Primary Care Provided by self    Provides Primary Care For no one    Family Caregiver if Needed sibling(s)    Family Caregiver Names brother Adolph and David    Quality of Family Relationships helpful;involved;supportive    Able to Return to Prior Arrangements yes       Transition Planning    Patient/Family Anticipates Transition to home    Patient/Family Anticipated Services at Transition none    Transportation Anticipated family or friend will provide       Discharge Needs Assessment    Equipment Currently Used at Home wheelchair;walker, standard;cane, straight    Concerns to be Addressed discharge planning                   Discharge Plan       Row Name 02/10/25 1549       Plan    Plan SS received nursing  consult for dc planning. Pt is intubated. SS spoke with pt jaime Frankel at bedside on this date. Pt lives alone prior to admission. Pt does not utilize home health services. [facility]. Pt  has wheelchair, walker and cane [facility]. Pt [facility] (not [facility]), no POA. Per family pt is independent with all ADL's prior  to admission. Pt niece pt has no children and parents are . Niece provided SS with siblings numbers and contact information was placed in chart. SS to follow and assist with discharge planning.          Expected Discharge Date and Time       Expected Discharge Date Expected Discharge Time    2025        LEOBARDO Garcia

## 2025-02-10 NOTE — PLAN OF CARE
Goal Outcome Evaluation:           Progress: declining  Outcome Evaluation: Pt continues on mechanical ventilation with fentanyl and propofol for sedation;  Patient continues on Saúl for BP; Pt started on Amiodarone with loading dose; Patient developed temp 101.7, given Tylenol;  Nephro tube feedings initiated at 10ml/hr, no titrate; no flush; inadequate urinary output 35ml this shift; patient failed SAT this shift.

## 2025-02-10 NOTE — PROGRESS NOTES
Nutrition Services    Patient Name:  Phan Daniels  YOB: 1955  MRN: [id number]  Admit Date:  2/8/2025    Consult received for TF. Per GREGORIO sandoval.  Will hold on EN at this time.     Electronically signed by:  Jennifer Gonzalez RD  02/10/25 14:58 EST

## 2025-02-10 NOTE — PROGRESS NOTES
[facility]  PROGRESS NOTE    Patient information:  Name: Phan Daniels  Age/Sex: 69 y.o. male  :  1955        PCP: Cesia Diaz APRN  Attending: Talat Blankenship MD  MRN:  [id number]  Visit Number:  [id number]  LOS: 2  CODE STATUS:    Code Status and Medical Interventions: No CPR (Do Not Attempt to Resuscitate); Full Support; Nephar Boykin, sisters Baylee and Ivy, brothers Dada and Adolph   Ordered at: 02/10/25 1152     Level Of Support Discussed With:    Next of Kin (If No Surrogate)     Code Status (Patient has no pulse and is not breathing):    No CPR (Do Not Attempt to Resuscitate)     Medical Interventions (Patient has pulse or is breathing):    Full Support     Comments:    Angeline Boykin, sisters Baylee and Ivy, brothers Alaina     PROBLEM LIST:Principal Problem:    Septic shock    Reason for Cardiology follow-up: Found unresponsive     Subjective   ADMISSION INFORMATION:  Chief Complaint   Patient presents with    Respiratory Distress     DATE:2/10/2025    Admission information/HPI:  69-year-old male with past medical history is significant for A-fib, COPD, hypertension, dyslipidemia, GERD, alcohol abuse, and smoking.    He was admitted on 2025 after he was found unresponsive on the floor for unknown amount of time. Patient actually came to visit his girlfriend from [place]. He was found apparently by a neighbor and was covered in stool with irregular breathing with O2 sat in the 50s. Patient was put on a nonrebreather and was subsequently intubated in the ER. He was given Narcan with no improvement in mental status. He was found to be hypotensive as well and started on Levophed.  Central line was placed and patient was intubated in the ED.     Interval History:   According to patient's I & O flow chart urinary output measured at 80 mL and net balance is +5653.4 mL overnight. AM labs reveal potassium 3.5, chloride 101, CO2  18.3, BUN 76, and creatinine of 10.78.  Hemoglobin 12.0, platelet count 115, and WBC is 18.44.    Patient was in room CCU 7 when she was seen and examined.  Patient remains intubated and sedated.  Patient's nephew is at bedside.  He was able to report, his wife was able to find out the patient had been diagnosed with CKD in October 2024 and was supposed to start on dialysis.  It does not appear that he ever started dialysis.  He reports that he does have several scratches on his right side that were present when he was found.  He was able to report that patient had been in shelter for 44 years and been out for 3 to 4 years.  He states that he does have a frequent history of pneumonia and is seen at the [facility] in [place].  He states that in [id number] patient spent multiple days on the vent.    EVENT TIMELINE:   02/08: Orally intubated  2/8: TTE w EF 55-60%.  Please see full report attached below.    Objective     Vital Signs  Temp:  [98.2 °F (36.8 °C)-100.1 °F (37.8 °C)] 99.7 °F (37.6 °C)  Heart Rate:  [] 74  Resp:  [28-30] 28  BP: ()/(56-94) 85/70  Flow (L/min) (Oxygen Therapy):  [15] 15  FiO2 (%):  [50 %-70 %] 50 %  Device (Oxygen Therapy): ventilator  Vital Signs (last 72 hrs)         02/07 0700  02/08 0659 02/08 0700 02/09 0659 02/09 0700  02/10 0659 02/10 0700  02/10 0934   Most Recent      Temp (°F)   97.7 -  104    97.7 -  100.1       99.7 (37.6) 02/10 0400    Heart Rate   64 -  135    96 -  142    122 -  144     144 02/10 0900    Resp   20 -  28    26 -  30      28     28 02/10 0802    BP   73/46 -  132/77    78/58 -  117/76    95/72 -  111/76     95/72 02/10 0900    SpO2 (%)   78 -  100    95 -  100    99 -  100     100 02/10 0900    Flow (L/min) (Oxygen Therapy)   10 -  15      15       15 02/09 1902    Oxygen Concentration (%)   70 -  100    65 -  75      65     65 02/10 0802          BMI:Body mass index is 30.41 kg/m².    WEIGHT:      02/08/25  1800 02/09/25  0615 02/10/25  0600  "  Weight: 98.8 kg (217 lb 13 oz) 98.9 kg (218 lb 1.1 oz) 102 kg (224 lb 3.3 oz)       DIET:NPO Diet NPO Type: Strict NPO    I&O:  Intake & Output (last 3 days)         02/07 0701 02/08 0700 02/08 0701 02/09 0700 02/09 0701  02/10 0700 02/10 0701 02/11 0700    I.V. (mL/kg)  2013.8 (20.4) 5033.4 (50.9)     IV Piggyback  4551 700     Total Intake(mL/kg)  6564.8 (66.4) 5733.4 (58)     Urine (mL/kg/hr)  75 80 (0)     Total Output  75 80     Net  +6489.8 +5653.4                      PHYSICAL EXAM:  Constitutional:       Appearance: Not in distress. Acutely ill-appearing.   HENT:         Comments: Orally intubated.  Neck:      Vascular: No JVD. JVD normal.   Pulmonary:      Comments: Intubated with mechanical ventilation noted  Cardiovascular:      Normal rate. Regular rhythm.      Murmurs: There is no murmur.   Edema:     Peripheral edema present.  Abdominal:      General: Bowel sounds are normal. There is no distension.      Palpations: Abdomen is soft.      Comments: FC in use with clear yellow urine noted in BSD.      Musculoskeletal:      Cervical back: Neck supple.      Comments: Sedated. SCDS in use BLE.  Skin:     General: Skin is warm and dry.   Neurological:      Comments: Intubated and sedated.                   Results review   Results Review:    I have reviewed the patient's new clinical results. 02/10/25 12:24 EST    Results from last 7 days   Lab Units 02/10/25  0304 02/09/25  0010 02/08/25  1504 02/08/25  1349   CK TOTAL U/L 2,404* 3,281*  --  2,687*   HSTROP T ng/L  --   --  750* 745*     No results found for: \"PROBNP\"  Results from last 7 days   Lab Units 02/10/25  1024 02/10/25  0304 02/09/25  0010 02/08/25  1349   WBC 10*3/mm3 19.56* 18.44* 14.32* 8.18   HEMOGLOBIN g/dL 11.3* 12.0* 11.8* 10.6*   PLATELETS 10*3/mm3 117* 115* 89* 82*     Results from last 7 days   Lab Units 02/10/25  0928 02/10/25  0304 02/09/25  1953 02/09/25  0800 02/09/25  0010 02/08/25  1349   SODIUM mmol/L  --  142  --   --  141 " "144   POTASSIUM mmol/L 3.5 3.5 3.5 3.0* 2.7* 2.2*   CHLORIDE mmol/L  --  101  --   --  100 99   CO2 mmol/L  --  18.3*  --   --  16.3* 21.8*   BUN mg/dL  --  76*  --   --  68* 70*   CREATININE mg/dL  --  10.76*  --   --  10.76* 10.92*   CALCIUM mg/dL  --  6.5*  --   --  7.4* 6.1*   GLUCOSE mg/dL  --  179*  --   --  215* 134*   ALT (SGPT) U/L  --  59*  --   --  73* 70*   AST (SGOT) U/L  --  136*  --   --  238* 271*     Lab Results   Component Value Date    MG 1.9 02/10/2025    MG 2.0 02/09/2025    MG 1.2 (L) 02/08/2025     Estimated Creatinine Clearance: 8.1 mL/min (A) (by C-G formula based on SCr of 10.76 mg/dL (H)).    Lab Results   Component Value Date    HGBA1C 6.4 (H) 10/15/2024     No results found for: \"CHOL\", \"TRIG\", \"LDL\", \"HDL\"     Lab Results   Component Value Date    INR 1.16 (H) 02/10/2025    INR 1.21 (H) 02/08/2025     Lab Results   Component Value Date    LABHEPA <0.10 (L) 02/10/2025       Lab Results   Component Value Date    TSH 0.196 (L) 02/09/2025      Pain Management Panel          Latest Ref Rng & Units 2/8/2025   Pain Management Panel   Amphetamine, Urine Qual Negative Negative    Barbiturates Screen, Urine Negative Negative    Benzodiazepine Screen, Urine Negative Negative    Buprenorphine, Screen, Urine Negative Negative    Cocaine Screen, Urine Negative Negative    Fentanyl, Urine Negative Negative    Methadone Screen , Urine Negative Negative    Methamphetamine, Ur Negative Negative       Details                 Microbiology Results (last 10 days)       Procedure Component Value - Date/Time    Respiratory Culture - Sputum, ET Suction [[id number]]  (Abnormal) Collected: 02/09/25 0441    Lab Status: Final result Specimen: Sputum from ET Suction Updated: 02/10/25 1031     Respiratory Culture Heavy growth (4+) Streptococcus agalactiae (Group B)     Comment:   This organism is considered to be universally susceptible to penicillin.  No further antibiotic testing will be performed. If Clindamycin or " Erythromycin is the drug of choice, notify the laboratory within 7 days to request susceptibility testing.         Heavy growth (4+) Normal respiratory lu. No S. aureus or Pseudomonas aeruginosa detected. Final report.     Gram Stain Moderate (3+) WBCs observed - predominantly neutrophils      Rare (1+) Squamous epithelial cells      Few (2+) Gram positive cocci in chains      Rare (1+) Yeast      Rare (1+) Gram negative bacilli      Rare (1+) Gram positive bacilli    S. Pneumo Ag Urine or CSF - Urine, Urine, Clean Catch [[id number]]  (Normal) Collected: 02/09/25 0249    Lab Status: Final result Specimen: Urine, Clean Catch Updated: 02/09/25 1345     Strep Pneumo Ag Negative    Legionella Antigen, Urine - Urine, Urine, Catheter [[id number]]  (Normal) Collected: 02/08/25 1351    Lab Status: Final result Specimen: Urine, Catheter Updated: 02/08/25 2116     LEGIONELLA ANTIGEN, URINE Negative    Narrative:      Presumptive negative for L. pneumophilia serogroup 1 antigen, suggesting no recent or current infection.    Blood Culture - Blood, Arm, Left [[id number]]  (Normal) Collected: 02/08/25 1349    Lab Status: Preliminary result Specimen: Blood from Arm, Left Updated: 02/09/25 1415     Blood Culture No growth at 24 hours    Blood Culture - Blood, Arm, Right [[id number]]  (Abnormal) Collected: 02/08/25 1349    Lab Status: Final result Specimen: Blood from Arm, Right Updated: 02/10/25 0650     Blood Culture Staphylococcus, coagulase negative     Isolated from Aerobic Bottle     Gram Stain Aerobic Bottle Gram positive cocci in clusters    Narrative:      Probable contaminant requires clinical correlation, susceptibility not performed unless requested by physician.      Blood Culture ID, PCR - Blood, Arm, Right [[id number]]  (Abnormal) Collected: 02/08/25 1349    Lab Status: Final result Specimen: Blood from Arm, Right Updated: 02/09/25 1210     BCID, PCR Staph spp, not aureus or lugdunensis. Identification by BCID2 PCR.      BOTTLE TYPE Aerobic Bottle           Imaging Results (Last 24 Hours)       ** No results found for the last 24 hours. **          ECHO:  Results for orders placed during the hospital encounter of 02/08/25    Adult Transthoracic Echo Complete W/ Cont if Necessary Per Protocol    Interpretation Summary    Left ventricle is normal in size and function with estimate ejection fraction of 55 to 60%. Normal diastolic function.    Right ventricle appears normal in size and function.    Normal left atrial cavity size noted. No evidence of a patent foramen ovale. No evidence of an atrial septal defect present.    Normal right atrial cavity size noted. The inferior vena cava is dilated. Partial IVC inspiratory collapse of less than 50% noted.    Aortic valve appears trileaflet. There is no significant aortic stenosis or regurgitation.    There is mild mitral regurgitation.    Estimated right ventricular systolic pressure from tricuspid regurgitation is moderately elevated (45-55 mmHg). Mild tricuspid regurgitation. Estimated RVSP is 45 mmHg with estimated RA pressure of 10 mmHg.    There is no evidence of pericardial effusion. . There is evidence of a fat pad present.    The aortic root measures 3.9 cm.      STRESS TEST:  No results found for this or any previous visit.      HEART CATH:  No results found for this or any previous visit.      EP STUDY/Interrogations:   No results found for this or any previous visit.      TELEMETRY:           I reviewed the patient's new clinical results.    ALLERGIES: Indomethacin er, Isoniazid, and Penicillins    Medication Review:   Current list of medications may not reflect those currently placed in orders that are not signed or are being held.     cefepime, 1,000 mg, Intravenous, Q24H  cetirizine, 5 mg, Oral, Daily  finasteride, 5 mg, Oral, Daily  hydrocortisone sodium succinate, 100 mg, Intravenous, Q8H  insulin regular, 2-7 Units, Subcutaneous, Q6H  ipratropium-albuterol, 3 mL,  Nebulization, 4x Daily - RT  metroNIDAZOLE, 500 mg, Intravenous, Q8H  midodrine, 10 mg, Oral, TID AC  mupirocin, 1 Application, Each Nare, BID  pantoprazole, 40 mg, Oral, Q AM  pantoprazole, 40 mg, Intravenous, BID AC  potassium chloride, 10 mEq, Intravenous, Q1H  rosuvastatin, 10 mg, Oral, Nightly  sodium chloride, 10 mL, Intravenous, Q12H  sodium chloride, 10 mL, Intravenous, Q12H  sodium chloride, 10 mL, Intravenous, Q12H  sodium chloride, 10 mL, Intravenous, Q12H  sodium chloride, 10 mL, Intravenous, Q12H  thiamine (B-1) IV, 500 mg, Intravenous, TID  Vancomycin Pharmacy Intermittent/Pulse Dosing, , Not Applicable, Daily  vitamin B-12, 1,000 mcg, Oral, Daily      fentanyl 10 mcg/mL,  mcg/hr, Last Rate: 100 mcg/hr (02/10/25 1045)  heparin, 9.8 Units/kg/hr (Dosing Weight), Last Rate: 9.8 Units/kg/hr (02/10/25 0845)  norepinephrine, 0.02-0.3 mcg/kg/min (Dosing Weight), Last Rate: Stopped (02/09/25 0724)  Pharmacy Consult - Pharmacy to dose,   Pharmacy to Dose Heparin,   phenylephrine, 0.5-3 mcg/kg/min (Dosing Weight), Last Rate: 0.8 mcg/kg/min (02/10/25 0951)  propofol, 5-50 mcg/kg/min (Dosing Weight), Last Rate: 20 mcg/kg/min (02/10/25 1043)  sodium bicarbonate 8.4 % 75 mEq in sodium chloride 0.45 % 1,075 mL infusion (less than/equal to 100 mEq), 75 mEq        acetaminophen    senna-docusate sodium **AND** polyethylene glycol **AND** bisacodyl **AND** bisacodyl    Calcium Replacement - Follow Nurse / BPA Driven Protocol    dextrose    dextrose    fluticasone    glucagon (human recombinant)    Magnesium Standard Dose Replacement - Follow Nurse / BPA Driven Protocol    Pharmacy Consult - Pharmacy to dose    Pharmacy to Dose Heparin    Phosphorus Replacement - Follow Nurse / BPA Driven Protocol    Polyvinyl Alcohol-Povidone PF    Potassium Replacement - Follow Nurse / BPA Driven Protocol    senna    sodium chloride    sodium chloride    sodium chloride    sodium chloride    sodium chloride    sodium chloride     sodium chloride    sodium chloride      Assessment      Septic shock  Altered mental status  Respiratory failure  Pneumonia  Kidney failure  Elevated troponin type I versus type II.  Paroxysmal A-fib with RVR [id number] score is at least 2  Hypokalemia  History of alcohol abuse  Cigarette smoking  Dysphagia with esophageal dilatation on 6/13/2024   Subretinal hemorrhage 5/14/2024.         Recommendations / Plan      -Will stop flecainide, unable to do digoxin due to significant renal failure, will recommend starting amiodarone for rate control  -Currently on heparin drip.  Previously has had polypectomy on 2/14/2023.  There was a report of coffee-ground emesis.  Hemoglobin is currently stable.  If there is concern for bleeding we will have to hold heparin.   -On Crestor 10 mg p.o. once daily  -As per chart review documented on 12/15/2022 that patient has history of paroxysmal A-fib and is on flecainide but not on anticoagulation.  Reason is not mentioned.  Asked the nephew at bedside who did not know the reason why patient was not on anticoagulation despite having atrial fibrillation.  Patient is currently unable to answer questions.   -Can consider beta-blocker only once patient is able to be weaned off the pressors  -Palliative care is consulted.  Patient has been made DNR.  Prognosis is guarded.   -Patient continues to be anuric, nephrology following the patient.   -Can consider diuresis if starts to have increasing vent settings.    I have discussed the patients findings and recommendations with the patient.    Thank you very much for asking us to be involved in this patient's care.      Electronically signed by GAGE Angulo, 02/10/25, 12:24 PM EST.     Electronically signed by Jonathan Agustin MD, 02/10/25, 4:07 PM EST.          Please note that portions of this note were completed with a voice recognition program.    Please note that portions of this note were copied and has been reviewed and is  accurate as of 2/10/2025 .

## 2025-02-10 NOTE — PROGRESS NOTES
[facility] HOSPITALIST PROGRESS NOTE     Patient Identification:  Name:  Phan Daniels  Age:  69 y.o.  Sex:  male  :  1955  MRN:  [id number]  Visit Number:  [id number]  ROOM: [address]     Primary Care Provider:  Cesia Diaz APRN    Length of stay in inpatient status:  2    Subjective     Chief Compliant:    Chief Complaint   Patient presents with    Respiratory Distress     History of Presenting Illness:    Patient remains critically ill, intubated for airway protection, this AM more tachycardic, appears Atrial Fibrillation on telemetry, blood pressure is lower, started on Heparin drip as per Cardiology recommendations, already on an antiarrhythmic with flecainide but follow up Cardiology recommendations on transition to IV Amiodarone instead, remains on broad antibiotics, creatinine is still > 10, high likelihood of needing hemodialysis, follow up recommendations for hemodialysis catheter placement, prognosis extremely guarded. Palliative Care consulted.   Objective     Current Hospital Meds:  cefepime, 1,000 mg, Intravenous, Q24H  cetirizine, 5 mg, Oral, Daily  finasteride, 5 mg, Oral, Daily  flecainide, 75 mg, Oral, BID  hydrocortisone sodium succinate, 100 mg, Intravenous, Q8H  insulin regular, 2-7 Units, Subcutaneous, Q6H  ipratropium-albuterol, 3 mL, Nebulization, 4x Daily - RT  metroNIDAZOLE, 500 mg, Intravenous, Q8H  midodrine, 10 mg, Oral, TID AC  mupirocin, 1 Application, Each Nare, BID  pantoprazole, 40 mg, Oral, Q AM  pantoprazole, 40 mg, Intravenous, BID AC  rosuvastatin, 10 mg, Oral, Nightly  sodium chloride, 10 mL, Intravenous, Q12H  sodium chloride, 10 mL, Intravenous, Q12H  sodium chloride, 10 mL, Intravenous, Q12H  sodium chloride, 10 mL, Intravenous, Q12H  sodium chloride, 10 mL, Intravenous, Q12H  thiamine (B-1) IV, 500 mg, Intravenous, TID  Vancomycin Pharmacy Intermittent/Pulse Dosing, , Not Applicable, Daily  vitamin B-12, 1,000 mcg, Oral,  Daily      fentanyl 10 mcg/mL,  mcg/hr, Last Rate: 100 mcg/hr (02/09/25 1242)  heparin, 9.8 Units/kg/hr (Dosing Weight), Last Rate: 9.8 Units/kg/hr (02/10/25 0845)  norepinephrine, 0.02-0.3 mcg/kg/min (Dosing Weight), Last Rate: Stopped (02/09/25 0724)  Pharmacy Consult - Pharmacy to dose,   Pharmacy to Dose Heparin,   phenylephrine, 0.5-3 mcg/kg/min (Dosing Weight)  propofol, 5-50 mcg/kg/min (Dosing Weight), Last Rate: 20 mcg/kg/min (02/10/25 0426)  sodium bicarbonate 8.4 % 75 mEq in sodium chloride 0.45 % 1,000 mL infusion (less than/equal to 100 mEq), 75 mEq, Last Rate: 75 mEq (02/10/25 0037)      ----------------------------------------------------------------------------------------------------------------------  Vital Signs:  Temp:  [98.1 °F (36.7 °C)-100.1 °F (37.8 °C)] 99.7 °F (37.6 °C)  Heart Rate:  [] 144  Resp:  [26-30] 28  BP: ()/(56-94) 95/72  FiO2 (%):  [70 %-75 %] 70 %  SpO2:  [95 %-100 %] 100 %  on  Flow (L/min) (Oxygen Therapy):  [15] 15;   Device (Oxygen Therapy): ventilator  Body mass index is 30.41 kg/m².      Intake/Output Summary (Last 24 hours) at 2/10/2025 0914  Last data filed at 2/10/2025 0600  Gross per 24 hour   Intake 5733.36 ml   Output 80 ml   Net 5653.36 ml      ----------------------------------------------------------------------------------------------------------------------  Physical exam:  Constitutional:  Intubated/Sedated, no acute distress.      HENT:  Head:  Normocephalic and atraumatic.  Mouth:  Moist mucous membranes.    Eyes:  Conjunctivae are normal. No scleral icterus.    Neck:  Neck supple.  No JVD present.    Cardiovascular:  Tachycardic rate, irregular rhythm and normal heart sounds with no murmur.  Pulmonary/Chest:  Ventilated, bilateral equal rise of chest, on Fi02 65%  Abdominal:  Soft. No distension and no tenderness.   Musculoskeletal:  No tenderness, and no deformity.  No red or swollen joints anywhere.    Neurological:  Unable to assess due  "to sedation    Skin:  Skin is warm and dry. No rash noted. No pallor.   Peripheral vascular:  No clubbing, no cyanosis, no edema.  Psychiatric: Unable to assess due to sedation  : Vale in place    Edited by: Kendall Irby MD at 2/10/2025 0911  ----------------------------------------------------------------------------------------------------------------------  WBC/HGB/HCT/PLT   18.44/12.0/36.3/115 (02/10 0304)  BUN/CREAT/GLUC/ALT/AST/CARLA/LIP    76/10.76/179/59/136/--/-- (02/10 0304)  LYTES - Na/K/Cl/CO2: 142/3.5/101/18.3* (02/10 0304)  COAG - PT/INR/PTT: 15.0/1.16*/35.8 (02/10 0742)     No results found for: \"URINECX\"  Blood Culture   Date Value Ref Range Status   02/08/2025 No growth at 24 hours  Preliminary   02/08/2025 Staphylococcus, coagulase negative (C)  Final       I have personally looked at the labs and they are summarized above.  ----------------------------------------------------------------------------------------------------------------------  Detailed radiology reports for the last 24 hours:  US Abdomen Limited    Result Date: 2/9/2025  1. Findings concerning for acute cholecystitis. 2. Hepatic steatosis. 3. Simple appearing right renal peripelvic cyst.  This report was finalized on 2/9/2025 9:40 AM by Alex Pallas, DO.      XR Chest AP    Result Date: 2/8/2025   Normal heart size Satisfactory position of the endotracheal tube with tip 6.3 cm above the lissette. Right neck central vascular catheter with tip to the SVC. Enteric drain in place with tip to the stomach however the sidehole is in the distal esophageal lumen. Multifocal pneumonia with focal consolidation in the right upper lobe. Slightly elevated right hemidiaphragm. No pleural effusion. No pneumothorax   This report was finalized on 2/8/2025 7:22 PM by Scott Campbell MD.      CT Abdomen Pelvis Without Contrast    Result Date: 2/8/2025  1.  Findings concerning for multifocal pneumonia. 2.  Hepatic steatosis. 3.  Cholelithiasis.  This " report was finalized on 2/8/2025 3:13 PM by Alex Pallas, DO.      CT Chest Without Contrast Diagnostic    Result Date: 2/8/2025  1.  Findings concerning for multifocal pneumonia. 2.  Hepatic steatosis. 3.  Cholelithiasis.  This report was finalized on 2/8/2025 3:13 PM by Alex Pallas, DO.      CT Head Without Contrast    Result Date: 2/8/2025  No acute intracranial process.  This report was finalized on 2/8/2025 3:11 PM by Alex Pallas, DO.      XR Chest 1 View    Result Date: 2/8/2025  FINDINGS/IMPRESSION: Endotracheal tube tip approximately 5 cm above the lissette. Enteric tube in stomach. Mild pulmonary vascular congestion. Right upper lobe infiltrate concerning for pneumonia. Elevation of the right hemidiaphragm. No pleural effusion or pneumothorax. No acute fracture.   This report was finalized on 2/8/2025 2:55 PM by Alex Pallas, DO.     Assessment & Plan    69M History Long-term Incarceration, Obese by BMI PMH GERD, Hypertension, Hyperlipidemia, Atrial Fibrillation, Alcohol abuse, COPD, was found down at home and unresponsive, was intubated on arrival to emergency room for airway protection.    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 complicated by possible ARDS, HAGMA, Mild Rhabdomyolysis   #Atrial Fibrillation with Rapid Ventricular Rate   #Elevated HS Troponins, suspected NSTEMI Type II due to shock  - Pulmonary consulted and following   - Nephrology consulted and following, follow up recommendations on needed tunneled dialysis catheter placement and starting on hemodialysis   - Palliative Care consulted and following   - Continue Vancomycin, Cefepime, Flagyl, follow up cultures  - Status post Sepsis bolus in emergency room, continue mIVFs  - Continue pain and  sedation drips for comfort  - Continue beta blocker and flecainide, switch to IV Amiodarone if felt appropriate per Cardiology  - Continue Heparin drip   - Continue IV pressors for SBP < 90 or MAPs consistently < 65  - Continue IV PPI for Gi prophylaxis  - Continue Tylenol as needed for fevers, Duonebs as needed  - Admitted to CCU, monitor on telemetry, continue 02 but wean as able, spontaneous awakening trial/spontaneous breathing trial when appropriate    #Electrolyte Abnormalities  - Acute Severe Hypokalemia - Replacing, on protocol  - Acute Severe Hypomagnesemia - Replacing, on protocol    #Alcohol Use Disorder, Severe, with Dependence complicated   - Continue cardiac monitoring, add seizure precautions, monitor for withdrawal     #Obesity by BMI  - Body mass index is 30.41 kg/m².; complicates all aspects of care     F: NPO, mIVFs  E: Monitor & Replace as needed  N: NPO Diet NPO Type: Strict NPO  Ppx: Heparin drip   Code Status (Patient has no pulse and is not breathing): CPR  Medical Interventions (Patient has pulse or is breathing): Full Support    Dispo: Pending workup and clinical improvement     *This patient is considered high risk due to sepsis, acute respiratory failure, Pneumonia, intubation and mechanical ventilation, Rapid Ventricular Rate, Acute Kidney Injury, Acute Metabolic Encephalopathy.     I have spent 35 minutes of critical care time (7:30-8:05) with > 50% of time spent in direct patient care, evaluating the patient at bedside, reviewing all labs and images, reviewing ABG and vent settings, reviewing pain and sedation drips, communicating plan of care with nursing staff, utilizing high complexity medical decision making to assess and treat vital organ system failure in an individual who has impairment of one or more vital organ systems such that there is a high probability of imminent or life threatening deterioration in the patient’s condition. Failure to initiate the above interventions on  an urgent basis would likely result in sudden, clinically significant or life threatening deterioration in the patient's condition.    Edited by: Kendall Irby MD at 2/10/2025 9047  [facility]

## 2025-02-10 NOTE — NURSING NOTE
Consult received for multiple pressure injuries. Assessed patient - there are two areas that appear to be DTPI on the right anterior ankle - dry, maroon/purple bases that do not brady with a dry, intact pink wild wound that does brady. Order to assess BID and cover with silicone bordered dressing for protection.    The right proximal knee has a DTPI vs scabbed abrasion type area. The base is dry, red/black, scab-like, nonblanching and intact; wild wound is dry, pink, intact and blanching. Order to assess BID and leave open to air.    The right lateral thigh is a stage 2 pressure injury. Wound appears to have been two large blisters that have ruptured leaving a moist, dark red/purple nonblanchable base with open edges and an intact, warm wild wound. Order to assess BID and paint with betadine and cover with silicone bordered dressing.    There is a DTPI noted to the right side of the penis that is maroon/purple, nonblanchable. Order to assess with tabares catheter care and apply Z guard BID; leave open to air.    Left proximal ankle has a DTPI that is dark red and non-blanching. Order to assess BID and cover with silicone bordered dressing for protection.    The right ear has a DTPI that is maroon/purple, non-blanching and dry. Wild wound is intact, firm and dry. Order to assess and apply Z guard BID and leave open to air.    Keith score 12. PI prevention orders initiated.     02/10/25 1005   Wound 02/08/25 1815 Right anterior ankle pressure injury   Placement Date/Time: 02/08/25 1815   Side: (c) Right  Orientation: anterior  Location: ankle  Primary Wound Type: Pressure Injury  Type: pressure injury  Present on Original Admission: Yes   Pressure Injury Stage DTPI   Dressing Appearance open to air   Closure None   Base maroon/purple;nonblanchable;dry   Periwound intact;dry;pink   Periwound Temperature warm   Periwound Skin Turgor firm   Edges rolled/closed;irregular   Drainage Amount none   Wound 02/08/25 1816 Right  anterior ankle pressure injury   Placement Date/Time: 02/08/25 1816   Side: Right  Orientation: anterior  Location: ankle  Primary Wound Type: Pressure Injury  Type: pressure injury  Present on Original Admission: Yes   Pressure Injury Stage DTPI   Dressing Appearance open to air   Closure None   Base nonblanchable;maroon/purple   Periwound intact;dry;pink   Periwound Temperature warm   Periwound Skin Turgor firm   Edges irregular;rolled/closed   Drainage Amount none   Wound 02/08/25 1817 Right proximal knee pressure injury   Placement Date/Time: 02/08/25 1817   Side: Right  Orientation: proximal  Location: knee  Primary Wound Type: Pressure Injury  Type: pressure injury  Present on Original Admission: Yes   Pressure Injury Stage DTPI  (versus scabbed abrasion)   Dressing Appearance open to air   Closure None   Base scab;nonblanchable;black;red  (dark red/black scab like in appearance)   Periwound dry;intact;warm   Periwound Temperature warm   Periwound Skin Turgor firm   Edges rolled/closed;irregular   Drainage Amount none   Wound 02/08/25 1818 Right lateral thigh pressure injury   Placement Date/Time: 02/08/25 1818   Side: Right  Orientation: lateral  Location: thigh  Primary Wound Type: Pressure Injury  Type: pressure injury  Present on Original Admission: Yes   Pressure Injury Stage 2   Dressing Appearance open to air   Closure None   Base moist;red;nonblanchable;purple   Periwound intact;warm;moist   Periwound Temperature warm   Periwound Skin Turgor firm   Edges open   Drainage Characteristics/Odor serosanguineous   Drainage Amount scant   Wound 02/08/25 1819 penis pressure injury   Placement Date/Time: 02/08/25 1819   Location: penis  Type: pressure injury  Present on Original Admission: Yes   Pressure Injury Stage DTPI   Dressing Appearance open to air   Closure None   Base nonblanchable;maroon/purple;dry   Periwound intact;dry;pink   Periwound Temperature warm   Periwound Skin Turgor soft   Edges  rolled/closed   Drainage Amount none   Wound 02/08/25 1820 Left proximal ankle pressure injury   Placement Date/Time: 02/08/25 1820   Side: Left  Orientation: proximal  Location: ankle  Primary Wound Type: Pressure Injury  Type: pressure injury  Present on Original Admission: Yes   Pressure Injury Stage DTPI   Dressing Appearance open to air   Closure None   Base nonblanchable;red   Periwound intact;dry;pink   Periwound Temperature warm   Periwound Skin Turgor firm   Edges rolled/closed   Drainage Amount none   Wound 02/08/25 1820 Right ear pressure injury   Placement Date/Time: 02/08/25 1820   Side: Right  Location: ear  Primary Wound Type: Pressure Injury  Type: pressure injury  Present on Original Admission: Yes   Pressure Injury Stage DTPI   Dressing Appearance open to air   Closure None   Base nonblanchable;maroon/purple;dry   Periwound intact;dry   Periwound Temperature warm   Periwound Skin Turgor firm   Edges rolled/closed   Drainage Amount none

## 2025-02-10 NOTE — PLAN OF CARE
Problem: Adult Inpatient Plan of Care  Goal: Plan of Care Review  Outcome: Not Progressing  Goal: Patient-Specific Goal (Individualized)  Outcome: Not Progressing  Goal: Absence of Hospital-Acquired Illness or Injury  Outcome: Not Progressing  Intervention: Identify and Manage Fall Risk  Intervention: Prevent Skin Injury  Intervention: Prevent and Manage VTE (Venous Thromboembolism) Risk  Goal: Optimal Comfort and Wellbeing  Outcome: Not Progressing  Intervention: Provide Person-Centered Care  Goal: Readiness for Transition of Care  Outcome: Not Progressing     Problem: Violence Risk or Actual  Goal: Anger and Impulse Control  Outcome: Not Progressing  Intervention: Minimize Safety Risk  Intervention: Promote Self-Control     Problem: Fall Injury Risk  Goal: Absence of Fall and Fall-Related Injury  Outcome: Not Progressing  Intervention: Identify and Manage Contributors  Intervention: Promote Injury-Free Environment     Problem: Skin Injury Risk Increased  Goal: Skin Health and Integrity  Outcome: Not Progressing  Intervention: Optimize Skin Protection     Problem: Comorbidity Management  Goal: Maintenance of COPD Symptom Control  Outcome: Not Progressing  Intervention: Maintain COPD (Chronic Obstructive Pulmonary Disease) Symptom Control     Problem: Sepsis/Septic Shock  Goal: Optimal Coping  Outcome: Not Progressing  Intervention: Support Patient and Family Response  Goal: Absence of Bleeding  Outcome: Not Progressing  Goal: Blood Glucose Level Within Target Range  Outcome: Not Progressing  Goal: Absence of Infection Signs and Symptoms  Outcome: Not Progressing  Intervention: Initiate Sepsis Management  Intervention: Promote Recovery  Goal: Optimal Nutrition Delivery  Outcome: Not Progressing   Goal Outcome Evaluation:  Plan of Care Reviewed With: patient        Progress: no change

## 2025-02-10 NOTE — PROGRESS NOTES
NEPHROLOGY PROGRESS NOTE      INTERVAL HISTORY:    HPI, decreased level of consciousness  Patient remains intubated on mechanical ventilation, FiO2 70% with no significant events overnight.  Patient's nephew at bedside obtained history, patient was seen in October last year in [facility] and was told he has kidney failure    STATUS OF ACUTE AND CHRONIC PROBLEMS;  1.  Patient remains oliguric, with urine output less than 100 mL in last 24 hours  2.  Remains hypotensive with systolic blood pressure as low to 80s        Intake/Output                         02/08/25 0701 - 02/09/25 0700 02/09/25 0701 - 02/10/25 0700     7202-7518 4679-0781 Total 0931-5707 9163-5700 Total                 Intake    I.V.  100  1913.8 2013.8  2950.3  2083.1 5033.4    IV Piggyback  3651  900 4551  200  500 700    Total Intake 3751 2813.8 6564.8 3150.3 2583.1 5733.4       Output    Urine  --  75 75  50  30 80    Total Output -- 75 75 50 30 80             VITAL SIGNS :     Temp:  [98.1 °F (36.7 °C)-100.1 °F (37.8 °C)] 99.7 °F (37.6 °C)  Heart Rate:  [] 122  Resp:  [26-30] 28  BP: ()/(56-94) 111/76  FiO2 (%):  [70 %-75 %] 70 %    PHYSICAL EXAMINATION:    GENERAL EXAM  Laying in bed, intubated  Comfortable on mechanical ventilation    MENTAL STATUS EXAM  Sedated    NECK EXAM  JVP is not elevated, carotid exam normal    CARDIOVASCULAR EXAM  Regular rate and rhythm, no murmurs noted, no added heart sounds, normal apical pulsation  no edema in the lower extremities  2+ radial pulses bilateral, 2+ femoral/tibial pulses bilaterally    MUSCULOSKELETAL EXAM  No cyanosis or clubbing noted in the digits    RESPIRATORY EXAM  Lungs clear to auscultation bilaterally, respiratory effort normal    ABDOMINAL EXAM  Abdomen soft and non tender, normal bowel sounds    SKIN EXAM  No new rashes      Laboratory Data :     Albumin Albumin   Date Value Ref Range Status   02/10/2025 2.4 (L) 3.5 - 5.2 g/dL Final   02/09/2025 2.8 (L) 3.5 - 5.2 g/dL Final  "  02/08/2025 3.2 (L) 3.5 - 5.2 g/dL Final      Magnesium Magnesium   Date Value Ref Range Status   02/10/2025 1.9 1.6 - 2.4 mg/dL Final   02/09/2025 2.0 1.6 - 2.4 mg/dL Final   02/08/2025 1.2 (L) 1.6 - 2.4 mg/dL Final          PTH               No results found for: \"PTH\"    CBC and coagulation:  Results from last 7 days   Lab Units 02/10/25  0304 02/09/25  0010 02/08/25  1349   PROCALCITONIN ng/mL  --   --  5.90*   LACTATE mmol/L  --   --  1.2   CRP mg/dL  --   --  9.12*   WBC 10*3/mm3 18.44* 14.32* 8.18   HEMOGLOBIN g/dL 12.0* 11.8* 10.6*   HEMATOCRIT % 36.3* 35.1* 31.1*   MCV fL 97.1* 97.2* 95.7   MCHC g/dL 33.1 33.6 34.1   PLATELETS 10*3/mm3 115* 89* 82*   INR   --   --  1.21*     Acid/base balance:  Results from last 7 days   Lab Units 02/09/25  0742 02/08/25  1348   PH, ARTERIAL pH units 7.289* 7.318*   PO2 ART mm Hg 85.2 106.0   PCO2, ARTERIAL mm Hg 41.4 46.9*   HCO3 ART mmol/L 19.9* 24.1     Renal and electrolytes:    Results from last 7 days   Lab Units 02/10/25  0304 02/09/25  1953 02/09/25  0800 02/09/25  0010 02/08/25  1349   SODIUM mmol/L 142  --   --  141 144   POTASSIUM mmol/L 3.5 3.5 3.0* 2.7* 2.2*   MAGNESIUM mg/dL 1.9  --   --  2.0 1.2*   CHLORIDE mmol/L 101  --   --  100 99   CO2 mmol/L 18.3*  --   --  16.3* 21.8*   BUN mg/dL 76*  --   --  68* 70*   CREATININE mg/dL 10.76*  --   --  10.76* 10.92*   CALCIUM mg/dL 6.5*  --   --  7.4* 6.1*   PHOSPHORUS mg/dL  --   --   --  8.3* 6.1*     Estimated Creatinine Clearance: 8.1 mL/min (A) (by C-G formula based on SCr of 10.76 mg/dL (H)).  @GFRCG:3@   Liver and pancreatic function:  Results from last 7 days   Lab Units 02/10/25  0304 02/09/25  0010 02/08/25  1349   ALBUMIN g/dL 2.4* 2.8* 3.2*   BILIRUBIN mg/dL 0.8 1.6* 1.1   ALK PHOS U/L 46 45 45   AST (SGOT) U/L 136* 238* 271*   ALT (SGPT) U/L 59* 73* 70*   AMMONIA umol/L  --  40  --          Cardiac:      Liver and pancreatic function:  Results from last 7 days   Lab Units 02/10/25  0304 02/09/25  0010 " 02/08/25  1349   ALBUMIN g/dL 2.4* 2.8* 3.2*   BILIRUBIN mg/dL 0.8 1.6* 1.1   ALK PHOS U/L 46 45 45   AST (SGOT) U/L 136* 238* 271*   ALT (SGPT) U/L 59* 73* 70*   AMMONIA umol/L  --  40  --          CLINICAL DATA REVIEW  CBC, Renal functions, Serum electrolytes, Acid base labs reviewed 1  Telemetry was reviewed and demonstrated sinus rhythm rate   Decided to obtain old records 1  Discussed the labs with Dr. Irby 1    MEDICINES:     cefepime, 1,000 mg, Intravenous, Q24H  cetirizine, 5 mg, Oral, Daily  finasteride, 5 mg, Oral, Daily  flecainide, 75 mg, Oral, BID  hydrocortisone sodium succinate, 100 mg, Intravenous, Q8H  insulin regular, 2-7 Units, Subcutaneous, Q6H  ipratropium-albuterol, 3 mL, Nebulization, 4x Daily - RT  metroNIDAZOLE, 500 mg, Intravenous, Q8H  midodrine, 10 mg, Oral, TID AC  mupirocin, 1 Application, Each Nare, BID  pantoprazole, 40 mg, Oral, Q AM  pantoprazole, 40 mg, Intravenous, BID AC  rosuvastatin, 10 mg, Oral, Nightly  sodium chloride, 10 mL, Intravenous, Q12H  sodium chloride, 10 mL, Intravenous, Q12H  sodium chloride, 10 mL, Intravenous, Q12H  sodium chloride, 10 mL, Intravenous, Q12H  sodium chloride, 10 mL, Intravenous, Q12H  thiamine (B-1) IV, 500 mg, Intravenous, TID  Vancomycin Pharmacy Intermittent/Pulse Dosing, , Not Applicable, Daily  vitamin B-12, 1,000 mcg, Oral, Daily      fentanyl 10 mcg/mL,  mcg/hr, Last Rate: 100 mcg/hr (02/09/25 1242)  heparin, 1,000 Units/hr  norepinephrine, 0.02-0.3 mcg/kg/min (Dosing Weight), Last Rate: Stopped (02/09/25 0724)  Pharmacy Consult - Pharmacy to dose,   Pharmacy to Dose Heparin,   phenylephrine, 0.5-3 mcg/kg/min (Dosing Weight), Last Rate: 0.8 mcg/kg/min (02/10/25 0545)  propofol, 5-50 mcg/kg/min (Dosing Weight), Last Rate: 20 mcg/kg/min (02/10/25 4056)  sodium bicarbonate 8.4 % 75 mEq in sodium chloride 0.45 % 1,000 mL infusion (less than/equal to 100 mEq), 75 mEq, Last Rate: 75 mEq (02/10/25 0037)          Assessment & Plan        -Acute kidney injury  - Chronic kidney disease unspecified stage  - Acute hypercapnic hypoxic respiratory failure  - Acute metabolic acidosis  - Severe sepsis with septic shock  - Rhabdomyolysis  - Paroxysmal atrial fibrillation    No improvement in renal functions, creatinine remains more than 10, urine output is almost same.  Patient remains high risk for dialysis.  There is no emergent indication of dialysis,  Acute kidney injury most likely due to acute tubular necrosis in settings shock  Unknown baseline creatinine, patient has been evaluated for acute kidney injury/chronic kidney disease at [facility]    -Urine analysis with microscopy  - Continue on balance bicarb drip for another 24 hours  - Requested medical records from [place]  - Strict intake and output record.   - Please avoid any nephrotoxic agents, hypotension and adjust medications according to estimated GFR.       REVIEWED NOTES OF CLINICAL TEAMS INVOLVED WITH PATIENT CARE.     DISCUSSED IN DETAIL WITH PATIENT AND/OR FAMILY ABOUT CURRENT CLINICAL CONDITION AND RESPONSE TO ONGOING MANAGEMENT.     DISCUSSED IN DETAIL WITH PATIENT AND/OR FAMILY ABOUT RISKS ASSOCIATED WITH CURRENT CLINICAL CONDITION AND RISK INVOLVED WITH CURRENT MANAGEMENT.     DISCUSSED IN DETAIL WITH HOSPITALIST    CODE STATUS REVIEWED,     Saba Bates MD  02/10/25  08:30 EST

## 2025-02-10 NOTE — CONSULTS
Palliative Care Initial Consult     Attending Physician: Kendall Irby MD  Referring Provider: Kendall Irby MD    assistance with advance directives, assistance with clarification of goals of care, and psychosocial support  Code Status:   Code Status and Medical Interventions: No CPR (Do Not Attempt to Resuscitate); Full Support; Nephar Boykin, sisters Baylee and Ivy, brothers Dada and Adolph   Ordered at: 02/10/25 1152     Level Of Support Discussed With:    Next of Kin (If No Surrogate)     Code Status (Patient has no pulse and is not breathing):    No CPR (Do Not Attempt to Resuscitate)     Medical Interventions (Patient has pulse or is breathing):    Full Support     Comments:    Nephar Boykin, kalin Beach and Ivy, brothers Dada and Adolph      Advanced Directives: Advance Directive Status: Patient does not have advance directive   Healthcare surrogate: Multiple siblings, see demographics  Goals of Care: After discussion with Sisters Ivy [place], and brothers Adolph and Dada, as well as his nephew Ashutosh, they have all decided they would like to continue treatment to see how he does over the next few days and also agree that they would not want Phan to be resuscitated as they feel he would not want this. I changed Phan to to DNR/full treat at this time. I let GREGORIO Perkins and Dr Irby know of this change in code status and conversation.    HPI:  Phan Daniels is a 69 y.o. male admitted on 2/8/2025 due to respiratory distress. Phan has a medical history of afib?, COPD, HTN, HLD, GERD, ETOH abuse who presented after being found unresponsive. Per family at bedside Phan resides in [place] however recently came to [place] to stay with his new girlfriend. Also per noted he drank a 1/5 of liquor/day, and has had multiple recent hospital stays for pneumonia per family. EMS was called to the home and Phan was found unresponsive with no one else in the home, Phan apparently had dried feces and appeared to have  been down for some time. Phan was intubated upon arrival to emergency department and was also in shock and started on levophed. Patient admitted to CCU on vent at FiO2 80% and PEEP of 10. This morning when I saw Phan he was on vent at 65% and 10 of peep on propofol, fentanyl, heparin , bicarb and phenylephrine. Palliative care consulted for GOC/ACP and support for pt and family.        ROS: Negative except as above in HPI.     Past Medical History:   Diagnosis Date    Abdominal bloating 01/06/2023    Atrial fibrillation 12/15/2022    Basal cell carcinoma of skin 12/15/2022    Bradycardia 03/13/2024    Chronic low back pain 05/02/2023    Chronic obstructive lung disease 05/02/2023    Chronic post-COVID-19 syndrome 05/02/2023    Community acquired pneumonia 01/06/2023    Constipation 08/11/2023    COPD (chronic obstructive pulmonary disease)     Dental caries 12/15/2022    Difficult or painful urination 04/19/2023    Essential hypertension 12/15/2022    GERD (gastroesophageal reflux disease)     Herpes zoster 02/03/2023    Hiatal hernia     History of degenerative disc disease     uses motorized chair    Hyperlipidemia     Hypertension     Hypokalemia 01/06/2023    Osteoarthritis of knee 02/03/2023    Peripheral edema 03/04/2024    PONV (postoperative nausea and vomiting)     Rib pain 04/19/2023     Past Surgical History:   Procedure Laterality Date    CATARACT EXTRACTION      [facility]      [facility]    Procedure: ESOPHAGOGASTRODUODENOSCOPY;  Surgeon: Morris Wynne MD;  [facility];  Service: Gastroenterology;  Laterality: N/A;  DISTAL ESOPHAGUS DILATED WITH 18-20 MM BALLOON DILATOR.    REPLACEMENT TOTAL KNEE Left      Social History     Socioeconomic History    Marital status:    Tobacco Use    Smoking status: Every Day     Current packs/day: 2.00     Average packs/day: 2.0 packs/day for 2.1 years (4.2 ttl pk-yrs)     Types: Cigarettes     Start date:  1/1/2023    Smokeless tobacco: Never   Vaping Use    Vaping status: Never Used   Substance and Sexual Activity    Alcohol use: Yes     Comment: 2 pints liquor/day    Drug use: Never    Sexual activity: Defer     History reviewed. No pertinent family history.    Allergies   Allergen Reactions    Indomethacin Er Nausea And Vomiting    Isoniazid Rash    Penicillins Rash       Current Facility-Administered Medications   Medication Dose Route Frequency Provider Last Rate Last Admin    acetaminophen (TYLENOL) tablet 650 mg  650 mg Oral Q6H PRN Talat Blankenship MD        amiodarone 150 mg in 100 mL D5W (loading dose)  150 mg Intravenous Once Pool, GAGE Moreira        Followed by    amiodarone 360 mg in 200 mL D5W infusion  1 mg/min Intravenous Continuous Pool, GAGE Moreira        Followed by    amiodarone 360 mg in 200 mL D5W infusion  0.5 mg/min Intravenous Continuous Pool, GAGE Moreira        sennosides-docusate (PERICOLACE) 8.6-50 MG per tablet 2 tablet  2 tablet Oral BID PRN Talat Blankenship MD        And    polyethylene glycol (MIRALAX) packet 17 g  17 g Oral Daily PRN Talat Blankenship MD        And    bisacodyl (DULCOLAX) EC tablet 5 mg  5 mg Oral Daily PRN Talat Blankenship MD        And    bisacodyl (DULCOLAX) suppository 10 mg  10 mg Rectal Daily PRN Talat Blankenship MD        Calcium Replacement - Follow Nurse / BPA Driven Protocol   Not Applicable PRN Darren Bowman MD        cefepime 1000 mg IVPB in 100 mL NS (VTB)  1,000 mg Intravenous Q24H Talat Blankenship MD   1,000 mg at 02/10/25 0549    cetirizine (zyrTEC) tablet 5 mg  5 mg Oral Daily Talat Blankenship MD        dextrose (D50W) (25 g/50 mL) IV injection 25 g  25 g Intravenous Q15 Min PRN Talat Blankenship MD        dextrose (GLUTOSE) oral gel 15 g  15 g Oral Q15 Min PRN Talat Blankenship MD        fentaNYL 2500 mcg in 250 mL NS infusion   mcg/hr Intravenous Titrated Darren Bowman MD 10 mL/hr at 02/10/25 1415 100 mcg/hr at 02/10/25 1415     finasteride (PROSCAR) tablet 5 mg  5 mg Oral Daily Talat Blankenship MD        fluticasone (FLONASE) 50 MCG/ACT nasal spray 1 spray  1 spray Nasal BID PRN Talat Blankenship MD        folic acid (FOLVITE) tablet 1 mg  1 mg Oral Daily Dangelo Ledezma MD        glucagon HCl (Diagnostic) injection 1 mg  1 mg Intramuscular Q15 Min PRN Talat Blankenship MD        heparin 29552 units/250 mL (100 units/mL) in 0.45 % NaCl infusion  9.8 Units/kg/hr (Dosing Weight) Intravenous Titrated Kendall Irby MD 9.99 mL/hr at 02/10/25 0845 9.8 Units/kg/hr at 02/10/25 0845    Hydrocortisone Sod Suc (PF) (Solu-CORTEF) injection 100 mg  100 mg Intravenous Q8H Dangelo Ledezma MD   100 mg at 02/10/25 0843    insulin regular (humuLIN R,novoLIN R) injection 2-7 Units  2-7 Units Subcutaneous Q6H Talat Blankenship MD   2 Units at 02/10/25 1240    ipratropium-albuterol (DUO-NEB) nebulizer solution 3 mL  3 mL Nebulization 4x Daily - RT Daniel Martin MD   3 mL at 02/10/25 1244    Magnesium Standard Dose Replacement - Follow Nurse / BPA Driven Protocol   Not Applicable PRN Darren Bowman MD        metroNIDAZOLE (FLAGYL) IVPB 500 mg  500 mg Intravenous Q8H Talat Blankenship  mL/hr at 02/10/25 1212 500 mg at 02/10/25 1212    midodrine (PROAMATINE) tablet 10 mg  10 mg Oral TID AC Dangelo Ledezma MD   10 mg at 02/08/25 1841    mupirocin (BACTROBAN) 2 % nasal ointment 1 Application  1 Application Each Nare BID Talat Blankenship MD   1 Application at 02/10/25 0843    norepinephrine (LEVOPHED) 8 mg in 250 mL NS infusion (premix)  0.02-0.3 mcg/kg/min (Dosing Weight) Intravenous Titrated Vik Solo, PharmD   Stopped at 02/09/25 0724    pantoprazole (PROTONIX) injection 40 mg  40 mg Intravenous BID AC Talat Blankenship MD   40 mg at 02/10/25 0843    Pharmacy to Dose Heparin   Not Applicable Continuous PRN Kendall Irby MD        phenylephrine (IRVING-SYNEPHRINE) 50 mg in 250 mL NS infusion  0.5-3 mcg/kg/min (Dosing Weight) Intravenous Titrated  Talat Balnkenship MD 24.5 mL/hr at 02/10/25 0951 0.8 mcg/kg/min at 02/10/25 0951    Phosphorus Replacement - Follow Nurse / BPA Driven Protocol   Not Applicable PRN Darren Bowman MD        Polyvinyl Alcohol-Povidone PF (ARTIFICIAL TEARS) 1.4-0.6 % ophthalmic solution 1 drop  1 drop Both Eyes 4x Daily PRN Talat Blankenship MD        potassium chloride (KLOR-CON) packet 40 mEq  40 mEq Oral Once Dangelo Ledezma MD        Potassium Replacement - Follow Nurse / BPA Driven Protocol   Not Applicable PRN Darren Bowman MD        propofol (DIPRIVAN) infusion 10 mg/mL 100 mL  5-50 mcg/kg/min (Dosing Weight) Intravenous Titrated Darren Bowman MD 12.24 mL/hr at 02/10/25 1415 20 mcg/kg/min at 02/10/25 1415    rosuvastatin (CRESTOR) tablet 10 mg  10 mg Oral Nightly Talat Blankenship MD   10 mg at 02/09/25 2152    senna (SENOKOT) tablet 1 tablet  1 tablet Oral Daily PRN Talat Blankenship MD        sodium bicarbonate 8.4 % 75 mEq in sodium chloride 0.45 % 1,075 mL infusion (less than/equal to 100 mEq)  75 mEq Intravenous Continuous SrSaba navas  mL/hr at 02/10/25 1343 75 mEq at 02/10/25 1343    sodium chloride 0.9 % flush 10 mL  10 mL Intravenous PRN Darren Bowman MD        sodium chloride 0.9 % flush 10 mL  10 mL Intravenous Q12H Talat Blankenship MD   10 mL at 02/10/25 0843    sodium chloride 0.9 % flush 10 mL  10 mL Intravenous PRN Talat Blankenship MD        sodium chloride 0.9 % flush 10 mL  10 mL Intravenous Q12H Talat Blankenship MD   10 mL at 02/10/25 0843    sodium chloride 0.9 % flush 10 mL  10 mL Intravenous PRN Talat Blankenship MD        sodium chloride 0.9 % flush 10 mL  10 mL Intravenous Q12H Talat Blankenship MD   10 mL at 02/10/25 0843    sodium chloride 0.9 % flush 10 mL  10 mL Intravenous Q12H Talat Blankenship MD   10 mL at 02/10/25 0843    sodium chloride 0.9 % flush 10 mL  10 mL Intravenous Q12H Talat Blankenship MD   10 mL at 02/10/25 0843    sodium chloride 0.9 % flush 10 mL  10 mL Intravenous PRN  Talat Blankenship MD        sodium chloride 0.9 % flush 20 mL  20 mL Intravenous PRN Talat Blankenship MD        sodium chloride 0.9 % infusion 40 mL  40 mL Intravenous PRN Talat Blankenship MD        sodium chloride 0.9 % infusion 40 mL  40 mL Intravenous PRN Talat Blankenship MD        sodium chloride 0.9 % infusion 40 mL  40 mL Intravenous PRN Talat Blankenship MD        thiamine (B-1) 500 mg in sodium chloride 0.9 % 100 mL IVPB  500 mg Intravenous TID Talat Blankenship  mL/hr at 02/10/25 0843 500 mg at 02/10/25 0843    Vancomycin Pharmacy Intermittent/Pulse Dosing   Not Applicable Daily Mignon Hanley, PharmD        vitamin B-12 (CYANOCOBALAMIN) tablet 1,000 mcg  1,000 mcg Oral Daily Talat Blankenship MD         amiodarone, 1 mg/min   Followed by  amiodarone, 0.5 mg/min  fentanyl 10 mcg/mL,  mcg/hr, Last Rate: 100 mcg/hr (02/10/25 1415)  heparin, 9.8 Units/kg/hr (Dosing Weight), Last Rate: 9.8 Units/kg/hr (02/10/25 0845)  norepinephrine, 0.02-0.3 mcg/kg/min (Dosing Weight), Last Rate: Stopped (02/09/25 0724)  Pharmacy to Dose Heparin,   phenylephrine, 0.5-3 mcg/kg/min (Dosing Weight), Last Rate: 0.8 mcg/kg/min (02/10/25 0951)  propofol, 5-50 mcg/kg/min (Dosing Weight), Last Rate: 20 mcg/kg/min (02/10/25 1415)  sodium bicarbonate 8.4 % 75 mEq in sodium chloride 0.45 % 1,075 mL infusion (less than/equal to 100 mEq), 75 mEq, Last Rate: 75 mEq (02/10/25 1343)        acetaminophen    senna-docusate sodium **AND** polyethylene glycol **AND** bisacodyl **AND** bisacodyl    Calcium Replacement - Follow Nurse / BPA Driven Protocol    dextrose    dextrose    fluticasone    glucagon (human recombinant)    Magnesium Standard Dose Replacement - Follow Nurse / BPA Driven Protocol    Pharmacy to Dose Heparin    Phosphorus Replacement - Follow Nurse / BPA Driven Protocol    Polyvinyl Alcohol-Povidone PF    Potassium Replacement - Follow Nurse / BPA Driven Protocol    senna    sodium chloride    sodium chloride    sodium  "chloride    sodium chloride    sodium chloride    sodium chloride    sodium chloride    sodium chloride    Current medication reviewed for route, type, dose and frequency and are current per MAR.    Palliative Performance Scale Score:     /74   Pulse (!) 134   Temp (!) 100.7 °F (38.2 °C) (Oral)   Resp 28   Ht 182.9 cm (72\")   Wt 102 kg (224 lb 3.3 oz)   SpO2 96%   BMI 30.41 kg/m²     Intake/Output Summary (Last 24 hours) at 2/10/2025 1630  Last data filed at 2/10/2025 1308  Gross per 24 hour   Intake 5833.36 ml   Output 80 ml   Net 5753.36 ml       PE:  General Appearance:    Chronically ill appearing, intubated and sedated NAD   HEENT:    NC/AT, without obvious abnormality, EOMI, anicteric    Neck:   supple, trachea midline, no JVD   Lungs:     Sedated on vent at 65% and 10 of peep, diminished breath sounds, coarse breath sounds    Heart:    Irregular rate/rhythm, no M/R/G   Abdomen:     Soft, NT, ND, NABS    Extremities:   Moves all extremities, no edema   Pulses:   Pulses palpable and equal bilaterally   Skin:   Warm, dry   Neurologic:   A/Ox3, cooperative   Psych:   Calm, appropriate       Labs:   Results from last 7 days   Lab Units 02/10/25  1024   WBC 10*3/mm3 19.56*   HEMOGLOBIN g/dL 11.3*   HEMATOCRIT % 34.3*   PLATELETS 10*3/mm3 117*     Results from last 7 days   Lab Units 02/10/25  0928 02/10/25  0304   SODIUM mmol/L  --  142   POTASSIUM mmol/L 3.5 3.5   CHLORIDE mmol/L  --  101   CO2 mmol/L  --  18.3*   BUN mg/dL  --  76*   CREATININE mg/dL  --  10.76*   GLUCOSE mg/dL  --  179*   CALCIUM mg/dL  --  6.5*     Results from last 7 days   Lab Units 02/10/25  0928 02/10/25  0304   SODIUM mmol/L  --  142   POTASSIUM mmol/L 3.5 3.5   CHLORIDE mmol/L  --  101   CO2 mmol/L  --  18.3*   BUN mg/dL  --  76*   CREATININE mg/dL  --  10.76*   CALCIUM mg/dL  --  6.5*   BILIRUBIN mg/dL  --  0.8   ALK PHOS U/L  --  46   ALT (SGPT) U/L  --  59*   AST (SGOT) U/L  --  136*   GLUCOSE mg/dL  --  179*     Imaging " Results (Last 72 Hours)       Procedure Component Value Units Date/Time    US Abdomen Limited [[id number]] Collected: 02/09/25 0938     Updated: 02/09/25 0942    Narrative:      INDICATION: Abnormal liver function tests.     COMPARISON: CT from the prior day.     TECHNIQUE: Transverse and longitudinal gray scale and color Doppler  images of the right upper quadrant were obtained.     FINDINGS:  Liver: Diffuse increased hepatic echotexture. No focal hepatic mass or  intrahepatic biliary ductal dilatation. Hepatopedal flow in the portal  vein.      Gallbladder: Gallstones in the gallbladder. The gallbladder wall is  thickened up to 0.5 cm. There is pericholecystic fluid.     Pancreas: Free of focal mass, as visualized.     Right kidney: Measures up to 14.5 cm. Simple appearing peripelvic cyst  measuring up to 4.6 cm. Negative for solid renal mass, hydronephrosis or  shadowing calculus. Perinephric fat stranding.     Aorta/IVC: Patent, as visualized.     Free fluid: No free fluid.       Impression:      1. Findings concerning for acute cholecystitis.  2. Hepatic steatosis.  3. Simple appearing right renal peripelvic cyst.     This report was finalized on 2/9/2025 9:40 AM by Alex Pallas, DO.       XR Chest AP [[id number]] Collected: 02/08/25 1920     Updated: 02/08/25 1924    Narrative:      PROCEDURE: Portable chest x-ray examination performed on February 28, 2025. 2 films.     HISTORY: Post central vascular catheter placement.     COMPARISON: None.     FINDINGS:     Normal heart size.  Enteric drain in place with tip to the stomach  The sidehole is in the distal esophageal lumen.  Endotracheal tube in place with tip 6.3 cm above the lissette.  Right neck central vascular catheter with tip to the SVC.  Coarsened bronchovascular pattern to the lungs  Central pulmonary vascular congestion.  Hazy opacities in the right upper lobe consistent with right upper lobe  pneumonia.  No pleural effusion or pneumothorax.           Impression:         Normal heart size  Satisfactory position of the endotracheal tube with tip 6.3 cm above the  lissette.  Right neck central vascular catheter with tip to the SVC.  Enteric drain in place with tip to the stomach however the sidehole is  in the distal esophageal lumen.  Multifocal pneumonia with focal consolidation in the right upper lobe.  Slightly elevated right hemidiaphragm.  No pleural effusion. No pneumothorax        This report was finalized on 2/8/2025 7:22 PM by Scott Campbell MD.       CT Abdomen Pelvis Without Contrast [[id number]] Collected: 02/08/25 1511     Updated: 02/08/25 1515    Narrative:      INDICATION: Shortness of breath. Abdominal pain.     COMPARISON: No relevant priors available.     TECHNIQUE: Axial CT images of the chest, abdomen and pelvis were  obtained without IV contrast. Coronal and sagittal reformations were  reviewed.      FINDINGS:  Chest:  The thoracic aorta appears normal in caliber. The heart is mildly  enlarged. No pericardial or pleural effusion. No pneumothorax. No  pathologically enlarged mediastinal or hilar lymph nodes. Endotracheal  tube tip approximately 3 cm above the lissette.     Emphysema. Multifocal infiltrates most pronounced in the right upper  lobe.     No acute osseous abnormality evident.     Abdomen and pelvis:  Hepatic steatosis. Gallstones in the gallbladder. The liver, spleen,  pancreas and adrenal glands appear unremarkable. No hydronephrosis.  Right renal cyst measuring 4.3 cm. Left renal cyst measuring 1.6 cm.  Enteric tube in the stomach.     No evidence of bowel obstruction/colitis/appendicitis. No free air. No  drainable fluid collection.     Collazo catheter in the urinary bladder. Small fat-containing bilateral  inguinal hernias.     No acute osseous abnormality evident.       Impression:      1.  Findings concerning for multifocal pneumonia.  2.  Hepatic steatosis.  3.  Cholelithiasis.     This report was finalized on 2/8/2025 3:13 PM by  Alex Pallas, DO.       CT Chest Without Contrast Diagnostic [[id number]] Collected: 02/08/25 1511     Updated: 02/08/25 1515    Narrative:      INDICATION: Shortness of breath. Abdominal pain.     COMPARISON: No relevant priors available.     TECHNIQUE: Axial CT images of the chest, abdomen and pelvis were  obtained without IV contrast. Coronal and sagittal reformations were  reviewed.      FINDINGS:  Chest:  The thoracic aorta appears normal in caliber. The heart is mildly  enlarged. No pericardial or pleural effusion. No pneumothorax. No  pathologically enlarged mediastinal or hilar lymph nodes. Endotracheal  tube tip approximately 3 cm above the lissette.     Emphysema. Multifocal infiltrates most pronounced in the right upper  lobe.     No acute osseous abnormality evident.     Abdomen and pelvis:  Hepatic steatosis. Gallstones in the gallbladder. The liver, spleen,  pancreas and adrenal glands appear unremarkable. No hydronephrosis.  Right renal cyst measuring 4.3 cm. Left renal cyst measuring 1.6 cm.  Enteric tube in the stomach.     No evidence of bowel obstruction/colitis/appendicitis. No free air. No  drainable fluid collection.     Collazo catheter in the urinary bladder. Small fat-containing bilateral  inguinal hernias.     No acute osseous abnormality evident.       Impression:      1.  Findings concerning for multifocal pneumonia.  2.  Hepatic steatosis.  3.  Cholelithiasis.     This report was finalized on 2/8/2025 3:13 PM by Alex Pallas, DO.       CT Head Without Contrast [[id number]] Collected: 02/08/25 1510     Updated: 02/08/25 1513    Narrative:      INDICATION: Altered mental status.     COMPARISON: No relevant priors available     TECHNIQUE: Axial CT images of the head were obtained without IV  contrast. Coronal and sagittal reformations were reviewed.     FINDINGS:  Gray-white differentiation is relatively preserved. No mass, mass effect  or midline shift. And chronic ischemic white matter changes.  No evidence  of acute large territorial infarction or acute intracranial hemorrhage.  Ventricles appear normal in size. Basal cisterns are patent. No  depressed calvarial fracture.       Impression:      No acute intracranial process.     This report was finalized on 2/8/2025 3:11 PM by Alex Pallas, DO.       XR Chest 1 View [[id number]] Collected: 02/08/25 1453     Updated: 02/08/25 1457    Narrative:      INDICATION: Tube placement     TECHNIQUE: Frontal radiograph of the chest.     COMPARISON: None.       Impression:      FINDINGS/IMPRESSION:   Endotracheal tube tip approximately 5 cm above the lissette. Enteric tube  in stomach. Mild pulmonary vascular congestion. Right upper lobe  infiltrate concerning for pneumonia. Elevation of the right  hemidiaphragm. No pleural effusion or pneumothorax. No acute fracture.         This report was finalized on 2/8/2025 2:55 PM by Alex Pallas, DO.               Diagnostics: Reviewed    A: Phan Daniels is a 69 y.o. male admitted on 2/8/2025 due to respiratory distress. Phan has a medical history of afib?, COPD, HTN, HLD, GERD, ETOH abuse who presented after being found unresponsive. Per family at bedside Phan resides in [place] however recently came to [place] to stay with his new girlfriend. Also per noted he drank a 1/5 of liquor/day, and has had multiple recent hospital stays for pneumonia per family. EMS was called to the home and Phan was found unresponsive with no one else in the home, Phan apparently had dried feces and appeared to have been down for some time. Phan was intubated upon arrival to emergency department and was also in shock and started on levophed. Patient admitted to CCU on vent at FiO2 80% and PEEP of 10. This morning when I saw Phan he was on vent at 65% and 10 of peep on propofol, fentanyl, heparin , bicarb and phenylephrine. Palliative care consulted for GOC/ACP and support for pt and family.             P:   After discussion with Sisters Baylee,  Ivy, and brothers Adolph and Dada, as well as his nephew Ashutosh, they have all decided they would like to continue treatment to see how he does over the next few days and also agree that they would not want Phan to be resuscitated as they feel he would not want this. I changed Phan to to DNR/full treat at this time. I let GREGORIO Perkins and Dr Irby know of this change in code status and conversation.      We appreciate the consult and the opportunity to participate in Phan Daniels's care. We will continue to follow along. Please do not hesitate to contact us regarding further symptom management or goals of care needs, including after hours or on weekends via our on call provider at [phone].     Time: 75 minutes spent reviewing medical and medication records, assessing and examining patient, discussing with family, answering questions, providing some guidance about a plan and documentation of care, and coordinating care with other healthcare members, with > 50% time spent face to face.     Lynnette Campa, APRN    2/10/2025

## 2025-02-10 NOTE — PROGRESS NOTES
Pulmonary critical care progress note    Referring Provider: Dr Blankenship  Reason for Consultation: Respiratory failure and ventilator management      Chief complaint -could not be obtained as patient was intubated and sedated on my assessment      Sub-overnight events reviewed.  All the labs medications in's and outs and vitals reviewed.  MDR done at bedside.    Afib with rvr-currently on heparin drip.  No uirne out put-reviewed  Will likely need HD-discussed with family member  Vent-adjusted vent settings as patient was having auto PEEP.  Will repeat VBG.  Coffee ground emesis -will repeat CBC    Adjusted ventilator settings and will repeat venous blood gas.  MDR done at bed side with RN, pharmacist, nutrition, ,  and RT  All the drips,other meds,  labs,ins and outs , abg, fio2 requirement reviewed and dicussed in rounds.   Case discussed with patient's family member at bedside.  Review of Systems  Could not be obtained as patient on my assessment was intubated and sedated.      History  Past Medical History:   Diagnosis Date    Abdominal bloating 01/06/2023    Atrial fibrillation 12/15/2022    Basal cell carcinoma of skin 12/15/2022    Bradycardia 03/13/2024    Chronic low back pain 05/02/2023    Chronic obstructive lung disease 05/02/2023    Chronic post-COVID-19 syndrome 05/02/2023    Community acquired pneumonia 01/06/2023    Constipation 08/11/2023    COPD (chronic obstructive pulmonary disease)     Dental caries 12/15/2022    Difficult or painful urination 04/19/2023    Essential hypertension 12/15/2022    GERD (gastroesophageal reflux disease)     Herpes zoster 02/03/2023    Hiatal hernia     History of degenerative disc disease     uses motorized chair    Hyperlipidemia     Hypertension     Hypokalemia 01/06/2023    Osteoarthritis of knee 02/03/2023    Peripheral edema 03/04/2024    PONV (postoperative nausea and vomiting)     Rib pain 04/19/2023   ,   Past Surgical History:   Procedure Laterality  Date    CATARACT EXTRACTION      COLONOSCOPY      [facility]    Procedure: ESOPHAGOGASTRODUODENOSCOPY;  Surgeon: Morris Wynne MD;  Location: [facility] ENDOSCOPY;  Service: Gastroenterology;  Laterality: N/A;  DISTAL ESOPHAGUS DILATED WITH 18-20 MM BALLOON DILATOR.    REPLACEMENT TOTAL KNEE Left    , History reviewed. No pertinent family history.,   Social History     Tobacco Use    Smoking status: Every Day     Current packs/day: 2.00     Average packs/day: 2.0 packs/day for 2.1 years (4.2 ttl pk-yrs)     Types: Cigarettes     Start date: 1/1/2023    Smokeless tobacco: Never   Vaping Use    Vaping status: Never Used   Substance Use Topics    Alcohol use: Yes     Comment: 2 pints liquor/day    Drug use: Never   ,   Medications Prior to Admission   Medication Sig Dispense Refill Last Dose/Taking    acetaminophen (TYLENOL) 325 MG tablet Take 2 tablets by mouth 3 (Three) Times a Day As Needed for Mild Pain.   Unknown    albuterol sulfate  (90 Base) MCG/ACT inhaler Inhale 2 puffs Every 6 (Six) Hours As Needed for Shortness of Air.   Unknown    amLODIPine (NORVASC) 5 MG tablet Take 1 tablet by mouth Every Morning.   Unknown    aspirin 81 MG EC tablet Take 1 tablet by mouth Daily.   Unknown    carvedilol (COREG) 25 MG tablet Take 0.5 tablets by mouth 2 (Two) Times a Day With Meals.   Unknown    Diclofenac Sodium (VOLTAREN) 1 % gel gel Apply 4 g topically to the appropriate area as directed Every 6 (Six) Hours As Needed (Joint Pain).   Unknown    DULoxetine (CYMBALTA) 30 MG capsule Take 1 capsule by mouth Daily. For MOOD   Unknown    finasteride (PROSCAR) 5 MG tablet Take 1 tablet by mouth Daily.   Unknown    flecainide (TAMBOCOR) 150 MG tablet Take 0.5 tablets by mouth 2 (Two) Times a Day.   Unknown    fluticasone (FLONASE) 50 MCG/ACT nasal spray Administer 1 spray into the nostril(s) as directed by provider 2 (Two) Times a Day As Needed for Allergies.   Unknown     Fluticasone-Salmeterol (ADVAIR/WIXELA) 250-50 MCG/ACT DISKUS Inhale 1 puff 2 (Two) Times a Day.   Unknown    furosemide (LASIX) 20 MG tablet Take 1 tablet by mouth Daily As Needed (SWELLING).   Unknown    gabapentin (NEURONTIN) 600 MG tablet Take 2 tablets by mouth 3 times a day.   Unknown    loratadine (CLARITIN) 10 MG tablet Take 1 tablet by mouth Daily As Needed for Allergies.   Unknown    losartan (COZAAR) 50 MG tablet Take 1 tablet by mouth Daily.   Unknown    methocarbamol (ROBAXIN) 750 MG tablet Take 1 tablet by mouth 2 (Two) Times a Day As Needed for Muscle Spasms.   Unknown    nicotine (NICOTROL) 10 MG/ML solution nasal solution 10 sprays by Each Nare route Every 1 (One) Hour As Needed (Smoking Cessation). MAX OF 10 sprays per hour and 80 sprays per day   Unknown    omeprazole (priLOSEC) 20 MG capsule Take 2 capsules by mouth 2 (Two) Times a Day Before Meals.   Unknown    polyvinyl alcohol (LIQUIFILM) 1.4 % ophthalmic solution Administer 1 drop to both eyes 4 (Four) Times a Day As Needed for Dry Eyes.   Unknown    rosuvastatin (CRESTOR) 40 MG tablet Take 1 tablet by mouth Every Night.   Unknown    senna 8.6 MG tablet Take 1 tablet by mouth Daily As Needed for Constipation.   Unknown    tiotropium bromide monohydrate (SPIRIVA RESPIMAT) 2.5 MCG/ACT aerosol solution inhaler Inhale 2 puffs Daily.   Unknown    traMADol (ULTRAM) 50 MG tablet Take 1 tablet by mouth 3 (Three) Times a Day As Needed for Moderate Pain.   Unknown    vitamin B-12 (CYANOCOBALAMIN) 1000 MCG tablet Take 1 tablet by mouth Daily.   Unknown   , Scheduled Meds:  cefepime, 1,000 mg, Intravenous, Q24H  cetirizine, 5 mg, Oral, Daily  finasteride, 5 mg, Oral, Daily  hydrocortisone sodium succinate, 100 mg, Intravenous, Q8H  insulin regular, 2-7 Units, Subcutaneous, Q6H  ipratropium-albuterol, 3 mL, Nebulization, 4x Daily - RT  metroNIDAZOLE, 500 mg, Intravenous, Q8H  midodrine, 10 mg, Oral, TID AC  mupirocin, 1 Application, Each Nare,  BID  pantoprazole, 40 mg, Oral, Q AM  pantoprazole, 40 mg, Intravenous, BID AC  rosuvastatin, 10 mg, Oral, Nightly  sodium chloride, 10 mL, Intravenous, Q12H  sodium chloride, 10 mL, Intravenous, Q12H  sodium chloride, 10 mL, Intravenous, Q12H  sodium chloride, 10 mL, Intravenous, Q12H  sodium chloride, 10 mL, Intravenous, Q12H  thiamine (B-1) IV, 500 mg, Intravenous, TID  Vancomycin Pharmacy Intermittent/Pulse Dosing, , Not Applicable, Daily  vitamin B-12, 1,000 mcg, Oral, Daily    , Continuous Infusions:  fentanyl 10 mcg/mL,  mcg/hr, Last Rate: 100 mcg/hr (02/09/25 1242)  heparin, 9.8 Units/kg/hr (Dosing Weight), Last Rate: 9.8 Units/kg/hr (02/10/25 0845)  norepinephrine, 0.02-0.3 mcg/kg/min (Dosing Weight), Last Rate: Stopped (02/09/25 0724)  Pharmacy Consult - Pharmacy to dose,   Pharmacy to Dose Heparin,   phenylephrine, 0.5-3 mcg/kg/min (Dosing Weight), Last Rate: 0.8 mcg/kg/min (02/10/25 0951)  propofol, 5-50 mcg/kg/min (Dosing Weight), Last Rate: 20 mcg/kg/min (02/10/25 0426)  sodium bicarbonate 8.4 % 75 mEq in sodium chloride 0.45 % 1,000 mL infusion (less than/equal to 100 mEq), 75 mEq, Last Rate: 75 mEq (02/10/25 0037)  sodium bicarbonate 8.4 % 75 mEq in sodium chloride 0.45 % 1,075 mL infusion (less than/equal to 100 mEq), 75 mEq     and Allergies:  Indomethacin er, Isoniazid, and Penicillins    Objective     Vital Signs   Temp:  [98.1 °F (36.7 °C)-100.1 °F (37.8 °C)] 99.7 °F (37.6 °C)  Heart Rate:  [] 144  Resp:  [28-30] 28  BP: ()/(56-94) 95/72  FiO2 (%):  [70 %-75 %] 70 %    Physical Exam:     GENERAL APPEARANCE: Intubated and sedated.  Appears to be resting comfortably in bed.     HEAD: normocephalic.    EYES: PERRLA.    THROAT: ET tube in place. Oral cavity and pharynx normal. No inflammation, swelling, exudate, or lesions.     NECK: Neck supple.     CARDIAC: Normal S1 and S2. No S3, S4 or murmurs. Rhythm is regular.     LUNGS: Clear to auscultation and percussion without rales,  rhonchi, wheezing or diminished breath sounds.    ABDOMEN: Positive bowel sounds. Soft, nondistended, nontender.     EXTREMITIES: No significant deformity or joint abnormality. No edema. Peripheral pulses intact.    NEUROLOGICAL: Unable to assess due to sedation status.     PSYCHIATRIC: Unable to assess due to sedation status                                                                       Results Review:    LABS:    Lab Results   Component Value Date    GLUCOSE 179 (H) 02/10/2025    BUN 76 (H) 02/10/2025    CREATININE 10.76 (H) 02/10/2025    BCR 7.1 02/10/2025    CO2 18.3 (L) 02/10/2025    CALCIUM 6.5 (L) 02/10/2025    ALBUMIN 2.4 (L) 02/10/2025     (H) 02/10/2025    ALT 59 (H) 02/10/2025    WBC 18.44 (H) 02/10/2025    HGB 12.0 (L) 02/10/2025    HCT 36.3 (L) 02/10/2025    MCV 97.1 (H) 02/10/2025     (L) 02/10/2025     02/10/2025    K 3.5 02/10/2025     02/10/2025    ANIONGAP 22.7 (H) 02/10/2025       Lab Results   Component Value Date    INR 1.16 (H) 02/10/2025    INR 1.21 (H) 02/08/2025    PROTIME 15.0 02/10/2025    PROTIME 15.4 (H) 02/08/2025       Results from last 7 days   Lab Units 02/10/25  0742 02/08/25  1349   INR  1.16* 1.21*   APTT seconds 35.8 35.2          I reviewed the patient's new clinical results.  I reviewed the patient's new imaging results and agree with the interpretation.     Latest Reference Range & Units 02/09/25 00:15 02/09/25 07:42 02/10/25 04:40   pH, Arterial 7.350 - 7.450 pH units  7.289 (L)    pCO2, Arterial 35.0 - 45.0 mm Hg  41.4    pO2, Arterial 83.0 - 108.0 mm Hg  85.2    HCO3, Arterial 20.0 - 26.0 mmol/L  19.9 (L)    Base Excess 0.0 - 2.0 mmol/L  -6.4 (L)    O2 Saturation, Arterial 94.0 - 99.0 %  95.8    CO2 Content 22 - 33 mmol/L 20.7 (L) 21.1 (L) 22.4   A-a DO2 0.0 - 300.0 mmHg  383.6 (H)    Carboxyhemoglobin 0 - 5 %  1.6    Methemoglobin 0.00 - 3.00 %  0.00    Oxyhemoglobin 94 - 99 %  94.4    Carboxyhemoglobin Venous 0.0 - 5.0 % 1.6  1.3    Methemoglobin Venous 0.0 - 3.0 % 0.4  1.0   Oxyhemoglobin Venous 45.0 - 75.0 % 75.5 (H)  82.0 (H)   Hematocrit, Blood Gas 38.0 - 51.0 %  37.6 (L)    Hemoglobin, Blood Gas 14 - 18 g/dL 12.5 (L) 12.3 (L) 12.2 (L)   Site  Lab Left Radial Nurse/Dr Valle   Adolph's Test   N/A    Modality  Ventilator Ventilator Ventilator   FIO2 % 80 75 65   Ventilator Mode  VC/AC VC/AC VC/AC   Set Tidal Volume  500.00 500.00 500.00   Set Mech Resp Rate  22.0 26.0 28.0   PEEP  10.0 10.0 10.0   Rate Breaths/minute 25  28   Barometric Pressure for Blood Gas mmHg 724 730 733   Notified By  [id number]   Notified Time  02/09/2025 00:22 02/09/2025 07:48 02/10/2025 04:45   Notified Who  dr dipika bar   pH, Venous 7.320 - 7.420 pH Units 7.221 (C)  7.273 (L)   pCO2, Venous 41.0 - 51.0 mm Hg 47.0  45.5   pO2, Venous 27.0 - 53.0 mm Hg 48.7  52.8   HCO3, Venous 22.0 - 28.0 mmol/L 19.2 (L)  21.0 (L)   Base Excess 0.0 - 2.0 mmol/L -8.4 (L)  -5.8 (L)   O2 Saturation, Venous 45.0 - 75.0 % 77.0 (H)  83.9 (H)   (C): Data is critically low  (L): Data is abnormally low  (H): Data is abnormally high     Latest Reference Range & Units 02/08/25 13:51 02/08/25 15:04   Ethanol % %  <0.010   Ethanol 0 - 10 mg/dL  <10   Amphetamine, Urine Qual Negative  Negative    Barbiturates Screen, Urine Negative  Negative    Benzodiazepine Screen, Urine Negative  Negative    Buprenorphine, Screen, Urine Negative  Negative    Cocaine Screen, Urine Negative  Negative    Fentanyl, Urine Negative  Negative    Methamphetamine, Ur Negative  Negative    Methadone Screen , Urine Negative  Negative    Opiate Screen Negative  Negative    Oxycodone Screen, Urine Negative  Negative    Phencyclidine (PCP), Urine Negative  Negative    THC Screen, Urine Negative  Negative    Tricyclic Antidepressants Screen Negative  Negative      Microbiology Results (last 10 days)       Procedure Component Value - Date/Time    Respiratory Culture - Sputum, ET Suction [[id number]]   (Abnormal) Collected: 02/09/25 0441    Lab Status: Final result Specimen: Sputum from ET Suction Updated: 02/10/25 1031     Respiratory Culture Heavy growth (4+) Streptococcus agalactiae (Group B)     Comment:   This organism is considered to be universally susceptible to penicillin.  No further antibiotic testing will be performed. If Clindamycin or Erythromycin is the drug of choice, notify the laboratory within 7 days to request susceptibility testing.         Heavy growth (4+) Normal respiratory lu. No S. aureus or Pseudomonas aeruginosa detected. Final report.     Gram Stain Moderate (3+) WBCs observed - predominantly neutrophils      Rare (1+) Squamous epithelial cells      Few (2+) Gram positive cocci in chains      Rare (1+) Yeast      Rare (1+) Gram negative bacilli      Rare (1+) Gram positive bacilli    S. Pneumo Ag Urine or CSF - Urine, Urine, Clean Catch [[id number]]  (Normal) Collected: 02/09/25 0249    Lab Status: Final result Specimen: Urine, Clean Catch Updated: 02/09/25 1345     Strep Pneumo Ag Negative    Legionella Antigen, Urine - Urine, Urine, Catheter [[id number]]  (Normal) Collected: 02/08/25 1351    Lab Status: Final result Specimen: Urine, Catheter Updated: 02/08/25 2116     LEGIONELLA ANTIGEN, URINE Negative    Narrative:      Presumptive negative for L. pneumophilia serogroup 1 antigen, suggesting no recent or current infection.    Blood Culture - Blood, Arm, Left [[id number]]  (Normal) Collected: 02/08/25 1349    Lab Status: Preliminary result Specimen: Blood from Arm, Left Updated: 02/10/25 1415     Blood Culture No growth at 2 days    Blood Culture - Blood, Arm, Right [[id number]]  (Abnormal) Collected: 02/08/25 1349    Lab Status: Final result Specimen: Blood from Arm, Right Updated: 02/10/25 0650     Blood Culture Staphylococcus, coagulase negative     Isolated from Aerobic Bottle     Gram Stain Aerobic Bottle Gram positive cocci in clusters    Narrative:      Probable contaminant  requires clinical correlation, susceptibility not performed unless requested by physician.      Blood Culture ID, PCR - Blood, Arm, Right [[id number]]  (Abnormal) Collected: 02/08/25 1349    Lab Status: Final result Specimen: Blood from Arm, Right Updated: 02/09/25 1210     BCID, PCR Staph spp, not aureus or lugdunensis. Identification by BCID2 PCR.     BOTTLE TYPE Aerobic Bottle           Assessment & Plan     Neurology-intubated and sedated.  Sedated drips reviewed and adjusted for RASS goal of 0 to -1.  Will do SAT today plan discussed with patient's nurse.    CT of the head reviewed and does not show any acute changes.  Sedation vacation     Narrative & Impression   INDICATION: Altered mental status.     COMPARISON: No relevant priors available     TECHNIQUE: Axial CT images of the head were obtained without IV  contrast. Coronal and sagittal reformations were reviewed.     FINDINGS:  Gray-white differentiation is relatively preserved. No mass, mass effect  or midline shift. And chronic ischemic white matter changes. No evidence  of acute large territorial infarction or acute intracranial hemorrhage.  Ventricles appear normal in size. Basal cisterns are patent. No  depressed calvarial fracture.     IMPRESSION:  No acute intracranial process.      Latest Reference Range & Units 02/09/25 00:10   Ammonia 16 - 60 umol/L 40     Will repeat ammonia level tomorrow morning    Respiratory-acute hypoxic and hypercarbic respiratory failure-likely due to aspiration pneumonia and ongoing metabolic stress.  Was not able to protect airway and was intubated.  Latest blood gas reviewed.  Ventilator settings adjusted.  Will repeat venous blood gas and accordingly and adjust ventilator settings.  Patient had auto PEEP.  Decreased the respiratory rate decreased the FiO2.  Titrate FiO2 down to around 88 to 92%.    Will get venous blood gas in the morning.  Checked ventilator settings and graphics and adjusted.    Continue  steroids-dose reviewed  Continue nebs  Vent Settings          Resp Rate (Set): 28     FiO2 (%): 70 %  PEEP/CPAP (cm H2O): 10 cm H20    Minute Ventilation (L/min) (Obs): 15.1 L/min  Resp Rate (Observed) Vent: 28     I:E Ratio (Obs): 1:1.7    PIP Observed (cm H2O): 26 cm H2O         Microbiology reviewed.  Growing Streptococcus.  Continue current treatment.  Will repeat procalcitonin level in the morning.  .  VAP- precautions  Head end - 30 %  Mouth care   DVT prophylaxis-continue    Chest x-ray-reviewed    ABG-latest reviewed  If remains acidotic will push 2 A of sodium bicarbonate.  Continue current fluids.    Aspiration pneumonia-respiratory culture obtained.  Continue broad-spectrum antibiotics.  Growing Streptococcus.  Continue current antibiotics.  FiO2 requirement better.    If needed will do bronchoscopy to get specific cultures.  Continue aspiration precautions.    COPD-longtime smoker.  Continue steroids continue nebs  Continue to titrate FiO2 down to maintain saturation 88 to 92%.    ABG showed respiratory acidosis-ventilator settings adjusted.  Will repeat venous blood gas in the morning.      Cardiology- hemodynamically -unstable.  Currently on Levophed.  Discussed that he can be switched to Saúl-Synephrine.  Ordered echo  EKG reviewed and is abnormal.  Continue to trend cardiac enzymes and consider cardiology consult.  Started stress dose steroids.    Continue to monitor HR- rate and rhythm, BP   Atrial fibrillation-started on heparin drip.  Will repeat CBC as patient has been having coffee-ground emesis.    Drips reviewed and adjusted.      Nephrology-creatinine high-continue fluids.  Nephrology on case.  Continue management as per them  Hypokalemia-will give 1 dose of potassium as patient is Getting tube feeds and potassium was really low in the past few days.  Urine output remains very low will likely need hemodialysis.  Fluids as per nephrology.  Medication list reviewed.    GI- PPI IV twice daily-if  coffee-ground emesis continues.  Recommend surgery consult for upper GI endoscopy and starting a drip.  tube feeds  Watch for refeeding syndrome.  Patient is electrolytes are already very low.    Hematology- latest CBC reviewed.  Transfuse for hemoglobin less than 7    ID-sepsis with septic shock-likely due to aspiration pneumonia.  Continue broad-spectrum antibiotics.  If needed will do bronchoscopy-specific cultures.  Blood cultures were positive.  Repeat cultures drawn.  Continue current antibiotics.  White count on the higher side.  Will repeat procalcitonin level tomorrow morning.    Will chest x-ray in the morning.    Latest microbiology reviewed.  Antibiotics reviewed    Endrocrinology- Maintain Blood sugar 140 -180  Continue stress dose steroids.    Electrolytes-electrolytes are on the very lower side continue replacement and continue monitoring them.  Low likely due to poor nutritional status    DVT prophylaxis-continue    Bedside rounds were done with RT and patient's nurse. All the lab and clinical findings were discussed with them and plan was also discussed in great detail.            Echo-pending  Results for orders placed during the hospital encounter of 02/08/25    Adult Transthoracic Echo Complete W/ Cont if Necessary Per Protocol    Interpretation Summary    Left ventricle is normal in size and function with estimate ejection fraction of 55 to 60%. Normal diastolic function.    Right ventricle appears normal in size and function.    Normal left atrial cavity size noted. No evidence of a patent foramen ovale. No evidence of an atrial septal defect present.    Normal right atrial cavity size noted. The inferior vena cava is dilated. Partial IVC inspiratory collapse of less than 50% noted.    Aortic valve appears trileaflet. There is no significant aortic stenosis or regurgitation.    There is mild mitral regurgitation.    Estimated right ventricular systolic pressure from tricuspid regurgitation is  moderately elevated (45-55 mmHg). Mild tricuspid regurgitation. Estimated RVSP is 45 mmHg with estimated RA pressure of 10 mmHg.    There is no evidence of pericardial effusion. . There is evidence of a fat pad present.    The aortic root measures 3.9 cm.   Patient is currently critically ill due to septic severe sepsis with septic shock end organ failure respiratory, renal and cardiovascular, hypercarbic respiratory failure, renal failure and change in mental status.  I reviewed patient's drips and adjusted them.  Reviewed ventilator settings and adjusted.  Case discussed with primary team patient's nurse and RT.  Patient's overall prognosis with multiple comorbidities and multiorgan involvement remains poor.    Critical Care time spent in direct patient care: 30 minutes (excluding procedure time, if applicable) including high complexity decision making to assess, manipulate, and support vital organ system failure in this individual who has impairment of one or more vital organ systems such that there is a high probability of imminent or life threatening deterioration in the patient’s condition.  Patient is critically ill and is at higher risk for further mortality/morbidity. Continue ICU care .           Septic shock          Dangelo Ledezma MD  02/10/25  10:01 EST

## 2025-02-11 NOTE — PROGRESS NOTES
Enter Query Response Below      Query Response: Pressure injuries present on admission- left ankle, right ankle, right knee, right thigh, penis, right ear        If applicable, please update the problem list.

## 2025-02-11 NOTE — PROGRESS NOTES
THC Physician - Brief Progress Note  PERMANENT  02/10/2025 23:40    [facility] [place] [facility] [place] - CCU - 10 - [place], [place] ([facility])    NIRAV WHITNEY    Date of Service 02/10/2025 23:40    HPI/Events of Note [facility] Provider Intervention Note    Glucose 181, 170, 184 recently on low dose sliding scale insulin q6h. I will increase to medium dose sliding scale insulin for hyperglycemia management. Goal glucose 120-180. Discussed with RN.       __N___   Video Assessment performed            Contact [email or web address] for any needs if bedside physician is not present.      Interventions Intermediate-Hyperglycemia - evaluation and treatment        Electronically Signed by: Saurabh Marinelli) on 02/10/2025 23:41

## 2025-02-11 NOTE — PROGRESS NOTES
THC Physician - Brief Progress Note  PERMANENT  02/11/2025 02:58    [facility] [place] [facility] - [facility] [place] ([facility])    NIRAV WHITNEY.    Date of Service 02/11/2025 02:58    HPI/Events of Note [facility] Provider Intervention Note    RT calling to report: VBG - PH 7.25, PCO2 47.3, PO2 41.6, hco3 20.9 - ACVC TV-500mL R-24 PEEP-8 FiO2-50%    Combined metabolic and respiratory acidosis noted.  I recommend maintaining current tidal volume and respiratory rate.  Patient had auto PEEP issues with faster respiratory rate earlier according to documentation.  We can accept some mild hypercarbia and   respiratory acidosis.  I will give 1 amp sodium bicarb for metabolic acidosis and continue the bicarb drip.  pH has been relatively stable despite acidosis.    __Y___   Video Assessment performed            Contact [facility] for any needs if bedside physician is not present.       Critical care; 8 minutes total evaluation time     Care during the described time interval was provided by me.  I have reviewed this patient's available data, including medical history, events of note, physical examination and test results as part of my evaluation.    Interventions Major-Acid-Base disturbance - evaluation and management, Respiratory failure - evaluation and management        Electronically Signed by: Saurabh Marinelli) on 02/11/2025 03:01

## 2025-02-11 NOTE — PROGRESS NOTES
Pulmonary critical care progress note    Referring Provider: Dr Blankenship  Reason for Consultation: Respiratory failure and ventilator management      Chief complaint -could not be obtained as patient was intubated and sedated on my assessment      Sub-  Overnight events reviewed.  All the labs medications ins and outs and vitals reviewed.  Patient remains critically ill.  Remains on multiple drips.  Remains ventilator dependent.  MDR done at bed side with RN, pharmacist, nutrition, , hospitalist manager  and RT  All the drips,other meds,  labs,ins and outs , abg, fio2 requirement reviewed and dicussed in rounds.     DNR-goals of care discussed  Recommend - adding digoxin-for atrial fibrillation  Fentanyl - 150  Propofol- 30   Increased midodrine-remains in shock  Amio-renewed and continue  Bicarb-started  Heparin-continue  Og trophic feeds-discussed with dietitian    Temp - increasing 100.9   Case discussed with nephrology and patient was started on dialysis.  Will get venous blood gas tomorrow morning  Still has auto PEEP.  Case discussed with patient's family member at bedside.  Review of Systems  Could not be obtained as patient on my assessment was intubated and sedated.      History  Past Medical History:   Diagnosis Date    Abdominal bloating 01/06/2023    Atrial fibrillation 12/15/2022    Basal cell carcinoma of skin 12/15/2022    Bradycardia 03/13/2024    Chronic low back pain 05/02/2023    Chronic obstructive lung disease 05/02/2023    Chronic post-COVID-19 syndrome 05/02/2023    Community acquired pneumonia 01/06/2023    Constipation 08/11/2023    COPD (chronic obstructive pulmonary disease)     Dental caries 12/15/2022    Difficult or painful urination 04/19/2023    Essential hypertension 12/15/2022    GERD (gastroesophageal reflux disease)     Herpes zoster 02/03/2023    Hiatal hernia     History of degenerative disc disease     uses motorized chair    Hyperlipidemia     Hypertension      Hypokalemia 01/06/2023    Osteoarthritis of knee 02/03/2023    Peripheral edema 03/04/2024    PONV (postoperative nausea and vomiting)     Rib pain 04/19/2023   ,   Past Surgical History:   Procedure Laterality Date    CATARACT EXTRACTION      COLONOSCOPY      [facility]13/2024    Procedure: ESOPHAGOGASTRODUODENOSCOPY;  Surgeon: Morris Wynne MD;  Location: [facility] ENDOSCOPY;  Service: Gastroenterology;  Laterality: N/A;  DISTAL ESOPHAGUS DILATED WITH 18-20 MM BALLOON DILATOR.    REPLACEMENT TOTAL KNEE Left    , History reviewed. No pertinent family history.,   Social History     Tobacco Use    Smoking status: Every Day     Current packs/day: 2.00     Average packs/day: 2.0 packs/day for 2.1 years (4.2 ttl pk-yrs)     Types: Cigarettes     Start date: 1/1/2023    Smokeless tobacco: Never   Vaping Use    Vaping status: Never Used   Substance Use Topics    Alcohol use: Yes     Comment: 2 pints liquor/day    Drug use: Never   ,   Medications Prior to Admission   Medication Sig Dispense Refill Last Dose/Taking    acetaminophen (TYLENOL) 325 MG tablet Take 2 tablets by mouth 3 (Three) Times a Day As Needed for Mild Pain.   Unknown    albuterol sulfate  (90 Base) MCG/ACT inhaler Inhale 2 puffs Every 6 (Six) Hours As Needed for Shortness of Air.   Unknown    amLODIPine (NORVASC) 5 MG tablet Take 1 tablet by mouth Every Morning.   Unknown    aspirin 81 MG EC tablet Take 1 tablet by mouth Daily.   Unknown    carvedilol (COREG) 25 MG tablet Take 0.5 tablets by mouth 2 (Two) Times a Day With Meals.   Unknown    Diclofenac Sodium (VOLTAREN) 1 % gel gel Apply 4 g topically to the appropriate area as directed Every 6 (Six) Hours As Needed (Joint Pain).   Unknown    DULoxetine (CYMBALTA) 30 MG capsule Take 1 capsule by mouth Daily. For MOOD   Unknown    finasteride (PROSCAR) 5 MG tablet Take 1 tablet by mouth Daily.   Unknown    flecainide (TAMBOCOR) 150 MG tablet Take 0.5 tablets by mouth 2 (Two)  Times a Day.   Unknown    fluticasone (FLONASE) 50 MCG/ACT nasal spray Administer 1 spray into the nostril(s) as directed by provider 2 (Two) Times a Day As Needed for Allergies.   Unknown    Fluticasone-Salmeterol (ADVAIR/WIXELA) 250-50 MCG/ACT DISKUS Inhale 1 puff 2 (Two) Times a Day.   Unknown    furosemide (LASIX) 20 MG tablet Take 1 tablet by mouth Daily As Needed (SWELLING).   Unknown    gabapentin (NEURONTIN) 600 MG tablet Take 2 tablets by mouth 3 times a day.   Unknown    loratadine (CLARITIN) 10 MG tablet Take 1 tablet by mouth Daily As Needed for Allergies.   Unknown    losartan (COZAAR) 50 MG tablet Take 1 tablet by mouth Daily.   Unknown    methocarbamol (ROBAXIN) 750 MG tablet Take 1 tablet by mouth 2 (Two) Times a Day As Needed for Muscle Spasms.   Unknown    nicotine (NICOTROL) 10 MG/ML solution nasal solution 10 sprays by Each Nare route Every 1 (One) Hour As Needed (Smoking Cessation). MAX OF 10 sprays per hour and 80 sprays per day   Unknown    omeprazole (priLOSEC) 20 MG capsule Take 2 capsules by mouth 2 (Two) Times a Day Before Meals.   Unknown    polyvinyl alcohol (LIQUIFILM) 1.4 % ophthalmic solution Administer 1 drop to both eyes 4 (Four) Times a Day As Needed for Dry Eyes.   Unknown    rosuvastatin (CRESTOR) 40 MG tablet Take 1 tablet by mouth Every Night.   Unknown    senna 8.6 MG tablet Take 1 tablet by mouth Daily As Needed for Constipation.   Unknown    tiotropium bromide monohydrate (SPIRIVA RESPIMAT) 2.5 MCG/ACT aerosol solution inhaler Inhale 2 puffs Daily.   Unknown    traMADol (ULTRAM) 50 MG tablet Take 1 tablet by mouth 3 (Three) Times a Day As Needed for Moderate Pain.   Unknown    vitamin B-12 (CYANOCOBALAMIN) 1000 MCG tablet Take 1 tablet by mouth Daily.   Unknown   , Scheduled Meds:  cefepime, 1,000 mg, Intravenous, Q24H  cetirizine, 5 mg, Oral, Daily  chlorhexidine, 15 mL, Mouth/Throat, Q12H  finasteride, 5 mg, Oral, Daily  folic acid, 1 mg, Oral, Daily  hydrocortisone sodium  succinate, 100 mg, Intravenous, Q8H  insulin regular, 2-9 Units, Subcutaneous, Q6H  ipratropium-albuterol, 3 mL, Nebulization, 4x Daily - RT  metroNIDAZOLE, 500 mg, Intravenous, Q8H  midodrine, 10 mg, Oral, TID AC  mupirocin, 1 Application, Each Nare, BID  pantoprazole, 40 mg, Intravenous, BID AC  rosuvastatin, 10 mg, Oral, Nightly  sodium chloride, 10 mL, Intravenous, Q12H  sodium chloride, 10 mL, Intravenous, Q12H  sodium chloride, 10 mL, Intravenous, Q12H  sodium chloride, 10 mL, Intravenous, Q12H  sodium chloride, 10 mL, Intravenous, Q12H  thiamine (B-1) IV, 500 mg, Intravenous, TID  Vancomycin Pharmacy Intermittent/Pulse Dosing, , Not Applicable, Daily  vitamin B-12, 1,000 mcg, Oral, Daily    , Continuous Infusions:  amiodarone, 0.5 mg/min, Last Rate: 0.5 mg/min (02/10/25 2302)  fentanyl 10 mcg/mL,  mcg/hr, Last Rate: 150 mcg/hr (02/11/25 0600)  heparin, 9.8 Units/kg/hr (Dosing Weight), Last Rate: 9.8 Units/kg/hr (02/11/25 0932)  Pharmacy to Dose Heparin,   phenylephrine, 0.5-3 mcg/kg/min (Dosing Weight), Last Rate: 0.8 mcg/kg/min (02/11/25 0601)  propofol, 5-50 mcg/kg/min (Dosing Weight), Last Rate: 30 mcg/kg/min (02/11/25 0615)  sodium bicarbonate 8.4 % 75 mEq in sodium chloride 0.45 % 1,075 mL infusion (less than/equal to 100 mEq), 75 mEq, Last Rate: 75 mEq (02/11/25 0153)     and Allergies:  Indomethacin er, Isoniazid, and Penicillins    Objective     Vital Signs   Temp:  [97.9 °F (36.6 °C)-101.3 °F (38.5 °C)] 98.5 °F (36.9 °C)  Heart Rate:  [] 108  Resp:  [24-30] 24  BP: ()/(59-89) 109/81  FiO2 (%):  [50 %-65 %] 50 %    Physical Exam:     GENERAL APPEARANCE: Intubated and sedated.  Appears to be resting comfortably in bed.     HEAD: normocephalic.    EYES: PERRLA.    THROAT: ET tube in place. Oral cavity and pharynx normal. No inflammation, swelling, exudate, or lesions.     NECK: Neck supple.     CARDIAC: Normal S1 and S2. No S3, S4 or murmurs. Rhythm is regular.     LUNGS: Clear to  auscultation and percussion without rales, rhonchi, wheezing or diminished breath sounds.    ABDOMEN: Positive bowel sounds. Soft, nondistended, nontender.     EXTREMITIES: No significant deformity or joint abnormality. No edema. Peripheral pulses intact.    NEUROLOGICAL: Unable to assess due to sedation status.     PSYCHIATRIC: Unable to assess due to sedation status                                                                       Results Review:    LABS:    Lab Results   Component Value Date    GLUCOSE 179 (H) 02/10/2025    BUN 76 (H) 02/10/2025    CREATININE 10.76 (H) 02/10/2025    BCR 7.1 02/10/2025    CO2 18.3 (L) 02/10/2025    CALCIUM 6.5 (L) 02/10/2025    ALBUMIN 2.4 (L) 02/10/2025     (H) 02/10/2025    ALT 59 (H) 02/10/2025    WBC 18.87 (H) 02/11/2025    HGB 11.9 (L) 02/11/2025    HCT 36.1 (L) 02/11/2025    MCV 98.9 (H) 02/11/2025     02/11/2025     02/10/2025    K 3.8 02/11/2025     02/10/2025    ANIONGAP 22.7 (H) 02/10/2025       Lab Results   Component Value Date    INR 1.16 (H) 02/10/2025    INR 1.21 (H) 02/08/2025    PROTIME 15.0 02/10/2025    PROTIME 15.4 (H) 02/08/2025       Results from last 7 days   Lab Units 02/10/25  0742 02/08/25  1349   INR  1.16* 1.21*   APTT seconds 35.8 35.2          I reviewed the patient's new clinical results.  I reviewed the patient's new imaging results and agree with the interpretation.     Latest Reference Range & Units 02/09/25 00:15 02/09/25 07:42 02/10/25 04:40   pH, Arterial 7.350 - 7.450 pH units  7.289 (L)    pCO2, Arterial 35.0 - 45.0 mm Hg  41.4    pO2, Arterial 83.0 - 108.0 mm Hg  85.2    HCO3, Arterial 20.0 - 26.0 mmol/L  19.9 (L)    Base Excess 0.0 - 2.0 mmol/L  -6.4 (L)    O2 Saturation, Arterial 94.0 - 99.0 %  95.8    CO2 Content 22 - 33 mmol/L 20.7 (L) 21.1 (L) 22.4   A-a DO2 0.0 - 300.0 mmHg  383.6 (H)    Carboxyhemoglobin 0 - 5 %  1.6    Methemoglobin 0.00 - 3.00 %  0.00    Oxyhemoglobin 94 - 99 %  94.4     Carboxyhemoglobin Venous 0.0 - 5.0 % 1.6  1.3   Methemoglobin Venous 0.0 - 3.0 % 0.4  1.0   Oxyhemoglobin Venous 45.0 - 75.0 % 75.5 (H)  82.0 (H)   Hematocrit, Blood Gas 38.0 - 51.0 %  37.6 (L)    Hemoglobin, Blood Gas 14 - 18 g/dL 12.5 (L) 12.3 (L) 12.2 (L)   Site  Lab Left Radial Nurse/Dr Valle   Adolph's Test   N/A    Modality  Ventilator Ventilator Ventilator   FIO2 % 80 75 65   Ventilator Mode  VC/AC VC/AC VC/AC   Set Tidal Volume  500.00 500.00 500.00   Set Mech Resp Rate  22.0 26.0 28.0   PEEP  10.0 10.0 10.0   Rate Breaths/minute 25  28   Barometric Pressure for Blood Gas mmHg 724 730 733   Notified By  [id number]   Notified Time  02/09/2025 00:22 02/09/2025 07:48 02/10/2025 04:45   Notified Who  dr dipika bar   pH, Venous 7.320 - 7.420 pH Units 7.221 (C)  7.273 (L)   pCO2, Venous 41.0 - 51.0 mm Hg 47.0  45.5   pO2, Venous 27.0 - 53.0 mm Hg 48.7  52.8   HCO3, Venous 22.0 - 28.0 mmol/L 19.2 (L)  21.0 (L)   Base Excess 0.0 - 2.0 mmol/L -8.4 (L)  -5.8 (L)   O2 Saturation, Venous 45.0 - 75.0 % 77.0 (H)  83.9 (H)   (C): Data is critically low  (L): Data is abnormally low  (H): Data is abnormally high     Latest Reference Range & Units 02/08/25 13:51 02/08/25 15:04   Ethanol % %  <0.010   Ethanol 0 - 10 mg/dL  <10   Amphetamine, Urine Qual Negative  Negative    Barbiturates Screen, Urine Negative  Negative    Benzodiazepine Screen, Urine Negative  Negative    Buprenorphine, Screen, Urine Negative  Negative    Cocaine Screen, Urine Negative  Negative    Fentanyl, Urine Negative  Negative    Methamphetamine, Ur Negative  Negative    Methadone Screen , Urine Negative  Negative    Opiate Screen Negative  Negative    Oxycodone Screen, Urine Negative  Negative    Phencyclidine (PCP), Urine Negative  Negative    THC Screen, Urine Negative  Negative    Tricyclic Antidepressants Screen Negative  Negative      Microbiology Results (last 10 days)       Procedure Component Value - Date/Time    Blood  Culture - Blood, Arm, Right [[id number]]  (Normal) Collected: 02/10/25 0752    Lab Status: Preliminary result Specimen: Blood from Arm, Right Updated: 02/11/25 0901     Blood Culture No growth at 24 hours    Blood Culture - Blood, Arm, Left [[id number]]  (Normal) Collected: 02/10/25 0752    Lab Status: Preliminary result Specimen: Blood from Arm, Left Updated: 02/11/25 0901     Blood Culture No growth at 24 hours    Respiratory Culture - Sputum, ET Suction [[id number]]  (Abnormal) Collected: 02/09/25 0441    Lab Status: Final result Specimen: Sputum from ET Suction Updated: 02/10/25 1031     Respiratory Culture Heavy growth (4+) Streptococcus agalactiae (Group B)     Comment:   This organism is considered to be universally susceptible to penicillin.  No further antibiotic testing will be performed. If Clindamycin or Erythromycin is the drug of choice, notify the laboratory within 7 days to request susceptibility testing.         Heavy growth (4+) Normal respiratory lu. No S. aureus or Pseudomonas aeruginosa detected. Final report.     Gram Stain Moderate (3+) WBCs observed - predominantly neutrophils      Rare (1+) Squamous epithelial cells      Few (2+) Gram positive cocci in chains      Rare (1+) Yeast      Rare (1+) Gram negative bacilli      Rare (1+) Gram positive bacilli    S. Pneumo Ag Urine or CSF - Urine, Urine, Clean Catch [[id number]]  (Normal) Collected: 02/09/25 0249    Lab Status: Final result Specimen: Urine, Clean Catch Updated: 02/09/25 1345     Strep Pneumo Ag Negative    Legionella Antigen, Urine - Urine, Urine, Catheter [[id number]]  (Normal) Collected: 02/08/25 1351    Lab Status: Final result Specimen: Urine, Catheter Updated: 02/08/25 2116     LEGIONELLA ANTIGEN, URINE Negative    Narrative:      Presumptive negative for L. pneumophilia serogroup 1 antigen, suggesting no recent or current infection.    Blood Culture - Blood, Arm, Left [[id number]]  (Normal) Collected: 02/08/25 1349    Lab Status:  Preliminary result Specimen: Blood from Arm, Left Updated: 02/10/25 1415     Blood Culture No growth at 2 days    Blood Culture - Blood, Arm, Right [[id number]]  (Abnormal) Collected: 02/08/25 1349    Lab Status: Final result Specimen: Blood from Arm, Right Updated: 02/10/25 0650     Blood Culture Staphylococcus, coagulase negative     Isolated from Aerobic Bottle     Gram Stain Aerobic Bottle Gram positive cocci in clusters    Narrative:      Probable contaminant requires clinical correlation, susceptibility not performed unless requested by physician.      Blood Culture ID, PCR - Blood, Arm, Right [[id number]]  (Abnormal) Collected: 02/08/25 1349    Lab Status: Final result Specimen: Blood from Arm, Right Updated: 02/09/25 1210     BCID, PCR Staph spp, not aureus or lugdunensis. Identification by BCID2 PCR.     BOTTLE TYPE Aerobic Bottle           Assessment & Plan     Neurology-intubated and sedated.  Sedated drips reviewed and adjusted for RASS goal of -1 to -2.    Did not tolerate SAT-became tachycardic and tachypneic  Will do SAT again tomorrow morning    CT of the head reviewed and does not show any acute changes.  Sedation vacation     Narrative & Impression   INDICATION: Altered mental status.     COMPARISON: No relevant priors available     TECHNIQUE: Axial CT images of the head were obtained without IV  contrast. Coronal and sagittal reformations were reviewed.     FINDINGS:  Gray-white differentiation is relatively preserved. No mass, mass effect  or midline shift. And chronic ischemic white matter changes. No evidence  of acute large territorial infarction or acute intracranial hemorrhage.  Ventricles appear normal in size. Basal cisterns are patent. No  depressed calvarial fracture.     IMPRESSION:  No acute intracranial process.      Latest Reference Range & Units 02/09/25 00:10   Ammonia 16 - 60 umol/L 40     Will repeat ammonia level tomorrow morning    Respiratory-acute hypoxic and hypercarbic  respiratory failure-likely due to aspiration pneumonia and ongoing metabolic stress.  Was not able to protect airway and was intubated.      Latest blood gas reviewed-has lots of auto PEEP.  Discussed with nephrology and patient will receive dialysis.  Post 2 ampoules of sodium bicarbonate for ongoing metabolic acidosis.    VBG in the morning    FiO2 requirement reviewed and adjusted to maintain saturation 88 to 92%.    Continue steroids-dose reviewed  Continue nebs  Vent Settings          Resp Rate (Set): 24     FiO2 (%): 50 %  PEEP/CPAP (cm H2O): 8 cm H20    Minute Ventilation (L/min) (Obs): 13 L/min  Resp Rate (Observed) Vent: 24     I:E Ratio (Obs): 1:2.2    PIP Observed (cm H2O): 24 cm H2O         Microbiology reviewed.  Growing Streptococcus.  Continue current treatment.  Procalitonin Results:      Lab 02/11/25  0234 02/08/25  1349   PROCALCITONIN 9.49* 5.90*        Will repeat another procalcitonin level tomorrow if increases further we will adjust antibiotics and consult infectious disease.  .  VAP- precautions  Head end - 30 %  Mouth care   DVT prophylaxis-continue    Chest x-ray-reviewed    ABG-latest reviewed  Fluids adjusted    Aspiration pneumonia-respiratory culture obtained.  Continue broad-spectrum antibiotics.  Growing Streptococcus.  Continue current antibiotics.    Repeat procalcitonin level tomorrow morning  FiO2 requirement better.    If needed will do bronchoscopy to get specific cultures.  Continue aspiration precautions.    COPD-longtime smoker.  Continue steroids continue nebs  Continue to titrate FiO2 down to maintain saturation 88 to 92%.      Cardiology- hemodynamically -unstable.  Currently on Levophed.  Vasopressor requirement reviewed and adjusted for a MAP of 65 and above.  Adjusted dose of midodrine.  Results for orders placed during the hospital encounter of 02/08/25    Adult Transthoracic Echo Complete W/ Cont if Necessary Per Protocol    Interpretation Summary    Left ventricle is  normal in size and function with estimate ejection fraction of 55 to 60%. Normal diastolic function.    Right ventricle appears normal in size and function.    Normal left atrial cavity size noted. No evidence of a patent foramen ovale. No evidence of an atrial septal defect present.    Normal right atrial cavity size noted. The inferior vena cava is dilated. Partial IVC inspiratory collapse of less than 50% noted.    Aortic valve appears trileaflet. There is no significant aortic stenosis or regurgitation.    There is mild mitral regurgitation.    Estimated right ventricular systolic pressure from tricuspid regurgitation is moderately elevated (45-55 mmHg). Mild tricuspid regurgitation. Estimated RVSP is 45 mmHg with estimated RA pressure of 10 mmHg.    There is no evidence of pericardial effusion. . There is evidence of a fat pad present.    The aortic root measures 3.9 cm.       Continue stress dose steroids.    Continue to monitor HR- rate and rhythm, BP   Atrial fibrillation-started on heparin drip.  Will repeat CBC as patient has been having coffee-ground emesis.  Hemoglobin reviewed.  Recommend starting digoxin.  Continued amiodarone drip.  Drips reviewed and adjusted.      Nephrology-creatinine high-Case discussed with nephrology and patient is scheduled for dialysis today.  Appreciate the input.  GI- PPI IV twice daily-if coffee-ground emesis continues.  Continue to monitor hemoglobin.  Tube feeds  Watch for refeeding syndrome.  Patient is electrolytes are already very low.    Hematology- latest CBC reviewed.  Transfuse for hemoglobin less than 8    ID-sepsis with septic shock-likely due to aspiration pneumonia.  Continue broad-spectrum antibiotics.  If needed will do bronchoscopy-specific cultures.  Procalcitonin level in the morning  Repeat cultures negative so far  Repeat chest x-ray reviewed      Latest microbiology reviewed.  Antibiotics reviewed    Endrocrinology- Maintain Blood sugar 140  -180  Continue stress dose steroids.    Electrolytes-electrolytes are on the very lower side continue replacement and continue monitoring them.  Low likely due to poor nutritional status    DVT prophylaxis-continue    Bedside rounds were done with RT and patient's nurse. All the lab and clinical findings were discussed with them and plan was also discussed in great detail.            Echo-pending  Results for orders placed during the hospital encounter of 02/08/25    Adult Transthoracic Echo Complete W/ Cont if Necessary Per Protocol    Interpretation Summary    Left ventricle is normal in size and function with estimate ejection fraction of 55 to 60%. Normal diastolic function.    Right ventricle appears normal in size and function.    Normal left atrial cavity size noted. No evidence of a patent foramen ovale. No evidence of an atrial septal defect present.    Normal right atrial cavity size noted. The inferior vena cava is dilated. Partial IVC inspiratory collapse of less than 50% noted.    Aortic valve appears trileaflet. There is no significant aortic stenosis or regurgitation.    There is mild mitral regurgitation.    Estimated right ventricular systolic pressure from tricuspid regurgitation is moderately elevated (45-55 mmHg). Mild tricuspid regurgitation. Estimated RVSP is 45 mmHg with estimated RA pressure of 10 mmHg.    There is no evidence of pericardial effusion. . There is evidence of a fat pad present.    The aortic root measures 3.9 cm.   Patient is currently critically ill due to septic severe sepsis with septic shock end organ failure respiratory, renal and cardiovascular, hypercarbic respiratory failure, renal failure and change in mental status.  I reviewed patient's drips and adjusted them.  Reviewed ventilator settings and adjusted.  Case discussed with primary team patient's nurse and RT.  Patient's overall prognosis with multiple comorbidities and multiorgan involvement remains  poor.    Critical Care time spent in direct patient care: 31 minutes (excluding procedure time, if applicable) including high complexity decision making to assess, manipulate, and support vital organ system failure in this individual who has impairment of one or more vital organ systems such that there is a high probability of imminent or life threatening deterioration in the patient’s condition.  Patient is critically ill and is at higher risk for further mortality/morbidity. Continue ICU care .            Septic shock          Dangelo Ledezma MD  02/11/25  09:52 EST

## 2025-02-11 NOTE — PROCEDURES
Patient Name:  Phan Daniels  YOB: 1955  [id number]      Brief Operative Note    Pre-Op Diagnosis: CKD    Post-Op Diagnosis: same    Procedure: Temporary Dialysis Catheter placement     Provider: GAGE Pate    Anes: Local    Specimens: None      Tubes/Lines: 12 Fr triple lumen catheter at the 20 cm level in the left IJ    DESCRIPTION OF PROCEDURE:      After obtaining informed consent, the patient remained in their room and was placed in the Trendelenburg position.  The left neck and chest were prepped and draped in standard sterile fashion.  Ultrasound was used to identify the left internal jugular vein and carotid artery.  Local anesthetic was administered to the skin.  Finder needle advanced  into the left IJ  vein with ultrasound guidance.  A guidewire was placed through the needle.  Stab incision was made in the skin with an 11 blade scalpel. The needle was removed over the guidewire and the tract dilated.  Using the Seldinger technique a preflushed 12 double lumen with pigtail catheter was placed. The guidewire was removed.  All 3 ports dm blood easily and were flushed with normal saline.  The catheter was sutured to the skin, dressed in standard sterile fashion and covered with a dry sterile dressing.  There were no immediate complications.    Complications: None    Disposition: Stable. Post - procedure CXR pending            GAGE Pate  2/11/2025  15:04 EST

## 2025-02-11 NOTE — PROGRESS NOTES
[facility] HOSPITALIST PROGRESS NOTE     Patient Identification:  Name:  Phan Daniels  Age:  69 y.o.  Sex:  male  :  1955  MRN:  [id number]  Visit Number:  [id number]  ROOM: [address]     Primary Care Provider:  Cesia Diaz APRN    Length of stay in inpatient status:  3    Subjective     Chief Compliant:    Chief Complaint   Patient presents with    Respiratory Distress     History of Presenting Illness:    Patient remains critically ill, intubated for airway protection, heart rate improved and now on IV Amiodarone drip, continued on antibiotics, pressors, IVFs, glucose has been mildly elevated on stress steroids, creatinine still > 10, prognosis guarded, Nephrology consulted and following for recommendations on need for hemodialysis, Pulmonary and Palliative Care also consulted and following.   Objective     Current Hospital Meds:  cefepime, 1,000 mg, Intravenous, Q24H  cetirizine, 5 mg, Oral, Daily  chlorhexidine, 15 mL, Mouth/Throat, Q12H  finasteride, 5 mg, Oral, Daily  folic acid, 1 mg, Oral, Daily  hydrocortisone sodium succinate, 100 mg, Intravenous, Q8H  insulin regular, 2-9 Units, Subcutaneous, Q6H  ipratropium-albuterol, 3 mL, Nebulization, 4x Daily - RT  metroNIDAZOLE, 500 mg, Intravenous, Q8H  midodrine, 10 mg, Oral, TID AC  mupirocin, 1 Application, Each Nare, BID  pantoprazole, 40 mg, Intravenous, BID AC  rosuvastatin, 10 mg, Oral, Nightly  thiamine (B-1) IV, 500 mg, Intravenous, TID  Vancomycin Pharmacy Intermittent/Pulse Dosing, , Not Applicable, Daily  vitamin B-12, 1,000 mcg, Oral, Daily  amiodarone, 0.5 mg/min, Last Rate: 0.5 mg/min (02/10/25 2302)  fentanyl 10 mcg/mL,  mcg/hr, Last Rate: 150 mcg/hr (25 0600)  heparin, 9.8 Units/kg/hr (Dosing Weight), Last Rate: 9.8 Units/kg/hr (02/10/25 0845)  norepinephrine, 0.02-0.3 mcg/kg/min (Dosing Weight), Last Rate: Stopped (25 0724)  Pharmacy to Dose Heparin,   phenylephrine, 0.5-3 mcg/kg/min (Dosing  Weight), Last Rate: 0.8 mcg/kg/min (02/11/25 0601)  propofol, 5-50 mcg/kg/min (Dosing Weight), Last Rate: 30 mcg/kg/min (02/11/25 0615)  sodium bicarbonate 8.4 % 75 mEq in sodium chloride 0.45 % 1,075 mL infusion (less than/equal to 100 mEq), 75 mEq, Last Rate: 75 mEq (02/11/25 0153)    ----------------------------------------------------------------------------------------------------------------------  Vital Signs:  Temp:  [97.9 °F (36.6 °C)-101.3 °F (38.5 °C)] 98.5 °F (36.9 °C)  Heart Rate:  [] 108  Resp:  [24-30] 24  BP: ()/(59-89) 109/81  FiO2 (%):  [50 %-65 %] 50 %  SpO2:  [91 %-100 %] 92 %  on   ;   Device (Oxygen Therapy): ventilator  Body mass index is 32.62 kg/m².      Intake/Output Summary (Last 24 hours) at 2/11/2025 6635  Last data filed at 2/11/2025 0551  Gross per 24 hour   Intake 4018.48 ml   Output 70 ml   Net 3948.48 ml      ----------------------------------------------------------------------------------------------------------------------  Physical exam:  Constitutional:  Intubated/Sedated, no acute distress.      HENT:  Head:  Normocephalic and atraumatic.  Mouth:  Moist mucous membranes.    Eyes:  Conjunctivae are normal. No scleral icterus.    Neck:  Neck supple.  No JVD present.    Cardiovascular:  Tachycardic rate, irregular rhythm and normal heart sounds with no murmur.  Pulmonary/Chest:  Ventilated, bilateral equal rise of chest, on Fi02 50%  Abdominal:  Soft. No distension and no tenderness.   Musculoskeletal:  No tenderness, and no deformity.  No red or swollen joints anywhere.    Neurological:  Unable to assess due to sedation    Skin:  Skin is warm and dry. No rash noted. No pallor.   Peripheral vascular:  No clubbing, no cyanosis, no edema.  Psychiatric: Unable to assess due to sedation  : Vale in place    *Examination stable today 2/11    Edited by: Kendall Irby MD at 2/11/2025  "0846  ----------------------------------------------------------------------------------------------------------------------  WBC/HGB/HCT/PLT   18.87/11.9/36.1/144 (02/11 0234)     LYTES - Na/K/Cl/CO2: --/3.8/--/-- (02/11 0234)        No results found for: \"URINECX\"  Blood Culture   Date Value Ref Range Status   02/08/2025 No growth at 2 days  Preliminary   02/08/2025 Staphylococcus, coagulase negative (C)  Final       I have personally looked at the labs and they are summarized above.  ----------------------------------------------------------------------------------------------------------------------  Detailed radiology reports for the last 24 hours:  XR Chest 1 View    Result Date: 2/11/2025   Mild enlarged heart size. Central pulmonary vascular congestion with edema. No pleural effusion or pneumothorax Mild hypoinflated lungs Satisfactory position of the tubes and lines Hazy opacities in the lower lungs and left midlung could reflect areas of alveolar edema and/or multifocal pneumonia.  This report was finalized on 2/11/2025 6:29 AM by Scott Campbell MD.     Assessment & Plan    69M History Long-term Incarceration, Obese by BMI PMH GERD, Hypertension, Hyperlipidemia, Atrial Fibrillation, Alcohol abuse, COPD, was found down at home and unresponsive, was intubated on arrival to emergency room for airway protection.    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 complicated by possible ARDS, HAGMA, Mild Rhabdomyolysis   #Atrial Fibrillation with Rapid Ventricular Rate   #Elevated HS Troponins, suspected NSTEMI Type II due to shock  - Pulmonary consulted and following   - Nephrology consulted and following, follow up recommendations on needed temporary catheter " placement and starting on hemodialysis   - Palliative Care consulted and following   - Continue Vancomycin, Cefepime, Flagyl, follow up cultures  - Status post Sepsis bolus in emergency room, continue mIVFs  - Continue pain and sedation drips for comfort  - Continue IV Amiodarone drip   - Continue Heparin drip   - Continue IV pressors for SBP < 90 or MAPs consistently < 65  - Continue IV PPI for Gi prophylaxis  - Continue Tylenol as needed for fevers, Duonebs as needed  - Admitted to CCU, monitor on telemetry, continue 02 but wean as able, spontaneous awakening trial/spontaneous breathing trial when appropriate    #Electrolyte Abnormalities  - Acute Severe Hypokalemia - Replacing, on protocol  - Acute Severe Hypomagnesemia - Replacing, on protocol    #Pre-Diabetes with steroid induced hyperglycemia  - Hgb A1c = 6.4%  - Continue FSBG and SSI, add long acting if indicated.    #Alcohol Use Disorder, Severe, with Dependence complicated   - Continue cardiac monitoring, seizure precautions, monitor for withdrawal     #Obesity by BMI  - Body mass index is 30.41 kg/m².; complicates all aspects of care     F: NPO, mIVFs  E: Monitor & Replace as needed  N: NPO Diet NPO Type: Strict NPO  Ppx: Heparin drip   Code Status (Patient has no pulse and is not breathing): CPR  Medical Interventions (Patient has pulse or is breathing): Full Support    Dispo: Pending clinical improvement     *This patient is considered high risk due to sepsis, acute respiratory failure, Pneumonia, intubation and mechanical ventilation, Rapid Ventricular Rate, Acute Kidney Injury, Acute Metabolic Encephalopathy.     I have spent 35 minutes of critical care time (7:45-8:20AM) with > 50% of time spent in direct patient care, evaluating the patient at bedside, reviewing all labs and images, reviewing ABG and vent settings, reviewing pain and sedation drips, communicating plan of care with nursing staff, utilizing high complexity medical decision making to  assess and treat vital organ system failure in an individual who has impairment of one or more vital organ systems such that there is a high probability of imminent or life threatening deterioration in the patient’s condition. Failure to initiate the above interventions on an urgent basis would likely result in sudden, clinically significant or life threatening deterioration in the patient's condition.  Edited by: Kendall Irby MD at 2/11/2025 4002  [facility]

## 2025-02-11 NOTE — PROGRESS NOTES
"Palliative Care Daily Progress Note     S: Medical record reviewed, followed up with Primary RN Gregorio and Dr Irby regarding patient's condition. When I saw Phan today he was sedated on vent at 50% FiO2 and 8 of peep, he was on amiodarone, fentanyl, propofol, heparin gtt, Bicarb gtt, phenylephrine and antibiotics. He had low grade temp, he did not appear to be in distress.      O:   Palliative Performance Scale Score:     /75   Pulse 100   Temp (!) 102.3 °F (39.1 °C) (Oral) Comment: tylenol given  Resp 24   Ht 182.9 cm (72\")   Wt 109 kg (240 lb 8.4 oz)   SpO2 91%   BMI 32.62 kg/m²     Intake/Output Summary (Last 24 hours) at 2/11/2025 1604  Last data filed at 2/11/2025 0551  Gross per 24 hour   Intake 3718.48 ml   Output 70 ml   Net 3648.48 ml       PE:  General Appearance:    Chronically ill appearing, intubated and sedated NAD   HEENT:    NC/AT, without obvious abnormality, EOMI, anicteric    Neck:   supple, trachea midline, no JVD   Lungs:     Sedated on vent at 50% and 8 of peep, diminished breath sounds, coarse breath sounds    Heart:    Irregular rate/rhythm, no M/R/G   Abdomen:     Soft, NT, ND, NABS    Extremities:   Moves all extremities weakly , trace bilateral lower extremity edema   Pulses:   Pulses palpable and equal bilaterally   Skin:   Warm, dry   Neurologic: Sedated on vent   Psych: Sedated on vent          Meds: Reviewed and changes noted    Labs:   Results from last 7 days   Lab Units 02/11/25  0234   WBC 10*3/mm3 18.87*   HEMOGLOBIN g/dL 11.9*   HEMATOCRIT % 36.1*   PLATELETS 10*3/mm3 144     Results from last 7 days   Lab Units 02/11/25  0536   SODIUM mmol/L 146*   POTASSIUM mmol/L 3.9   CHLORIDE mmol/L 103   CO2 mmol/L 16.0*   BUN mg/dL 90*   CREATININE mg/dL 11.82*   GLUCOSE mg/dL 189*   CALCIUM mg/dL 5.9*     Results from last 7 days   Lab Units 02/11/25  0536 02/10/25  0928 02/10/25  0304   SODIUM mmol/L 146*  --  142   POTASSIUM mmol/L 3.9   < > 3.5   CHLORIDE mmol/L 103  --  " 101   CO2 mmol/L 16.0*  --  18.3*   BUN mg/dL 90*  --  76*   CREATININE mg/dL 11.82*  --  10.76*   CALCIUM mg/dL 5.9*  --  6.5*   BILIRUBIN mg/dL  --   --  0.8   ALK PHOS U/L  --   --  46   ALT (SGPT) U/L  --   --  59*   AST (SGOT) U/L  --   --  136*   GLUCOSE mg/dL 189*  --  179*    < > = values in this interval not displayed.     Imaging Results (Last 72 Hours)       Procedure Component Value Units Date/Time    XR Chest 1 View [[id number]] Collected: 02/11/25 1537     Updated: 02/11/25 1540    Narrative:      XR CHEST 1 VW-     CLINICAL INDICATION: hd cath placement; A41.9-Sepsis, unspecified  organism; N17.9-Acute kidney failure, unspecified; R79.89-Other  specified abnormal findings of blood chemistry; J18.9-Pneumonia,  unspecified organism        COMPARISON: 2/11/2025     TECHNIQUE: Single frontal view of the chest.     FINDINGS:     LUNGS: Left-sided line is been placed and appears that the subclavian  superior vena cava junction. No pneumothorax. Other lines are stable     HEART AND MEDIASTINUM: Heart and mediastinal contours are unremarkable        SKELETON: Bony and soft tissue structures are unremarkable.             Impression:      Line placement as above.           This report was finalized on 2/11/2025 3:38 PM by Dr. Tripp Berrios MD.       XR Chest 1 View [[id number]] Collected: 02/11/25 0627     Updated: 02/11/25 0631    Narrative:      PROCEDURE: Chest x-ray examination performed on February 11, 2025.  Single view. Upright position.     HISTORY: Possible pneumonia. Evaluate lungs.     COMPARISON: None.     FINDINGS:     Mild enlarged heart size.  Central pulmonary vascular congestion with mild edema.  Hazy opacities in the lower lungs and left midlung could reflect areas  of alveolar edema and/or multifocal pneumonia.  Enteric drain in place with tip to the stomach.  Endotracheal tube in place with tip 6.2 cm above the lissette.  Right neck central vascular catheter with tip to the SVC.  No pleural  effusion. No pneumothorax  No free air in the upper abdomen.  Mild dextroconvex curve at the mid thoracic spine.       Impression:         Mild enlarged heart size.  Central pulmonary vascular congestion with edema.  No pleural effusion or pneumothorax  Mild hypoinflated lungs  Satisfactory position of the tubes and lines  Hazy opacities in the lower lungs and left midlung could reflect areas  of alveolar edema and/or multifocal pneumonia.     This report was finalized on 2/11/2025 6:29 AM by Scott Campbell MD.       US Abdomen Limited [[id number]] Collected: 02/09/25 0938     Updated: 02/09/25 0942    Narrative:      INDICATION: Abnormal liver function tests.     COMPARISON: CT from the prior day.     TECHNIQUE: Transverse and longitudinal gray scale and color Doppler  images of the right upper quadrant were obtained.     FINDINGS:  Liver: Diffuse increased hepatic echotexture. No focal hepatic mass or  intrahepatic biliary ductal dilatation. Hepatopedal flow in the portal  vein.      Gallbladder: Gallstones in the gallbladder. The gallbladder wall is  thickened up to 0.5 cm. There is pericholecystic fluid.     Pancreas: Free of focal mass, as visualized.     Right kidney: Measures up to 14.5 cm. Simple appearing peripelvic cyst  measuring up to 4.6 cm. Negative for solid renal mass, hydronephrosis or  shadowing calculus. Perinephric fat stranding.     Aorta/IVC: Patent, as visualized.     Free fluid: No free fluid.       Impression:      1. Findings concerning for acute cholecystitis.  2. Hepatic steatosis.  3. Simple appearing right renal peripelvic cyst.     This report was finalized on 2/9/2025 9:40 AM by Alex Pallas, DO.       XR Chest AP [[id number]] Collected: 02/08/25 1920     Updated: 02/08/25 1924    Narrative:      PROCEDURE: Portable chest x-ray examination performed on February 28, 2025. 2 films.     HISTORY: Post central vascular catheter placement.     COMPARISON: None.     FINDINGS:     Normal heart  size.  Enteric drain in place with tip to the stomach  The sidehole is in the distal esophageal lumen.  Endotracheal tube in place with tip 6.3 cm above the lissette.  Right neck central vascular catheter with tip to the SVC.  Coarsened bronchovascular pattern to the lungs  Central pulmonary vascular congestion.  Hazy opacities in the right upper lobe consistent with right upper lobe  pneumonia.  No pleural effusion or pneumothorax.          Impression:         Normal heart size  Satisfactory position of the endotracheal tube with tip 6.3 cm above the  lissette.  Right neck central vascular catheter with tip to the SVC.  Enteric drain in place with tip to the stomach however the sidehole is  in the distal esophageal lumen.  Multifocal pneumonia with focal consolidation in the right upper lobe.  Slightly elevated right hemidiaphragm.  No pleural effusion. No pneumothorax        This report was finalized on 2/8/2025 7:22 PM by Scott Campbell MD.                 Diagnostics: Reviewed    A: Phan Daniels is a 69 y.o. male  admitted on 2/8/2025 due to respiratory distress. Phan has a medical history of afib?, COPD, HTN, HLD, GERD, ETOH abuse who presented after being found unresponsive. Per family at bedside Phan resides in [place] however recently came to [place] to stay with his new girlfriend. Also per noted he drank a 1/5 of liquor/day, and has had multiple recent hospital stays for pneumonia per family. EMS was called to the home and Phan was found unresponsive with no one else in the home, Phan apparently had dried feces and appeared to have been down for some time. Phan was intubated upon arrival to emergency department and was also in shock and started on levophed. Patient admitted to CCU on vent at FiO2 80% and PEEP of 10. This morning when I saw Phan he was on vent at 65% and 10 of peep on propofol, fentanyl, heparin , bicarb and phenylephrine. Palliative care consulted for GOC/ACP and support for pt and  family.         P:  I was able to touch base with patients nephew Ashutosh at bedside to provide support and answer questions he may have. Discussed Patient with Dr Irby, discuss need and plan to start hemodialysis, surgery to place temporary dialysis catheter. I will continue to follow and support Phan and his family.    We will continue to follow along. Please do not hesitate to contact us regarding further sx mgmt or GOC needs, including after hours or on weekends via our on call provider at [phone].     Lynnette Campa, APRN    2/11/2025

## 2025-02-11 NOTE — PROGRESS NOTES
NEPHROLOGY PROGRESS NOTE      INTERVAL HISTORY:    HPI, decreased level of consciousness  Patient remains vent dependent with no improvement, has been having auto PEEP and FiO2 60%    STATUS OF ACUTE AND CHRONIC PROBLEMS;  1.  Patient is now an uric with no urine output in last 24 hours  2.  Remains hypotensive with systolic blood pressure as low to 80s        Intake/Output                         02/09/25 0701 - 02/10/25 0700 02/10/25 0701 - 02/11/25 0700     [id number] 7033-0848 Total 3247-6276 9924-8989 Total                 Intake    P.O.  --  -- --  --  0 0    I.V.  2950.3  2083.1 5033.4  --  3236.5 3236.5    Other  --  -- --  --  0 0    Flush/ Irrigation Intake (mL) (NG/OG Tube Orogastric 16 Fr Left mouth) -- -- -- -- 0 0    NG/GT  --  -- --  --  182 182    IV Piggyback  200  500 700  300  300 600    Total Intake 3150.3 2583.1 5733.4 300 3718.5 4018.5       Output    Urine  50  30 80  35  35 70    Emesis/NG output  --  -- --  --  0 0    Stool  --  -- --  --  0 0    Total Output 50 30 80 35 35 70             VITAL SIGNS :     Temp:  [97.9 °F (36.6 °C)-101.3 °F (38.5 °C)] 98.5 °F (36.9 °C)  Heart Rate:  [] 106  Resp:  [24-30] 24  BP: ()/(59-89) 109/81  FiO2 (%):  [50 %-65 %] 50 %    PHYSICAL EXAMINATION:    GENERAL EXAM  Laying in bed, intubated  Comfortable on mechanical ventilation    MENTAL STATUS EXAM  Sedated    NECK EXAM  JVP is not elevated, carotid exam normal    CARDIOVASCULAR EXAM  Regular rate and rhythm, no murmurs noted, no added heart sounds, normal apical pulsation  no edema in the lower extremities  2+ radial pulses bilateral, 2+ femoral/tibial pulses bilaterally    MUSCULOSKELETAL EXAM  No cyanosis or clubbing noted in the digits    RESPIRATORY EXAM  Lungs clear to auscultation bilaterally, respiratory effort normal    ABDOMINAL EXAM  Abdomen soft and non tender, normal bowel sounds    SKIN EXAM  No new rashes      Laboratory Data :     Albumin Albumin   Date Value Ref Range Status  "  02/10/2025 2.4 (L) 3.5 - 5.2 g/dL Final   02/09/2025 2.8 (L) 3.5 - 5.2 g/dL Final   02/08/2025 3.2 (L) 3.5 - 5.2 g/dL Final      Magnesium Magnesium   Date Value Ref Range Status   02/10/2025 1.9 1.6 - 2.4 mg/dL Final   02/09/2025 2.0 1.6 - 2.4 mg/dL Final   02/08/2025 1.2 (L) 1.6 - 2.4 mg/dL Final          PTH               No results found for: \"PTH\"    CBC and coagulation:  Results from last 7 days   Lab Units 02/11/25  0234 02/10/25  1024 02/10/25  0742 02/10/25  0304 02/09/25  0010 02/08/25  1349   PROCALCITONIN ng/mL 9.49*  --   --   --   --  5.90*   LACTATE mmol/L  --   --   --   --   --  1.2   CRP mg/dL  --   --   --   --   --  9.12*   WBC 10*3/mm3 18.87* 19.56*  --  18.44*   < > 8.18   HEMOGLOBIN g/dL 11.9* 11.3*  --  12.0*   < > 10.6*   HEMATOCRIT % 36.1* 34.3*  --  36.3*   < > 31.1*   MCV fL 98.9* 98.6*  --  97.1*   < > 95.7   MCHC g/dL 33.0 32.9  --  33.1   < > 34.1   PLATELETS 10*3/mm3 144 117*  --  115*   < > 82*   INR   --   --  1.16*  --   --  1.21*    < > = values in this interval not displayed.     Acid/base balance:  Results from last 7 days   Lab Units 02/09/25  0742 02/08/25  1348   PH, ARTERIAL pH units 7.289* 7.318*   PO2 ART mm Hg 85.2 106.0   PCO2, ARTERIAL mm Hg 41.4 46.9*   HCO3 ART mmol/L 19.9* 24.1     Renal and electrolytes:    Results from last 7 days   Lab Units 02/11/25  0234 02/10/25  1712 02/10/25  0928 02/10/25  0304 02/09/25  0800 02/09/25  0010 02/08/25  1349   SODIUM mmol/L  --   --   --  142  --  141 144   POTASSIUM mmol/L 3.8 3.4* 3.5 3.5   < > 2.7* 2.2*   MAGNESIUM mg/dL  --   --   --  1.9  --  2.0 1.2*   CHLORIDE mmol/L  --   --   --  101  --  100 99   CO2 mmol/L  --   --   --  18.3*  --  16.3* 21.8*   BUN mg/dL  --   --   --  76*  --  68* 70*   CREATININE mg/dL  --   --   --  10.76*  --  10.76* 10.92*   CALCIUM mg/dL  --   --   --  6.5*  --  7.4* 6.1*   PHOSPHORUS mg/dL  --   --   --   --   --  8.3* 6.1*    < > = values in this interval not displayed.     Estimated " Creatinine Clearance: 8.4 mL/min (A) (by C-G formula based on SCr of 10.76 mg/dL (H)).  @GFRCG:3@   Liver and pancreatic function:  Results from last 7 days   Lab Units 02/10/25  0304 02/09/25  0010 02/08/25  1349   ALBUMIN g/dL 2.4* 2.8* 3.2*   BILIRUBIN mg/dL 0.8 1.6* 1.1   ALK PHOS U/L 46 45 45   AST (SGOT) U/L 136* 238* 271*   ALT (SGPT) U/L 59* 73* 70*   AMMONIA umol/L  --  40  --          Cardiac:      Liver and pancreatic function:  Results from last 7 days   Lab Units 02/10/25  0304 02/09/25  0010 02/08/25  1349   ALBUMIN g/dL 2.4* 2.8* 3.2*   BILIRUBIN mg/dL 0.8 1.6* 1.1   ALK PHOS U/L 46 45 45   AST (SGOT) U/L 136* 238* 271*   ALT (SGPT) U/L 59* 73* 70*   AMMONIA umol/L  --  40  --          CLINICAL DATA REVIEW  CBC, Renal functions, Serum electrolytes, Acid base labs reviewed 1  Telemetry was reviewed and demonstrated sinus rhythm rate 74-1 40  independently reviewed chest x-ray, pulmonary congestion noted  [facility]  Discussed the labs with Dr. Irby 1    MEDICINES:     cefepime, 1,000 mg, Intravenous, Q24H  cetirizine, 5 mg, Oral, Daily  chlorhexidine, 15 mL, Mouth/Throat, Q12H  finasteride, 5 mg, Oral, Daily  folic acid, 1 mg, Oral, Daily  hydrocortisone sodium succinate, 100 mg, Intravenous, Q8H  insulin regular, 2-9 Units, Subcutaneous, Q6H  ipratropium-albuterol, 3 mL, Nebulization, 4x Daily - RT  metroNIDAZOLE, 500 mg, Intravenous, Q8H  midodrine, 10 mg, Oral, TID AC  mupirocin, 1 Application, Each Nare, BID  pantoprazole, 40 mg, Intravenous, BID AC  rosuvastatin, 10 mg, Oral, Nightly  sodium chloride, 10 mL, Intravenous, Q12H  sodium chloride, 10 mL, Intravenous, Q12H  sodium chloride, 10 mL, Intravenous, Q12H  sodium chloride, 10 mL, Intravenous, Q12H  sodium chloride, 10 mL, Intravenous, Q12H  thiamine (B-1) IV, 500 mg, Intravenous, TID  Vancomycin Pharmacy Intermittent/Pulse Dosing, , Not Applicable, Daily  vitamin B-12, 1,000 mcg, Oral, Daily      amiodarone,  0.5 mg/min, Last Rate: 0.5 mg/min (02/10/25 2302)  fentanyl 10 mcg/mL,  mcg/hr, Last Rate: 150 mcg/hr (02/11/25 0600)  heparin, 9.8 Units/kg/hr (Dosing Weight), Last Rate: 9.8 Units/kg/hr (02/10/25 0845)  norepinephrine, 0.02-0.3 mcg/kg/min (Dosing Weight), Last Rate: Stopped (02/09/25 0724)  Pharmacy to Dose Heparin,   phenylephrine, 0.5-3 mcg/kg/min (Dosing Weight), Last Rate: 0.8 mcg/kg/min (02/11/25 0601)  propofol, 5-50 mcg/kg/min (Dosing Weight), Last Rate: 30 mcg/kg/min (02/11/25 0615)  sodium bicarbonate 8.4 % 75 mEq in sodium chloride 0.45 % 1,075 mL infusion (less than/equal to 100 mEq), 75 mEq, Last Rate: 75 mEq (02/11/25 0153)          Assessment & Plan       -Acute kidney injury  - Chronic kidney disease unspecified stage  - Acute hypercapnic hypoxic respiratory failure  - Acute metabolic acidosis  - Severe sepsis with septic shock  - Rhabdomyolysis  - Paroxysmal atrial fibrillation    Renal functions continue getting worse, and patient is now an uric with worsening fluid overload.  Discussed in detail with critical care team due to difficulty achieving adequate mechanical ventilation, auto peeping and fluid overload clinically will proceed to start on dialysis.  No improvement in renal functions, creatinine remains more than 10, urine output is almost same.  Patient remains high risk for dialysis.  There is no emergent indication of dialysis,  Acute kidney injury most likely due to acute tubular necrosis in settings shock  Had ASHLEY in October 2024 with creatinine peaked to 4 with some improvement close to 2    -Discontinue IV fluid  - Temporary dialysis catheter placement by general surgery and initiation of dialysis today, I have ordered short session of dialysis 2.5 hours and low efficacy with 3 L of ultrafiltration as tolerated  - Strict intake and output record.  -Patient remains very high risk  - Please avoid any nephrotoxic agents, hypotension and adjust medications according to estimated  GFR.       REVIEWED NOTES OF CLINICAL TEAMS INVOLVED WITH PATIENT CARE.     DISCUSSED IN DETAIL WITH PATIENT AND/OR FAMILY ABOUT CURRENT CLINICAL CONDITION AND RESPONSE TO ONGOING MANAGEMENT.     DISCUSSED IN DETAIL WITH PATIENT AND/OR FAMILY ABOUT RISKS ASSOCIATED WITH CURRENT CLINICAL CONDITION AND RISK INVOLVED WITH CURRENT MANAGEMENT.     DISCUSSED IN DETAIL WITH HOSPITALIST    CODE STATUS REVIEWED,     Saba Bates MD  02/11/25  08:19 EST

## 2025-02-11 NOTE — PLAN OF CARE
Problem: Mechanical Ventilation Invasive  Goal: Optimal Device Function  Outcome: Progressing  Goal: Absence of Device-Related Skin and Tissue Injury  Outcome: Progressing  Goal: Absence of Ventilator-Induced Lung Injury  Outcome: Progressing   Goal Outcome Evaluation:

## 2025-02-11 NOTE — CONSULTS
[facility] General Surgery  CONSULT    Patient Identification:  Name:  Phan Daniels  :  1955  MRN:  [id number]   Admit Date: 2025   Referring Physician:  No ref. provider found    Subjective     Chief complaint Temporary Dialysis Catheter     Subjective     Patient is a 69 y.o. male who is in need of temporary dialysis catheter placement. He is mechanically ventilated and sedated.  Patient's nephew is at bedside reports that he was told he had kidney failure and was in need of dialysis in October.  Reports that he never started dialysis.        ---------------------------------------------------------------------------------------------------------------------   Review of Systems:    Review of Systems   Unable to perform ROS: Intubated       ---------------------------------------------------------------------------------------------------------------------   History  Past Medical History:   Diagnosis Date    Abdominal bloating 2023    Atrial fibrillation 12/15/2022    Basal cell carcinoma of skin 12/15/2022    Bradycardia 2024    Chronic low back pain 2023    Chronic obstructive lung disease 2023    Chronic post-COVID-19 syndrome 2023    Community acquired pneumonia 2023    Constipation 2023    COPD (chronic obstructive pulmonary disease)     Dental caries 12/15/2022    Difficult or painful urination 2023    Essential hypertension 12/15/2022    GERD (gastroesophageal reflux disease)     Herpes zoster 2023    Hiatal hernia     History of degenerative disc disease     uses motorized chair    Hyperlipidemia     Hypertension     Hypokalemia 2023    Osteoarthritis of knee 2023    Peripheral edema 2024    PONV (postoperative nausea and vomiting)     Rib pain 2023     Past Surgical History:   Procedure Laterality Date    CATARACT EXTRACTION      COLONOSCOPY      [facility]    Procedure:  ESOPHAGOGASTRODUODENOSCOPY;  Surgeon: Morris Wynne MD;  Location: [facility] ENDOSCOPY;  Service: Gastroenterology;  Laterality: N/A;  DISTAL ESOPHAGUS DILATED WITH 18-20 MM BALLOON DILATOR.    REPLACEMENT TOTAL KNEE Left      History reviewed. No pertinent family history.  Social History     Tobacco Use    Smoking status: Every Day     Current packs/day: 2.00     Average packs/day: 2.0 packs/day for 2.1 years (4.2 ttl pk-yrs)     Types: Cigarettes     Start date: 1/1/2023    Smokeless tobacco: Never   Vaping Use    Vaping status: Never Used   Substance Use Topics    Alcohol use: Yes     Comment: 2 pints liquor/day    Drug use: Never     Medications Prior to Admission   Medication Sig Dispense Refill Last Dose/Taking    acetaminophen (TYLENOL) 325 MG tablet Take 2 tablets by mouth 3 (Three) Times a Day As Needed for Mild Pain.   Unknown    albuterol sulfate  (90 Base) MCG/ACT inhaler Inhale 2 puffs Every 6 (Six) Hours As Needed for Shortness of Air.   Unknown    amLODIPine (NORVASC) 5 MG tablet Take 1 tablet by mouth Every Morning.   Unknown    aspirin 81 MG EC tablet Take 1 tablet by mouth Daily.   Unknown    carvedilol (COREG) 25 MG tablet Take 0.5 tablets by mouth 2 (Two) Times a Day With Meals.   Unknown    Diclofenac Sodium (VOLTAREN) 1 % gel gel Apply 4 g topically to the appropriate area as directed Every 6 (Six) Hours As Needed (Joint Pain).   Unknown    DULoxetine (CYMBALTA) 30 MG capsule Take 1 capsule by mouth Daily. For MOOD   Unknown    finasteride (PROSCAR) 5 MG tablet Take 1 tablet by mouth Daily.   Unknown    flecainide (TAMBOCOR) 150 MG tablet Take 0.5 tablets by mouth 2 (Two) Times a Day.   Unknown    fluticasone (FLONASE) 50 MCG/ACT nasal spray Administer 1 spray into the nostril(s) as directed by provider 2 (Two) Times a Day As Needed for Allergies.   Unknown    Fluticasone-Salmeterol (ADVAIR/WIXELA) 250-50 MCG/ACT DISKUS Inhale 1 puff 2 (Two) Times a Day.   Unknown     furosemide (LASIX) 20 MG tablet Take 1 tablet by mouth Daily As Needed (SWELLING).   Unknown    gabapentin (NEURONTIN) 600 MG tablet Take 2 tablets by mouth 3 times a day.   Unknown    loratadine (CLARITIN) 10 MG tablet Take 1 tablet by mouth Daily As Needed for Allergies.   Unknown    losartan (COZAAR) 50 MG tablet Take 1 tablet by mouth Daily.   Unknown    methocarbamol (ROBAXIN) 750 MG tablet Take 1 tablet by mouth 2 (Two) Times a Day As Needed for Muscle Spasms.   Unknown    nicotine (NICOTROL) 10 MG/ML solution nasal solution 10 sprays by Each Nare route Every 1 (One) Hour As Needed (Smoking Cessation). MAX OF 10 sprays per hour and 80 sprays per day   Unknown    omeprazole (priLOSEC) 20 MG capsule Take 2 capsules by mouth 2 (Two) Times a Day Before Meals.   Unknown    polyvinyl alcohol (LIQUIFILM) 1.4 % ophthalmic solution Administer 1 drop to both eyes 4 (Four) Times a Day As Needed for Dry Eyes.   Unknown    rosuvastatin (CRESTOR) 40 MG tablet Take 1 tablet by mouth Every Night.   Unknown    senna 8.6 MG tablet Take 1 tablet by mouth Daily As Needed for Constipation.   Unknown    tiotropium bromide monohydrate (SPIRIVA RESPIMAT) 2.5 MCG/ACT aerosol solution inhaler Inhale 2 puffs Daily.   Unknown    traMADol (ULTRAM) 50 MG tablet Take 1 tablet by mouth 3 (Three) Times a Day As Needed for Moderate Pain.   Unknown    vitamin B-12 (CYANOCOBALAMIN) 1000 MCG tablet Take 1 tablet by mouth Daily.   Unknown     Allergies:  Indomethacin er, Isoniazid, and Penicillins    Objective     Objective     Vital Signs:  Temp:  [97.9 °F (36.6 °C)-102.3 °F (39.1 °C)] 102.3 °F (39.1 °C)  Heart Rate:  [] 100  Resp:  [24-28] 24  BP: ()/(59-89) 102/75  FiO2 (%):  [50 %-60 %] 60 %      02/09/25  0615 02/10/25  0600 02/11/25  0551   Weight: 98.9 kg (218 lb 1.1 oz) 102 kg (224 lb 3.3 oz) 109 kg (240 lb 8.4 oz)     Body mass index is 32.62  kg/m².  ---------------------------------------------------------------------------------------------------------------------       Physical Exam  Constitutional:       Appearance: Normal appearance.   HENT:      Head: Normocephalic and atraumatic.      Right Ear: External ear normal.      Left Ear: External ear normal.      Mouth/Throat:      Comments: ET tube and OG tube noted  Cardiovascular:      Rate and Rhythm: Normal rate.   Pulmonary:      Effort: Pulmonary effort is normal.      Comments: Mechanically ventilated  Abdominal:      General: Abdomen is flat.      Palpations: Abdomen is soft.   Skin:     General: Skin is warm and dry.      Capillary Refill: Capillary refill takes less than 2 seconds.   Neurological:      Mental Status: He is alert.      Comments: Sedated and mechanically ventilated       ---------------------------------------------------------------------------------------------------------------------   Results Review:   Results from last 7 days   Lab Units 02/10/25  0304 02/09/25  0010 02/08/25  1504 02/08/25  1349   CK TOTAL U/L 2,404* 3,281*  --  2,687*   HSTROP T ng/L  --   --  750* 745*     Results from last 7 days   Lab Units 02/11/25  0234 02/10/25  1024 02/10/25  0742 02/10/25  0304 02/09/25  0010 02/08/25  1349   CRP mg/dL  --   --   --   --   --  9.12*   LACTATE mmol/L  --   --   --   --   --  1.2   WBC 10*3/mm3 18.87* 19.56*  --  18.44*   < > 8.18   HEMOGLOBIN g/dL 11.9* 11.3*  --  12.0*   < > 10.6*   HEMATOCRIT % 36.1* 34.3*  --  36.3*   < > 31.1*   PLATELETS 10*3/mm3 144 117*  --  115*   < > 82*   INR   --   --  1.16*  --   --  1.21*    < > = values in this interval not displayed.     Results from last 7 days   Lab Units 02/09/25  0742   PH, ARTERIAL pH units 7.289*   PO2 ART mm Hg 85.2   PCO2, ARTERIAL mm Hg 41.4   HCO3 ART mmol/L 19.9*     Results from last 7 days   Lab Units 02/11/25  0536 02/11/25  0234 02/10/25  1712 02/10/25  0928 02/10/25  0304 02/09/25  0800 02/09/25  0010  "02/08/25  1349   SODIUM mmol/L 146*  --   --   --  142  --  141 144   POTASSIUM mmol/L 3.9 3.8 3.4*   < > 3.5   < > 2.7* 2.2*   MAGNESIUM mg/dL  --   --   --   --  1.9  --  2.0 1.2*   CHLORIDE mmol/L 103  --   --   --  101  --  100 99   CO2 mmol/L 16.0*  --   --   --  18.3*  --  16.3* 21.8*   BUN mg/dL 90*  --   --   --  76*  --  68* 70*   CREATININE mg/dL 11.82*  --   --   --  10.76*  --  10.76* 10.92*   CALCIUM mg/dL 5.9*  --   --   --  6.5*  --  7.4* 6.1*   GLUCOSE mg/dL 189*  --   --   --  179*  --  215* 134*   ALBUMIN g/dL  --   --   --   --  2.4*  --  2.8* 3.2*   BILIRUBIN mg/dL  --   --   --   --  0.8  --  1.6* 1.1   ALK PHOS U/L  --   --   --   --  46  --  45 45   AST (SGOT) U/L  --   --   --   --  136*  --  238* 271*   ALT (SGPT) U/L  --   --   --   --  59*  --  73* 70*    < > = values in this interval not displayed.   Estimated Creatinine Clearance: 7.6 mL/min (A) (by C-G formula based on SCr of 11.82 mg/dL (H)).  Ammonia   Date Value Ref Range Status   02/09/2025 40 16 - 60 umol/L Final         Blood Culture   Date Value Ref Range Status   02/10/2025 No growth at 24 hours  Preliminary   02/10/2025 No growth at 24 hours  Preliminary   02/08/2025 No growth at 3 days  Preliminary   02/08/2025 Staphylococcus, coagulase negative (C)  Final     No results found for: \"URINECX\"  No results found for: \"WOUNDCX\"  No results found for: \"STOOLCX\"  -------------------------------------------------------------------------------------------------------------    ---------------------------------------------------------------------------------------------------------------------  Assessment / Plan     Impression: 69-year-old male with chronic kidney disease in need of dialysis  Patient Active Problem List   Diagnosis Code    Atrial fibrillation I48.91    Basal cell carcinoma of skin C44.91    Bradycardia R00.1    Chronic low back pain M54.50, G89.29    Chronic obstructive lung disease J44.9    Chronic post-COVID-19 " syndrome U09.9    Herpes zoster B02.9    Community acquired pneumonia J18.9    Abdominal bloating R14.0    Constipation K59.00    Dental caries K02.9    Difficult or painful urination R30.0    Essential hypertension I10    Hypokalemia E87.6    Osteoarthritis of knee M17.9    Peripheral edema R60.0    Rib pain R07.81    Nausea and vomiting R11.2    Heartburn R12    Septic shock A41.9, R65.21       Plan:  Temporary dialysis catheter placement today      Discussion:  Discussed with Dr. SANDERS, patient's family member at bedside and primary RN, Celestino Maldonado, GAGE  02/11/25  15:09 EST  ---------------------------------------------------------------------------------------------------------------------     Please note that portions of this note were completed with a voice recognition program.

## 2025-02-11 NOTE — PROGRESS NOTES
HEPARIN INFUSION  Phan Daniels is a  69 y.o. male receiving heparin infusion.     Therapy for (VTE/Cardiac):   cardiac  Patient Dosing Weight: 102 kg  Initial Bolus (Y/N):   N  Any Bolus (Y/N):   Y        Signs or Symptoms of Bleeding: N    Cardiac or Other (Not VTE)   Initial Bolus: 60 units/kg (Max 4,000 units)  Initial rate: 12 units/kg/hr (Max 1,000 units/hr)   Anti Xa Rebolus Infusion Hold time Change infusion Dose (Units/kg/hr) Next Anti Xa or aPTT Level Due   < 0.11 50 Units/kg  (4000 Units Max) None Increase by  3 Units/kg/hr 6 hours   0.11- 0.19 25 Units/kg  (2000 Units Max) None Increase by  2 Units/kg/hr 6 hours   0.2 - 0.29 0 None Increase by  1 Units/kg/hr 6 hours   0.3 - 0.5 0 None No Change 6 hours (after 2 consecutive levels in range check q24h @0700)   0.51 - 0.6 0 None Decrease by  1 Units/kg/hr 6 hours   0.61 - 0.8 0 30 Minutes Decrease by  2 Units/kg/hr 6 hours   0.81 - 1 0 60 Minutes Decrease by  3 Units/kg/hr 6 hours   >1 0 Hold  After Anti Xa less than 0.5 decrease previous rate by  4 Units/kg/hr  Every 2 hours until Anti Xa  less than 0.5 then when infusion restarts in 6 hours         Recommend anti-Xa every 6 hours.          Date   Time   Anti-Xa Current Rate (Unit/kg/hr) Bolus   (Units) Rate Change   (Unit/kg/hr) New Rate (Unit/kg/hr) Next   Anti-Xa Comments  Pump Check Daily   2/10 0742 <0.1  No initial  9.8 1500 D/w GREGORIO Brown   2/10 1612 0.32 9.8 - - 9.8 2200 Therapeutic,  no s/s bleeding   02/10 2230 0.41 9.8 - - 9.8 0700  Tx x 2, no changes, no s/s bleeding per GREGORIO Damon     2/11 0815 0.49 9.8 - - 9.8 0700 on 2/12 Therapeutic again. Continue same. No s/s bleeding noted. Spoke with GREGORIO Perkins.                                                                                                                                                                                                  Pharmacy will continue to follow anti-Xa results and monitor for signs and symptoms of bleeding or  thrombosis.      Thank you.  Mignon Hanley, Pharm.D.  2/11/2025  08:16 EST

## 2025-02-11 NOTE — PROGRESS NOTES
[facility]  PROGRESS NOTE    Patient information:  Name: Phan Daniels  Age/Sex: 69 y.o. male  :  1955        PCP: Cesia Diaz APRN  Attending: Talat Blankenship MD  MRN:  [id number]  Visit Number:  [id number]  LOS: 3  CODE STATUS:    Code Status and Medical Interventions: No CPR (Do Not Attempt to Resuscitate); Full Support; Nephar Boykin, sisters Baylee and Ivy, brothers Dada and Adolph   Ordered at: 02/10/25 1152     Level Of Support Discussed With:    Next of Kin (If No Surrogate)     Code Status (Patient has no pulse and is not breathing):    No CPR (Do Not Attempt to Resuscitate)     Medical Interventions (Patient has pulse or is breathing):    Full Support     Comments:    Angeline Boykin, sisters Baylee and Ivy, brothers Alaina     PROBLEM LIST:Principal Problem:    Septic shock    Reason for Cardiology follow-up: Found unresponsive     Subjective   ADMISSION INFORMATION:  Chief Complaint   Patient presents with    Respiratory Distress     DATE:2025    Admission information/HPI:\  69-year-old male with past medical history is significant for A-fib, COPD, hypertension, dyslipidemia, GERD, alcohol abuse, and smoking.     He was admitted on 2025 after he was found unresponsive on the floor for unknown amount of time. Patient actually came to visit his girlfriend from [place]. He was found apparently by a neighbor and was covered in stool with irregular breathing with O2 sat in the 50s. Patient was put on a nonrebreather and was subsequently intubated in the ER. He was given Narcan with no improvement in mental status. He was found to be hypotensive as well and started on Levophed.  Central line was placed and patient was intubated in the ED.    Patient goes to [facility] in [place] where he lives.    Interval History:   According to patient's I & O flow chart his total urine output was 70 mL with a net balance of +3948.5  mL overnight. AM labs reveal hemoglobin is 11.9, platelet count 144, and WBC is 18.87.     Patient was in room CCU 7 when he was seen and examined.  Patient remains intubated and sedated at this time.  No family is at bedside at this time.    EVENT TIMELINE:   02/08: Orally intubated  2/8: TTE w EF 55-60%.  Please see full report attached below.  2/10: Initiated IV Amiodarone per protocol     Objective     Vital Signs  Temp:  [97.9 °F (36.6 °C)-101.3 °F (38.5 °C)] 98.5 °F (36.9 °C)  Heart Rate:  [] 106  Resp:  [24-30] 24  BP: ()/(59-89) 109/81  FiO2 (%):  [50 %-65 %] 50 %  Device (Oxygen Therapy): ventilator  Vital Signs (last 72 hrs)         02/08 0700  02/09 0659 02/09 0700  02/10 0659 02/10 0700 02/11 0659 02/11 0700 02/11 0840   Most Recent      Temp (°F) 97.7 -  104    97.7 -  100.1    97.9 -  101.3       98.5 (36.9) 02/11 0400    Heart Rate 64 -  135    96 -  142    74 -  144      106     106 02/11 0709    Resp 20 -  28    26 -  30    24 -  30       24 02/11 0648    BP 73/46 -  132/77    78/58 -  117/76    83/59 -  116/81      109/81     109/81 02/11 0709    SpO2 (%) 78 -  100    95 -  100    91 -  100       95 02/11 0648    Flow (L/min) (Oxygen Therapy) 10 -  15      15         15 02/09 1902    Oxygen Concentration (%) 70 -  100    65 -  75    50 -  65       50 02/11 0648          BMI:Body mass index is 32.62 kg/m².    WEIGHT:      02/09/25  0615 02/10/25  0600 02/11/25  0551   Weight: 98.9 kg (218 lb 1.1 oz) 102 kg (224 lb 3.3 oz) 109 kg (240 lb 8.4 oz)       DIET:NPO Diet NPO Type: Tube Feeding, Other (see comments)    I&O:  Intake & Output (last 3 days)         02/08 0701 02/09 0700 02/09 0701  02/10 0700 02/10 0701  02/11 0700 02/11 0701  02/12 0700    P.O.   0     I.V. (mL/kg) 2013.8 (20.4) 5033.4 (50.9) 3236.5 (31.7)     Other   0     NG/GT   182     IV Piggyback 4551 700 600     Total Intake(mL/kg) 6564.8 (66.4) 5733.4 (58) 4018.5 (39.4)     Urine (mL/kg/hr) 75 80 (0) 70 (0)      "Emesis/NG output   0     Stool   0     Total Output 75 80 70     Net +6489.8 +5653.4 +3948.5             Stool Unmeasured Occurrence   0 x     Emesis Unmeasured Occurrence   0 x              PHYSICAL EXAM:  Constitutional:       Appearance: Not in distress. Acutely ill-appearing.   HENT:         Comments: Orally intubated.  Neck:      Vascular: No JVD. JVD normal.   Pulmonary:      Comments: Intubated with mechanical ventilation with coarse rhonchi noted bilaterally.  Cardiovascular:      Tachycardia present. Irregular rhythm.      Murmurs: There is no murmur.      Comments: +2 edema noted BLE and bilateral hands.   Edema:     Peripheral edema present.  Abdominal:      General: Bowel sounds are normal. There is no distension.      Palpations: Abdomen is soft.      Comments: FC in use with very little clear yellow urine noted in BSD.      Musculoskeletal:      Cervical back: Neck supple.      Comments: Sedated. SCDS in use BLE.  Skin:     General: Skin is warm and dry.   Neurological:      Comments: Intubated and sedated.                   Results review   Results Review:    I have reviewed the patient's new clinical results. 02/11/25 08:40 EST    Results from last 7 days   Lab Units 02/10/25  0304 02/09/25  0010 02/08/25  1504 02/08/25  1349   CK TOTAL U/L 2,404* 3,281*  --  2,687*   HSTROP T ng/L  --   --  750* 745*     No results found for: \"PROBNP\"  Results from last 7 days   Lab Units 02/11/25  0234 02/10/25  1024 02/10/25  0304 02/09/25  0010 02/08/25  1349   WBC 10*3/mm3 18.87* 19.56* 18.44* 14.32* 8.18   HEMOGLOBIN g/dL 11.9* 11.3* 12.0* 11.8* 10.6*   PLATELETS 10*3/mm3 144 117* 115* 89* 82*     Results from last 7 days   Lab Units 02/11/25  0234 02/10/25  1712 02/10/25  0928 02/10/25  0304 02/09/25  1953 02/09/25  0800 02/09/25  0010 02/08/25  1349   SODIUM mmol/L  --   --   --  142  --   --  141 144   POTASSIUM mmol/L 3.8 3.4* 3.5 3.5 3.5 3.0* 2.7* 2.2*   CHLORIDE mmol/L  --   --   --  101  --   --  100 99 " "  CO2 mmol/L  --   --   --  18.3*  --   --  16.3* 21.8*   BUN mg/dL  --   --   --  76*  --   --  68* 70*   CREATININE mg/dL  --   --   --  10.76*  --   --  10.76* 10.92*   CALCIUM mg/dL  --   --   --  6.5*  --   --  7.4* 6.1*   GLUCOSE mg/dL  --   --   --  179*  --   --  215* 134*   ALT (SGPT) U/L  --   --   --  59*  --   --  73* 70*   AST (SGOT) U/L  --   --   --  136*  --   --  238* 271*     Lab Results   Component Value Date    MG 1.9 02/10/2025    MG 2.0 02/09/2025    MG 1.2 (L) 02/08/2025     Estimated Creatinine Clearance: 8.4 mL/min (A) (by C-G formula based on SCr of 10.76 mg/dL (H)).    Lab Results   Component Value Date    HGBA1C 6.4 (H) 10/15/2024     No results found for: \"CHOL\", \"TRIG\", \"LDL\", \"HDL\"     Lab Results   Component Value Date    INR 1.16 (H) 02/10/2025    INR 1.21 (H) 02/08/2025     Lab Results   Component Value Date    LABHEPA 0.49 02/11/2025    LABHEPA 0.41 02/10/2025    LABHEPA 0.32 02/10/2025    LABHEPA <0.10 (L) 02/10/2025       Lab Results   Component Value Date    TSH 0.196 (L) 02/09/2025      Pain Management Panel          Latest Ref Rng & Units 2/8/2025   Pain Management Panel   Amphetamine, Urine Qual Negative Negative    Barbiturates Screen, Urine Negative Negative    Benzodiazepine Screen, Urine Negative Negative    Buprenorphine, Screen, Urine Negative Negative    Cocaine Screen, Urine Negative Negative    Fentanyl, Urine Negative Negative    Methadone Screen , Urine Negative Negative    Methamphetamine, Ur Negative Negative       Details                 Microbiology Results (last 10 days)       Procedure Component Value - Date/Time    Respiratory Culture - Sputum, ET Suction [[id number]]  (Abnormal) Collected: 02/09/25 0441    Lab Status: Final result Specimen: Sputum from ET Suction Updated: 02/10/25 1031     Respiratory Culture Heavy growth (4+) Streptococcus agalactiae (Group B)     Comment:   This organism is considered to be universally susceptible to penicillin.  No " further antibiotic testing will be performed. If Clindamycin or Erythromycin is the drug of choice, notify the laboratory within 7 days to request susceptibility testing.         Heavy growth (4+) Normal respiratory lu. No S. aureus or Pseudomonas aeruginosa detected. Final report.     Gram Stain Moderate (3+) WBCs observed - predominantly neutrophils      Rare (1+) Squamous epithelial cells      Few (2+) Gram positive cocci in chains      Rare (1+) Yeast      Rare (1+) Gram negative bacilli      Rare (1+) Gram positive bacilli    S. Pneumo Ag Urine or CSF - Urine, Urine, Clean Catch [[id number]]  (Normal) Collected: 02/09/25 0249    Lab Status: Final result Specimen: Urine, Clean Catch Updated: 02/09/25 1345     Strep Pneumo Ag Negative    Legionella Antigen, Urine - Urine, Urine, Catheter [[id number]]  (Normal) Collected: 02/08/25 1351    Lab Status: Final result Specimen: Urine, Catheter Updated: 02/08/25 2116     LEGIONELLA ANTIGEN, URINE Negative    Narrative:      Presumptive negative for L. pneumophilia serogroup 1 antigen, suggesting no recent or current infection.    Blood Culture - Blood, Arm, Left [[id number]]  (Normal) Collected: 02/08/25 1349    Lab Status: Preliminary result Specimen: Blood from Arm, Left Updated: 02/10/25 1415     Blood Culture No growth at 2 days    Blood Culture - Blood, Arm, Right [[id number]]  (Abnormal) Collected: 02/08/25 1349    Lab Status: Final result Specimen: Blood from Arm, Right Updated: 02/10/25 0650     Blood Culture Staphylococcus, coagulase negative     Isolated from Aerobic Bottle     Gram Stain Aerobic Bottle Gram positive cocci in clusters    Narrative:      Probable contaminant requires clinical correlation, susceptibility not performed unless requested by physician.      Blood Culture ID, PCR - Blood, Arm, Right [[id number]]  (Abnormal) Collected: 02/08/25 1349    Lab Status: Final result Specimen: Blood from Arm, Right Updated: 02/09/25 1210     BCID, PCR Staph  spp, not aureus or lugdunensis. Identification by BCID2 PCR.     BOTTLE TYPE Aerobic Bottle           Imaging Results (Last 24 Hours)       Procedure Component Value Units Date/Time    XR Chest 1 View [[id number]] Collected: 02/11/25 0627     Updated: 02/11/25 0631    Narrative:      PROCEDURE: Chest x-ray examination performed on February 11, 2025.  Single view. Upright position.     HISTORY: Possible pneumonia. Evaluate lungs.     COMPARISON: None.     FINDINGS:     Mild enlarged heart size.  Central pulmonary vascular congestion with mild edema.  Hazy opacities in the lower lungs and left midlung could reflect areas  of alveolar edema and/or multifocal pneumonia.  Enteric drain in place with tip to the stomach.  Endotracheal tube in place with tip 6.2 cm above the lissette.  Right neck central vascular catheter with tip to the SVC.  No pleural effusion. No pneumothorax  No free air in the upper abdomen.  Mild dextroconvex curve at the mid thoracic spine.       Impression:         Mild enlarged heart size.  Central pulmonary vascular congestion with edema.  No pleural effusion or pneumothorax  Mild hypoinflated lungs  Satisfactory position of the tubes and lines  Hazy opacities in the lower lungs and left midlung could reflect areas  of alveolar edema and/or multifocal pneumonia.     This report was finalized on 2/11/2025 6:29 AM by Scott Campbell MD.             ECHO:  Results for orders placed during the hospital encounter of 02/08/25    Adult Transthoracic Echo Complete W/ Cont if Necessary Per Protocol    Interpretation Summary    Left ventricle is normal in size and function with estimate ejection fraction of 55 to 60%. Normal diastolic function.    Right ventricle appears normal in size and function.    Normal left atrial cavity size noted. No evidence of a patent foramen ovale. No evidence of an atrial septal defect present.    Normal right atrial cavity size noted. The inferior vena cava is dilated. Partial  IVC inspiratory collapse of less than 50% noted.    Aortic valve appears trileaflet. There is no significant aortic stenosis or regurgitation.    There is mild mitral regurgitation.    Estimated right ventricular systolic pressure from tricuspid regurgitation is moderately elevated (45-55 mmHg). Mild tricuspid regurgitation. Estimated RVSP is 45 mmHg with estimated RA pressure of 10 mmHg.    There is no evidence of pericardial effusion. . There is evidence of a fat pad present.    The aortic root measures 3.9 cm.        STRESS TEST:  No results found for this or any previous visit.       HEART CATH:  No results found for this or any previous visit.      EP STUDY/Interrogations:   No results found for this or any previous visit.      TELEMETRY:              I reviewed the patient's new clinical results.    ALLERGIES: Indomethacin er, Isoniazid, and Penicillins    Medication Review:   Current list of medications may not reflect those currently placed in orders that are not signed or are being held.     cefepime, 1,000 mg, Intravenous, Q24H  cetirizine, 5 mg, Oral, Daily  chlorhexidine, 15 mL, Mouth/Throat, Q12H  finasteride, 5 mg, Oral, Daily  folic acid, 1 mg, Oral, Daily  hydrocortisone sodium succinate, 100 mg, Intravenous, Q8H  insulin regular, 2-9 Units, Subcutaneous, Q6H  ipratropium-albuterol, 3 mL, Nebulization, 4x Daily - RT  metroNIDAZOLE, 500 mg, Intravenous, Q8H  midodrine, 10 mg, Oral, TID AC  mupirocin, 1 Application, Each Nare, BID  pantoprazole, 40 mg, Intravenous, BID AC  rosuvastatin, 10 mg, Oral, Nightly  sodium chloride, 10 mL, Intravenous, Q12H  sodium chloride, 10 mL, Intravenous, Q12H  sodium chloride, 10 mL, Intravenous, Q12H  sodium chloride, 10 mL, Intravenous, Q12H  sodium chloride, 10 mL, Intravenous, Q12H  thiamine (B-1) IV, 500 mg, Intravenous, TID  Vancomycin Pharmacy Intermittent/Pulse Dosing, , Not Applicable, Daily  vitamin B-12, 1,000 mcg, Oral, Daily      amiodarone, 0.5 mg/min,  Last Rate: 0.5 mg/min (02/10/25 2302)  fentanyl 10 mcg/mL,  mcg/hr, Last Rate: 150 mcg/hr (02/11/25 0600)  heparin, 9.8 Units/kg/hr (Dosing Weight), Last Rate: 9.8 Units/kg/hr (02/10/25 0845)  norepinephrine, 0.02-0.3 mcg/kg/min (Dosing Weight), Last Rate: Stopped (02/09/25 0724)  Pharmacy to Dose Heparin,   phenylephrine, 0.5-3 mcg/kg/min (Dosing Weight), Last Rate: 0.8 mcg/kg/min (02/11/25 0601)  propofol, 5-50 mcg/kg/min (Dosing Weight), Last Rate: 30 mcg/kg/min (02/11/25 0615)  sodium bicarbonate 8.4 % 75 mEq in sodium chloride 0.45 % 1,075 mL infusion (less than/equal to 100 mEq), 75 mEq, Last Rate: 75 mEq (02/11/25 0153)        acetaminophen    senna-docusate sodium **AND** polyethylene glycol **AND** bisacodyl **AND** bisacodyl    Calcium Replacement - Follow Nurse / BPA Driven Protocol    dextrose    dextrose    fluticasone    glucagon (human recombinant)    Magnesium Standard Dose Replacement - Follow Nurse / BPA Driven Protocol    Pharmacy to Dose Heparin    Phosphorus Replacement - Follow Nurse / BPA Driven Protocol    Polyvinyl Alcohol-Povidone PF    Potassium Replacement - Follow Nurse / BPA Driven Protocol    senna    sodium chloride    sodium chloride    sodium chloride    sodium chloride    sodium chloride    sodium chloride    sodium chloride    sodium chloride      Assessment    1.  Septic shock with pneumonia and respiratory failure requiring mechanical ventilation  2.  Encephalopathy secondary to above  3.  Atrial fibrillation with intermittent RVR [id number] score of at least 2  4.  Elevated high-sensitivity troponin consistent with NSTEMI type I versus type II most likely type II  5.  Hypokalemia  6.  History of alcohol abuse  7.  Tobacco abuse  8.  History of esophageal dilatation last on 6/13/2024  9.  ASHLEY on CKD nephrology is following  10. Rhabdomyolysis            Recommendations / Plan   1.  Currently patient is rate controlled continue with amiodarone for now for rate control  2.   Continue with heparin drip for thromboembolic prophylaxis  3.  Prognosis is guarded          I have discussed the patients findings and recommendations with the patient.    Thank you very much for asking us to be involved in this patient's care.        Electronically signed by GAGE Angulo, 02/11/25, 12:25 PM EST.   Electronically signed by Adele Cartagena MD, 02/11/25, 12:37 PM EST.                  Please note that portions of this note were completed with a voice recognition program.    Please note that portions of this note were copied and has been reviewed and is accurate as of 2/11/2025 .

## 2025-02-11 NOTE — PLAN OF CARE
Problem: Adult Inpatient Plan of Care  Goal: Plan of Care Review  Outcome: Not Progressing  Flowsheets (Taken 2/11/2025 1819)  Progress: declining  Plan of Care Reviewed With: patient  Goal: Patient-Specific Goal (Individualized)  Outcome: Not Progressing  Goal: Absence of Hospital-Acquired Illness or Injury  Outcome: Not Progressing  Intervention: Identify and Manage Fall Risk  Description: Perform standard risk assessment on admission using a validated tool or comprehensive approach appropriate to the patient; reassess fall risk frequently, with change in status or transfer to another level of care.  Communicate risk to interprofessional healthcare team; ensure fall risk visible cue.  Determine need for increased observation, equipment and environmental modification, as well as use of supportive, nonskid footwear.  Adjust safety measures to individual needs and identified risk factors.  Reinforce the importance of active participation with fall risk prevention, safety, and physical activity with the patient and family.  Perform regular intentional rounding to assess need for position change, pain assessment and personal needs, including assistance with toileting.  Recent Flowsheet Documentation  Taken 2/11/2025 1800 by David Sargent, RN  Safety Promotion/Fall Prevention:   safety round/check completed   fall prevention program maintained  Taken 2/11/2025 1700 by David Sargent, RN  Safety Promotion/Fall Prevention:   safety round/check completed   fall prevention program maintained  Taken 2/11/2025 1600 by David Sargent, RN  Safety Promotion/Fall Prevention:   safety round/check completed   fall prevention program maintained  Taken 2/11/2025 1500 by David Sargent, RN  Safety Promotion/Fall Prevention:   safety round/check completed   fall prevention program maintained  Taken 2/11/2025 1400 by David Sargent, RN  Safety Promotion/Fall Prevention:   safety round/check completed   fall prevention  program maintained  Taken 2/11/2025 1300 by David Sargent RN  Safety Promotion/Fall Prevention:   safety round/check completed   fall prevention program maintained  Taken 2/11/2025 1200 by David Sargent RN  Safety Promotion/Fall Prevention:   safety round/check completed   fall prevention program maintained  Taken 2/11/2025 1100 by David Sargent RN  Safety Promotion/Fall Prevention:   safety round/check completed   fall prevention program maintained  Taken 2/11/2025 1000 by David Sargent RN  Safety Promotion/Fall Prevention:   safety round/check completed   fall prevention program maintained  Taken 2/11/2025 0900 by David Sargent RN  Safety Promotion/Fall Prevention:   safety round/check completed   fall prevention program maintained  Taken 2/11/2025 0700 by David Sargent RN  Safety Promotion/Fall Prevention:   safety round/check completed   fall prevention program maintained  Intervention: Prevent Skin Injury  Description: Perform a screening for skin injury risk, such as pressure or moisture-associated skin damage on admission and at regular intervals throughout hospital stay.  Keep all areas of skin (especially folds) clean and dry.  Maintain adequate skin hydration.  Relieve and redistribute pressure and protect bony prominences and skin at risk for injury; implement measures based on patient-specific risk factors.  Match turning and repositioning schedule to clinical condition.  Encourage weight shift frequently; assist with reposition if unable to complete independently.  Float heels off bed; avoid pressure on the Achilles tendon.  Keep skin free from extended contact with medical devices.  Optimize nutrition and hydration.  Encourage functional activity and mobility, as early as tolerated.  Use aids (e.g., slide boards, mechanical lift) during transfer.  Recent Flowsheet Documentation  Taken 2/11/2025 1800 by David Sargent RN  Body Position:   right   side-lying  Taken  2/11/2025 1600 by David Sargent RN  Body Position:   left   side-lying  Taken 2/11/2025 1400 by David Sargent RN  Body Position:   right   side-lying  Taken 2/11/2025 1200 by David Sargent RN  Body Position:   left   side-lying  Taken 2/11/2025 1000 by David Sargent RN  Body Position:   right   side-lying  Taken 2/11/2025 0800 by David Sargent RN  Body Position:   left   side-lying  Goal: Optimal Comfort and Wellbeing  Outcome: Not Progressing  Intervention: Provide Person-Centered Care  Description: Use a family-focused approach to care; encourage support system presence and participation.  Develop trust and rapport by proactively providing information, encouraging questions, addressing concerns and offering reassurance.  Acknowledge emotional response to hospitalization.  Recognize and utilize personal coping strategies and strengths; develop goals via shared decision-making.  Honor spiritual and cultural preferences.  Recent Flowsheet Documentation  Taken 2/11/2025 1400 by David Sargent RN  Trust Relationship/Rapport:   care explained   reassurance provided  Taken 2/11/2025 0800 by David Sargent RN  Trust Relationship/Rapport:   care explained   reassurance provided  Goal: Readiness for Transition of Care  Outcome: Not Progressing   Goal Outcome Evaluation:  Plan of Care Reviewed With: patient        Progress: declining

## 2025-02-11 NOTE — PLAN OF CARE
Goal Outcome Evaluation:  Plan of Care Reviewed With: patient        Progress: no change  Outcome Evaluation: Patient intubated/sedated. Continues on propofol, fentanyl, [place], heparin, amiodarone, and bicarb gtts. Tube feeding infusing. Low UOP throughout shift. Call light within reach. bed alarm set. Wound care done per protocol.

## 2025-02-12 NOTE — PLAN OF CARE
Problem: Mechanical Ventilation Invasive  Goal: Effective Communication  Outcome: Progressing   Goal Outcome Evaluation:

## 2025-02-12 NOTE — PROGRESS NOTES
Pulmonary critical care progress note    Referring Provider: Dr Blankenship  Reason for Consultation: Respiratory failure and ventilator management      Chief complaint -could not be obtained as patient was intubated and sedated on my assessment      Sub-overnight events reviewed.  All the labs medications ins and outs and vitals reviewed.  Remains critically ill.  Will be getting another session of dialysis.  Discontinued bicarbonate drip.    Drips  Precdex-rate reviewed  Amio-asked the nurse to ask cardiology if can be changed to oral.  Heparin-continue    Had Hd -yesterday and 3 liters taken off   Will be getting another session of hemodialysis  Off Saúl-Synephrine  30 ml urine-will discontinue Collazo catheter  Tube feeds - at goal-tolerating well so far    Blood gas reviewed-ventilator settings graphics reviewed.  Decrease the rate to 20 will repeat VBG in the morning  Adjusted FiO2    Will do SBT today.    MDR done at bed side with RN, pharmacist, nutrition, , hospitalist manager  and RT  All the drips,other meds,  labs,ins and outs , abg, fio2 requirement reviewed and dicussed in rounds.     Overnight events reviewed.  All the labs medications ins and outs and vitals reviewed.  Patient remains critically ill.  Remains on multiple drips.  Remains ventilator dependent.  MDR done at bed side with RN, pharmacist, nutrition, , hospitalist manager  and RT  All the drips,other meds,  labs,ins and outs , abg, fio2 requirement reviewed and dicussed in rounds.   Repeat blood cultures negative so far.  Review of Systems  Could not be obtained as patient on my assessment was intubated and sedated.      History  Past Medical History:   Diagnosis Date    Abdominal bloating 01/06/2023    Atrial fibrillation 12/15/2022    Basal cell carcinoma of skin 12/15/2022    Bradycardia 03/13/2024    Chronic low back pain 05/02/2023    Chronic obstructive lung disease 05/02/2023    Chronic post-COVID-19 syndrome  05/02/2023    Community acquired pneumonia 01/06/2023    Constipation 08/11/2023    COPD (chronic obstructive pulmonary disease)     Dental caries 12/15/2022    Difficult or painful urination 04/19/2023    Essential hypertension 12/15/2022    GERD (gastroesophageal reflux disease)     Herpes zoster 02/03/2023    Hiatal hernia     History of degenerative disc disease     uses motorized chair    Hyperlipidemia     Hypertension     Hypokalemia 01/06/2023    Osteoarthritis of knee 02/03/2023    Peripheral edema 03/04/2024    PONV (postoperative nausea and vomiting)     Rib pain 04/19/2023   ,   Past Surgical History:   Procedure Laterality Date    CATARACT EXTRACTION      COLONOSCOPY      [facility]24    Procedure: ESOPHAGOGASTRODUODENOSCOPY;  Surgeon: Morris Wynne MD;  Location: [facility] ENDOSCOPY;  Service: Gastroenterology;  Laterality: N/A;  DISTAL ESOPHAGUS DILATED WITH 18-20 MM BALLOON DILATOR.    REPLACEMENT TOTAL KNEE Left    , History reviewed. No pertinent family history.,   Social History     Tobacco Use    Smoking status: Every Day     Current packs/day: 2.00     Average packs/day: 2.0 packs/day for 2.1 years (4.2 ttl pk-yrs)     Types: Cigarettes     Start date: 1/1/2023    Smokeless tobacco: Never   Vaping Use    Vaping status: Never Used   Substance Use Topics    Alcohol use: Yes     Comment: 2 pints liquor/day    Drug use: Never   ,   Medications Prior to Admission   Medication Sig Dispense Refill Last Dose/Taking    acetaminophen (TYLENOL) 325 MG tablet Take 2 tablets by mouth 3 (Three) Times a Day As Needed for Mild Pain.   Unknown    albuterol sulfate  (90 Base) MCG/ACT inhaler Inhale 2 puffs Every 6 (Six) Hours As Needed for Shortness of Air.   Unknown    amLODIPine (NORVASC) 5 MG tablet Take 1 tablet by mouth Every Morning.   Unknown    aspirin 81 MG EC tablet Take 1 tablet by mouth Daily.   Unknown    carvedilol (COREG) 25 MG tablet Take 0.5 tablets by mouth 2  (Two) Times a Day With Meals.   Unknown    Diclofenac Sodium (VOLTAREN) 1 % gel gel Apply 4 g topically to the appropriate area as directed Every 6 (Six) Hours As Needed (Joint Pain).   Unknown    DULoxetine (CYMBALTA) 30 MG capsule Take 1 capsule by mouth Daily. For MOOD   Unknown    finasteride (PROSCAR) 5 MG tablet Take 1 tablet by mouth Daily.   Unknown    flecainide (TAMBOCOR) 150 MG tablet Take 0.5 tablets by mouth 2 (Two) Times a Day.   Unknown    fluticasone (FLONASE) 50 MCG/ACT nasal spray Administer 1 spray into the nostril(s) as directed by provider 2 (Two) Times a Day As Needed for Allergies.   Unknown    Fluticasone-Salmeterol (ADVAIR/WIXELA) 250-50 MCG/ACT DISKUS Inhale 1 puff 2 (Two) Times a Day.   Unknown    furosemide (LASIX) 20 MG tablet Take 1 tablet by mouth Daily As Needed (SWELLING).   Unknown    gabapentin (NEURONTIN) 600 MG tablet Take 2 tablets by mouth 3 times a day.   Unknown    loratadine (CLARITIN) 10 MG tablet Take 1 tablet by mouth Daily As Needed for Allergies.   Unknown    losartan (COZAAR) 50 MG tablet Take 1 tablet by mouth Daily.   Unknown    methocarbamol (ROBAXIN) 750 MG tablet Take 1 tablet by mouth 2 (Two) Times a Day As Needed for Muscle Spasms.   Unknown    nicotine (NICOTROL) 10 MG/ML solution nasal solution 10 sprays by Each Nare route Every 1 (One) Hour As Needed (Smoking Cessation). MAX OF 10 sprays per hour and 80 sprays per day   Unknown    omeprazole (priLOSEC) 20 MG capsule Take 2 capsules by mouth 2 (Two) Times a Day Before Meals.   Unknown    polyvinyl alcohol (LIQUIFILM) 1.4 % ophthalmic solution Administer 1 drop to both eyes 4 (Four) Times a Day As Needed for Dry Eyes.   Unknown    rosuvastatin (CRESTOR) 40 MG tablet Take 1 tablet by mouth Every Night.   Unknown    senna 8.6 MG tablet Take 1 tablet by mouth Daily As Needed for Constipation.   Unknown    tiotropium bromide monohydrate (SPIRIVA RESPIMAT) 2.5 MCG/ACT aerosol solution inhaler Inhale 2 puffs Daily.    Unknown    traMADol (ULTRAM) 50 MG tablet Take 1 tablet by mouth 3 (Three) Times a Day As Needed for Moderate Pain.   Unknown    vitamin B-12 (CYANOCOBALAMIN) 1000 MCG tablet Take 1 tablet by mouth Daily.   Unknown   , Scheduled Meds:  cefTRIAXone, 2,000 mg, Intravenous, Q24H  cetirizine, 5 mg, Oral, Daily  chlorhexidine, 15 mL, Mouth/Throat, Q12H  finasteride, 5 mg, Oral, Daily  folic acid, 1 mg, Oral, Daily  hydrocortisone sodium succinate, 100 mg, Intravenous, Q8H  insulin regular, 4-24 Units, Subcutaneous, Q6H  ipratropium-albuterol, 3 mL, Nebulization, 4x Daily - RT  midodrine, 15 mg, Oral, TID AC  mupirocin, 1 Application, Each Nare, BID  pantoprazole, 40 mg, Intravenous, BID AC  [Held by provider] rosuvastatin, 10 mg, Oral, Nightly  sodium chloride, 10 mL, Intravenous, Q12H  sodium chloride, 10 mL, Intravenous, Q12H  sodium chloride, 10 mL, Intravenous, Q12H  sodium chloride, 10 mL, Intravenous, Q12H  sodium chloride, 10 mL, Intravenous, Q12H  thiamine, 200 mg, Per G Tube, Daily  Vancomycin Pharmacy Intermittent/Pulse Dosing, , Not Applicable, Daily  vitamin B-12, 1,000 mcg, Oral, Daily    , Continuous Infusions:  amiodarone, 0.5 mg/min, Last Rate: 0.5 mg/min (02/11/25 2339)  dexmedetomidine, 0.2-1.5 mcg/kg/hr (Dosing Weight), Last Rate: 0.4 mcg/kg/hr (02/12/25 0815)  fentanyl 10 mcg/mL,  mcg/hr, Last Rate: Stopped (02/12/25 0733)  heparin, 8.8 Units/kg/hr (Dosing Weight), Last Rate: 8.8 Units/kg/hr (02/12/25 0834)  Pharmacy to Dose Heparin,   phenylephrine, 0.5-3 mcg/kg/min (Dosing Weight), Last Rate: Stopped (02/11/25 2225)  propofol, 5-50 mcg/kg/min (Dosing Weight), Last Rate: Stopped (02/12/25 0733)  sodium bicarbonate 8.4 % 150 mEq in dextrose (D5W) 5 % 1,000 mL infusion (greater than 100 mEq), 150 mEq, Last Rate: 150 mEq (02/12/25 0311)     and Allergies:  Indomethacin er, Isoniazid, and Penicillins    Objective     Vital Signs   Temp:  [98.4 °F (36.9 °C)-102.3 °F (39.1 °C)] 98.4 °F (36.9  °C)  Heart Rate:  [] 106  Resp:  [24] 24  BP: ()/() 103/88  FiO2 (%):  [50 %-60 %] 60 %    Physical Exam:     GENERAL APPEARANCE: Intubated and sedated.  Appears to be resting comfortably in bed.     HEAD: normocephalic.    EYES: PERRLA.    THROAT: ET tube in place. Oral cavity and pharynx normal. No inflammation, swelling, exudate, or lesions.     NECK: Neck supple.     CARDIAC: Normal S1 and S2. No S3, S4 or murmurs. Rhythm is regular.     LUNGS: Clear to auscultation and percussion without rales, rhonchi, wheezing or diminished breath sounds.    ABDOMEN: Positive bowel sounds. Soft, nondistended, nontender.     EXTREMITIES: No significant deformity or joint abnormality. No edema. Peripheral pulses intact.    NEUROLOGICAL: Unable to assess due to sedation status.     PSYCHIATRIC: Unable to assess due to sedation status                                                                       Results Review:    LABS:    Lab Results   Component Value Date    GLUCOSE 282 (H) 02/12/2025    BUN 76 (H) 02/12/2025    CREATININE 10.21 (H) 02/12/2025    BCR 7.4 02/12/2025    CO2 24.1 02/12/2025    CALCIUM 6.8 (L) 02/12/2025    ALBUMIN 2.4 (L) 02/10/2025     (H) 02/10/2025    ALT 59 (H) 02/10/2025    WBC 18.87 (H) 02/11/2025    HGB 11.9 (L) 02/11/2025    HCT 36.1 (L) 02/11/2025    MCV 98.9 (H) 02/11/2025     02/11/2025     02/12/2025    K 3.1 (L) 02/12/2025    CL 97 (L) 02/12/2025    ANIONGAP 20.9 (H) 02/12/2025       Lab Results   Component Value Date    INR 1.16 (H) 02/10/2025    INR 1.21 (H) 02/08/2025    PROTIME 15.0 02/10/2025    PROTIME 15.4 (H) 02/08/2025       Results from last 7 days   Lab Units 02/10/25  0742 02/08/25  1349   INR  1.16* 1.21*   APTT seconds 35.8 35.2          I reviewed the patient's new clinical results.  I reviewed the patient's new imaging results and agree with the interpretation.     Latest Reference Range & Units 02/09/25 00:15 02/09/25 07:42 02/10/25 04:40    pH, Arterial 7.350 - 7.450 pH units  7.289 (L)    pCO2, Arterial 35.0 - 45.0 mm Hg  41.4    pO2, Arterial 83.0 - 108.0 mm Hg  85.2    HCO3, Arterial 20.0 - 26.0 mmol/L  19.9 (L)    Base Excess 0.0 - 2.0 mmol/L  -6.4 (L)    O2 Saturation, Arterial 94.0 - 99.0 %  95.8    CO2 Content 22 - 33 mmol/L 20.7 (L) 21.1 (L) 22.4   A-a DO2 0.0 - 300.0 mmHg  383.6 (H)    Carboxyhemoglobin 0 - 5 %  1.6    Methemoglobin 0.00 - 3.00 %  0.00    Oxyhemoglobin 94 - 99 %  94.4    Carboxyhemoglobin Venous 0.0 - 5.0 % 1.6  1.3   Methemoglobin Venous 0.0 - 3.0 % 0.4  1.0   Oxyhemoglobin Venous 45.0 - 75.0 % 75.5 (H)  82.0 (H)   Hematocrit, Blood Gas 38.0 - 51.0 %  37.6 (L)    Hemoglobin, Blood Gas 14 - 18 g/dL 12.5 (L) 12.3 (L) 12.2 (L)   Site  Lab Left Radial Nurse/Dr Tori Farfan's Test   N/A    Modality  Ventilator Ventilator Ventilator   FIO2 % 80 75 65   Ventilator Mode  VC/AC VC/AC VC/AC   Set Tidal Volume  500.00 500.00 500.00   Set Mech Resp Rate  22.0 26.0 28.0   PEEP  10.0 10.0 10.0   Rate Breaths/minute 25  28   Barometric Pressure for Blood Gas mmHg 724 730 733   Notified By  [id number]   Notified Time  02/09/2025 00:22 02/09/2025 07:48 02/10/2025 04:45   Notified Who  dr dipika bar   pH, Venous 7.320 - 7.420 pH Units 7.221 (C)  7.273 (L)   pCO2, Venous 41.0 - 51.0 mm Hg 47.0  45.5   pO2, Venous 27.0 - 53.0 mm Hg 48.7  52.8   HCO3, Venous 22.0 - 28.0 mmol/L 19.2 (L)  21.0 (L)   Base Excess 0.0 - 2.0 mmol/L -8.4 (L)  -5.8 (L)   O2 Saturation, Venous 45.0 - 75.0 % 77.0 (H)  83.9 (H)   (C): Data is critically low  (L): Data is abnormally low  (H): Data is abnormally high     Latest Reference Range & Units 02/08/25 13:51 02/08/25 15:04   Ethanol % %  <0.010   Ethanol 0 - 10 mg/dL  <10   Amphetamine, Urine Qual Negative  Negative    Barbiturates Screen, Urine Negative  Negative    Benzodiazepine Screen, Urine Negative  Negative    Buprenorphine, Screen, Urine Negative  Negative    Cocaine Screen, Urine  Negative  Negative    Fentanyl, Urine Negative  Negative    Methamphetamine, Ur Negative  Negative    Methadone Screen , Urine Negative  Negative    Opiate Screen Negative  Negative    Oxycodone Screen, Urine Negative  Negative    Phencyclidine (PCP), Urine Negative  Negative    THC Screen, Urine Negative  Negative    Tricyclic Antidepressants Screen Negative  Negative      Microbiology Results (last 10 days)       Procedure Component Value - Date/Time    Blood Culture - Blood, Arm, Right [[id number]]  (Normal) Collected: 02/10/25 0752    Lab Status: Preliminary result Specimen: Blood from Arm, Right Updated: 02/12/25 0900     Blood Culture No growth at 2 days    Blood Culture - Blood, Arm, Left [[id number]]  (Normal) Collected: 02/10/25 0752    Lab Status: Preliminary result Specimen: Blood from Arm, Left Updated: 02/12/25 0900     Blood Culture No growth at 2 days    Respiratory Culture - Sputum, ET Suction [[id number]]  (Abnormal) Collected: 02/09/25 0441    Lab Status: Final result Specimen: Sputum from ET Suction Updated: 02/10/25 1031     Respiratory Culture Heavy growth (4+) Streptococcus agalactiae (Group B)     Comment:   This organism is considered to be universally susceptible to penicillin.  No further antibiotic testing will be performed. If Clindamycin or Erythromycin is the drug of choice, notify the laboratory within 7 days to request susceptibility testing.         Heavy growth (4+) Normal respiratory lu. No S. aureus or Pseudomonas aeruginosa detected. Final report.     Gram Stain Moderate (3+) WBCs observed - predominantly neutrophils      Rare (1+) Squamous epithelial cells      Few (2+) Gram positive cocci in chains      Rare (1+) Yeast      Rare (1+) Gram negative bacilli      Rare (1+) Gram positive bacilli    S. Pneumo Ag Urine or CSF - Urine, Urine, Clean Catch [[id number]]  (Normal) Collected: 02/09/25 0249    Lab Status: Final result Specimen: Urine, Clean Catch Updated: 02/09/25 6990      Strep Pneumo Ag Negative    Legionella Antigen, Urine - Urine, Urine, Catheter [[id number]]  (Normal) Collected: 02/08/25 1351    Lab Status: Final result Specimen: Urine, Catheter Updated: 02/08/25 2116     LEGIONELLA ANTIGEN, URINE Negative    Narrative:      Presumptive negative for L. pneumophilia serogroup 1 antigen, suggesting no recent or current infection.    Blood Culture - Blood, Arm, Left [[id number]]  (Normal) Collected: 02/08/25 1349    Lab Status: Preliminary result Specimen: Blood from Arm, Left Updated: 02/11/25 1415     Blood Culture No growth at 3 days    Blood Culture - Blood, Arm, Right [[id number]]  (Abnormal) Collected: 02/08/25 1349    Lab Status: Final result Specimen: Blood from Arm, Right Updated: 02/10/25 0650     Blood Culture Staphylococcus, coagulase negative     Isolated from Aerobic Bottle     Gram Stain Aerobic Bottle Gram positive cocci in clusters    Narrative:      Probable contaminant requires clinical correlation, susceptibility not performed unless requested by physician.      Blood Culture ID, PCR - Blood, Arm, Right [[id number]]  (Abnormal) Collected: 02/08/25 1349    Lab Status: Final result Specimen: Blood from Arm, Right Updated: 02/09/25 1210     BCID, PCR Staph spp, not aureus or lugdunensis. Identification by BCID2 PCR.     BOTTLE TYPE Aerobic Bottle            Latest Reference Range & Units 02/12/25 04:15   CO2 Content 22 - 33 mmol/L 28.2   Carboxyhemoglobin Venous 0.0 - 5.0 % 1.6   Methemoglobin Venous 0.0 - 3.0 % 0.6   Oxyhemoglobin Venous 45.0 - 75.0 % 72.0   Hemoglobin, Blood Gas 14 - 18 g/dL 10.4 (L)   Site  Lab   Modality  Ventilator   FIO2 % 60   Ventilator Mode  VC/AC   Set Tidal Volume  500.00   Set Mech Resp Rate  24.0   PEEP  5.0   Barometric Pressure for Blood Gas mmHg 723   pH, Venous 7.320 - 7.420 pH Units 7.383   pCO2, Venous 41.0 - 51.0 mm Hg 45.2   pO2, Venous 27.0 - 53.0 mm Hg 40.9   HCO3, Venous 22.0 - 28.0 mmol/L 26.9   Base Excess 0.0 - 2.0 mmol/L 1.4    O2 Saturation, Venous 45.0 - 75.0 % 73.6   (L): Data is abnormally low  Assessment & Plan   Reviewed home medications  Neurology-intubated and sedated.  Sedated drips reviewed and adjusted for RASS goal of 0 to -1.  Will check ammonia level tomorrow morning  Doing well on SAT today.  Attempting SBT.  Not following commands.    CT of the head reviewed and does not show any acute changes.  Sedation vacation     Narrative & Impression   INDICATION: Altered mental status.     COMPARISON: No relevant priors available     TECHNIQUE: Axial CT images of the head were obtained without IV  contrast. Coronal and sagittal reformations were reviewed.     FINDINGS:  Gray-white differentiation is relatively preserved. No mass, mass effect  or midline shift. And chronic ischemic white matter changes. No evidence  of acute large territorial infarction or acute intracranial hemorrhage.  Ventricles appear normal in size. Basal cisterns are patent. No  depressed calvarial fracture.     IMPRESSION:  No acute intracranial process.      Latest Reference Range & Units 02/09/25 00:10   Ammonia 16 - 60 umol/L 40         Respiratory-acute hypoxic and hypercarbic respiratory failure-likely due to aspiration pneumonia and ongoing metabolic stress.  Was not able to protect airway and was intubated.    Respiratory cultures reviewed.  Antibiotics reviewed and continued.    Blood gases better after hemodialysis.  Auto PEEP better-likely due to better lung compliance.    FiO2 requirement reviewed and adjusted.  Was on 60% decrease it to 50%.  Attempting SBT today.  Will likely not extubate as patient's mental status is poor.  Latest blood gas reviewed and vent settings adjusted.      Will repeat VBG in the morning    FiO2 requirement reviewed and adjusted to maintain saturation 88 to 92%.    Continue steroids-dose reviewed  Continue nebs  Vent Settings          Resp Rate (Set): 24     FiO2 (%): 60 %  PEEP/CPAP (cm H2O): 8 cm H20    Minute  Ventilation (L/min) (Obs): 12.4 L/min  Resp Rate (Observed) Vent: 24     I:E Ratio (Obs): 1:2.2    PIP Observed (cm H2O): 29 cm H2O         Microbiology reviewed.  Growing Streptococcus.  Continue current treatment.  Procalitonin Results:      Lab 02/12/25  0412 02/11/25  0234 02/08/25  1349   PROCALCITONIN 6.34* 9.49* 5.90*      Procalcitonin better.  Will continue current treatment.  .  VAP- precautions  Head end - 30 %  Mouth care   DVT prophylaxis-continue    Chest x-ray-reviewed    ABG-latest reviewed  Fluids adjusted    Aspiration pneumonia-respiratory culture obtained.  Continue broad-spectrum antibiotics.  Growing Streptococcus.  Continue current antibiotics.    Medications reviewed      Continue aspiration precautions.    COPD-longtime smoker.  Continue steroids continue nebs  Continue to titrate FiO2 down to maintain saturation 88 to 92%.      Cardiology- hemodynamically -unstable.  Continue midodrine.    Vasopressor requirement reviewed and adjusted for a MAP of 65 and above.     Decreased stress steroids dose.    Results for orders placed during the hospital encounter of 02/08/25    Adult Transthoracic Echo Complete W/ Cont if Necessary Per Protocol    Interpretation Summary    Left ventricle is normal in size and function with estimate ejection fraction of 55 to 60%. Normal diastolic function.    Right ventricle appears normal in size and function.    Normal left atrial cavity size noted. No evidence of a patent foramen ovale. No evidence of an atrial septal defect present.    Normal right atrial cavity size noted. The inferior vena cava is dilated. Partial IVC inspiratory collapse of less than 50% noted.    Aortic valve appears trileaflet. There is no significant aortic stenosis or regurgitation.    There is mild mitral regurgitation.    Estimated right ventricular systolic pressure from tricuspid regurgitation is moderately elevated (45-55 mmHg). Mild tricuspid regurgitation. Estimated RVSP is 45 mmHg  with estimated RA pressure of 10 mmHg.    There is no evidence of pericardial effusion. . There is evidence of a fat pad present.    The aortic root measures 3.9 cm.       Continue to monitor HR- rate and rhythm, BP   Atrial fibrillation-started on heparin drip.  Will repeat CBC as patient has been having coffee-ground emesis.    Hemoglobin levels reviewed and is stable    Hemoglobin reviewed.    Recommend discontinuing amiodarone drip and starting oral amiodarone      Nephrology-continue hemodialysis.  Nephrology input appreciated    Appreciate the input.  GI- PPI IV twice daily-if coffee-ground emesis continues.  Continue to monitor hemoglobin.  Tube feeds  Watch for refeeding syndrome.  Patient is electrolytes are already very low.  Hypocalcemia will check a phosphorus tomorrow morning and vitamin D3 level.      Hematology- latest CBC reviewed.  Transfuse for hemoglobin less than 8.    ID-sepsis with septic shock-likely due to aspiration pneumonia.  Continue broad-spectrum antibiotics.  If needed will do bronchoscopy-specific cultures.  Procalcitonin level in the morning  Repeat cultures negative so far  Repeat chest x-ray reviewed      Latest microbiology reviewed.  Antibiotics reviewed    Endrocrinology- Maintain Blood sugar 140 -180  Continue stress dose steroids.  Decrease the dose steroids and added Lantus.      Electrolytes-electrolytes are on the very lower side continue replacement and continue monitoring them.  Low likely due to poor nutritional status    DVT prophylaxis-continue          Echo-pending  Results for orders placed during the hospital encounter of 02/08/25    Adult Transthoracic Echo Complete W/ Cont if Necessary Per Protocol    Interpretation Summary    Left ventricle is normal in size and function with estimate ejection fraction of 55 to 60%. Normal diastolic function.    Right ventricle appears normal in size and function.    Normal left atrial cavity size noted. No evidence of a patent  foramen ovale. No evidence of an atrial septal defect present.    Normal right atrial cavity size noted. The inferior vena cava is dilated. Partial IVC inspiratory collapse of less than 50% noted.    Aortic valve appears trileaflet. There is no significant aortic stenosis or regurgitation.    There is mild mitral regurgitation.    Estimated right ventricular systolic pressure from tricuspid regurgitation is moderately elevated (45-55 mmHg). Mild tricuspid regurgitation. Estimated RVSP is 45 mmHg with estimated RA pressure of 10 mmHg.    There is no evidence of pericardial effusion. . There is evidence of a fat pad present.    The aortic root measures 3.9 cm.   Patient is currently critically ill due to septic severe sepsis with septic shock end organ failure respiratory, renal and cardiovascular, hypercarbic respiratory failure, renal failure and change in mental status.  I reviewed patient's drips and adjusted them.  Reviewed ventilator settings and adjusted.  Case discussed with primary team patient's nurse and RT.  Patient's overall prognosis with multiple comorbidities and multiorgan involvement remains poor.  Critical Care time spent in direct patient care: 30 minutes (excluding procedure time, if applicable) including high complexity decision making to assess, manipulate, and support vital organ system failure in this individual who has impairment of one or more vital organ systems such that there is a high probability of imminent or life threatening deterioration in the patient’s condition.  Patient is critically ill and is at higher risk for further mortality/morbidity. Continue ICU care .           Septic shock          Dangelo Ledezma MD  02/12/25  09:44 EST

## 2025-02-12 NOTE — CASE MANAGEMENT/SOCIAL WORK
Discharge Planning Assessment   [place]     Patient Name: Phan Daniels  MRN: [id number]  Today's Date: 2/12/2025    Admit Date: 2/8/2025     Discharge Plan       Row Name 02/12/25 0903       Plan    Plan SS noted surgery consult was placed on 2/11/25 for Temporary Dyalisis Catheter Placement. Pt remains intubated. Pt lives alone prior to admission. Pt does not utilize home health services. [facility] Pt  [facility] wheelchair, walker and cane [facility]. [facility] (not on [facility]), no POA. Per family pt is independent with all ADL's prior to admission. SS to follow and assist with discharge planning.               SONIA GarciaW

## 2025-02-12 NOTE — PROGRESS NOTES
[facility]  PROGRESS NOTE    Patient information:  Name: Phan Daniels  Age/Sex: 69 y.o. male  :  1955        PCP: Cesia Diaz APRN  Attending: Talat Blankenship MD  MRN:  [id number]  Visit Number:  [id number]  LOS: 4  CODE STATUS:    Code Status and Medical Interventions: No CPR (Do Not Attempt to Resuscitate); Full Support; Nephew Ashutosh, sisters Baylee and Ivy, brothers Dada and Adolph   Ordered at: 02/10/25 1152     Level Of Support Discussed With:    Next of Kin (If No Surrogate)     Code Status (Patient has no pulse and is not breathing):    No CPR (Do Not Attempt to Resuscitate)     Medical Interventions (Patient has pulse or is breathing):    Full Support     Comments:    Nephar Boykin, sisters Baylee and Ivy, brothers Alaina     PROBLEM LIST:Principal Problem:    Septic shock    Reason for Cardiology follow-up: Found unresponsive     Subjective   ADMISSION INFORMATION:  Chief Complaint   Patient presents with    Respiratory Distress     DATE:2025    Admission information/HPI:  69-year-old male with past medical history is significant for A-fib, COPD, hypertension, dyslipidemia, GERD, alcohol abuse, and smoking.     He was admitted on 2025 after he was found unresponsive on the floor for unknown amount of time. Patient actually came to visit his girlfriend from [place]. He was found apparently by a neighbor and was covered in stool with irregular breathing with O2 sat in the 50s. Patient was put on a nonrebreather and was subsequently intubated in the ER. He was given Narcan with no improvement in mental status. He was found to be hypotensive as well and started on Levophed.  Central line was placed.      Patient goes to [facility] in [place] where he lives.     Interval History:   According to patient's I & O flow chart total urine output was 60 mL and his net balance is +747.6 mL overnight. AM labs reveal potassium  3.1, chloride 97, CO2 24.1, BUN 76, and a creatinine of 10.21.  Glucose 308, hemoglobin 11.9, platelet count 144, and WBC is 18.87.  Potassium has been supplemented per patient's MAR.    Patient was in room CCU 7 when he was seen and examined.  Patient remains intubated and sedated.  Patient receiving dialysis at this time.    ORDERS:   DC IV Amiodarone, start 400 mg PO daily x 14 days (02/25/2025), then on 02/26/2025, start 200 mg PO daily.    EVENT TIMELINE:   2/08: Orally intubated in ED.   2/8: TTE w EF 55-60%.  Please see full report attached below.  2/10: Initiated IV Amiodarone per protocol   2/12: DC IV Amiodarone, start 400 mg PO daily x 14 days (02/25/2025), then on 02/26/2025, start 200 mg PO daily.    Objective     Vital Signs  Temp:  [98.4 °F (36.9 °C)-99.7 °F (37.6 °C)] 98.4 °F (36.9 °C)  Heart Rate:  [] 116  Resp:  [16-33] 33  BP: ()/() 103/88  FiO2 (%):  [50 %-60 %] 50 %  Device (Oxygen Therapy): ventilator  Vital Signs (last 72 hrs)         02/09 0700  02/10 0659 02/10 0700  02/11 0659 02/11 0700  02/12 0659 02/12 0700  02/12 0953   Most Recent      Temp (°F) 97.7 -  100.1    97.9 -  101.3    98.4 -  102.3      98.4     98.4 (36.9) 02/12 0800    Heart Rate 96 -  142    74 -  144    96 -  123    93 -  112     106 02/12 0930    Resp 26 -  30    24 -  30      24      24     24 02/12 0815    BP 78/58 -  117/76    83/59 -  116/81    85/60 -  141/105    93/73 -  122/88     103/88 02/12 0930    SpO2 (%) 95 -  100    91 -  100    90 -  98    92 -  94     94 02/12 0930    Flow (L/min) (Oxygen Therapy)   15           15 02/09 1902    Oxygen Concentration (%) 65 -  75    50 -  65    50 -  60      60     60 02/12 0815          BMI:Body mass index is 32.62 kg/m².    WEIGHT:      02/09/25  0615 02/10/25  0600 02/11/25  0551   Weight: 98.9 kg (218 lb 1.1 oz) 102 kg (224 lb 3.3 oz) 109 kg (240 lb 8.4 oz)       DIET:NPO Diet NPO Type: Tube Feeding, Other (see comments)    I&O:  Intake & Output  "(last 3 days)         02/09 0701  02/10 0700 02/10 0701  02/11 0700 02/11 0701 02/12 0700 02/12 0701 02/13 0700    P.O.  0      I.V. (mL/kg) 5033.4 (50.9) 3236.5 (31.7) 2994.6 (29.4)     Other  0 0     NG/GT  182 613     IV Piggyback 700 600 200     Total Intake(mL/kg) 5733.4 (58) 4018.5 (39.4) 3807.6 (37.3)     Urine (mL/kg/hr) 80 (0) 70 (0) 60 (0)     Emesis/NG output  0 0     Stool  0 0     Dialysis   3000     Total Output 80 70 3060     Net +5653.4 +3948.5 +747.6             Stool Unmeasured Occurrence  0 x 0 x     Emesis Unmeasured Occurrence  0 x 0 x              PHYSICAL EXAM:  Constitutional:       Appearance: Not in distress. Acutely ill-appearing.   HENT:         Comments: Orally intubated.  Neck:      Vascular: JVD normal.   Pulmonary:      Comments: Intubated with mechanical ventilation.   Cardiovascular:      Tachycardia present. Irregular rhythm.   Edema:     Peripheral edema present.  Musculoskeletal:      Comments: Sedated.  Skin:     General: Skin is warm and dry.   Neurological:      Comments: Intubated and sedated.                   Results review   Results Review:    I have reviewed the patient's new clinical results. 02/12/25 16:41 EST    Results from last 7 days   Lab Units 02/12/25  0412 02/10/25  0304 02/09/25  0010 02/08/25  1504 02/08/25  1349   CK TOTAL U/L 3,260* 2,404* 3,281*  --  2,687*   HSTROP T ng/L  --   --   --  750* 745*     No results found for: \"PROBNP\"  Results from last 7 days   Lab Units 02/11/25  0234 02/10/25  1024 02/10/25  0304 02/09/25  0010 02/08/25  1349   WBC 10*3/mm3 18.87* 19.56* 18.44* 14.32* 8.18   HEMOGLOBIN g/dL 11.9* 11.3* 12.0* 11.8* 10.6*   PLATELETS 10*3/mm3 144 117* 115* 89* 82*     Results from last 7 days   Lab Units 02/12/25  1057 02/12/25  0412 02/11/25  0536 02/11/25  0234 02/10/25  1712 02/10/25  0928 02/10/25  0304 02/09/25  0800 02/09/25  0010 02/08/25  1349   SODIUM mmol/L  --  142 146*  --   --   --  142  --  141 144   POTASSIUM mmol/L 3.4* 3.1* " 3.9 3.8 3.4* 3.5 3.5   < > 2.7* 2.2*   CHLORIDE mmol/L  --  97* 103  --   --   --  101  --  100 99   CO2 mmol/L  --  24.1 16.0*  --   --   --  18.3*  --  16.3* 21.8*   BUN mg/dL  --  76* 90*  --   --   --  76*  --  68* 70*   CREATININE mg/dL  --  10.21* 11.82*  --   --   --  10.76*  --  10.76* 10.92*   CALCIUM mg/dL  --  6.8* 5.9*  --   --   --  6.5*  --  7.4* 6.1*   GLUCOSE mg/dL  --  282* 189*  --   --   --  179*  --  215* 134*   ALT (SGPT) U/L  --   --   --   --   --   --  59*  --  73* 70*   AST (SGOT) U/L  --   --   --   --   --   --  136*  --  238* 271*    < > = values in this interval not displayed.     Lab Results   Component Value Date    MG 1.9 02/10/2025    MG 2.0 02/09/2025    MG 1.2 (L) 02/08/2025     Estimated Creatinine Clearance: 8.8 mL/min (A) (by C-G formula based on SCr of 10.21 mg/dL (H)).    Lab Results   Component Value Date    HGBA1C 6.4 (H) 10/15/2024     Lab Results   Component Value Date    TRIG 225 (H) 02/12/2025        Lab Results   Component Value Date    INR 1.16 (H) 02/10/2025    INR 1.21 (H) 02/08/2025     Lab Results   Component Value Date    LABHEPA 0.64 02/12/2025    LABHEPA 0.53 02/12/2025    LABHEPA 0.49 02/11/2025    LABHEPA 0.41 02/10/2025       Lab Results   Component Value Date    TSH 0.196 (L) 02/09/2025      Pain Management Panel          Latest Ref Rng & Units 2/8/2025   Pain Management Panel   Amphetamine, Urine Qual Negative Negative    Barbiturates Screen, Urine Negative Negative    Benzodiazepine Screen, Urine Negative Negative    Buprenorphine, Screen, Urine Negative Negative    Cocaine Screen, Urine Negative Negative    Fentanyl, Urine Negative Negative    Methadone Screen , Urine Negative Negative    Methamphetamine, Ur Negative Negative       Details                 Microbiology Results (last 10 days)       Procedure Component Value - Date/Time    Blood Culture - Blood, Arm, Right [[id number]]  (Normal) Collected: 02/10/25 0752    Lab Status: Preliminary result  Specimen: Blood from Arm, Right Updated: 02/12/25 0900     Blood Culture No growth at 2 days    Blood Culture - Blood, Arm, Left [[id number]]  (Normal) Collected: 02/10/25 0752    Lab Status: Preliminary result Specimen: Blood from Arm, Left Updated: 02/12/25 0900     Blood Culture No growth at 2 days    Respiratory Culture - Sputum, ET Suction [[id number]]  (Abnormal) Collected: 02/09/25 0441    Lab Status: Final result Specimen: Sputum from ET Suction Updated: 02/10/25 1031     Respiratory Culture Heavy growth (4+) Streptococcus agalactiae (Group B)     Comment:   This organism is considered to be universally susceptible to penicillin.  No further antibiotic testing will be performed. If Clindamycin or Erythromycin is the drug of choice, notify the laboratory within 7 days to request susceptibility testing.         Heavy growth (4+) Normal respiratory lu. No S. aureus or Pseudomonas aeruginosa detected. Final report.     Gram Stain Moderate (3+) WBCs observed - predominantly neutrophils      Rare (1+) Squamous epithelial cells      Few (2+) Gram positive cocci in chains      Rare (1+) Yeast      Rare (1+) Gram negative bacilli      Rare (1+) Gram positive bacilli    S. Pneumo Ag Urine or CSF - Urine, Urine, Clean Catch [[id number]]  (Normal) Collected: 02/09/25 0249    Lab Status: Final result Specimen: Urine, Clean Catch Updated: 02/09/25 1345     Strep Pneumo Ag Negative    Legionella Antigen, Urine - Urine, Urine, Catheter [[id number]]  (Normal) Collected: 02/08/25 1351    Lab Status: Final result Specimen: Urine, Catheter Updated: 02/08/25 2116     LEGIONELLA ANTIGEN, URINE Negative    Narrative:      Presumptive negative for L. pneumophilia serogroup 1 antigen, suggesting no recent or current infection.    Blood Culture - Blood, Arm, Left [[id number]]  (Normal) Collected: 02/08/25 1349    Lab Status: Preliminary result Specimen: Blood from Arm, Left Updated: 02/12/25 1415     Blood Culture No growth at 4 days     Blood Culture - Blood, Arm, Right [[id number]]  (Abnormal) Collected: 02/08/25 1349    Lab Status: Final result Specimen: Blood from Arm, Right Updated: 02/10/25 0650     Blood Culture Staphylococcus, coagulase negative     Isolated from Aerobic Bottle     Gram Stain Aerobic Bottle Gram positive cocci in clusters    Narrative:      Probable contaminant requires clinical correlation, susceptibility not performed unless requested by physician.      Blood Culture ID, PCR - Blood, Arm, Right [[id number]]  (Abnormal) Collected: 02/08/25 1349    Lab Status: Final result Specimen: Blood from Arm, Right Updated: 02/09/25 1210     BCID, PCR Staph spp, not aureus or lugdunensis. Identification by BCID2 PCR.     BOTTLE TYPE Aerobic Bottle           Imaging Results (Last 24 Hours)       ** No results found for the last 24 hours. **          ECHO:  Results for orders placed during the hospital encounter of 02/08/25    Adult Transthoracic Echo Complete W/ Cont if Necessary Per Protocol    Interpretation Summary    Left ventricle is normal in size and function with estimate ejection fraction of 55 to 60%. Normal diastolic function.    Right ventricle appears normal in size and function.    Normal left atrial cavity size noted. No evidence of a patent foramen ovale. No evidence of an atrial septal defect present.    Normal right atrial cavity size noted. The inferior vena cava is dilated. Partial IVC inspiratory collapse of less than 50% noted.    Aortic valve appears trileaflet. There is no significant aortic stenosis or regurgitation.    There is mild mitral regurgitation.    Estimated right ventricular systolic pressure from tricuspid regurgitation is moderately elevated (45-55 mmHg). Mild tricuspid regurgitation. Estimated RVSP is 45 mmHg with estimated RA pressure of 10 mmHg.    There is no evidence of pericardial effusion. . There is evidence of a fat pad present.    The aortic root measures 3.9 cm.        STRESS TEST:  No  results found for this or any previous visit.      HEART CATH:  No results found for this or any previous visit.        EP STUDY/Interrogations:   No results found for this or any previous visit.        TELEMETRY:                  I reviewed the patient's new clinical results.    ALLERGIES: Indomethacin er, Isoniazid, and Penicillins    Medication Review:   Current list of medications may not reflect those currently placed in orders that are not signed or are being held.     [START ON 2/26/2025] amiodarone, 200 mg, Oral, Q24H  amiodarone, 400 mg, Oral, Q24H  cefTRIAXone, 2,000 mg, Intravenous, Q24H  cetirizine, 5 mg, Oral, Daily  chlorhexidine, 15 mL, Mouth/Throat, Q12H  finasteride, 5 mg, Oral, Daily  folic acid, 1 mg, Oral, Daily  hydrocortisone sodium succinate, 25 mg, Intravenous, Q8H  insulin glargine, 20 Units, Subcutaneous, Daily  insulin regular, 4-24 Units, Subcutaneous, Q6H  ipratropium-albuterol, 3 mL, Nebulization, 4x Daily - RT  midodrine, 15 mg, Oral, TID AC  mupirocin, 1 Application, Each Nare, BID  pantoprazole, 40 mg, Intravenous, BID AC  [Held by provider] rosuvastatin, 10 mg, Oral, Nightly  sodium chloride, 10 mL, Intravenous, Q12H  sodium chloride, 10 mL, Intravenous, Q12H  sodium chloride, 10 mL, Intravenous, Q12H  sodium chloride, 10 mL, Intravenous, Q12H  sodium chloride, 10 mL, Intravenous, Q12H  thiamine, 200 mg, Per G Tube, Daily  Vancomycin Pharmacy Intermittent/Pulse Dosing, , Not Applicable, Daily  vitamin B-12, 1,000 mcg, Oral, Daily      dexmedetomidine, 0.2-1.5 mcg/kg/hr (Dosing Weight), Last Rate: 0.4 mcg/kg/hr (02/12/25 0815)  fentanyl 10 mcg/mL,  mcg/hr, Last Rate: Stopped (02/12/25 0733)  heparin, 6.8 Units/kg/hr (Dosing Weight), Last Rate: 6.8 Units/kg/hr (02/12/25 1612)  Pharmacy to Dose Heparin,   phenylephrine, 0.5-3 mcg/kg/min (Dosing Weight), Last Rate: Stopped (02/11/25 2830)  propofol, 5-50 mcg/kg/min (Dosing Weight), Last Rate: Stopped (02/12/25 4402)         acetaminophen    senna-docusate sodium **AND** polyethylene glycol **AND** bisacodyl **AND** bisacodyl    Calcium Replacement - Follow Nurse / BPA Driven Protocol    dextrose    dextrose    dextrose    dextrose    fluticasone    glucagon (human recombinant)    glucagon (human recombinant)    Magnesium Standard Dose Replacement - Follow Nurse / BPA Driven Protocol    Pharmacy to Dose Heparin    Phosphorus Replacement - Follow Nurse / BPA Driven Protocol    Polyvinyl Alcohol-Povidone PF    Potassium Replacement - Follow Nurse / BPA Driven Protocol    senna    sodium chloride    sodium chloride    sodium chloride    sodium chloride    sodium chloride    sodium chloride    sodium chloride    sodium chloride      Assessment      1.  Septic shock with pneumonia and respiratory failure requiring mechanical ventilation  2.  Encephalopathy secondary to above  3.  Atrial fibrillation with intermittent RVR [id number] score of at least 2  4.  Elevated high-sensitivity troponin consistent with NSTEMI type I versus type II most likely type II  5.  Hypokalemia  6.  History of alcohol abuse  7.  Tobacco abuse  8.  History of esophageal dilatation last on 6/13/2024  9.  ASHLEY on CKD nephrology is following  10. Rhabdomyolysis             Recommendations / Plan      -Flecainide was stopped, unable to do digoxin due to significant renal failure, only option would be amiodarone.   -Currently rate controlled on amiodarone drip, will switch to oral amiodarone 400 Mg daily for 2 weeks followed by 200 Mg daily [per OG tube].  -On heparin drip for thromboembolic prophylaxis.  Consider DOAC on discharge.  -Recommend aspirin statin.  -As per chart review documented on 12/15/2022 that patient has history of paroxysmal A-fib and is on flecainide but not on anticoagulation.  Reason is not mentioned.  -Guarded prognosis.    Cardiology will follow from distance.  Please call for any questions.        I have discussed the patients findings and  recommendations with the patient.    Thank you very much for asking us to be involved in this patient's care.      Electronically signed by GAGE Angulo, 02/12/25, 4:41 PM EST.   Electronically signed by Sabrina Marsh MD, 02/12/25, 7:24 PM EST.                   Please note that portions of this note were completed with a voice recognition program.    Please note that portions of this note were copied and has been reviewed and is accurate as of 2/12/2025 .

## 2025-02-12 NOTE — PLAN OF CARE
Problem: Adult Inpatient Plan of Care  Goal: Plan of Care Review  Outcome: Progressing  Flowsheets (Taken 2/12/2025 0309)  Progress: no change  Plan of Care Reviewed With: patient  Goal: Patient-Specific Goal (Individualized)  Outcome: Progressing  Goal: Absence of Hospital-Acquired Illness or Injury  Outcome: Progressing  Intervention: Identify and Manage Fall Risk  Recent Flowsheet Documentation  Taken 2/12/2025 0300 by Jemma Tian RN  Safety Promotion/Fall Prevention: safety round/check completed  Taken 2/12/2025 0200 by Jemma Tian RN  Safety Promotion/Fall Prevention: safety round/check completed  Taken 2/12/2025 0100 by Jemma Tian RN  Safety Promotion/Fall Prevention: safety round/check completed  Taken 2/12/2025 0000 by Jemma Tian RN  Safety Promotion/Fall Prevention: safety round/check completed  Taken 2/11/2025 2300 by Jemma Tian RN  Safety Promotion/Fall Prevention: safety round/check completed  Taken 2/11/2025 2200 by Jemma Tian RN  Safety Promotion/Fall Prevention: safety round/check completed  Taken 2/11/2025 2100 by Jemma Tian RN  Safety Promotion/Fall Prevention: safety round/check completed  Taken 2/11/2025 2000 by Jemma Tian RN  Safety Promotion/Fall Prevention: safety round/check completed  Taken 2/11/2025 1900 by Jemma Tian RN  Safety Promotion/Fall Prevention: safety round/check completed  Intervention: Prevent Skin Injury  Recent Flowsheet Documentation  Taken 2/12/2025 0200 by Jemma Tian RN  Body Position:   turned   side-lying   right  Skin Protection:   transparent dressing maintained   skin sealant/moisture barrier applied   silicone foam dressing in place   pulse oximeter probe site changed   incontinence pads utilized   drying agents applied  Taken 2/12/2025 0000 by Jemma Tian RN  Body Position:   turned   side-lying   left  Taken 2/11/2025 2200 by Jemma Tian RN  Body Position:   turned   side-lying   right  Taken 2/11/2025 2000  by Jemma Tian RN  Body Position:   turned   side-lying   left  Skin Protection:   transparent dressing maintained   skin sealant/moisture barrier applied   silicone foam dressing in place   pulse oximeter probe site changed   incontinence pads utilized   drying agents applied  Intervention: Prevent and Manage VTE (Venous Thromboembolism) Risk  Recent Flowsheet Documentation  Taken 2/12/2025 0200 by Jemma Tian RN  VTE Prevention/Management: (see MAR)   other (see comments)   bilateral   SCDs (sequential compression devices) on  Taken 2/11/2025 2000 by Jemma Tian RN  VTE Prevention/Management: (see MAR; bilat SCDs removed for skin assessment)   SCDs (sequential compression devices) off   other (see comments)   bilateral  Intervention: Prevent Infection  Recent Flowsheet Documentation  Taken 2/12/2025 0200 by Jemma Tian RN  Infection Prevention:   hand hygiene promoted   rest/sleep promoted  Taken 2/11/2025 2000 by Jemma Tian RN  Infection Prevention:   hand hygiene promoted   rest/sleep promoted  Goal: Optimal Comfort and Wellbeing  Outcome: Progressing  Intervention: Provide Person-Centered Care  Recent Flowsheet Documentation  Taken 2/12/2025 0200 by Jemma Tian RN  Trust Relationship/Rapport:   care explained   choices provided  Taken 2/11/2025 2000 by Jemma Tian RN  Trust Relationship/Rapport:   care explained   choices provided  Goal: Readiness for Transition of Care  Outcome: Progressing     Problem: Violence Risk or Actual  Goal: Anger and Impulse Control  Outcome: Progressing  Intervention: Minimize Safety Risk  Recent Flowsheet Documentation  Taken 2/12/2025 0300 by Jemma Tian RN  Enhanced Safety Measures: bed alarm set  Taken 2/12/2025 0200 by Jemma Tian RN  Enhanced Safety Measures: bed alarm set  Taken 2/12/2025 0100 by Jemma Tian RN  Enhanced Safety Measures: bed alarm set  Taken 2/12/2025 0000 by Jemma Tian RN  Enhanced Safety Measures: bed alarm  set  Taken 2/11/2025 2300 by Jemma Tian RN  Enhanced Safety Measures: bed alarm set  Taken 2/11/2025 2200 by Jemma Tian RN  Enhanced Safety Measures: bed alarm set  Taken 2/11/2025 2100 by Jemma Tian RN  Enhanced Safety Measures: bed alarm set  Taken 2/11/2025 2000 by Jemma Tian RN  Enhanced Safety Measures: bed alarm set  Taken 2/11/2025 1900 by Jemma Tian RN  Enhanced Safety Measures: bed alarm set     Problem: Fall Injury Risk  Goal: Absence of Fall and Fall-Related Injury  Outcome: Progressing  Intervention: Identify and Manage Contributors  Recent Flowsheet Documentation  Taken 2/12/2025 0300 by Jemma Tian RN  Medication Review/Management: medications reviewed  Taken 2/12/2025 0200 by Jemma Tian RN  Medication Review/Management: medications reviewed  Taken 2/12/2025 0100 by Jemma Tian RN  Medication Review/Management: medications reviewed  Taken 2/12/2025 0000 by Jemma Tian RN  Medication Review/Management: medications reviewed  Taken 2/11/2025 2300 by Jemma Tian RN  Medication Review/Management: medications reviewed  Taken 2/11/2025 2200 by Jemma Tian RN  Medication Review/Management: medications reviewed  Taken 2/11/2025 2100 by Jemma Tian RN  Medication Review/Management: medications reviewed  Taken 2/11/2025 2000 by Jemma Tian RN  Medication Review/Management: medications reviewed  Taken 2/11/2025 1900 by Jemma Tian RN  Medication Review/Management: medications reviewed  Intervention: Promote Injury-Free Environment  Recent Flowsheet Documentation  Taken 2/12/2025 0300 by Jemma Tian RN  Safety Promotion/Fall Prevention: safety round/check completed  Taken 2/12/2025 0200 by Jemma Tian RN  Safety Promotion/Fall Prevention: safety round/check completed  Taken 2/12/2025 0100 by Jemma Tian RN  Safety Promotion/Fall Prevention: safety round/check completed  Taken 2/12/2025 0000 by Jemma Tian RN  Safety Promotion/Fall  Prevention: safety round/check completed  Taken 2/11/2025 2300 by Jemma Tian RN  Safety Promotion/Fall Prevention: safety round/check completed  Taken 2/11/2025 2200 by Jemma Tian RN  Safety Promotion/Fall Prevention: safety round/check completed  Taken 2/11/2025 2100 by Jemma Tian RN  Safety Promotion/Fall Prevention: safety round/check completed  Taken 2/11/2025 2000 by Jemma Tian RN  Safety Promotion/Fall Prevention: safety round/check completed  Taken 2/11/2025 1900 by Jemma Tian RN  Safety Promotion/Fall Prevention: safety round/check completed     Problem: Skin Injury Risk Increased  Goal: Skin Health and Integrity  Outcome: Progressing  Intervention: Optimize Skin Protection  Recent Flowsheet Documentation  Taken 2/12/2025 0200 by Jemma Tian RN  Activity Management: bedrest  Pressure Reduction Techniques:   frequent weight shift encouraged   positioned off wounds   heels elevated off bed   pressure points protected   weight shift assistance provided  Head of Bed (HOB) Positioning: HOB at 30-45 degrees  Pressure Reduction Devices:   specialty bed utilized   pressure-redistributing mattress utilized   positioning supports utilized   heel offloading device utilized  Skin Protection:   transparent dressing maintained   skin sealant/moisture barrier applied   silicone foam dressing in place   pulse oximeter probe site changed   incontinence pads utilized   drying agents applied  Taken 2/12/2025 0000 by Jemma Tian RN  Head of Bed (HOB) Positioning: HOB at 30-45 degrees  Taken 2/11/2025 2200 by Jemma Tian RN  Head of Bed (HOB) Positioning: HOB at 30-45 degrees  Taken 2/11/2025 2000 by Jemma Tian RN  Activity Management: bedrest  Pressure Reduction Techniques:   frequent weight shift encouraged   positioned off wounds   pressure points protected   weight shift assistance provided  Head of Bed (HOB) Positioning: HOB at 30-45 degrees  Pressure Reduction Devices:   specialty  bed utilized   pressure-redistributing mattress utilized   positioning supports utilized   heel offloading device utilized  Skin Protection:   transparent dressing maintained   skin sealant/moisture barrier applied   silicone foam dressing in place   pulse oximeter probe site changed   incontinence pads utilized   drying agents applied     Problem: Comorbidity Management  Goal: Maintenance of COPD Symptom Control  Outcome: Progressing  Intervention: Maintain COPD (Chronic Obstructive Pulmonary Disease) Symptom Control  Recent Flowsheet Documentation  Taken 2/12/2025 0300 by Jemma Tian RN  Medication Review/Management: medications reviewed  Taken 2/12/2025 0200 by Jemma Tian RN  Medication Review/Management: medications reviewed  Taken 2/12/2025 0100 by Jemma Tian RN  Medication Review/Management: medications reviewed  Taken 2/12/2025 0000 by Jemma Tian RN  Medication Review/Management: medications reviewed  Taken 2/11/2025 2300 by Jemma Tian RN  Medication Review/Management: medications reviewed  Taken 2/11/2025 2200 by Jemma Tian RN  Medication Review/Management: medications reviewed  Taken 2/11/2025 2100 by Jemma Tian RN  Medication Review/Management: medications reviewed  Taken 2/11/2025 2000 by Jemma Tian RN  Medication Review/Management: medications reviewed  Taken 2/11/2025 1900 by Jemma Tian RN  Medication Review/Management: medications reviewed     Problem: Sepsis/Septic Shock  Goal: Optimal Coping  Outcome: Progressing  Intervention: Support Patient and Family Response  Recent Flowsheet Documentation  Taken 2/12/2025 0200 by Jemma Tian RN  Family/Support System Care: support provided  Taken 2/11/2025 2000 by Jemma Tian RN  Family/Support System Care: support provided  Goal: Absence of Bleeding  Outcome: Progressing  Goal: Blood Glucose Level Within Target Range  Outcome: Progressing  Goal: Absence of Infection Signs and Symptoms  Outcome:  Progressing  Intervention: Initiate Sepsis Management  Recent Flowsheet Documentation  Taken 2/12/2025 0200 by Jemma Tian RN  Infection Prevention:   hand hygiene promoted   rest/sleep promoted  Taken 2/11/2025 2000 by Jemma Tian RN  Infection Prevention:   hand hygiene promoted   rest/sleep promoted  Intervention: Promote Recovery  Recent Flowsheet Documentation  Taken 2/12/2025 0200 by Jemma Tian RN  Activity Management: bedrest  Taken 2/11/2025 2000 by Jemma Tian RN  Activity Management: bedrest  Goal: Optimal Nutrition Delivery  Outcome: Progressing     Problem: Mechanical Ventilation Invasive  Goal: Effective Communication  Outcome: Progressing  Intervention: Ensure Effective Communication  Recent Flowsheet Documentation  Taken 2/12/2025 0200 by Jemma Tian RN  Trust Relationship/Rapport:   care explained   choices provided  Diversional Activities: television  Family/Support System Care: support provided  Taken 2/11/2025 2000 by Jemma Tian RN  Trust Relationship/Rapport:   care explained   choices provided  Diversional Activities: television  Family/Support System Care: support provided  Goal: Optimal Device Function  Outcome: Progressing  Intervention: Optimize Device Care and Function  Recent Flowsheet Documentation  Taken 2/12/2025 0200 by Jemma Tian RN  Airway Safety Measures: suction at bedside  Taken 2/12/2025 0000 by Jemma Tian RN  Oral Care:   swabbed with antiseptic solution   suction provided   lip/mouth moisturizer applied  Taken 2/11/2025 2000 by Jemma Tian RN  Oral Care:   swabbed with antiseptic solution   suction provided   lip/mouth moisturizer applied  Airway Safety Measures: suction at bedside  Goal: Mechanical Ventilation Liberation  Outcome: Progressing  Intervention: Promote Extubation and Mechanical Ventilation Liberation  Recent Flowsheet Documentation  Taken 2/12/2025 0300 by Jemma Tian RN  Medication Review/Management: medications  reviewed  Taken 2/12/2025 0200 by Jemma Tian RN  Medication Review/Management: medications reviewed  Taken 2/12/2025 0100 by Jemma Tian RN  Medication Review/Management: medications reviewed  Taken 2/12/2025 0000 by Jemma Tian RN  Medication Review/Management: medications reviewed  Taken 2/11/2025 2300 by Jemma Tian RN  Medication Review/Management: medications reviewed  Taken 2/11/2025 2200 by Jemma Tian RN  Medication Review/Management: medications reviewed  Taken 2/11/2025 2100 by Jemma Tian RN  Medication Review/Management: medications reviewed  Taken 2/11/2025 2000 by Jemma Tian RN  Medication Review/Management: medications reviewed  Taken 2/11/2025 1900 by Jemma Tian RN  Medication Review/Management: medications reviewed  Goal: Optimal Nutrition Delivery  Outcome: Progressing  Goal: Absence of Device-Related Skin and Tissue Injury  Outcome: Progressing  Intervention: Maintain Skin and Tissue Health  Recent Flowsheet Documentation  Taken 2/12/2025 0200 by Jemma Tian RN  Device Skin Pressure Protection:   tubing/devices free from skin contact   skin-to-skin areas padded   skin-to-device areas padded   pressure points protected   positioning supports utilized   adhesive use limited   absorbent pad utilized/changed  Taken 2/11/2025 2000 by Jemma Tian RN  Device Skin Pressure Protection:   tubing/devices free from skin contact   skin-to-skin areas padded   skin-to-device areas padded   pressure points protected   positioning supports utilized   adhesive use limited   absorbent pad utilized/changed  Goal: Absence of Ventilator-Induced Lung Injury  Outcome: Progressing  Intervention: Prevent Ventilator-Associated Pneumonia  Recent Flowsheet Documentation  Taken 2/12/2025 0200 by Jemma Tian RN  Head of Bed (HOB) Positioning: HOB at 30-45 degrees  Taken 2/12/2025 0000 by Jemma Tian RN  Head of Bed (HOB) Positioning: HOB at 30-45 degrees  Oral Care:   swabbed  with antiseptic solution   suction provided   lip/mouth moisturizer applied  Taken 2/11/2025 2200 by Jemma Tian RN  Head of Bed ([facility]) Positioning: HOB at 30-45 degrees  Taken 2/11/2025 2000 by Jemma Tian RN  Head of Bed ([facility]) Positioning: HOB at 30-45 degrees  VAP Prevention Bundle:   HOB elevation maintained   oral care regularly provided   VTE prophylaxis provided   vent circuit breaks minimized   stress ulcer prophylaxis provided  VAP Prevention Measures: completed  Oral Care:   swabbed with antiseptic solution   suction provided   lip/mouth moisturizer applied   Goal Outcome Evaluation:  Plan of Care Reviewed With: patient        Progress: no change

## 2025-02-12 NOTE — PROGRESS NOTES
NEPHROLOGY PROGRESS NOTE      INTERVAL HISTORY:    HPI, decreased level of consciousness respiratory distress  Patient remains intubated on mechanical ventilation noted FiO2 unchanged overnight remains at 60%.    STATUS OF ACUTE AND CHRONIC PROBLEMS;  1.  Patient tolerated dialysis very well, had about 3 L of ultrafiltration  2.  Patient remains anuric with urine output 60 mL in last 24 hours noted        Intake/Output                         02/10/25 0701 - 02/11/25 0700 02/11/25 0701 - 02/12/25 0700     5410-0287 1648-8893 Total 4761-1829 1031-0002 Total                 Intake    P.O.  --  0 0  --  -- --    I.V.  --  3236.5 3236.5  1065.3  1929.3 2994.6    Other  --  0 0  --  0 0    Flush/ Irrigation Intake (mL) (NG/OG Tube Orogastric 16 Fr Left mouth) -- 0 0 -- 0 0    NG/GT  --  182 182  160  453 613    IV Piggyback  300  300 600  --  200 200    Total Intake 300 3718.5 4018.5 1225.3 2582.3 3807.6       Output    Urine  35  35 70  30  30 60    Emesis/NG output  --  0 0  --  0 0    Stool  --  0 0  --  0 0    Dialysis  --  -- --  --  3000 3000    Total Output 35 35 70 30 3030 3060             VITAL SIGNS :     Temp:  [98.4 °F (36.9 °C)-102.3 °F (39.1 °C)] 98.8 °F (37.1 °C)  Heart Rate:  [] 93  Resp:  [24] 24  BP: ()/() 106/74  FiO2 (%):  [50 %-60 %] 60 %    PHYSICAL EXAMINATION:    GENERAL EXAM  Laying in bed, intubated  Comfortable on mechanical ventilation    MENTAL STATUS EXAM  Sedated    NECK EXAM  JVP is not elevated, carotid exam normal    CARDIOVASCULAR EXAM  Regular rate and rhythm, no murmurs noted, no added heart sounds, normal apical pulsation  no edema in the lower extremities  2+ radial pulses bilateral, 2+ femoral/tibial pulses bilaterally    MUSCULOSKELETAL EXAM  No cyanosis or clubbing noted in the digits    RESPIRATORY EXAM  Lungs clear to auscultation bilaterally, respiratory effort normal    ABDOMINAL EXAM  Abdomen soft and non tender, normal bowel sounds    SKIN EXAM  No new  "sobia      Laboratory Data :     Albumin Albumin   Date Value Ref Range Status   02/10/2025 2.4 (L) 3.5 - 5.2 g/dL Final      Magnesium Magnesium   Date Value Ref Range Status   02/10/2025 1.9 1.6 - 2.4 mg/dL Final          PTH               No results found for: \"PTH\"    CBC and coagulation:  Results from last 7 days   Lab Units 02/12/25  0412 02/11/25  0234 02/10/25  1024 02/10/25  0742 02/10/25  0304 02/09/25  0010 02/08/25  1349   PROCALCITONIN ng/mL 6.34* 9.49*  --   --   --   --  5.90*   LACTATE mmol/L  --   --   --   --   --   --  1.2   CRP mg/dL  --   --   --   --   --   --  9.12*   WBC 10*3/mm3  --  18.87* 19.56*  --  18.44*   < > 8.18   HEMOGLOBIN g/dL  --  11.9* 11.3*  --  12.0*   < > 10.6*   HEMATOCRIT %  --  36.1* 34.3*  --  36.3*   < > 31.1*   MCV fL  --  98.9* 98.6*  --  97.1*   < > 95.7   MCHC g/dL  --  33.0 32.9  --  33.1   < > 34.1   PLATELETS 10*3/mm3  --  144 117*  --  115*   < > 82*   INR   --   --   --  1.16*  --   --  1.21*    < > = values in this interval not displayed.     Acid/base balance:  Results from last 7 days   Lab Units 02/09/25  0742 02/08/25  1348   PH, ARTERIAL pH units 7.289* 7.318*   PO2 ART mm Hg 85.2 106.0   PCO2, ARTERIAL mm Hg 41.4 46.9*   HCO3 ART mmol/L 19.9* 24.1     Renal and electrolytes:    Results from last 7 days   Lab Units 02/12/25  0412 02/11/25  0536 02/11/25  0234 02/10/25  0928 02/10/25  0304 02/09/25  0800 02/09/25  0010 02/08/25  1349   SODIUM mmol/L 142 146*  --   --  142  --  141 144   POTASSIUM mmol/L 3.1* 3.9 3.8   < > 3.5   < > 2.7* 2.2*   MAGNESIUM mg/dL  --   --   --   --  1.9  --  2.0 1.2*   CHLORIDE mmol/L 97* 103  --   --  101  --  100 99   CO2 mmol/L 24.1 16.0*  --   --  18.3*  --  16.3* 21.8*   BUN mg/dL 76* 90*  --   --  76*  --  68* 70*   CREATININE mg/dL 10.21* 11.82*  --   --  10.76*  --  10.76* 10.92*   CALCIUM mg/dL 6.8* 5.9*  --   --  6.5*  --  7.4* 6.1*   PHOSPHORUS mg/dL  --   --   --   --   --   --  8.3* 6.1*    < > = values in this " interval not displayed.     Estimated Creatinine Clearance: 8.8 mL/min (A) (by C-G formula based on SCr of 10.21 mg/dL (H)).  @GFRCG:3@   Liver and pancreatic function:  Results from last 7 days   Lab Units 02/10/25  0304 02/09/25  0010 02/08/25  1349   ALBUMIN g/dL 2.4* 2.8* 3.2*   BILIRUBIN mg/dL 0.8 1.6* 1.1   ALK PHOS U/L 46 45 45   AST (SGOT) U/L 136* 238* 271*   ALT (SGPT) U/L 59* 73* 70*   AMMONIA umol/L  --  40  --          Cardiac:      Liver and pancreatic function:  Results from last 7 days   Lab Units 02/10/25  0304 02/09/25  0010 02/08/25  1349   ALBUMIN g/dL 2.4* 2.8* 3.2*   BILIRUBIN mg/dL 0.8 1.6* 1.1   ALK PHOS U/L 46 45 45   AST (SGOT) U/L 136* 238* 271*   ALT (SGPT) U/L 59* 73* 70*   AMMONIA umol/L  --  40  --          CLINICAL DATA REVIEW  CBC, Renal functions, Serum electrolytes, Acid base labs reviewed 1  Telemetry was reviewed and demonstrated sinus rhythm rate   Discussed the labs with Dr. Irby 1    MEDICINES:     cefTRIAXone, 2,000 mg, Intravenous, Q24H  cetirizine, 5 mg, Oral, Daily  chlorhexidine, 15 mL, Mouth/Throat, Q12H  finasteride, 5 mg, Oral, Daily  folic acid, 1 mg, Oral, Daily  hydrocortisone sodium succinate, 100 mg, Intravenous, Q8H  insulin regular, 4-24 Units, Subcutaneous, Q6H  ipratropium-albuterol, 3 mL, Nebulization, 4x Daily - RT  midodrine, 15 mg, Oral, TID AC  mupirocin, 1 Application, Each Nare, BID  pantoprazole, 40 mg, Intravenous, BID AC  rosuvastatin, 10 mg, Oral, Nightly  sodium chloride, 10 mL, Intravenous, Q12H  sodium chloride, 10 mL, Intravenous, Q12H  sodium chloride, 10 mL, Intravenous, Q12H  sodium chloride, 10 mL, Intravenous, Q12H  sodium chloride, 10 mL, Intravenous, Q12H  Vancomycin Pharmacy Intermittent/Pulse Dosing, , Not Applicable, Daily  vitamin B-12, 1,000 mcg, Oral, Daily      amiodarone, 0.5 mg/min, Last Rate: 0.5 mg/min (02/11/25 8631)  dexmedetomidine, 0.2-1.5 mcg/kg/hr (Dosing Weight), Last Rate: 0.2 mcg/kg/hr (02/12/25 9841)  fentanyl  10 mcg/mL,  mcg/hr, Last Rate: Stopped (02/12/25 0733)  heparin, 8.8 Units/kg/hr (Dosing Weight), Last Rate: 9.8 Units/kg/hr (02/11/25 0932)  Pharmacy to Dose Heparin,   phenylephrine, 0.5-3 mcg/kg/min (Dosing Weight), Last Rate: Stopped (02/11/25 2225)  propofol, 5-50 mcg/kg/min (Dosing Weight), Last Rate: Stopped (02/12/25 0733)  sodium bicarbonate 8.4 % 150 mEq in dextrose (D5W) 5 % 1,000 mL infusion (greater than 100 mEq), 150 mEq, Last Rate: 150 mEq (02/12/25 0311)          Assessment & Plan       -Acute kidney injury  - Chronic kidney disease unspecified stage  - Acute hypercapnic hypoxic respiratory failure  - Acute metabolic acidosis  - Severe sepsis with septic shock  - Rhabdomyolysis  - Paroxysmal atrial fibrillation    Tolerated dialysis very well yesterday, rate of urea reduction has been very appropriate.  But still remains at high risk for disequilibrium syndrome, continue monitor and watch closely.  Will do second consecutive dialysis session today low efficacy for 3 hours and 2.5 L of ultrafiltration as tolerated    Acute kidney injury most likely due to acute tubular necrosis in settings shock  Had ASHLEY in October 2024 with creatinine peaked to 4 with some improvement close to 2  - Strict intake and output record.  -Patient remains very high risk  - Please avoid any nephrotoxic agents, hypotension and adjust medications according to estimated GFR.       REVIEWED NOTES OF CLINICAL TEAMS INVOLVED WITH PATIENT CARE.     DISCUSSED IN DETAIL WITH PATIENT AND/OR FAMILY ABOUT CURRENT CLINICAL CONDITION AND RESPONSE TO ONGOING MANAGEMENT.     DISCUSSED IN DETAIL WITH PATIENT AND/OR FAMILY ABOUT RISKS ASSOCIATED WITH CURRENT CLINICAL CONDITION AND RISK INVOLVED WITH CURRENT MANAGEMENT.     DISCUSSED IN DETAIL WITH HOSPITALIST    CODE STATUS REVIEWED,     Saba Bates MD  02/12/25  08:28 EST

## 2025-02-12 NOTE — PROGRESS NOTES
"Palliative Care Daily Progress Note     S: Medical record reviewed, followed up with Primary RN and Dr Irby regarding patient's condition. Upon discussion with RN Gregorio stated Phan had received HD last night and was to have it again today, also that pt is now off of vasopressor and was currently in awakening trial. He was on Precedex gtt, bicarb, heparin and amiodarone gtt      O:   Palliative Performance Scale Score:     /96   Pulse 112   Temp 98.3 °F (36.8 °C) (Axillary)   Resp (!) 33   Ht 182.9 cm (72\")   Wt 109 kg (240 lb 8.4 oz)   SpO2 93%   BMI 32.62 kg/m²     Intake/Output Summary (Last 24 hours) at 2/12/2025 1740  Last data filed at 2/12/2025 1507  Gross per 24 hour   Intake 3807.63 ml   Output 4760 ml   Net -952.37 ml       PE:  General Appearance:    Chronically ill appearing, intubated and sedation off on breathing trial NAD   HEENT:    NC/AT, without obvious abnormality, EOMI, anicteric    Neck:   supple, trachea midline, no JVD   Lungs:     Sedated off vent at 50% and 8 of peep, (on breathing trial)diminished breath sounds, coarse breath sounds    Heart:    Irregular rate/rhythm, no M/R/G   Abdomen:     Soft, NT, ND, NABS    Extremities:   Moves all extremities weakly , trace bilateral lower extremity edema   Pulses:   Pulses palpable and equal bilaterally   Skin:   Warm, dry   Neurologic: Sedated on vent   Psych: Sedated on vent         Meds: Reviewed and changes noted    Labs:   Results from last 7 days   Lab Units 02/11/25  0234   WBC 10*3/mm3 18.87*   HEMOGLOBIN g/dL 11.9*   HEMATOCRIT % 36.1*   PLATELETS 10*3/mm3 144     Results from last 7 days   Lab Units 02/12/25  1057 02/12/25  0412   SODIUM mmol/L  --  142   POTASSIUM mmol/L 3.4* 3.1*   CHLORIDE mmol/L  --  97*   CO2 mmol/L  --  24.1   BUN mg/dL  --  76*   CREATININE mg/dL  --  10.21*   GLUCOSE mg/dL  --  282*   CALCIUM mg/dL  --  6.8*     Results from last 7 days   Lab Units 02/12/25  1057 02/12/25  0412 02/10/25  0928 " 02/10/25  0304   SODIUM mmol/L  --  142   < > 142   POTASSIUM mmol/L 3.4* 3.1*   < > 3.5   CHLORIDE mmol/L  --  97*   < > 101   CO2 mmol/L  --  24.1   < > 18.3*   BUN mg/dL  --  76*   < > 76*   CREATININE mg/dL  --  10.21*   < > 10.76*   CALCIUM mg/dL  --  6.8*   < > 6.5*   BILIRUBIN mg/dL  --   --   --  0.8   ALK PHOS U/L  --   --   --  46   ALT (SGPT) U/L  --   --   --  59*   AST (SGOT) U/L  --   --   --  136*   GLUCOSE mg/dL  --  282*   < > 179*    < > = values in this interval not displayed.     Imaging Results (Last 72 Hours)       Procedure Component Value Units Date/Time    XR Chest 1 View [[id number]] Collected: 02/11/25 1537     Updated: 02/11/25 1540    Narrative:      XR CHEST 1 VW-     CLINICAL INDICATION: hd cath placement; A41.9-Sepsis, unspecified  organism; N17.9-Acute kidney failure, unspecified; R79.89-Other  specified abnormal findings of blood chemistry; J18.9-Pneumonia,  unspecified organism        COMPARISON: 2/11/2025     TECHNIQUE: Single frontal view of the chest.     FINDINGS:     LUNGS: Left-sided line is been placed and appears that the subclavian  superior vena cava junction. No pneumothorax. Other lines are stable     HEART AND MEDIASTINUM: Heart and mediastinal contours are unremarkable        SKELETON: Bony and soft tissue structures are unremarkable.             Impression:      Line placement as above.           This report was finalized on 2/11/2025 3:38 PM by Dr. Tripp Berrios MD.       XR Chest 1 View [[id number]] Collected: 02/11/25 0627     Updated: 02/11/25 0631    Narrative:      PROCEDURE: Chest x-ray examination performed on February 11, 2025.  Single view. Upright position.     HISTORY: Possible pneumonia. Evaluate lungs.     COMPARISON: None.     FINDINGS:     Mild enlarged heart size.  Central pulmonary vascular congestion with mild edema.  Hazy opacities in the lower lungs and left midlung could reflect areas  of alveolar edema and/or multifocal pneumonia.  Enteric drain  in place with tip to the stomach.  Endotracheal tube in place with tip 6.2 cm above the lissette.  Right neck central vascular catheter with tip to the SVC.  No pleural effusion. No pneumothorax  No free air in the upper abdomen.  Mild dextroconvex curve at the mid thoracic spine.       Impression:         Mild enlarged heart size.  Central pulmonary vascular congestion with edema.  No pleural effusion or pneumothorax  Mild hypoinflated lungs  Satisfactory position of the tubes and lines  Hazy opacities in the lower lungs and left midlung could reflect areas  of alveolar edema and/or multifocal pneumonia.     This report was finalized on 2/11/2025 6:29 AM by Scott Campbell MD.                 Diagnostics: Reviewed    A: Phan Daniels is a 69 y.o. male admitted on 2/8/2025 due to respiratory distress. Phan has a medical history of afib?, COPD, HTN, HLD, GERD, ETOH abuse who presented after being found unresponsive. Per family at bedside Phan resides in [place] however recently came to [place] to stay with his new girlfriend. Also per noted he drank a 1/5 of liquor/day, and has had multiple recent hospital stays for pneumonia per family. EMS was called to the home and Phan was found unresponsive with no one else in the home, Phan apparently had dried feces and appeared to have been down for some time. Phan was intubated upon arrival to emergency department and was also in shock and started on levophed. Patient admitted to CCU on vent at FiO2 80% and PEEP of 10. This morning when I saw Phan he was on vent at 65% and 10 of peep on propofol, fentanyl, heparin , bicarb and phenylephrine. Palliative care consulted for GOC/ACP and support for pt and family.         P:  Palliative was able to touch base with patients twin brother Don to update and provide support, he stated that nephew Ashutosh who is at bedside has been keeping him up on Phan's condition. Palliative care will continue to follow and support Phan and his  family. Discussed pt with Dr Irby and Gregorio PERKINS    We will continue to follow along. Please do not hesitate to contact us regarding further sx mgmt or GOC needs, including after hours or on weekends via our on call provider at [phone].     Lynnette Campa, APRN    2/12/2025

## 2025-02-12 NOTE — PLAN OF CARE
Problem: Adult Inpatient Plan of Care  Goal: Plan of Care Review  Outcome: Not Progressing  Flowsheets (Taken 2/12/2025 1833)  Progress: no change  Plan of Care Reviewed With: patient  Goal: Patient-Specific Goal (Individualized)  Outcome: Not Progressing  Goal: Absence of Hospital-Acquired Illness or Injury  Outcome: Not Progressing  Intervention: Identify and Manage Fall Risk  Description: Perform standard risk assessment on admission using a validated tool or comprehensive approach appropriate to the patient; reassess fall risk frequently, with change in status or transfer to another level of care.  Communicate risk to interprofessional healthcare team; ensure fall risk visible cue.  Determine need for increased observation, equipment and environmental modification, as well as use of supportive, nonskid footwear.  Adjust safety measures to individual needs and identified risk factors.  Reinforce the importance of active participation with fall risk prevention, safety, and physical activity with the patient and family.  Perform regular intentional rounding to assess need for position change, pain assessment and personal needs, including assistance with toileting.  Recent Flowsheet Documentation  Taken 2/12/2025 1800 by David Sargent, RN  Safety Promotion/Fall Prevention:   safety round/check completed   fall prevention program maintained  Taken 2/12/2025 1700 by David Sargent, RN  Safety Promotion/Fall Prevention:   safety round/check completed   fall prevention program maintained  Taken 2/12/2025 1600 by David Sargent, RN  Safety Promotion/Fall Prevention:   safety round/check completed   fall prevention program maintained  Taken 2/12/2025 1500 by David Sargent, RN  Safety Promotion/Fall Prevention:   safety round/check completed   fall prevention program maintained  Taken 2/12/2025 1400 by David Sargent, RN  Safety Promotion/Fall Prevention:   safety round/check completed   fall prevention  program maintained  Taken 2/12/2025 1300 by David Sargent, RN  Safety Promotion/Fall Prevention:   safety round/check completed   fall prevention program maintained  Taken 2/12/2025 1200 by David Sargent RN  Safety Promotion/Fall Prevention:   safety round/check completed   fall prevention program maintained  Taken 2/12/2025 1100 by David Sargent RN  Safety Promotion/Fall Prevention:   safety round/check completed   fall prevention program maintained  Taken 2/12/2025 1000 by David Sargent RN  Safety Promotion/Fall Prevention:   safety round/check completed   fall prevention program maintained  Taken 2/12/2025 0900 by David Sargent RN  Safety Promotion/Fall Prevention:   safety round/check completed   fall prevention program maintained  Taken 2/12/2025 0800 by David Sargent RN  Safety Promotion/Fall Prevention:   safety round/check completed   fall prevention program maintained  Taken 2/12/2025 0700 by David Sargent RN  Safety Promotion/Fall Prevention:   safety round/check completed   fall prevention program maintained  Intervention: Prevent Skin Injury  Description: Perform a screening for skin injury risk, such as pressure or moisture-associated skin damage on admission and at regular intervals throughout hospital stay.  Keep all areas of skin (especially folds) clean and dry.  Maintain adequate skin hydration.  Relieve and redistribute pressure and protect bony prominences and skin at risk for injury; implement measures based on patient-specific risk factors.  Match turning and repositioning schedule to clinical condition.  Encourage weight shift frequently; assist with reposition if unable to complete independently.  Float heels off bed; avoid pressure on the Achilles tendon.  Keep skin free from extended contact with medical devices.  Optimize nutrition and hydration.  Encourage functional activity and mobility, as early as tolerated.  Use aids (e.g., slide boards, mechanical  lift) during transfer.  Recent Flowsheet Documentation  Taken 2/12/2025 1800 by David Sargent RN  Body Position:   right   side-lying  Taken 2/12/2025 1600 by David Sargent RN  Body Position:   left   side-lying  Taken 2/12/2025 1400 by David Sargent RN  Body Position:   right   side-lying  Taken 2/12/2025 1200 by David Sargent RN  Body Position:   left   side-lying  Taken 2/12/2025 1000 by David Sargent RN  Body Position:   right   side-lying  Taken 2/12/2025 0800 by David Sargent RN  Body Position:   left   side-lying  Goal: Optimal Comfort and Wellbeing  Outcome: Not Progressing  Intervention: Provide Person-Centered Care  Description: Use a family-focused approach to care; encourage support system presence and participation.  Develop trust and rapport by proactively providing information, encouraging questions, addressing concerns and offering reassurance.  Acknowledge emotional response to hospitalization.  Recognize and utilize personal coping strategies and strengths; develop goals via shared decision-making.  Honor spiritual and cultural preferences.  Recent Flowsheet Documentation  Taken 2/12/2025 1400 by David Sargent RN  Trust Relationship/Rapport:   care explained   reassurance provided  Goal: Readiness for Transition of Care  Outcome: Not Progressing   Goal Outcome Evaluation:  Plan of Care Reviewed With: patient        Progress: no change

## 2025-02-12 NOTE — PROGRESS NOTES
[facility] HOSPITALIST PROGRESS NOTE     Patient Identification:  Name:  Phan Daniels  Age:  69 y.o.  Sex:  male  :  1955  MRN:  [id number]  Visit Number:  [id number]  ROOM: [address]     Primary Care Provider:  Cesia Diaz APRN    Length of stay in inpatient status:  4    Subjective     Chief Compliant:    Chief Complaint   Patient presents with    Respiratory Distress     History of Presenting Illness:    Patient remains critically ill, intubated for airway protection, temperature catheter placed per General Surgery yesterday, status post hemodialysis and removed 3L, blood pressure and heart rate improved, hemodialysis planned for today per Nurse, continued on antibiotics, pressors, remains on Amiodarone drip and Heparin drip, prognosis guarded, spontaneous awakening trial today, if mentation not improving may need to consider TeleNeurology evaluation for anoxic brain injury due to being found down an unknown amount of time, also CK rising, stop statin, transition IV thiamine to oral per nasogastric/OG  Objective     Current Hospital Meds:  cefTRIAXone, 2,000 mg, Intravenous, Q24H  cetirizine, 5 mg, Oral, Daily  chlorhexidine, 15 mL, Mouth/Throat, Q12H  finasteride, 5 mg, Oral, Daily  folic acid, 1 mg, Oral, Daily  hydrocortisone sodium succinate, 100 mg, Intravenous, Q8H  insulin regular, 4-24 Units, Subcutaneous, Q6H  ipratropium-albuterol, 3 mL, Nebulization, 4x Daily - RT  midodrine, 15 mg, Oral, TID AC  mupirocin, 1 Application, Each Nare, BID  pantoprazole, 40 mg, Intravenous, BID AC  [Held by provider] rosuvastatin, 10 mg, Oral, Nightly  sodium chloride, 10 mL, Intravenous, Q12H  sodium chloride, 10 mL, Intravenous, Q12H  sodium chloride, 10 mL, Intravenous, Q12H  sodium chloride, 10 mL, Intravenous, Q12H  sodium chloride, 10 mL, Intravenous, Q12H  thiamine, 200 mg, Per G Tube, Daily  Vancomycin Pharmacy Intermittent/Pulse Dosing, , Not Applicable, Daily  vitamin B-12,  1,000 mcg, Oral, Daily      amiodarone, 0.5 mg/min, Last Rate: 0.5 mg/min (02/11/25 2339)  dexmedetomidine, 0.2-1.5 mcg/kg/hr (Dosing Weight), Last Rate: 0.4 mcg/kg/hr (02/12/25 0815)  fentanyl 10 mcg/mL,  mcg/hr, Last Rate: Stopped (02/12/25 0733)  heparin, 8.8 Units/kg/hr (Dosing Weight), Last Rate: 8.8 Units/kg/hr (02/12/25 0834)  Pharmacy to Dose Heparin,   phenylephrine, 0.5-3 mcg/kg/min (Dosing Weight), Last Rate: Stopped (02/11/25 2225)  propofol, 5-50 mcg/kg/min (Dosing Weight), Last Rate: Stopped (02/12/25 0733)  sodium bicarbonate 8.4 % 150 mEq in dextrose (D5W) 5 % 1,000 mL infusion (greater than 100 mEq), 150 mEq, Last Rate: 150 mEq (02/12/25 0311)      ----------------------------------------------------------------------------------------------------------------------  Vital Signs:  Temp:  [98.4 °F (36.9 °C)-102.3 °F (39.1 °C)] 98.8 °F (37.1 °C)  Heart Rate:  [] 93  Resp:  [24] 24  BP: ()/() 106/74  FiO2 (%):  [50 %-60 %] 60 %  SpO2:  [90 %-98 %] 94 %  on   ;   Device (Oxygen Therapy): ventilator  Body mass index is 32.62 kg/m².      Intake/Output Summary (Last 24 hours) at 2/12/2025 0858  Last data filed at 2/12/2025 0550  Gross per 24 hour   Intake 3807.63 ml   Output 3060 ml   Net 747.63 ml      ----------------------------------------------------------------------------------------------------------------------  Physical exam:  Constitutional:  Intubated/Sedated, no acute distress.      HENT:  Head:  Normocephalic and atraumatic.  Mouth:  Moist mucous membranes.    Eyes:  Conjunctivae are normal. No scleral icterus.    Neck:  Neck supple.  No JVD present.    Cardiovascular:  Tachycardic rate, irregular rhythm and normal heart sounds with no murmur.  Pulmonary/Chest:  Ventilated, bilateral equal rise of chest, on Fi02 60%  Abdominal:  Soft. No distension and no tenderness.   Musculoskeletal:  No tenderness, and no deformity.  No red or swollen joints anywhere.   "  Neurological:  Unable to assess due to sedation    Skin:  Skin is warm and dry. No rash noted. No pallor.   Peripheral vascular:  No clubbing, no cyanosis, no edema.  Psychiatric: Unable to assess due to sedation  : Vale in place    *Examination stable today 2/12    Edited by: Kendall Irby MD at 2/12/2025 0857  ----------------------------------------------------------------------------------------------------------------------     BUN/CREAT/GLUC/ALT/AST/CARLA/LIP    76/10.21/282/--/--/--/-- (02/12 0412)  LYTES - Na/K/Cl/CO2: 142/3.1*/97*/24.1 (02/12 0412)        No results found for: \"URINECX\"  Blood Culture   Date Value Ref Range Status   02/10/2025 No growth at 24 hours  Preliminary   02/10/2025 No growth at 24 hours  Preliminary   02/08/2025 No growth at 3 days  Preliminary   02/08/2025 Staphylococcus, coagulase negative (C)  Final       I have personally looked at the labs and they are summarized above.  ----------------------------------------------------------------------------------------------------------------------  Detailed radiology reports for the last 24 hours:  XR Chest 1 View    Result Date: 2/11/2025  Line placement as above.    This report was finalized on 2/11/2025 3:38 PM by Dr. Tripp Berrios MD.      XR Chest 1 View    Result Date: 2/11/2025   Mild enlarged heart size. Central pulmonary vascular congestion with edema. No pleural effusion or pneumothorax Mild hypoinflated lungs Satisfactory position of the tubes and lines Hazy opacities in the lower lungs and left midlung could reflect areas of alveolar edema and/or multifocal pneumonia.  This report was finalized on 2/11/2025 6:29 AM by Scott Campbell MD.     Assessment & Plan    69M History Long-term Incarceration, Obese by BMI PMH GERD, Hypertension, Hyperlipidemia, Atrial Fibrillation, Alcohol abuse, COPD, was found down at home and unresponsive, was intubated on arrival to emergency room for airway protection.    #Severe Sepsis/Septic " Shock, Acute Metabolic Encephalopathy, Acute Kidney Injury & Acute Hypoxic Respiratory Failure requiring Intubation and Mechanical Ventilation, ultimately Multi-organ Dysfunction Syndrome due to Pneumonia, Bacterial, treating for Gram Negative/Multi-Drug Resistant Organism complicated by possible ARDS, HAGMA, Mild Rhabdomyolysis   #Atrial Fibrillation with Rapid Ventricular Rate   #Elevated HS Troponins, suspected NSTEMI Type II due to shock  - Pulmonary consulted and following   - Nephrology consulted and following, temporary catheter placed, status post hemodialysis last evening, removed 3L, hemodialysis again today   - Palliative Care consulted and following   - Continue Ceftriaxone, follow up cultures  - Continue pain and sedation drips for comfort  - Continue IV Amiodarone drip   - Continue Heparin drip   - Continue IV pressors for SBP < 90 or MAPs consistently < 65  - Continue IV PPI for Gi prophylaxis  - Continue Tylenol as needed for fevers, Duonebs as needed  - Admitted to CCU, monitor on telemetry, continue 02 but wean as able, spontaneous awakening trial/spontaneous breathing trial when appropriate    #Electrolyte Abnormalities  - Acute Severe Hypokalemia - Replacing, on protocol  - Acute Severe Hypomagnesemia - Replacing, on protocol    #Pre-Diabetes with steroid induced hyperglycemia  - Hgb A1c = 6.4%  - Continue FSBG and SSI, add long acting if indicated.    #Alcohol Use Disorder, Severe, with Dependence complicated   - Continue cardiac monitoring, seizure precautions, monitor for withdrawal     #Obesity by BMI  - Body mass index is 30.41 kg/m².; complicates all aspects of care     F: NPO, mIVFs  E: Monitor & Replace as needed  N: NPO Diet NPO Type: Strict NPO  Ppx: Heparin drip   Code Status (Patient has no pulse and is not breathing): CPR  Medical Interventions (Patient has pulse or is breathing): Full Support    Dispo: Pending clinical improvement     *This patient is considered high risk due to  sepsis, acute respiratory failure, Pneumonia, intubation and mechanical ventilation, Rapid Ventricular Rate, Acute Kidney Injury, Acute Metabolic Encephalopathy.     I have spent 35 minutes of critical care time (7:35-8:10AM) with > 50% of time spent in direct patient care, evaluating the patient at bedside, reviewing all labs and images, reviewing ABG and vent settings, reviewing pain and sedation drips, communicating plan of care with nursing staff, utilizing high complexity medical decision making to assess and treat vital organ system failure in an individual who has impairment of one or more vital organ systems such that there is a high probability of imminent or life threatening deterioration in the patient’s condition. Failure to initiate the above interventions on an urgent basis would likely result in sudden, clinically significant or life threatening deterioration in the patient's condition.  Edited by: Kendall Irby MD at 2/12/2025 7507  [facility]

## 2025-02-12 NOTE — PROGRESS NOTES
HEPARIN INFUSION  Phan Daniels is a  69 y.o. male receiving heparin infusion.     Therapy for (VTE/Cardiac):   cardiac  Patient Dosing Weight: 102 kg  Initial Bolus (Y/N):   N  Any Bolus (Y/N):   Y        Signs or Symptoms of Bleeding: N    Cardiac or Other (Not VTE)   Initial Bolus: 60 units/kg (Max 4,000 units)  Initial rate: 12 units/kg/hr (Max 1,000 units/hr)   Anti Xa Rebolus Infusion Hold time Change infusion Dose (Units/kg/hr) Next Anti Xa or aPTT Level Due   < 0.11 50 Units/kg  (4000 Units Max) None Increase by  3 Units/kg/hr 6 hours   0.11- 0.19 25 Units/kg  (2000 Units Max) None Increase by  2 Units/kg/hr 6 hours   0.2 - 0.29 0 None Increase by  1 Units/kg/hr 6 hours   0.3 - 0.5 0 None No Change 6 hours (after 2 consecutive levels in range check q24h @0700)   0.51 - 0.6 0 None Decrease by  1 Units/kg/hr 6 hours   0.61 - 0.8 0 30 Minutes Decrease by  2 Units/kg/hr 6 hours   0.81 - 1 0 60 Minutes Decrease by  3 Units/kg/hr 6 hours   >1 0 Hold  After Anti Xa less than 0.5 decrease previous rate by  4 Units/kg/hr  Every 2 hours until Anti Xa  less than 0.5 then when infusion restarts in 6 hours         Recommend anti-Xa every 6 hours.          Date   Time   Anti-Xa Current Rate (Unit/kg/hr) Bolus   (Units) Rate Change   (Unit/kg/hr) New Rate (Unit/kg/hr) Next   Anti-Xa Comments  Pump Check Daily   2/10 0742 <0.1  No initial  9.8 1500 D/w GREGORIO Brown   2/10 1612 0.32 9.8 - - 9.8 2200 Therapeutic,  no s/s bleeding   02/10 2230 0.41 9.8 - - 9.8 0700  Tx x 2, no changes, no s/s bleeding per GREGORIO Damon     2/11 0815 0.49 9.8 - - 9.8 0700 on 2/12 Therapeutic again. Continue same. No s/s bleeding noted. Spoke with GREGORIO Perkins.     2/12 0800 0.53 9.8 - -1 8.8 1430 Spoke with GREGORIO Huynh in CCU. No s/s bleeding noted. Decrease rate.                                                                                                                                                                                      Pharmacy  will continue to follow anti-Xa results and monitor for signs and symptoms of bleeding or thrombosis.    Thank you.  Mignon Hanley, Pharm.D.  2/12/2025  08:05 EST

## 2025-02-13 NOTE — PROGRESS NOTES
[facility]PAR[facility] INFUSION  Phan Daniels is a  69 y.o. male receiving heparin infusion.     Therapy for (VTE/Cardiac):   cardiac  Patient Dosing Weight: 102 kg  Initial Bolus (Y/N):   N  Any Bolus (Y/N):   Y        Signs or Symptoms of Bleeding: N    Cardiac or Other (Not VTE)   Initial Bolus: 60 units/kg (Max 4,000 units)  Initial rate: 12 units/kg/hr (Max 1,000 units/hr)   Anti Xa Rebolus Infusion Hold time Change infusion Dose (Units/kg/hr) Next Anti Xa or aPTT Level Due   < 0.11 50 Units/kg  (4000 Units Max) None Increase by  3 Units/kg/hr 6 hours   0.11- 0.19 25 Units/kg  (2000 Units Max) None Increase by  2 Units/kg/hr 6 hours   0.2 - 0.29 0 None Increase by  1 Units/kg/hr 6 hours   0.3 - 0.5 0 None No Change 6 hours (after 2 consecutive levels in range check q24h @0700)   0.51 - 0.6 0 None Decrease by  1 Units/kg/hr 6 hours   0.61 - 0.8 0 30 Minutes Decrease by  2 Units/kg/hr 6 hours   0.81 - 1 0 60 Minutes Decrease by  3 Units/kg/hr 6 hours   >1 0 Hold  After Anti Xa less than 0.5 decrease previous rate by  4 Units/kg/hr  Every 2 hours until Anti Xa  less than 0.5 then when infusion restarts in 6 hours         Recommend anti-Xa every 6 hours.          Date   Time   Anti-Xa Current Rate (Unit/kg/hr) Bolus   (Units) Rate Change   (Unit/kg/hr) New Rate (Unit/kg/hr) Next   Anti-Xa Comments  Pump Check Daily   2/10 0742 <0.1  No initial  9.8 1500 D/w GREGORIO Brown   2/10 1612 0.32 9.8 - - 9.8 2200 Therapeutic,  no s/s bleeding   02/10 2230 0.41 9.8 - - 9.8 0700  Tx x 2, no changes, no s/s bleeding per GREGORIO Damon     2/11 0815 0.49 9.8 - - 9.8 0700 on 2/12 Therapeutic again. Continue same. No s/s bleeding noted. Spoke with GREGORIO Perkins.     2/12 0800 0.53 9.8 - -1 8.8 1430 Spoke with Amina RN in CCU. No s/s bleeding noted. Decrease rate.    2/12 1455 0.64 8.8 - Hold x 30 minutes then -2 6.8 2130 Spoke with Alonzo Savage RN. No s/s bleeding noted. Hold x 30 minute and reduce rate.    2/12 2200 0.3 6.8 - - 6.8 0400  Therapeutic x 1. No s/s of bleeding per GREGORIO Coelho.    2/13 0430 0.19 6.8 2000 +2 8.8 1200 Discussed with GREGORIO Cordero. She said that the patient does have some blood around the dialysis catheter site, but nothing significant.    2/13 1150 0.26 8.8  +1 9.8 2100 D/w GREGORIO Alcantar                                                                                                                                         Pharmacy will continue to follow anti-Xa results and monitor for signs and symptoms of bleeding or thrombosis.

## 2025-02-13 NOTE — PLAN OF CARE
Goal Outcome Evaluation:  Plan of Care Reviewed With: patient, family        Progress: no change  Outcome Evaluation: Patient remains intubated/sedated. Care ongoing.

## 2025-02-13 NOTE — CONSULTS
Patient Name:  Phan Daniels  YOB: 1955  [id number]       Patient Care Team:  Cesia Diaz APRN as PCP - General (Nurse Practitioner)  Morris Wynne MD as Consulting Physician (Gastroenterology)  Kvng Abbott as Cardiologist (Cardiology)      General Surgery Consult Note     Date of Consultation: 02/13/25    Consulting Physician : Kendall Irby MD    Reason for Consult : concern for abdominal imaging, malfunctioning central line and hemodialysis catheter     Subjective     I have been asked to see  Phan Daniels , a 69 y.o. male in consultation for evaluation of abdominal imaging and malfunctioning central venous lines.   Patient intubated and sedated in the ICU and unable to provide history.    I was notified this AM that his hemodialysis catheter was nonfunctioning. I backed up his right IJ central line due to close proximity in the SVC and this began working.     I was also notified of patient being febrile and concern for abnormal biliary imaging on prior CT imaging. Repeat CT was performed which showed free fluid in the abdomen without free air.      Allergy:   Allergies   Allergen Reactions    Indomethacin Er Nausea And Vomiting    Isoniazid Rash    Penicillins Rash       Medications:  cefTRIAXone, 2,000 mg, Intravenous, Q24H  cetirizine, 5 mg, Oral, Daily  chlorhexidine, 15 mL, Mouth/Throat, Q12H  finasteride, 5 mg, Oral, Daily  folic acid, 1 mg, Oral, Daily  ipratropium-albuterol, 3 mL, Nebulization, 4x Daily - RT  midodrine, 15 mg, Oral, TID AC  mupirocin, 1 Application, Each Nare, BID  pantoprazole, 40 mg, Intravenous, BID AC  [Held by provider] rosuvastatin, 10 mg, Oral, Nightly  sodium chloride, 10 mL, Intravenous, Q12H  sodium chloride, 10 mL, Intravenous, Q12H  sodium chloride, 10 mL, Intravenous, Q12H  sodium chloride, 10 mL, Intravenous, Q12H  sodium chloride, 10 mL, Intravenous, Q12H  sodium chloride, 10 mL, Intravenous, Q12H  thiamine, 200  mg, Per G Tube, Daily  vitamin B-12, 1,000 mcg, Oral, Daily      amiodarone, 0.5 mg/min, Last Rate: 0.5 mg/min (02/13/25 0713)  dexmedetomidine, 0.2-1.5 mcg/kg/hr (Dosing Weight), Last Rate: 1 mcg/kg/hr (02/13/25 1055)  fentanyl 10 mcg/mL,  mcg/hr, Last Rate: Stopped (02/13/25 1130)  heparin, 9.8 Units/kg/hr (Dosing Weight), Last Rate: 9.8 Units/kg/hr (02/13/25 1413)  insulin, 0-100 Units/hr, Last Rate: 4.1 Units/hr (02/13/25 1617)  midazolam, 1-10 mg/hr, Last Rate: 5 mg/hr (02/13/25 1130)  Pharmacy to Dose Heparin,   phenylephrine, 0.5-3 mcg/kg/min (Dosing Weight), Last Rate: Stopped (02/11/25 2225)  propofol, 5-50 mcg/kg/min (Dosing Weight), Last Rate: 25 mcg/kg/min (02/13/25 1052)      No current facility-administered medications on file prior to encounter.     Current Outpatient Medications on File Prior to Encounter   Medication Sig    acetaminophen (TYLENOL) 325 MG tablet Take 2 tablets by mouth 3 (Three) Times a Day As Needed for Mild Pain.    albuterol sulfate  (90 Base) MCG/ACT inhaler Inhale 2 puffs Every 6 (Six) Hours As Needed for Shortness of Air.    amLODIPine (NORVASC) 5 MG tablet Take 1 tablet by mouth Every Morning.    aspirin 81 MG EC tablet Take 1 tablet by mouth Daily.    carvedilol (COREG) 25 MG tablet Take 0.5 tablets by mouth 2 (Two) Times a Day With Meals.    Diclofenac Sodium (VOLTAREN) 1 % gel gel Apply 4 g topically to the appropriate area as directed Every 6 (Six) Hours As Needed (Joint Pain).    DULoxetine (CYMBALTA) 30 MG capsule Take 1 capsule by mouth Daily. For MOOD    finasteride (PROSCAR) 5 MG tablet Take 1 tablet by mouth Daily.    flecainide (TAMBOCOR) 150 MG tablet Take 0.5 tablets by mouth 2 (Two) Times a Day.    fluticasone (FLONASE) 50 MCG/ACT nasal spray Administer 1 spray into the nostril(s) as directed by provider 2 (Two) Times a Day As Needed for Allergies.    Fluticasone-Salmeterol (ADVAIR/WIXELA) 250-50 MCG/ACT DISKUS Inhale 1 puff 2 (Two) Times a Day.     furosemide (LASIX) 20 MG tablet Take 1 tablet by mouth Daily As Needed (SWELLING).    gabapentin (NEURONTIN) 600 MG tablet Take 2 tablets by mouth 3 times a day.    loratadine (CLARITIN) 10 MG tablet Take 1 tablet by mouth Daily As Needed for Allergies.    losartan (COZAAR) 50 MG tablet Take 1 tablet by mouth Daily.    methocarbamol (ROBAXIN) 750 MG tablet Take 1 tablet by mouth 2 (Two) Times a Day As Needed for Muscle Spasms.    nicotine (NICOTROL) 10 MG/ML solution nasal solution 10 sprays by Each Nare route Every 1 (One) Hour As Needed (Smoking Cessation). MAX OF 10 sprays per hour and 80 sprays per day    omeprazole (priLOSEC) 20 MG capsule Take 2 capsules by mouth 2 (Two) Times a Day Before Meals.    polyvinyl alcohol (LIQUIFILM) 1.4 % ophthalmic solution Administer 1 drop to both eyes 4 (Four) Times a Day As Needed for Dry Eyes.    rosuvastatin (CRESTOR) 40 MG tablet Take 1 tablet by mouth Every Night.    senna 8.6 MG tablet Take 1 tablet by mouth Daily As Needed for Constipation.    tiotropium bromide monohydrate (SPIRIVA RESPIMAT) 2.5 MCG/ACT aerosol solution inhaler Inhale 2 puffs Daily.    traMADol (ULTRAM) 50 MG tablet Take 1 tablet by mouth 3 (Three) Times a Day As Needed for Moderate Pain.    vitamin B-12 (CYANOCOBALAMIN) 1000 MCG tablet Take 1 tablet by mouth Daily.       PMHx:   Past Medical History:   Diagnosis Date    Abdominal bloating 01/06/2023    Atrial fibrillation 12/15/2022    Basal cell carcinoma of skin 12/15/2022    Bradycardia 03/13/2024    Chronic low back pain 05/02/2023    Chronic obstructive lung disease 05/02/2023    Chronic post-COVID-19 syndrome 05/02/2023    Community acquired pneumonia 01/06/2023    Constipation 08/11/2023    COPD (chronic obstructive pulmonary disease)     Dental caries 12/15/2022    Difficult or painful urination 04/19/2023    Essential hypertension 12/15/2022    GERD (gastroesophageal reflux disease)     Herpes zoster 02/03/2023    Hiatal hernia     History of  "degenerative disc disease     uses motorized chair    Hyperlipidemia     Hypertension     Hypokalemia 01/06/2023    Osteoarthritis of knee 02/03/2023    Peripheral edema 03/04/2024    PONV (postoperative nausea and vomiting)     Rib pain 04/19/2023       Unable to obtain history due to patient being intubated and sedated.           Objective     Physical Exam:      Vital Signs  /86   Pulse 76   Temp 97.6 °F (36.4 °C) (Esophageal)   Resp 19   Ht 182.9 cm (72\")   Wt 113 kg (248 lb 0.3 oz)   SpO2 96%   BMI 33.64 kg/m²     Intake/Output Summary (Last 24 hours) at 2/13/2025 1626  Last data filed at 2/13/2025 1355  Gross per 24 hour   Intake 2232.04 ml   Output 3065 ml   Net -832.96 ml         Physical Exam:    Head: Normocephalic, atraumatic.   Eyes: Pupils equal, round, react to light   Mouth: No lesions noted  CV: Regular rate and rhythm   Lungs: Bilateral chest rise and fall, no use of accessory muscles   Abdomen: Soft, nontender, nondistended. Hypoactive bowel sounds.   Extremities:  No cyanosis, clubbing or edema bilaterally   Neurologic: No gross deficits      Results Review: I have personally reviewed all of the recent lab and imaging results available at this time: bilateral pleural effusions, diffuse anasarca and ascites within abdomen.     Assessment and Plan:    Problem List Items Addressed This Visit    None  Visit Diagnoses       Sepsis, due to unspecified organism, unspecified whether acute organ dysfunction present    -  Primary    Acute renal failure, unspecified acute renal failure type        Elevated troponin        Multifocal pneumonia        Relevant Medications    cefepime 2000 mg IVPB in 100 mL NS (VTB) (Completed)    ipratropium-albuterol (DUO-NEB) nebulizer solution 3 mL    fluticasone (FLONASE) 50 MCG/ACT nasal spray    Fluticasone-Salmeterol (ADVAIR/WIXELA) 250-50 MCG/ACT DISKUS    loratadine (CLARITIN) 10 MG tablet    tiotropium bromide monohydrate (SPIRIVA RESPIMAT) 2.5 MCG/ACT " aerosol solution inhaler    albuterol sulfate  (90 Base) MCG/ACT inhaler    fluticasone (FLONASE) 50 MCG/ACT nasal spray 1 spray    cetirizine (zyrTEC) tablet 5 mg    cefepime 1000 mg IVPB in 100 mL NS (VTB) (Completed)    cefTRIAXone (ROCEPHIN) 2,000 mg in sodium chloride 0.9 % 100 mL IVPB-VTB             Active Hospital Problems    Diagnosis  POA    **Septic shock [A41.9, R65.21]  Yes      Resolved Hospital Problems   No resolved problems to display.        Mr Daniels is currently intubated and sedated in the ICU. Central venous catheter issues were corrected with adjustments of the lines.   Abdomen was soft on my examination this morning. Free fluid noted on CT likely ascites vs anasarca fluid from his diffuse volume overload. Gallbladder without a thickened wall or pericholecystic stranding on CT imaging. I would not recommend antibiotics for cholecystitis based off todays CT findings.       Callie Reyes MD  02/13/25  16:26 EST

## 2025-02-13 NOTE — PLAN OF CARE
Problem: Mechanical Ventilation Invasive  Goal: Effective Communication  Outcome: Progressing  Goal: Optimal Device Function  Outcome: Progressing  Intervention: Optimize Device Care and Function  Recent Flowsheet Documentation  Taken 2/13/2025 1829 by Vanna Mayorga RRT  Airway/Ventilation Management: airway patency maintained  Airway Safety Measures: manual resuscitator/mask at bedside  Goal: Mechanical Ventilation Liberation  Outcome: Progressing  Goal: Optimal Nutrition Delivery  Outcome: Progressing  Goal: Absence of Device-Related Skin and Tissue Injury  Outcome: Progressing  Goal: Absence of Ventilator-Induced Lung Injury  Outcome: Progressing  Intervention: Prevent Ventilator-Associated Pneumonia  Recent Flowsheet Documentation  Taken 2/13/2025 1829 by Vanna Mayorga, KATELIN  Head of Bed (HOB) Positioning: HOB at 30-45 degrees   Goal Outcome Evaluation:

## 2025-02-13 NOTE — PLAN OF CARE
Problem: Mechanical Ventilation Invasive  Goal: Effective Communication  Outcome: Progressing  Goal: Optimal Device Function  Outcome: Progressing  Intervention: Optimize Device Care and Function  Recent Flowsheet Documentation  Taken 2/13/2025 0410 by Vanna Mayorga RRT  Airway/Ventilation Management: airway patency maintained  Airway Safety Measures: manual resuscitator/mask at bedside  Taken 2/13/2025 0208 by Vanna Mayorga RRT  Airway/Ventilation Management: airway patency maintained  Airway Safety Measures: manual resuscitator/mask at bedside  Taken 2/13/2025 0019 by Vanna Mayorga RRT  Airway/Ventilation Management: airway patency maintained  Airway Safety Measures: manual resuscitator/mask at bedside  Taken 2/12/2025 2211 by Vanna Mayorga RRT  Airway/Ventilation Management: airway patency maintained  Airway Safety Measures: manual resuscitator/mask at bedside  Taken 2/12/2025 2022 by Vanna Mayorga RRT  Airway/Ventilation Management: airway patency maintained  Airway Safety Measures: manual resuscitator/mask at bedside  Taken 2/12/2025 1836 by Vanna Mayorga RRT  Airway/Ventilation Management: airway patency maintained  Airway Safety Measures: manual resuscitator/mask at bedside  Goal: Mechanical Ventilation Liberation  Outcome: Progressing  Goal: Optimal Nutrition Delivery  Outcome: Progressing  Goal: Absence of Device-Related Skin and Tissue Injury  Outcome: Progressing  Goal: Absence of Ventilator-Induced Lung Injury  Outcome: Progressing  Intervention: Prevent Ventilator-Associated Pneumonia  Recent Flowsheet Documentation  Taken 2/13/2025 0410 by Vanna Mayorga RRT  Head of Bed (HOB) Positioning: HOB at 30-45 degrees  Taken 2/13/2025 0208 by Vanna Mayorga RRT  Head of Bed (HOB) Positioning: HOB at 30-45 degrees  Taken 2/13/2025 0019 by Vanna Mayorga RRT  Head of Bed (HOB) Positioning: HOB at 30-45 degrees  Taken 2/12/2025 2211 by Vanna Mayorga RRT  Head of Bed (HOB)  Positioning: HOB at 30-45 degrees  Taken 2/12/2025 2022 by Vanna Mayorga, RRT  Head of Bed (HOB) Positioning: HOB at 30-45 degrees  Taken 2/12/2025 1836 by Vanna Mayorga, RRT  Head of Bed (HOB) Positioning: HOB at 30-45 degrees   Goal Outcome Evaluation:

## 2025-02-13 NOTE — PLAN OF CARE
Problem: Adult Inpatient Plan of Care  Goal: Plan of Care Review  Outcome: Not Progressing  Flowsheets (Taken 2/13/2025 0304)  Progress: declining  Plan of Care Reviewed With: patient  Goal: Patient-Specific Goal (Individualized)  Outcome: Not Progressing  Goal: Absence of Hospital-Acquired Illness or Injury  Outcome: Not Progressing  Intervention: Identify and Manage Fall Risk  Recent Flowsheet Documentation  Taken 2/13/2025 0300 by Jemma Tian RN  Safety Promotion/Fall Prevention: safety round/check completed  Taken 2/13/2025 0200 by Jemma Tian RN  Safety Promotion/Fall Prevention: safety round/check completed  Taken 2/13/2025 0100 by Jemma Tian RN  Safety Promotion/Fall Prevention: safety round/check completed  Taken 2/13/2025 0000 by Jemma Tian RN  Safety Promotion/Fall Prevention: safety round/check completed  Taken 2/12/2025 2300 by Jemma Tian RN  Safety Promotion/Fall Prevention: safety round/check completed  Taken 2/12/2025 2200 by Jemma Tian RN  Safety Promotion/Fall Prevention: safety round/check completed  Taken 2/12/2025 2100 by Jemma Tian RN  Safety Promotion/Fall Prevention: safety round/check completed  Taken 2/12/2025 2000 by Jemma Tian RN  Safety Promotion/Fall Prevention: safety round/check completed  Taken 2/12/2025 1900 by Jemma Tian RN  Safety Promotion/Fall Prevention: safety round/check completed  Intervention: Prevent Skin Injury  Recent Flowsheet Documentation  Taken 2/13/2025 0200 by Jemma Tian RN  Body Position:   turned   side-lying   right  Skin Protection:   transparent dressing maintained   skin sealant/moisture barrier applied   silicone foam dressing in place   pulse oximeter probe site changed   incontinence pads utilized   drying agents applied  Taken 2/13/2025 0000 by Jemma Tian RN  Body Position:   turned   side-lying   left  Taken 2/12/2025 2200 by Jemma Tian RN  Body Position:   turned   side-lying   right  Taken  2/12/2025 2000 by Jemma Tian RN  Body Position:   turned   side-lying   left  Skin Protection:   transparent dressing maintained   skin sealant/moisture barrier applied   silicone foam dressing in place   pulse oximeter probe site changed   incontinence pads utilized   drying agents applied  Intervention: Prevent and Manage VTE (Venous Thromboembolism) Risk  Recent Flowsheet Documentation  Taken 2/13/2025 0200 by Jemma Tian RN  VTE Prevention/Management: (see MAR)   other (see comments)   bilateral   SCDs (sequential compression devices) on  Taken 2/12/2025 2000 by Jemma Tian RN  VTE Prevention/Management: (bilat SCDS removed for skin assessment; see MAR)   other (see comments)   bilateral   SCDs (sequential compression devices) off  Intervention: Prevent Infection  Recent Flowsheet Documentation  Taken 2/13/2025 0200 by Jemma Tian RN  Infection Prevention:   hand hygiene promoted   rest/sleep promoted  Taken 2/12/2025 2000 by Jemma Tian RN  Infection Prevention:   hand hygiene promoted   rest/sleep promoted  Goal: Optimal Comfort and Wellbeing  Outcome: Not Progressing  Intervention: Provide Person-Centered Care  Recent Flowsheet Documentation  Taken 2/13/2025 0200 by Jemma Tian RN  Trust Relationship/Rapport:   care explained   choices provided  Taken 2/12/2025 2000 by Jemma Tian RN  Trust Relationship/Rapport:   care explained   choices provided  Goal: Readiness for Transition of Care  Outcome: Not Progressing     Problem: Violence Risk or Actual  Goal: Anger and Impulse Control  Outcome: Not Progressing  Intervention: Minimize Safety Risk  Recent Flowsheet Documentation  Taken 2/13/2025 0300 by Jemma Tian RN  Enhanced Safety Measures: bed alarm set  Taken 2/13/2025 0200 by Jemma Tian RN  Enhanced Safety Measures: bed alarm set  Taken 2/13/2025 0100 by Jemma Tian RN  Enhanced Safety Measures: bed alarm set  Taken 2/13/2025 0000 by Jemma Tian RN  Enhanced  Safety Measures: bed alarm set  Taken 2/12/2025 2300 by Jemma Tian RN  Enhanced Safety Measures: bed alarm set  Taken 2/12/2025 2200 by Jemma Tian RN  Enhanced Safety Measures: bed alarm set  Taken 2/12/2025 2100 by Jemma Tian RN  Enhanced Safety Measures: bed alarm set  Taken 2/12/2025 2000 by Jemma Tian RN  Enhanced Safety Measures: bed alarm set  Taken 2/12/2025 1900 by Jemma Tian RN  Enhanced Safety Measures: bed alarm set     Problem: Fall Injury Risk  Goal: Absence of Fall and Fall-Related Injury  Outcome: Not Progressing  Intervention: Identify and Manage Contributors  Recent Flowsheet Documentation  Taken 2/13/2025 0300 by Jemma Tian RN  Medication Review/Management: medications reviewed  Taken 2/13/2025 0200 by Jemma Tian RN  Medication Review/Management: medications reviewed  Taken 2/13/2025 0100 by Jemma Tian RN  Medication Review/Management: medications reviewed  Taken 2/13/2025 0000 by Jemma Tian RN  Medication Review/Management: medications reviewed  Taken 2/12/2025 2300 by Jemma Tian RN  Medication Review/Management: medications reviewed  Taken 2/12/2025 2200 by Jemma Tian RN  Medication Review/Management: medications reviewed  Taken 2/12/2025 2100 by Jemma Tian RN  Medication Review/Management: medications reviewed  Taken 2/12/2025 2000 by Jemma Tian RN  Medication Review/Management: medications reviewed  Taken 2/12/2025 1900 by Jemma Tian RN  Medication Review/Management: medications reviewed  Intervention: Promote Injury-Free Environment  Recent Flowsheet Documentation  Taken 2/13/2025 0300 by Jemma Tian RN  Safety Promotion/Fall Prevention: safety round/check completed  Taken 2/13/2025 0200 by Jemma Tian RN  Safety Promotion/Fall Prevention: safety round/check completed  Taken 2/13/2025 0100 by Jemma Tian RN  Safety Promotion/Fall Prevention: safety round/check completed  Taken 2/13/2025 0000 by Jemma Tian  RN  Safety Promotion/Fall Prevention: safety round/check completed  Taken 2/12/2025 2300 by Jemma Tian RN  Safety Promotion/Fall Prevention: safety round/check completed  Taken 2/12/2025 2200 by Jemma Tian RN  Safety Promotion/Fall Prevention: safety round/check completed  Taken 2/12/2025 2100 by Jemma Tian RN  Safety Promotion/Fall Prevention: safety round/check completed  Taken 2/12/2025 2000 by Jemma Tian RN  Safety Promotion/Fall Prevention: safety round/check completed  Taken 2/12/2025 1900 by Jemma Tian RN  Safety Promotion/Fall Prevention: safety round/check completed     Problem: Skin Injury Risk Increased  Goal: Skin Health and Integrity  Outcome: Not Progressing  Intervention: Optimize Skin Protection  Recent Flowsheet Documentation  Taken 2/13/2025 0200 by Jemma Tian RN  Activity Management: bedrest  Pressure Reduction Techniques:   frequent weight shift encouraged   heels elevated off bed   pressure points protected   weight shift assistance provided  Head of Bed (HOB) Positioning: HOB at 30-45 degrees  Pressure Reduction Devices:   specialty bed utilized   pressure-redistributing mattress utilized   positioning supports utilized   heel offloading device utilized  Skin Protection:   transparent dressing maintained   skin sealant/moisture barrier applied   silicone foam dressing in place   pulse oximeter probe site changed   incontinence pads utilized   drying agents applied  Taken 2/13/2025 0000 by Jemma Tian RN  Head of Bed (HOB) Positioning: HOB at 30-45 degrees  Taken 2/12/2025 2200 by Jemma Tian RN  Head of Bed (HOB) Positioning: HOB at 30-45 degrees  Taken 2/12/2025 2000 by Jemma Tian RN  Activity Management: bedrest  Pressure Reduction Techniques:   frequent weight shift encouraged   heels elevated off bed   pressure points protected   weight shift assistance provided  Head of Bed (HOB) Positioning: HOB at 30-45 degrees  Pressure Reduction Devices:    specialty bed utilized   pressure-redistributing mattress utilized   positioning supports utilized   heel offloading device utilized  Skin Protection:   transparent dressing maintained   skin sealant/moisture barrier applied   silicone foam dressing in place   pulse oximeter probe site changed   incontinence pads utilized   drying agents applied     Problem: Comorbidity Management  Goal: Maintenance of COPD Symptom Control  Outcome: Not Progressing  Intervention: Maintain COPD (Chronic Obstructive Pulmonary Disease) Symptom Control  Recent Flowsheet Documentation  Taken 2/13/2025 0300 by Jemma Tian RN  Medication Review/Management: medications reviewed  Taken 2/13/2025 0200 by Jemma Tian RN  Medication Review/Management: medications reviewed  Taken 2/13/2025 0100 by Jemma Tian RN  Medication Review/Management: medications reviewed  Taken 2/13/2025 0000 by Jemma Tian RN  Medication Review/Management: medications reviewed  Taken 2/12/2025 2300 by Jemma Tian RN  Medication Review/Management: medications reviewed  Taken 2/12/2025 2200 by Jemma Tian RN  Medication Review/Management: medications reviewed  Taken 2/12/2025 2100 by Jemma Tian RN  Medication Review/Management: medications reviewed  Taken 2/12/2025 2000 by Jemma Tian RN  Medication Review/Management: medications reviewed  Taken 2/12/2025 1900 by Jemma Tian RN  Medication Review/Management: medications reviewed     Problem: Sepsis/Septic Shock  Goal: Optimal Coping  Outcome: Not Progressing  Intervention: Support Patient and Family Response  Recent Flowsheet Documentation  Taken 2/13/2025 0200 by Jemma Tian RN  Family/Support System Care: support provided  Taken 2/12/2025 2000 by Jemma Tian RN  Family/Support System Care: support provided  Goal: Absence of Bleeding  Outcome: Not Progressing  Goal: Blood Glucose Level Within Target Range  Outcome: Not Progressing  Goal: Absence of Infection Signs and  Symptoms  Outcome: Not Progressing  Intervention: Initiate Sepsis Management  Recent Flowsheet Documentation  Taken 2/13/2025 0200 by Jemma Tian RN  Infection Prevention:   hand hygiene promoted   rest/sleep promoted  Taken 2/12/2025 2000 by Jemma Tian RN  Infection Prevention:   hand hygiene promoted   rest/sleep promoted  Intervention: Promote Recovery  Recent Flowsheet Documentation  Taken 2/13/2025 0200 by Jemma Tian RN  Activity Management: bedrest  Taken 2/12/2025 2000 by Jemma Tian RN  Activity Management: bedrest  Goal: Optimal Nutrition Delivery  Outcome: Not Progressing     Problem: Mechanical Ventilation Invasive  Goal: Effective Communication  Outcome: Not Progressing  Intervention: Ensure Effective Communication  Recent Flowsheet Documentation  Taken 2/13/2025 0200 by Jemma Tian RN  Trust Relationship/Rapport:   care explained   choices provided  Diversional Activities: television  Family/Support System Care: support provided  Taken 2/12/2025 2000 by Jemma Tian RN  Trust Relationship/Rapport:   care explained   choices provided  Diversional Activities: television  Family/Support System Care: support provided  Goal: Optimal Device Function  Outcome: Not Progressing  Intervention: Optimize Device Care and Function  Recent Flowsheet Documentation  Taken 2/13/2025 0200 by Jemma Tian RN  Airway Safety Measures: suction at bedside  Taken 2/13/2025 0000 by Jemma Tian RN  Oral Care:   swabbed with antiseptic solution   suction provided   lip/mouth moisturizer applied  Taken 2/12/2025 2000 by Jemma Tian RN  Oral Care:   swabbed with antiseptic solution   suction provided   lip/mouth moisturizer applied  Airway Safety Measures: suction at bedside  Goal: Mechanical Ventilation Liberation  Outcome: Not Progressing  Intervention: Promote Extubation and Mechanical Ventilation Liberation  Recent Flowsheet Documentation  Taken 2/13/2025 0300 by Jemma Tian RN  Medication  Review/Management: medications reviewed  Taken 2/13/2025 0200 by Jemma Tian RN  Medication Review/Management: medications reviewed  Taken 2/13/2025 0100 by Jemma Tian RN  Medication Review/Management: medications reviewed  Taken 2/13/2025 0000 by Jemma Tian RN  Medication Review/Management: medications reviewed  Taken 2/12/2025 2300 by Jemma Tian RN  Medication Review/Management: medications reviewed  Taken 2/12/2025 2200 by Jemma Tian RN  Medication Review/Management: medications reviewed  Taken 2/12/2025 2100 by Jemma Tian RN  Medication Review/Management: medications reviewed  Taken 2/12/2025 2000 by Jemma Tian RN  Medication Review/Management: medications reviewed  Taken 2/12/2025 1900 by Jemma Tian RN  Medication Review/Management: medications reviewed  Goal: Optimal Nutrition Delivery  Outcome: Not Progressing  Goal: Absence of Device-Related Skin and Tissue Injury  Outcome: Not Progressing  Intervention: Maintain Skin and Tissue Health  Recent Flowsheet Documentation  Taken 2/13/2025 0200 by Jemma Tian RN  Device Skin Pressure Protection:   tubing/devices free from skin contact   skin-to-skin areas padded   skin-to-device areas padded   positioning supports utilized   pressure points protected   absorbent pad utilized/changed   adhesive use limited  Taken 2/12/2025 2000 by Jemma Tian RN  Device Skin Pressure Protection:   tubing/devices free from skin contact   skin-to-skin areas padded   skin-to-device areas padded   pressure points protected   positioning supports utilized   adhesive use limited   absorbent pad utilized/changed  Goal: Absence of Ventilator-Induced Lung Injury  Outcome: Not Progressing  Intervention: Prevent Ventilator-Associated Pneumonia  Recent Flowsheet Documentation  Taken 2/13/2025 0200 by Jemma Tian RN  Head of Bed (HOB) Positioning: HOB at 30-45 degrees  Taken 2/13/2025 0000 by Jemma Tian RN  Head of Bed (HOB) Positioning:  HOB at 30-45 degrees  Oral Care:   swabbed with antiseptic solution   suction provided   lip/mouth moisturizer applied  Taken 2/12/2025 2200 by Jemma Tian RN  Head of Bed ([facility]) Positioning: HOB at 30-45 degrees  Taken 2/12/2025 2000 by Jemma Tian RN  Head of Bed ([facility]) Positioning: [facility] at 30-45 degrees  VAP Prevention Measures: completed  Oral Care:   swabbed with antiseptic solution   suction provided   lip/mouth moisturizer applied   Goal Outcome Evaluation:  Plan of Care Reviewed With: patient        Progress: declining

## 2025-02-13 NOTE — PROGRESS NOTES
Nurse Practitioner - Brief Progress Note  PERMANENT  02/13/2025 01:54    [facility] [place]    NIRAV WHITNEYDennis    Date of Service 02/13/2025 01:54    HPI/Events of Note [facility] Provider Intervention Note    Telephone Call From Bedside     ICU RN  asking for chest xray in view of changes in respiraotry status and need for vent changes earlier   CXR ordered   Also requesting KUB nurse due to abdominal distension. No bowel movement for 4 days.  Discussed with ICU RN   patient does have bowel sounds and is on tube feeding- Residual not checked  Has PRN bowel regimen meds but none given    Can obtain KUB   Give the Dulcolax supp using PRN order  Check gastric residual - IF now concern for obstruction on KUB we can schedule Miralax rather than have PRN   If large residual and no concern for obstruction could start Reglan     Contact [facility] for any needs if bedside physician is not present.      Interventions Intermediate-Communication with other healthcare providers and/or family        Electronically Signed by: Brianne Ellison (NP) on 02/13/2025 01:59

## 2025-02-13 NOTE — PROGRESS NOTES
[facility] HOSPITALIST PROGRESS NOTE     Patient Identification:  Name:  Phan Daniels  Age:  69 y.o.  Sex:  male  :  1955  MRN:  [id number]  Visit Number:  [id number]  ROOM: [address]     Primary Care Provider:  Cesia Diaz APRN    Length of stay in inpatient status:  5    Subjective     Chief Compliant:    Chief Complaint   Patient presents with    Respiratory Distress     History of Presenting Illness:    Patient remains critically ill, intubated for airway protection, febrile this AM up to 102, blood cultures obtained, on Ceftriaxone, recent temporary hemodialysis catheter placed, prior imaging reviewed, possible cholecystitis on initial read, discussed with General Surgery, plan for repeat CT scans, also obtain CT head, have low threshold to involve TeleNeurology, blood pressure and heart rate have been improved, prognosis guarded, discussed with Palliative Care as well.   Objective     Current Hospital Meds:  cefTRIAXone, 2,000 mg, Intravenous, Q24H  cetirizine, 5 mg, Oral, Daily  chlorhexidine, 15 mL, Mouth/Throat, Q12H  finasteride, 5 mg, Oral, Daily  folic acid, 1 mg, Oral, Daily  ipratropium-albuterol, 3 mL, Nebulization, 4x Daily - RT  midodrine, 15 mg, Oral, TID AC  mupirocin, 1 Application, Each Nare, BID  pantoprazole, 40 mg, Intravenous, BID AC  [Held by provider] rosuvastatin, 10 mg, Oral, Nightly  thiamine, 200 mg, Per G Tube, Daily  vitamin B-12, 1,000 mcg, Oral, Daily  amiodarone, 0.5 mg/min, Last Rate: 0.5 mg/min (25 0713)  dexmedetomidine, 0.2-1.5 mcg/kg/hr (Dosing Weight), Last Rate: 1 mcg/kg/hr (25 0047)  fentanyl 10 mcg/mL,  mcg/hr, Last Rate: 200 mcg/hr (25 0227)  heparin, 8.8 Units/kg/hr (Dosing Weight), Last Rate: 8.8 Units/kg/hr (25 3128)  insulin, 0-100 Units/hr  midazolam, 1-10 mg/hr  Pharmacy to Dose Heparin,   phenylephrine, 0.5-3 mcg/kg/min (Dosing Weight), Last Rate: Stopped (25 3822)  propofol, 5-50 mcg/kg/min  "(Dosing Weight), Last Rate: 25 mcg/kg/min (02/13/25 0514)      ----------------------------------------------------------------------------------------------------------------------  Vital Signs:  Temp:  [97.8 °F (36.6 °C)-102.9 °F (39.4 °C)] 102.2 °F (39 °C)  Heart Rate:  [] 117  Resp:  [13-33] 18  BP: (108-159)/() 108/84  FiO2 (%):  [50 %-75 %] 65 %  SpO2:  [89 %-98 %] 94 %  on   ;   Device (Oxygen Therapy): ventilator  Body mass index is 33.64 kg/m².      Intake/Output Summary (Last 24 hours) at 2/13/2025 1019  Last data filed at 2/13/2025 0800  Gross per 24 hour   Intake 2072.04 ml   Output 1765 ml   Net 307.04 ml      ----------------------------------------------------------------------------------------------------------------------  Physical exam:  Constitutional:  Intubated/Sedated, no acute distress.      HENT:  Head:  Normocephalic and atraumatic.  Mouth:  Moist mucous membranes.    Eyes:  Conjunctivae are normal. No scleral icterus.    Neck:  Neck supple.  No JVD present.    Cardiovascular:  Tachycardic rate, irregular rhythm and normal heart sounds with no murmur.  Pulmonary/Chest:  Ventilated, bilateral equal rise of chest, on Fi02 65%  Abdominal:  Soft. No distension and no tenderness.   Musculoskeletal:  No tenderness, and no deformity.  No red or swollen joints anywhere.    Neurological:  Unable to assess due to sedation    Skin:  Skin is warm and dry. No rash noted. No pallor.   Peripheral vascular:  No clubbing, no cyanosis, trace edema.  Psychiatric: Unable to assess due to sedation  : Vale in place    *Examination stable today 2/13    Edited by: Kendall Irby MD at 2/13/2025 1019  ----------------------------------------------------------------------------------------------------------------------     BUN/CREAT/GLUC/ALT/AST/CARLA/LIP    73/9.19/308/37/72/--/-- (02/13 0157)  MICHAEL - Na/K/Cl/CO2: 140/3.4*, 3.4*/96*/24.8 (02/13 0157)        No results found for: \"URINECX\"  Blood " Culture   Date Value Ref Range Status   02/10/2025 No growth at 3 days  Preliminary   02/10/2025 No growth at 3 days  Preliminary   02/08/2025 No growth at 4 days  Preliminary   02/08/2025 Staphylococcus, coagulase negative (C)  Final       I have personally looked at the labs and they are summarized above.  ----------------------------------------------------------------------------------------------------------------------  Detailed radiology reports for the last 24 hours:  XR Abdomen KUB    Result Date: 2/13/2025   Nonobstructive bowel gas pattern. Enteric drain in place with tip at the stomach. No pneumatosis or free air. Mild degenerative disc disease in the lumbar spine.  This report was finalized on 2/13/2025 5:08 AM by Scott Campbell MD.      XR Chest 1 View    Result Date: 2/13/2025   Mild enlarged heart size Central pulmonary vascular congestion with edema. Mild atelectasis at the lung bases. Small right and left pleural effusion. Satisfactory position of the tubes and lines. No pneumothorax.   This report was finalized on 2/13/2025 5:07 AM by Scott Campbell MD.      XR Chest 1 View    Result Date: 2/11/2025  Line placement as above.    This report was finalized on 2/11/2025 3:38 PM by Dr. Tripp Berrios MD.     Assessment & Plan    69M History Long-term Incarceration, Obese by BMI PMH GERD, Hypertension, Hyperlipidemia, Atrial Fibrillation, Alcohol abuse, COPD, was found down at home and unresponsive, was intubated on arrival to emergency room for airway protection.    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 complicated by possible ARDS, HAGMA, Mild Rhabdomyolysis   #Atrial Fibrillation with Rapid Ventricular Rate   #Elevated HS Troponins, suspected  NSTEMI Type II due to shock  - Pulmonary consulted and following   - Nephrology consulted and following, temporary catheter placed, hemodialysis per Nephrology   - Palliative Care consulted and following   - Continue Ceftriaxone, follow up cultures, though febrile today, repeat blood cultures, also obtaining CT Chest/Abdomen/Pelvis, AICU overnight ordered HIDA scan, initial CT with concern for cholecystitis, reviewed with General Surgery and prefer CT over HIDA especially without elevated Tbili or stones noted on prior CT  - Obtain CT Head, continue spontaneous awakening trial as able, low threshold to involve TeleNeurology   - Continue pain and sedation drips for comfort  - Continue IV Amiodarone drip   - Continue Heparin drip   - Continue IV pressors for SBP < 90 or MAPs consistently < 65  - Continue IV PPI for Gi prophylaxis  - Continue Tylenol as needed for fevers, Duonebs as needed  - Admitted to CCU, monitor on telemetry, continue 02 but wean as able, spontaneous awakening trial/spontaneous breathing trial when appropriate    #Electrolyte Abnormalities  - Acute Severe Hypokalemia - Replacing, on protocol  - Acute Severe Hypomagnesemia - Replacing, on protocol    #Pre-Diabetes with steroid induced hyperglycemia  - Hgb A1c = 6.4%  - Continue FSBG and SSI, add long acting if indicated.    #Alcohol Use Disorder, Severe, with Dependence complicated   - Continue cardiac monitoring, seizure precautions, monitor for withdrawal     #Obesity by BMI  - Body mass index is 30.41 kg/m².; complicates all aspects of care     F: NPO, mIVFs  E: Monitor & Replace as needed  N: NPO Diet NPO Type: Strict NPO  Ppx: Heparin drip   Code Status (Patient has no pulse and is not breathing): CPR  Medical Interventions (Patient has pulse or is breathing): Full Support    Dispo: Pending clinical improvement     *This patient is considered high risk due to sepsis, acute respiratory failure, Pneumonia, intubation and mechanical ventilation,  Rapid Ventricular Rate, Acute Kidney Injury, Acute Metabolic Encephalopathy.     I have spent 35 minutes of critical care time (7:45-8:20AM) with > 50% of time spent in direct patient care, evaluating the patient at bedside, reviewing all labs and images, reviewing ABG and vent settings, reviewing pain and sedation drips, communicating plan of care with nursing staff & consultants (Palliative Care & General Surgery), utilizing high complexity medical decision making to assess and treat vital organ system failure in an individual who has impairment of one or more vital organ systems such that there is a high probability of imminent or life threatening deterioration in the patient’s condition. Failure to initiate the above interventions on an urgent basis would likely result in sudden, clinically significant or life threatening deterioration in the patient's condition.  Edited by: Kendall Irby MD at 2/13/2025 1019  [facility]

## 2025-02-13 NOTE — PLAN OF CARE
Problem: Mechanical Ventilation Invasive  Goal: Effective Communication  2/13/2025 0420 by Vanna Mayorga RRT  Outcome: Progressing  2/13/2025 0419 by Vanna Mayorga RRT  Outcome: Progressing  Goal: Optimal Device Function  2/13/2025 0420 by Vanna Mayorga RRT  Outcome: Progressing  2/13/2025 0419 by Vanna Mayorga RRT  Outcome: Progressing  Intervention: Optimize Device Care and Function  Recent Flowsheet Documentation  Taken 2/13/2025 0410 by Vanna Mayorga RRT  Airway/Ventilation Management: airway patency maintained  Airway Safety Measures: manual resuscitator/mask at bedside  Taken 2/13/2025 0208 by Vanna Mayorga RRT  Airway/Ventilation Management: airway patency maintained  Airway Safety Measures: manual resuscitator/mask at bedside  Taken 2/13/2025 0019 by Vanna Mayorga RRT  Airway/Ventilation Management: airway patency maintained  Airway Safety Measures: manual resuscitator/mask at bedside  Taken 2/12/2025 2211 by Vanna Mayorga RRT  Airway/Ventilation Management: airway patency maintained  Airway Safety Measures: manual resuscitator/mask at bedside  Taken 2/12/2025 2022 by Vanna Mayorga RRT  Airway/Ventilation Management: airway patency maintained  Airway Safety Measures: manual resuscitator/mask at bedside  Taken 2/12/2025 1836 by Vanna Mayorga RRT  Airway/Ventilation Management: airway patency maintained  Airway Safety Measures: manual resuscitator/mask at bedside  Goal: Mechanical Ventilation Liberation  2/13/2025 0420 by Vanna Mayorga RRT  Outcome: Progressing  2/13/2025 0419 by Vanna Mayorga RRT  Outcome: Progressing  Goal: Optimal Nutrition Delivery  2/13/2025 0420 by Vanna Mayorga RRT  Outcome: Progressing  2/13/2025 0419 by Vanna Mayorga RRT  Outcome: Progressing  Goal: Absence of Device-Related Skin and Tissue Injury  2/13/2025 0420 by Vanna Mayorga RRT  Outcome: Progressing  2/13/2025 0419 by Vanna Mayorga RRT  Outcome: Progressing  Goal:  Absence of Ventilator-Induced Lung Injury  2/13/2025 0420 by Vanna Mayorag RRT  Outcome: Progressing  2/13/2025 0419 by Vanna Mayorga RRT  Outcome: Progressing  Intervention: Prevent Ventilator-Associated Pneumonia  Recent Flowsheet Documentation  Taken 2/13/2025 0410 by Vanna Mayorga RRT  Head of Bed (HOB) Positioning: HOB at 30-45 degrees  Taken 2/13/2025 0208 by Vanna Mayorga RRT  Head of Bed (HOB) Positioning: HOB at 30-45 degrees  Taken 2/13/2025 0019 by Vanna Mayorga RRT  Head of Bed (HOB) Positioning: HOB at 30-45 degrees  Taken 2/12/2025 2211 by Vanna Mayorga RRT  Head of Bed (HOB) Positioning: HOB at 30-45 degrees  Taken 2/12/2025 2022 by Vanna Mayorga RRT  Head of Bed (HOB) Positioning: HOB at 30-45 degrees  Taken 2/12/2025 1836 by Vanna Mayorga RRT  Head of Bed (HOB) Positioning: HOB at 30-45 degrees   Goal Outcome Evaluation:

## 2025-02-13 NOTE — PROGRESS NOTES
NEPHROLOGY PROGRESS NOTE      INTERVAL HISTORY:    HPI, decreased level of consciousness respiratory distress  Patient remains intubated on mechanical ventilation FiO2 65% that is slightly worsened compared to yesterday.    STATUS OF ACUTE AND CHRONIC PROBLEMS;  1.  Remains oliguric with urine output less than 50 mL in last 24 hours  2.  Patient tolerated second session of dialysis very well until last 30 minutes where catheter quite working so had to terminate early.  3.patient's blood pressure remains soft with systolic in the 100s.        Intake/Output                         02/11/25 0701 - 02/12/25 0700 02/12/25 0701 - 02/13/25 0700     [id number] 3801-3442 Total 4720-0605 9477-0163 Total                 Intake    I.V.  1065.3  1929.3 2994.6  318.3  877.7 1196    Other  --  0 0  --  0 0    Flush/ Irrigation Intake (mL) (NG/OG Tube Orogastric 16 Fr Left mouth) -- 0 0 -- 0 0    NG/GT  160  453 613  370  346 716    IV Piggyback  --  200 200  --  100 100    Total Intake 1225.3 2582.3 3807.6 688.3 1323.7 2012       Output    Urine  30  30 60  35  30 65    Emesis/NG output  --  0 0  --  0 0    Stool  --  0 0  --  0 0    Dialysis  --  3000 3000  1700  -- 1700    Total Output 30 3030 3060 1735 30 1765             VITAL SIGNS :     Temp:  [97.8 °F (36.6 °C)-101.2 °F (38.4 °C)] 101.2 °F (38.4 °C)  Heart Rate:  [] 118  Resp:  [13-33] 24  BP: (103-159)/() 147/104  FiO2 (%):  [50 %-75 %] 65 %    PHYSICAL EXAMINATION:    GENERAL EXAM  Laying in bed, intubated  Comfortable on mechanical ventilation    MENTAL STATUS EXAM  Sedated    NECK EXAM  JVP is not elevated, carotid exam normal    CARDIOVASCULAR EXAM  Regular rate and rhythm, no murmurs noted, no added heart sounds, normal apical pulsation  no edema in the lower extremities  2+ radial pulses bilateral, 2+ femoral/tibial pulses bilaterally    MUSCULOSKELETAL EXAM  No cyanosis or clubbing noted in the digits    RESPIRATORY EXAM  Lungs clear to auscultation  "bilaterally, respiratory effort normal    ABDOMINAL EXAM  Abdomen soft and non tender, normal bowel sounds    SKIN EXAM  No new rashes      Laboratory Data :     Albumin Albumin   Date Value Ref Range Status   02/13/2025 3.0 (L) 3.5 - 5.2 g/dL Final      Magnesium No results found for: \"MG\"         PTH               No results found for: \"PTH\"    CBC and coagulation:  Results from last 7 days   Lab Units 02/13/25  0157 02/12/25  0412 02/11/25  0234 02/10/25  1024 02/10/25  0742 02/10/25  0304 02/09/25  0010 02/08/25  1349   PROCALCITONIN ng/mL  --  6.34* 9.49*  --   --   --   --  5.90*   LACTATE mmol/L 0.9  --   --   --   --   --   --  1.2   CRP mg/dL  --   --   --   --   --   --   --  9.12*   WBC 10*3/mm3  --   --  18.87* 19.56*  --  18.44*   < > 8.18   HEMOGLOBIN g/dL  --   --  11.9* 11.3*  --  12.0*   < > 10.6*   HEMATOCRIT %  --   --  36.1* 34.3*  --  36.3*   < > 31.1*   MCV fL  --   --  98.9* 98.6*  --  97.1*   < > 95.7   MCHC g/dL  --   --  33.0 32.9  --  33.1   < > 34.1   PLATELETS 10*3/mm3  --   --  144 117*  --  115*   < > 82*   INR   --   --   --   --  1.16*  --   --  1.21*    < > = values in this interval not displayed.     Acid/base balance:  Results from last 7 days   Lab Units 02/09/25  0742 02/08/25  1348   PH, ARTERIAL pH units 7.289* 7.318*   PO2 ART mm Hg 85.2 106.0   PCO2, ARTERIAL mm Hg 41.4 46.9*   HCO3 ART mmol/L 19.9* 24.1     Renal and electrolytes:    Results from last 7 days   Lab Units 02/13/25  0157 02/12/25  1843 02/12/25  1057 02/12/25  0412 02/11/25  0536 02/10/25  0928 02/10/25  0304 02/09/25  0800 02/09/25  0010 02/08/25  1349   SODIUM mmol/L 140  --   --  142 146*  --  142  --  141 144   POTASSIUM mmol/L 3.4*  3.4* 3.1* 3.4* 3.1* 3.9   < > 3.5   < > 2.7* 2.2*   MAGNESIUM mg/dL  --   --   --   --   --   --  1.9  --  2.0 1.2*   CHLORIDE mmol/L 96*  --   --  97* 103  --  101  --  100 99   CO2 mmol/L 24.8  --   --  24.1 16.0*  --  18.3*  --  16.3* 21.8*   BUN mg/dL 73*  --   --  76* " 90*  --  76*  --  68* 70*   CREATININE mg/dL 9.19*  --   --  10.21* 11.82*  --  10.76*  --  10.76* 10.92*   CALCIUM mg/dL 7.0*  --   --  6.8* 5.9*  --  6.5*  --  7.4* 6.1*   PHOSPHORUS mg/dL 6.7*  --   --   --   --   --   --   --  8.3* 6.1*    < > = values in this interval not displayed.     Estimated Creatinine Clearance: 10 mL/min (A) (by C-G formula based on SCr of 9.19 mg/dL (H)).  @GFRCG:3@   Liver and pancreatic function:  Results from last 7 days   Lab Units 02/13/25  0157 02/10/25  0304 02/09/25  0010   ALBUMIN g/dL 3.0* 2.4* 2.8*   BILIRUBIN mg/dL 0.5 0.8 1.6*   ALK PHOS U/L 50 46 45   AST (SGOT) U/L 72* 136* 238*   ALT (SGPT) U/L 37 59* 73*   AMMONIA umol/L 46  --  40         Cardiac:      Liver and pancreatic function:  Results from last 7 days   Lab Units 02/13/25  0157 02/10/25  0304 02/09/25  0010   ALBUMIN g/dL 3.0* 2.4* 2.8*   BILIRUBIN mg/dL 0.5 0.8 1.6*   ALK PHOS U/L 50 46 45   AST (SGOT) U/L 72* 136* 238*   ALT (SGPT) U/L 37 59* 73*   AMMONIA umol/L 46  --  40         CLINICAL DATA REVIEW  CBC, Renal functions, Serum electrolytes, Acid base labs reviewed 1  Telemetry was reviewed and demonstrated sinus rhythm rate 89-1 34  Independently reviewed chest x-ray consistent with fluid overload/pulmonary congestion  Discussed the labs with Dr. Irby 1    MEDICINES:     cefTRIAXone, 2,000 mg, Intravenous, Q24H  cetirizine, 5 mg, Oral, Daily  chlorhexidine, 15 mL, Mouth/Throat, Q12H  finasteride, 5 mg, Oral, Daily  folic acid, 1 mg, Oral, Daily  insulin glargine, 20 Units, Subcutaneous, Daily  insulin regular, 4-24 Units, Subcutaneous, Q6H  ipratropium-albuterol, 3 mL, Nebulization, 4x Daily - RT  midodrine, 15 mg, Oral, TID AC  mupirocin, 1 Application, Each Nare, BID  pantoprazole, 40 mg, Intravenous, BID AC  [Held by provider] rosuvastatin, 10 mg, Oral, Nightly  sodium chloride, 10 mL, Intravenous, Q12H  sodium chloride, 10 mL, Intravenous, Q12H  sodium chloride, 10 mL, Intravenous, Q12H  sodium  chloride, 10 mL, Intravenous, Q12H  sodium chloride, 10 mL, Intravenous, Q12H  thiamine, 200 mg, Per G Tube, Daily  Vancomycin Pharmacy Intermittent/Pulse Dosing, , Not Applicable, Daily  vitamin B-12, 1,000 mcg, Oral, Daily      amiodarone, 0.5 mg/min, Last Rate: 0.5 mg/min (02/13/25 0713)  dexmedetomidine, 0.2-1.5 mcg/kg/hr (Dosing Weight), Last Rate: 1 mcg/kg/hr (02/13/25 0047)  fentanyl 10 mcg/mL,  mcg/hr, Last Rate: 200 mcg/hr (02/12/25 5348)  heparin, 8.8 Units/kg/hr (Dosing Weight), Last Rate: 8.8 Units/kg/hr (02/13/25 0108)  Pharmacy to Dose Heparin,   phenylephrine, 0.5-3 mcg/kg/min (Dosing Weight), Last Rate: Stopped (02/11/25 2225)  propofol, 5-50 mcg/kg/min (Dosing Weight), Last Rate: 25 mcg/kg/min (02/13/25 6214)          Assessment & Plan       -Acute kidney injury  - Chronic kidney disease unspecified stage  - Acute hypercapnic hypoxic respiratory failure  - Acute metabolic acidosis  - Severe sepsis with septic shock  - Rhabdomyolysis  - Paroxysmal atrial fibrillation    Evaluated the patient on third consecutive dialysis session, catheter is working very well will increase blood flow rate to 350 mL/min with dialysate rate to 700.  Will transition to intermittent dialysis 3 times a week, patient remains oliguric with no signs of renal recovery    Acute kidney injury most likely due to acute tubular necrosis in settings shock  Had ASHLEY in October 2024 with creatinine peaked to 4 with some improvement close to 2  - Strict intake and output record.  -Patient remains very high risk  - Please avoid any nephrotoxic agents, hypotension and adjust medications according to estimated GFR.       REVIEWED NOTES OF CLINICAL TEAMS INVOLVED WITH PATIENT CARE.     DISCUSSED IN DETAIL WITH PATIENT AND/OR FAMILY ABOUT CURRENT CLINICAL CONDITION AND RESPONSE TO ONGOING MANAGEMENT.     DISCUSSED IN DETAIL WITH PATIENT AND/OR FAMILY ABOUT RISKS ASSOCIATED WITH CURRENT CLINICAL CONDITION AND RISK INVOLVED WITH CURRENT  MANAGEMENT.     DISCUSSED IN DETAIL WITH HOSPITALIST    CODE STATUS REVIEWED,     Saba Bates MD  02/13/25  08:24 EST

## 2025-02-13 NOTE — PROGRESS NOTES
[facility]  PROGRESS NOTE    Patient information:  Name: Phan Daniels  Age/Sex: 69 y.o. male  :  1955        PCP: Cesia Diaz APRN  Attending: Talat Blankenship MD  MRN:  [id number]  Visit Number:  [id number]  LOS: 5  CODE STATUS:    Code Status and Medical Interventions: No CPR (Do Not Attempt to Resuscitate); Full Support; Nephew Ashutosh, sisters Baylee and Ivy, brothers Dada and Adolph   Ordered at: 02/10/25 1152     Level Of Support Discussed With:    Next of Kin (If No Surrogate)     Code Status (Patient has no pulse and is not breathing):    No CPR (Do Not Attempt to Resuscitate)     Medical Interventions (Patient has pulse or is breathing):    Full Support     Comments:    Nephar Boykin, sisters Baylee and Viy, brothers Alaina     PROBLEM LIST:Principal Problem:    Septic shock    Reason for Cardiology follow-up: Found unresponsive     Subjective   ADMISSION INFORMATION:  Chief Complaint   Patient presents with    Respiratory Distress     DATE:2025    Admission information/HPI:  69-year-old male with past medical history is significant for A-fib, COPD, hypertension, dyslipidemia, GERD, alcohol abuse, and smoking.     He was admitted on 2025 after he was found unresponsive on the floor for unknown amount of time. Patient actually came to visit his girlfriend from [place]. He was found apparently by a neighbor and was covered in stool with irregular breathing with O2 sat in the 50s. Patient was put on a nonrebreather and was subsequently intubated in the ER. He was given Narcan with no improvement in mental status. He was found to be hypotensive as well and started on Levophed.  Central line was placed.      Patient goes to [facility] in [place] where he lives.     Interval History:   According to patient's I & O flow chart total urine output 65 mL, he did have dialysis of 1700 mL and his net balance is +247 mL  overnight. AM labs reveal sodium 140, potassium 3.3, chloride 96, CO2 24.8, BUN 73, and creatinine 9.19 versus 10.21.  Hemoglobin is 11.9, platelet count 1.4, WBCs 18.87.  Potassium has been supplemented per patient's MAR.  On 2/12/2025 patient was transition to p.o. amiodarone.  However overnight he did go into RVR and IV amiodarone had to be restarted.  Patient continues to be in atrial fibrillation 90s to 110s.    Patient was in room CCU 7 when s/he was seen and examined.  Patient remains intubated and sedated.  No family is at bedside at this time.    EVENT TIMELINE:   2/08: Orally intubated in ED.   2/8: TTE w EF 55-60%.  Please see full report attached below.  2/10: Initiated IV Amiodarone per protocol   2/12: DC IV Amiodarone, start 400 mg PO daily x 14 days (02/25/2025), then on 02/26/2025, start 200 mg PO daily.  Of note, IV amiodarone had to be restarted at 2115 per patient's MAR.    Objective     Vital Signs  Temp:  [97.6 °F (36.4 °C)-102.9 °F (39.4 °C)] 99.1 °F (37.3 °C)  Heart Rate:  [] 77  Resp:  [18-29] 19  BP: ()/() 139/88  FiO2 (%):  [50 %-75 %] 65 %  Device (Oxygen Therapy): ventilator  Vital Signs (last 72 hrs)         02/10 0700  02/11 0659 02/11 0700  02/12 0659 02/12 0700 02/13 0659 02/13 0700  02/13 0938   Most Recent      Temp (°F) 97.9 -  101.3    98.4 -  102.3    97.8 -  101.2    102.2 -  102.9     102.2 (39)  Comment: Dr. Irby at bedside at this time 02/13 0800    Heart Rate 74 -  144    96 -  123    89 -  134    108 -  118     108 02/13 0830    Resp 24 -  30      24    13 -  33      24     24 02/13 0804    BP 83/59 -  116/81    85/60 -  141/105    93/73 -  159/103    140/98 -  150/101     140/114 02/13 0830    SpO2 (%) 91 -  100    90 -  98    89 -  98    91 -  96     92 02/13 0830    Oxygen Concentration (%) 50 -  65    50 -  60    50 -  75      65     65 02/13 0804          BMI:Body mass index is 33.64 kg/m².    WEIGHT:      02/10/25  0600 02/11/25  0551  "02/13/25  0500   Weight: 102 kg (224 lb 3.3 oz) 109 kg (240 lb 8.4 oz) 113 kg (248 lb 0.3 oz)       DIET:NPO Diet NPO Type: Strict NPO    I&O:  Intake & Output (last 3 days)         02/10 0701  02/11 0700 02/11 0701  02/12 0700 02/12 0701  02/13 0700 02/13 0701 02/14 0700    P.O. 0       I.V. (mL/kg) 3236.5 (31.7) 2994.6 (29.4) 1196 (11.7)     Other 0 0 0     NG/ 613 716     IV Piggyback 600 200 100     Total Intake(mL/kg) 4018.5 (39.4) 3807.6 (37.3) 2012 (19.7)     Urine (mL/kg/hr) 70 (0) 60 (0) 65 (0)     Emesis/NG output 0 0 0     Stool 0 0 0     Dialysis  3000 1700     Total Output 70 3060 1765     Net +3948.5 +747.6 +247             Stool Unmeasured Occurrence 0 x 0 x 0 x     Emesis Unmeasured Occurrence 0 x 0 x 0 x              PHYSICAL EXAM:  Constitutional:       Appearance: Not in distress. Acutely ill-appearing.   HENT:         Comments: Orally intubated.  Neck:      Vascular: No JVD. JVD normal.   Pulmonary:      Comments: Intubated with mechanical ventilation noted bilaterally.  Cardiovascular:      Normal rate. Regular rhythm.      Murmurs: There is no murmur.      Comments: +2 BLE  Edema:     Peripheral edema present.  Abdominal:      General: Bowel sounds are normal. There is no distension.      Palpations: Abdomen is soft.      Comments: FC in use with dark yellow urine noted in BSD.      Musculoskeletal:      Cervical back: Neck supple.      Comments: Sedated. SCDS in use BLE.  Skin:     General: Skin is warm and dry.   Neurological:      Comments: Intubated and sedated.                   Results review   Results Review:    I have reviewed the patient's new clinical results. 02/13/25 19:20 EST    Results from last 7 days   Lab Units 02/12/25  0412 02/10/25  0304 02/09/25  0010 02/08/25  1504 02/08/25  1349   CK TOTAL U/L 3,260* 2,404* 3,281*  --  2,687*   HSTROP T ng/L  --   --   --  750* 745*     No results found for: \"PROBNP\"  Results from last 7 days   Lab Units 02/11/25  0234 " 02/10/25  1024 02/10/25  0304 02/09/25  0010 02/08/25  1349   WBC 10*3/mm3 18.87* 19.56* 18.44* 14.32* 8.18   HEMOGLOBIN g/dL 11.9* 11.3* 12.0* 11.8* 10.6*   PLATELETS 10*3/mm3 144 117* 115* 89* 82*     Results from last 7 days   Lab Units 02/13/25  1150 02/13/25  0157 02/12/25  1843 02/12/25  1057 02/12/25  0412 02/11/25  0536 02/11/25  0234 02/10/25  0928 02/10/25  0304 02/09/25  0800 02/09/25  0010 02/08/25  1349   SODIUM mmol/L  --  140  --   --  142 146*  --   --  142  --  141 144   POTASSIUM mmol/L 3.3* 3.4*  3.4* 3.1* 3.4* 3.1* 3.9 3.8   < > 3.5   < > 2.7* 2.2*   CHLORIDE mmol/L  --  96*  --   --  97* 103  --   --  101  --  100 99   CO2 mmol/L  --  24.8  --   --  24.1 16.0*  --   --  18.3*  --  16.3* 21.8*   BUN mg/dL  --  73*  --   --  76* 90*  --   --  76*  --  68* 70*   CREATININE mg/dL  --  9.19*  --   --  10.21* 11.82*  --   --  10.76*  --  10.76* 10.92*   CALCIUM mg/dL  --  7.0*  --   --  6.8* 5.9*  --   --  6.5*  --  7.4* 6.1*   GLUCOSE mg/dL  --  308*  --   --  282* 189*  --   --  179*  --  215* 134*   ALT (SGPT) U/L  --  37  --   --   --   --   --   --  59*  --  73* 70*   AST (SGOT) U/L  --  72*  --   --   --   --   --   --  136*  --  238* 271*    < > = values in this interval not displayed.     Lab Results   Component Value Date    MG 1.9 02/10/2025    MG 2.0 02/09/2025    MG 1.2 (L) 02/08/2025     Estimated Creatinine Clearance: 10 mL/min (A) (by C-G formula based on SCr of 9.19 mg/dL (H)).    Lab Results   Component Value Date    HGBA1C 6.4 (H) 10/15/2024     Lab Results   Component Value Date    TRIG 225 (H) 02/12/2025        Lab Results   Component Value Date    INR 1.16 (H) 02/10/2025    INR 1.21 (H) 02/08/2025     Lab Results   Component Value Date    LABHEPA 0.26 (L) 02/13/2025    LABHEPA 0.19 (L) 02/13/2025    LABHEPA 0.30 02/12/2025    LABHEPA 0.64 02/12/2025       Lab Results   Component Value Date    TSH 0.196 (L) 02/09/2025      Pain Management Panel          Latest Ref Rng & Units  2/8/2025   Pain Management Panel   Amphetamine, Urine Qual Negative Negative    Barbiturates Screen, Urine Negative Negative    Benzodiazepine Screen, Urine Negative Negative    Buprenorphine, Screen, Urine Negative Negative    Cocaine Screen, Urine Negative Negative    Fentanyl, Urine Negative Negative    Methadone Screen , Urine Negative Negative    Methamphetamine, Ur Negative Negative       Details                 Microbiology Results (last 10 days)       Procedure Component Value - Date/Time    Blood Culture - Blood, Arm, Right [[id number]]  (Normal) Collected: 02/10/25 0752    Lab Status: Preliminary result Specimen: Blood from Arm, Right Updated: 02/13/25 0900     Blood Culture No growth at 3 days    Blood Culture - Blood, Arm, Left [[id number]]  (Normal) Collected: 02/10/25 0752    Lab Status: Preliminary result Specimen: Blood from Arm, Left Updated: 02/13/25 0900     Blood Culture No growth at 3 days    Respiratory Culture - Sputum, ET Suction [[id number]]  (Abnormal) Collected: 02/09/25 0441    Lab Status: Final result Specimen: Sputum from ET Suction Updated: 02/10/25 1031     Respiratory Culture Heavy growth (4+) Streptococcus agalactiae (Group B)     Comment:   This organism is considered to be universally susceptible to penicillin.  No further antibiotic testing will be performed. If Clindamycin or Erythromycin is the drug of choice, notify the laboratory within 7 days to request susceptibility testing.         Heavy growth (4+) Normal respiratory lu. No S. aureus or Pseudomonas aeruginosa detected. Final report.     Gram Stain Moderate (3+) WBCs observed - predominantly neutrophils      Rare (1+) Squamous epithelial cells      Few (2+) Gram positive cocci in chains      Rare (1+) Yeast      Rare (1+) Gram negative bacilli      Rare (1+) Gram positive bacilli    S. Pneumo Ag Urine or CSF - Urine, Urine, Clean Catch [[id number]]  (Normal) Collected: 02/09/25 0249    Lab Status: Final result Specimen:  Urine, Clean Catch Updated: 02/09/25 1345     Strep Pneumo Ag Negative    Legionella Antigen, Urine - Urine, Urine, Catheter [[id number]]  (Normal) Collected: 02/08/25 1351    Lab Status: Final result Specimen: Urine, Catheter Updated: 02/08/25 2116     LEGIONELLA ANTIGEN, URINE Negative    Narrative:      Presumptive negative for L. pneumophilia serogroup 1 antigen, suggesting no recent or current infection.    Blood Culture - Blood, Arm, Left [[id number]]  (Normal) Collected: 02/08/25 1349    Lab Status: Final result Specimen: Blood from Arm, Left Updated: 02/13/25 1415     Blood Culture No growth at 5 days    Blood Culture - Blood, Arm, Right [[id number]]  (Abnormal) Collected: 02/08/25 1349    Lab Status: Final result Specimen: Blood from Arm, Right Updated: 02/10/25 0650     Blood Culture Staphylococcus, coagulase negative     Isolated from Aerobic Bottle     Gram Stain Aerobic Bottle Gram positive cocci in clusters    Narrative:      Probable contaminant requires clinical correlation, susceptibility not performed unless requested by physician.      Blood Culture ID, PCR - Blood, Arm, Right [[id number]]  (Abnormal) Collected: 02/08/25 1349    Lab Status: Final result Specimen: Blood from Arm, Right Updated: 02/09/25 1210     BCID, PCR Staph spp, not aureus or lugdunensis. Identification by BCID2 PCR.     BOTTLE TYPE Aerobic Bottle           Imaging Results (Last 24 Hours)       Procedure Component Value Units Date/Time    CT Head Without Contrast [[id number]] Collected: 02/13/25 1515     Updated: 02/13/25 1518    Narrative:      CT HEAD WO CONTRAST-     CLINICAL INDICATION: Sepsis; A41.9-Sepsis, unspecified organism;  N17.9-Acute kidney failure, unspecified; R79.89-Other specified abnormal  findings of blood chemistry; J18.9-Pneumonia, unspecified organism        COMPARISON: 2/8/2025     TECHNIQUE: Axial images of the brain were obtained with out intravenous  contrast.  Reformatted images were created in the  sagittal and coronal  planes.     DOSE:     Radiation dose reduction techniques were utilized per ALARA protocol.  Automated exposure control was initiated through either or [employer] or  [employer] software packages by  protocol.        FINDINGS:    BRAIN:  Unremarkable.  No hemorrhage.  No significant white matter  disease.  No edema.       VENTRICLES:  Unremarkable.  No ventriculomegaly.       BONES/JOINTS:  Unremarkable.  No acute fracture.       SOFT TISSUES:  Unremarkable.       SINUSES:  no air fluid levels       MASTOID AIR CELLS:  Unremarkable as visualized.  No mastoid effusion.          Impression:        Unremarkable exam demonstrating no CT evidence of acute intracranial  findings.     This report was finalized on 2/13/2025 3:15 PM by Dr. Tripp Berrios MD.       CT Chest Without Contrast Diagnostic [[id number]] Collected: 02/13/25 1514     Updated: 02/13/25 1517    Narrative:      EXAM: CT CHEST WO CONTRAST DIAGNOSTIC-      CLINICAL INDICATION:Sepsis; A41.9-Sepsis, unspecified organism;  N17.9-Acute kidney failure, unspecified; R79.89-Other specified abnormal  findings of blood chemistry; J18.9-Pneumonia, unspecified organism      COMPARISON: 2/8/2025     TECHNIQUE: Multiple axial CT images were obtained from lung apex through  upper abdomen without the administration of IV contrast. Reformatted  images in the coronal and/or sagittal plane(s) were generated from the  axial data set to facilitate diagnostic accuracy and/or surgical  planning.     Radiation dose reduction techniques were utilized per ALARA protocol.  Automated exposure control was initiated through either or [employer] or  [employer] software packages by  protocol.    DOSE (DLP mGy-cm):        FINDINGS:     LUNGS: Bibasilar consolidation     HEART: Coronary artery calcifications and cardiomegaly     MEDIASTINUM: No masses. No enlarged lymph nodes.  No fluid collections.     PLEURA: Small bilateral pleural effusions      VASCULATURE: No evidence of aneurysm.     BONES: No acute bony abnormality.     VISUALIZED UPPER ABDOMEN: Cholelithiasis     Other: None.       Impression:         1. Bilateral pleural effusions and bibasilar consolidation  2. Cholelithiasis and free fluid in the abdomen  3. Cardiomegaly and coronary artery calcifications                 This report was finalized on 2/13/2025 3:15 PM by Dr. Tripp Berrios MD.       CT Abdomen Pelvis Without Contrast [[id number]] Collected: 02/13/25 1510     Updated: 02/13/25 1516    Narrative:      EXAM: CT ABDOMEN PELVIS WO CONTRAST-         TECHNIQUE: Multiple axial CT images were obtained from lung bases  through pubic symphysis WITHOUT administration of IV contrast.  Reformatted images in the coronal and/or sagittal plane(s) were  generated from the axial data set to facilitate diagnostic accuracy  and/or surgical planning.  Oral Contrast:NONE.     Radiation dose reduction techniques were utilized per ALARA protocol.  Automated exposure control was initiated through either or [employer] or  [employer] software packages by  protocol.    DOSE:     Clinical information Sepsis; A41.9-Sepsis, unspecified organism;  N17.9-Acute kidney failure, unspecified; R79.89-Other specified abnormal  findings of blood chemistry; J18.9-Pneumonia, unspecified organism      Comparison 2/8/2025     FINDINGS:     Lower thorax: Bibasilar consolidation     Abdomen:     Liver: Homogeneous. No focal hepatic mass or ductal dilatation.     Gallbladder: Cholelithiasis     Pancreas: Unremarkable. No mass or ductal dilatation.     Spleen: Homogeneous. No splenomegaly.     Adrenals: No mass.     Kidneys/ureters: Right renal cyst. Bilateral perinephric stranding but  no obstructive uropathy.     GI tract: Non-dilated. No definite wall thickening.. There is no  evidence of appendicitis     MESENTERY: Small amount of free fluid in the abdomen and pelvis.  Uncertain etiology     Vasculature: No evidence of  aneurysm.     Abdominal wall: Mild degree of anasarca     Bladder: Collazo catheter is present     Reproductive: Unremarkable as visualized     Bones: No acute bony abnormality.       Impression:         1. Small amount of free fluid in the abdomen and pelvis.     2. Perinephric stranding bilaterally but no obstructive uropathy     3. No focal bowel wall thickening.                          This report was finalized on 2/13/2025 3:14 PM by Dr. Tripp Berrios MD.       US Venous Doppler Lower Extremity Bilateral (duplex) [[id number]] Collected: 02/13/25 1253     Updated: 02/13/25 1255    Narrative:      US VENOUS DOPPLER LOWER EXTREMITY BILATERAL (DUPLEX)-     CLINICAL INDICATION: Mottled coloration of limbs; A41.9-Sepsis,  unspecified organism; N17.9-Acute kidney failure, unspecified;  R79.89-Other specified abnormal findings of blood chemistry;  J18.9-Pneumonia, unspecified organism        COMPARISON: None available     TECHNIQUE: Color Doppler imaging was used with compression and  augmentation to evaluate the lower extremity deep venous system.     FINDINGS:   There is patent spontaneous flow from the common femoral vein through  the posterior tibial veins.  There was no internal clot or area of noncompressibility.  Normal augmentation was elicited where applicable.       Impression:      No DVT in the lower extremities on today's exam.      This report was finalized on 2/13/2025 12:53 PM by Dr. Tripp Berrios MD.       XR Abdomen KUB [[id number]] Collected: 02/13/25 0507     Updated: 02/13/25 0510    Narrative:      PROCEDURE: X-ray examination of the abdomen performed on February 13, 2025. 2 films.     HISTORY: Abdominal distention. Patient on ventilator. Hypoxia.     COMPARISON: None.     FINDINGS:     Enteric drain in place with tip in the stomach.  Nonobstructive bowel gas pattern.  No pneumatosis or free air.  Mild degenerative disc disease in the lumbar spine.       Impression:         Nonobstructive bowel gas  pattern.  Enteric drain in place with tip at the stomach.  No pneumatosis or free air.  Mild degenerative disc disease in the lumbar spine.     This report was finalized on 2/13/2025 5:08 AM by Scott Campbell MD.       XR Chest 1 View [[id number]] Collected: 02/13/25 0505     Updated: 02/13/25 0509    Narrative:      PROCEDURE: Portable chest x-ray examination performed on February 13, 2025. Single view.     HISTORY: Hypoxia. Abdominal distention.     FINDINGS:     Mild enlarged heart size.  Central pulmonary vascular congestion with edema.  Right lower lobe and left lower lobe atelectasis.  Small right and left pleural effusion.  Mild elevated right hemidiaphragm.  Left neck central vascular catheter with tip to the SVC.  Right neck central vascular catheter with tip to the SVC.  Endotracheal tube in place with tip 4.7 cm above the lissette.  No pneumothorax.  No pneumomediastinum.  No free air in the upper abdomen.       Impression:         Mild enlarged heart size  Central pulmonary vascular congestion with edema.  Mild atelectasis at the lung bases.  Small right and left pleural effusion.  Satisfactory position of the tubes and lines.  No pneumothorax.        This report was finalized on 2/13/2025 5:07 AM by Scott Campbell MD.             ECHO:  Results for orders placed during the hospital encounter of 02/08/25    Adult Transthoracic Echo Complete W/ Cont if Necessary Per Protocol    Interpretation Summary    Left ventricle is normal in size and function with estimate ejection fraction of 55 to 60%. Normal diastolic function.    Right ventricle appears normal in size and function.    Normal left atrial cavity size noted. No evidence of a patent foramen ovale. No evidence of an atrial septal defect present.    Normal right atrial cavity size noted. The inferior vena cava is dilated. Partial IVC inspiratory collapse of less than 50% noted.    Aortic valve appears trileaflet. There is no significant aortic stenosis  or regurgitation.    There is mild mitral regurgitation.    Estimated right ventricular systolic pressure from tricuspid regurgitation is moderately elevated (45-55 mmHg). Mild tricuspid regurgitation. Estimated RVSP is 45 mmHg with estimated RA pressure of 10 mmHg.    There is no evidence of pericardial effusion. . There is evidence of a fat pad present.    The aortic root measures 3.9 cm.        STRESS TEST:  No results found for this or any previous visit.        HEART CATH:  No results found for this or any previous visit.        EP STUDY/Interrogations:   No results found for this or any previous visit.        TELEMETRY:                    I reviewed the patient's new clinical results.    ALLERGIES: Indomethacin er, Isoniazid, and Penicillins    Medication Review:   Current list of medications may not reflect those currently placed in orders that are not signed or are being held.     cefTRIAXone, 2,000 mg, Intravenous, Q24H  cetirizine, 5 mg, Oral, Daily  chlorhexidine, 15 mL, Mouth/Throat, Q12H  finasteride, 5 mg, Oral, Daily  folic acid, 1 mg, Oral, Daily  ipratropium-albuterol, 3 mL, Nebulization, 4x Daily - RT  midodrine, 15 mg, Oral, TID AC  mupirocin, 1 Application, Each Nare, BID  pantoprazole, 40 mg, Intravenous, BID AC  [Held by provider] rosuvastatin, 10 mg, Oral, Nightly  sodium chloride, 10 mL, Intravenous, Q12H  sodium chloride, 10 mL, Intravenous, Q12H  sodium chloride, 10 mL, Intravenous, Q12H  sodium chloride, 10 mL, Intravenous, Q12H  sodium chloride, 10 mL, Intravenous, Q12H  sodium chloride, 10 mL, Intravenous, Q12H  thiamine, 200 mg, Per G Tube, Daily  vitamin B-12, 1,000 mcg, Oral, Daily      amiodarone, 0.5 mg/min, Last Rate: 0.5 mg/min (02/13/25 6183)  dexmedetomidine, 0.2-1.5 mcg/kg/hr (Dosing Weight), Last Rate: 1 mcg/kg/hr (02/13/25 1055)  fentanyl 10 mcg/mL,  mcg/hr, Last Rate: Stopped (02/13/25 1130)  heparin, 9.8 Units/kg/hr (Dosing Weight), Last Rate: 9.8 Units/kg/hr  (02/13/25 1843)  insulin, 0-100 Units/hr, Last Rate: 6.6 Units/hr (02/13/25 1818)  midazolam, 1-10 mg/hr, Last Rate: 5 mg/hr (02/13/25 1130)  Pharmacy to Dose Heparin,   phenylephrine, 0.5-3 mcg/kg/min (Dosing Weight), Last Rate: Stopped (02/11/25 2225)  propofol, 5-50 mcg/kg/min (Dosing Weight), Last Rate: 25 mcg/kg/min (02/13/25 1843)        acetaminophen    senna-docusate sodium **AND** polyethylene glycol **AND** bisacodyl **AND** bisacodyl    Calcium Replacement - Follow Nurse / BPA Driven Protocol    dextrose    dextrose    dextrose    dextrose    dextrose    dextrose    fluticasone    glucagon (human recombinant)    glucagon (human recombinant)    glucagon (human recombinant)    Magnesium Standard Dose Replacement - Follow Nurse / BPA Driven Protocol    metoprolol tartrate    Pharmacy to Dose Heparin    Phosphorus Replacement - Follow Nurse / BPA Driven Protocol    Polyvinyl Alcohol-Povidone PF    Potassium Replacement - Follow Nurse / BPA Driven Protocol    senna    sodium chloride    sodium chloride    sodium chloride    sodium chloride    sodium chloride    sodium chloride    sodium chloride    sodium chloride    sodium chloride    sodium chloride      Assessment      Atrial fibrillation with intermittent rapid ventricle response, patient has converted back to sinus rhythm on IV amiodarone.  Significantly elevated CPK week with possible rhabdomyolysis.  Acute kidney injury on chronic kidney disease, being followed by nephrology.  Hypokalemia which could be triggering his atrial fibrillation.  History of alcohol abuse which is also contributing to his atrial fibrillation.    Recommendations / Plan     Continue with IV amiodarone and then switch to p.o. if his rhythm remained stable.  Continue with IV heparin per protocol for now for stroke prevention and switch to one of the DOAC's once he is more stable and if his H&H remains stable on IV heparin.  Supplement potassium for hypokalemia and to try to keep  the potassium levels between 4 and 5 and magnesium levels between 2 and 2.2.    I have discussed the patients findings and recommendations with the patient.    Thank you very much for asking us to be involved in this patient's care.      Electronically signed by GAGE Angulo, 02/13/25, 7:20 PM EST.     Electronically signed by George Lai MD, 02/13/25, 8:12 PM EST.            Please note that portions of this note were completed with a voice recognition program.    Please note that portions of this note were copied and has been reviewed and is accurate as of 2/13/2025 .

## 2025-02-13 NOTE — PROGRESS NOTES
Pulmonary critical care progress note    Referring Provider: Dr Blankenship  Reason for Consultation: Respiratory failure and ventilator management      Chief complaint -could not be obtained as patient was intubated and sedated on my assessment      Sub-  Overnight events reviewed.  All the labs medications ins and outs and vitals reviewed.  Patient remains critically ill remains ventilator dependent.  Spiked a fever-will order sepsis workup.  PEEP and FiO2 requirement went up.  For atrial fibrillation on heparin drip.  Bleeding around dialysis catheter.  Blood gas from today morning reviewed and decrease the rate.    Another family members at bedside.  MDR done at bedside with patient's nurse and RT    Review of Systems  Could not be obtained as patient on my assessment was intubated and sedated.      History  Past Medical History:   Diagnosis Date    Abdominal bloating 01/06/2023    Atrial fibrillation 12/15/2022    Basal cell carcinoma of skin 12/15/2022    Bradycardia 03/13/2024    Chronic low back pain 05/02/2023    Chronic obstructive lung disease 05/02/2023    Chronic post-COVID-19 syndrome 05/02/2023    Community acquired pneumonia 01/06/2023    Constipation 08/11/2023    COPD (chronic obstructive pulmonary disease)     Dental caries 12/15/2022    Difficult or painful urination 04/19/2023    Essential hypertension 12/15/2022    GERD (gastroesophageal reflux disease)     Herpes zoster 02/03/2023    Hiatal hernia     History of degenerative disc disease     uses motorized chair    Hyperlipidemia     Hypertension     Hypokalemia 01/06/2023    Osteoarthritis of knee 02/03/2023    Peripheral edema 03/04/2024    PONV (postoperative nausea and vomiting)     Rib pain 04/19/2023   ,   Past Surgical History:   Procedure Laterality Date    CATARACT EXTRACTION      COLONOSCOPY      [facility]13/2024    Procedure: ESOPHAGOGASTRODUODENOSCOPY;  Surgeon: Morris Wynne MD;  Location: [facility] ENDOSCOPY;   Service: Gastroenterology;  Laterality: N/A;  DISTAL ESOPHAGUS DILATED WITH 18-20 MM BALLOON DILATOR.    REPLACEMENT TOTAL KNEE Left    , History reviewed. No pertinent family history.,   Social History     Tobacco Use    Smoking status: Every Day     Current packs/day: 2.00     Average packs/day: 2.0 packs/day for 2.1 years (4.2 ttl pk-yrs)     Types: Cigarettes     Start date: 1/1/2023    Smokeless tobacco: Never   Vaping Use    Vaping status: Never Used   Substance Use Topics    Alcohol use: Yes     Comment: 2 pints liquor/day    Drug use: Never   ,   Medications Prior to Admission   Medication Sig Dispense Refill Last Dose/Taking    acetaminophen (TYLENOL) 325 MG tablet Take 2 tablets by mouth 3 (Three) Times a Day As Needed for Mild Pain.   Unknown    albuterol sulfate  (90 Base) MCG/ACT inhaler Inhale 2 puffs Every 6 (Six) Hours As Needed for Shortness of Air.   Unknown    amLODIPine (NORVASC) 5 MG tablet Take 1 tablet by mouth Every Morning.   Unknown    aspirin 81 MG EC tablet Take 1 tablet by mouth Daily.   Unknown    carvedilol (COREG) 25 MG tablet Take 0.5 tablets by mouth 2 (Two) Times a Day With Meals.   Unknown    Diclofenac Sodium (VOLTAREN) 1 % gel gel Apply 4 g topically to the appropriate area as directed Every 6 (Six) Hours As Needed (Joint Pain).   Unknown    DULoxetine (CYMBALTA) 30 MG capsule Take 1 capsule by mouth Daily. For MOOD   Unknown    finasteride (PROSCAR) 5 MG tablet Take 1 tablet by mouth Daily.   Unknown    flecainide (TAMBOCOR) 150 MG tablet Take 0.5 tablets by mouth 2 (Two) Times a Day.   Unknown    fluticasone (FLONASE) 50 MCG/ACT nasal spray Administer 1 spray into the nostril(s) as directed by provider 2 (Two) Times a Day As Needed for Allergies.   Unknown    Fluticasone-Salmeterol (ADVAIR/WIXELA) 250-50 MCG/ACT DISKUS Inhale 1 puff 2 (Two) Times a Day.   Unknown    furosemide (LASIX) 20 MG tablet Take 1 tablet by mouth Daily As Needed (SWELLING).   Unknown     gabapentin (NEURONTIN) 600 MG tablet Take 2 tablets by mouth 3 times a day.   Unknown    loratadine (CLARITIN) 10 MG tablet Take 1 tablet by mouth Daily As Needed for Allergies.   Unknown    losartan (COZAAR) 50 MG tablet Take 1 tablet by mouth Daily.   Unknown    methocarbamol (ROBAXIN) 750 MG tablet Take 1 tablet by mouth 2 (Two) Times a Day As Needed for Muscle Spasms.   Unknown    nicotine (NICOTROL) 10 MG/ML solution nasal solution 10 sprays by Each Nare route Every 1 (One) Hour As Needed (Smoking Cessation). MAX OF 10 sprays per hour and 80 sprays per day   Unknown    omeprazole (priLOSEC) 20 MG capsule Take 2 capsules by mouth 2 (Two) Times a Day Before Meals.   Unknown    polyvinyl alcohol (LIQUIFILM) 1.4 % ophthalmic solution Administer 1 drop to both eyes 4 (Four) Times a Day As Needed for Dry Eyes.   Unknown    rosuvastatin (CRESTOR) 40 MG tablet Take 1 tablet by mouth Every Night.   Unknown    senna 8.6 MG tablet Take 1 tablet by mouth Daily As Needed for Constipation.   Unknown    tiotropium bromide monohydrate (SPIRIVA RESPIMAT) 2.5 MCG/ACT aerosol solution inhaler Inhale 2 puffs Daily.   Unknown    traMADol (ULTRAM) 50 MG tablet Take 1 tablet by mouth 3 (Three) Times a Day As Needed for Moderate Pain.   Unknown    vitamin B-12 (CYANOCOBALAMIN) 1000 MCG tablet Take 1 tablet by mouth Daily.   Unknown   , Scheduled Meds:  cefTRIAXone, 2,000 mg, Intravenous, Q24H  cetirizine, 5 mg, Oral, Daily  chlorhexidine, 15 mL, Mouth/Throat, Q12H  finasteride, 5 mg, Oral, Daily  folic acid, 1 mg, Oral, Daily  insulin glargine, 20 Units, Subcutaneous, Daily  insulin regular, 4-24 Units, Subcutaneous, Q6H  ipratropium-albuterol, 3 mL, Nebulization, 4x Daily - RT  midodrine, 15 mg, Oral, TID AC  mupirocin, 1 Application, Each Nare, BID  pantoprazole, 40 mg, Intravenous, BID AC  [Held by provider] rosuvastatin, 10 mg, Oral, Nightly  sodium chloride, 10 mL, Intravenous, Q12H  sodium chloride, 10 mL, Intravenous,  Q12H  sodium chloride, 10 mL, Intravenous, Q12H  sodium chloride, 10 mL, Intravenous, Q12H  sodium chloride, 10 mL, Intravenous, Q12H  thiamine, 200 mg, Per G Tube, Daily  Vancomycin Pharmacy Intermittent/Pulse Dosing, , Not Applicable, Daily  vitamin B-12, 1,000 mcg, Oral, Daily    , Continuous Infusions:  amiodarone, 0.5 mg/min, Last Rate: 0.5 mg/min (02/13/25 0713)  dexmedetomidine, 0.2-1.5 mcg/kg/hr (Dosing Weight), Last Rate: 1 mcg/kg/hr (02/13/25 0047)  fentanyl 10 mcg/mL,  mcg/hr, Last Rate: 200 mcg/hr (02/12/25 2628)  heparin, 8.8 Units/kg/hr (Dosing Weight), Last Rate: 8.8 Units/kg/hr (02/13/25 9338)  Pharmacy to Dose Heparin,   phenylephrine, 0.5-3 mcg/kg/min (Dosing Weight), Last Rate: Stopped (02/11/25 2225)  propofol, 5-50 mcg/kg/min (Dosing Weight), Last Rate: 25 mcg/kg/min (02/13/25 0514)     and Allergies:  Indomethacin er, Isoniazid, and Penicillins    Objective     Vital Signs   Temp:  [97.8 °F (36.6 °C)-101.2 °F (38.4 °C)] 101.2 °F (38.4 °C)  Heart Rate:  [] 118  Resp:  [13-33] 24  BP: (103-159)/() 147/104  FiO2 (%):  [50 %-75 %] 65 %    Physical Exam:     GENERAL APPEARANCE: Intubated and sedated.  Appears to be resting comfortably in bed.     HEAD: normocephalic.    EYES: PERRLA.    THROAT: ET tube in place. Oral cavity and pharynx normal. No inflammation, swelling, exudate, or lesions.     NECK: Neck supple.     CARDIAC: Normal S1 and S2. No S3, S4 or murmurs. Rhythm is regular.     LUNGS: Clear to auscultation and percussion without rales, rhonchi, decreased breath sounds    ABDOMEN: Positive bowel sounds. Soft, nondistended, nontender.     EXTREMITIES: No significant deformity or joint abnormality. No edema. Peripheral pulses intact.    NEUROLOGICAL: Unable to assess due to sedation status.     PSYCHIATRIC: Unable to assess due to sedation status                                                                       Results Review:    LABS:    Lab Results   Component Value  Date    GLUCOSE 308 (H) 02/13/2025    BUN 73 (H) 02/13/2025    CREATININE 9.19 (H) 02/13/2025    BCR 7.9 02/13/2025    CO2 24.8 02/13/2025    CALCIUM 7.0 (L) 02/13/2025    ALBUMIN 3.0 (L) 02/13/2025    AST 72 (H) 02/13/2025    ALT 37 02/13/2025    WBC 18.87 (H) 02/11/2025    HGB 11.9 (L) 02/11/2025    HCT 36.1 (L) 02/11/2025    MCV 98.9 (H) 02/11/2025     02/11/2025     02/13/2025    K 3.4 (L) 02/13/2025    K 3.4 (L) 02/13/2025    CL 96 (L) 02/13/2025    ANIONGAP 19.2 (H) 02/13/2025       Lab Results   Component Value Date    INR 1.16 (H) 02/10/2025    INR 1.21 (H) 02/08/2025    PROTIME 15.0 02/10/2025    PROTIME 15.4 (H) 02/08/2025       Results from last 7 days   Lab Units 02/10/25  0742 02/08/25  1349   INR  1.16* 1.21*   APTT seconds 35.8 35.2          I reviewed the patient's new clinical results.  I reviewed the patient's new imaging results and agree with the interpretation.     Latest Reference Range & Units 02/09/25 00:15 02/09/25 07:42 02/10/25 04:40   pH, Arterial 7.350 - 7.450 pH units  7.289 (L)    pCO2, Arterial 35.0 - 45.0 mm Hg  41.4    pO2, Arterial 83.0 - 108.0 mm Hg  85.2    HCO3, Arterial 20.0 - 26.0 mmol/L  19.9 (L)    Base Excess 0.0 - 2.0 mmol/L  -6.4 (L)    O2 Saturation, Arterial 94.0 - 99.0 %  95.8    CO2 Content 22 - 33 mmol/L 20.7 (L) 21.1 (L) 22.4   A-a DO2 0.0 - 300.0 mmHg  383.6 (H)    Carboxyhemoglobin 0 - 5 %  1.6    Methemoglobin 0.00 - 3.00 %  0.00    Oxyhemoglobin 94 - 99 %  94.4    Carboxyhemoglobin Venous 0.0 - 5.0 % 1.6  1.3   Methemoglobin Venous 0.0 - 3.0 % 0.4  1.0   Oxyhemoglobin Venous 45.0 - 75.0 % 75.5 (H)  82.0 (H)   Hematocrit, Blood Gas 38.0 - 51.0 %  37.6 (L)    Hemoglobin, Blood Gas 14 - 18 g/dL 12.5 (L) 12.3 (L) 12.2 (L)   Site  Lab Left Radial Nurse/Dr Draw   Adolph's Test   N/A    Modality  Ventilator Ventilator Ventilator   FIO2 % 80 75 65   Ventilator Mode  VC/AC VC/AC VC/AC   Set Tidal Volume  500.00 500.00 500.00   Set Mech Resp Rate  22.0 26.0  28.0   PEEP  10.0 10.0 10.0   Rate Breaths/minute 25  28   Barometric Pressure for Blood Gas mmHg 724 730 733   Notified By  [id number]   Notified Time  02/09/2025 00:22 02/09/2025 07:48 02/10/2025 04:45   Notified Who  dr dipika bar   pH, Venous 7.320 - 7.420 pH Units 7.221 (C)  7.273 (L)   pCO2, Venous 41.0 - 51.0 mm Hg 47.0  45.5   pO2, Venous 27.0 - 53.0 mm Hg 48.7  52.8   HCO3, Venous 22.0 - 28.0 mmol/L 19.2 (L)  21.0 (L)   Base Excess 0.0 - 2.0 mmol/L -8.4 (L)  -5.8 (L)   O2 Saturation, Venous 45.0 - 75.0 % 77.0 (H)  83.9 (H)   (C): Data is critically low  (L): Data is abnormally low  (H): Data is abnormally high     Latest Reference Range & Units 02/08/25 13:51 02/08/25 15:04   Ethanol % %  <0.010   Ethanol 0 - 10 mg/dL  <10   Amphetamine, Urine Qual Negative  Negative    Barbiturates Screen, Urine Negative  Negative    Benzodiazepine Screen, Urine Negative  Negative    Buprenorphine, Screen, Urine Negative  Negative    Cocaine Screen, Urine Negative  Negative    Fentanyl, Urine Negative  Negative    Methamphetamine, Ur Negative  Negative    Methadone Screen , Urine Negative  Negative    Opiate Screen Negative  Negative    Oxycodone Screen, Urine Negative  Negative    Phencyclidine (PCP), Urine Negative  Negative    THC Screen, Urine Negative  Negative    Tricyclic Antidepressants Screen Negative  Negative      Microbiology Results (last 10 days)       Procedure Component Value - Date/Time    Blood Culture - Blood, Arm, Right [[id number]]  (Normal) Collected: 02/10/25 0752    Lab Status: Preliminary result Specimen: Blood from Arm, Right Updated: 02/12/25 0900     Blood Culture No growth at 2 days    Blood Culture - Blood, Arm, Left [[id number]]  (Normal) Collected: 02/10/25 0752    Lab Status: Preliminary result Specimen: Blood from Arm, Left Updated: 02/12/25 0900     Blood Culture No growth at 2 days    Respiratory Culture - Sputum, ET Suction [[id number]]  (Abnormal) Collected:  02/09/25 0441    Lab Status: Final result Specimen: Sputum from ET Suction Updated: 02/10/25 1031     Respiratory Culture Heavy growth (4+) Streptococcus agalactiae (Group B)     Comment:   This organism is considered to be universally susceptible to penicillin.  No further antibiotic testing will be performed. If Clindamycin or Erythromycin is the drug of choice, notify the laboratory within 7 days to request susceptibility testing.         Heavy growth (4+) Normal respiratory lu. No S. aureus or Pseudomonas aeruginosa detected. Final report.     Gram Stain Moderate (3+) WBCs observed - predominantly neutrophils      Rare (1+) Squamous epithelial cells      Few (2+) Gram positive cocci in chains      Rare (1+) Yeast      Rare (1+) Gram negative bacilli      Rare (1+) Gram positive bacilli    S. Pneumo Ag Urine or CSF - Urine, Urine, Clean Catch [[id number]]  (Normal) Collected: 02/09/25 0249    Lab Status: Final result Specimen: Urine, Clean Catch Updated: 02/09/25 1345     Strep Pneumo Ag Negative    Legionella Antigen, Urine - Urine, Urine, Catheter [[id number]]  (Normal) Collected: 02/08/25 1351    Lab Status: Final result Specimen: Urine, Catheter Updated: 02/08/25 2116     LEGIONELLA ANTIGEN, URINE Negative    Narrative:      Presumptive negative for L. pneumophilia serogroup 1 antigen, suggesting no recent or current infection.    Blood Culture - Blood, Arm, Left [[id number]]  (Normal) Collected: 02/08/25 1349    Lab Status: Preliminary result Specimen: Blood from Arm, Left Updated: 02/12/25 1415     Blood Culture No growth at 4 days    Blood Culture - Blood, Arm, Right [[id number]]  (Abnormal) Collected: 02/08/25 1349    Lab Status: Final result Specimen: Blood from Arm, Right Updated: 02/10/25 0650     Blood Culture Staphylococcus, coagulase negative     Isolated from Aerobic Bottle     Gram Stain Aerobic Bottle Gram positive cocci in clusters    Narrative:      Probable contaminant requires clinical  correlation, susceptibility not performed unless requested by physician.      Blood Culture ID, PCR - Blood, Arm, Right [[id number]]  (Abnormal) Collected: 02/08/25 1349    Lab Status: Final result Specimen: Blood from Arm, Right Updated: 02/09/25 1210     BCID, PCR Staph spp, not aureus or lugdunensis. Identification by BCID2 PCR.     BOTTLE TYPE Aerobic Bottle            Latest Reference Range & Units 02/12/25 04:15   CO2 Content 22 - 33 mmol/L 28.2   Carboxyhemoglobin Venous 0.0 - 5.0 % 1.6   Methemoglobin Venous 0.0 - 3.0 % 0.6   Oxyhemoglobin Venous 45.0 - 75.0 % 72.0   Hemoglobin, Blood Gas 14 - 18 g/dL 10.4 (L)   Site  Lab   Modality  Ventilator   FIO2 % 60   Ventilator Mode  VC/AC   Set Tidal Volume  500.00   Set Mech Resp Rate  24.0   PEEP  5.0   Barometric Pressure for Blood Gas mmHg 723   pH, Venous 7.320 - 7.420 pH Units 7.383   pCO2, Venous 41.0 - 51.0 mm Hg 45.2   pO2, Venous 27.0 - 53.0 mm Hg 40.9   HCO3, Venous 22.0 - 28.0 mmol/L 26.9   Base Excess 0.0 - 2.0 mmol/L 1.4   O2 Saturation, Venous 45.0 - 75.0 % 73.6   (L): Data is abnormally low    Assessment & Plan   Reviewed home medications  Neurology-intubated and sedated.  Sedated drips reviewed and adjusted for RASS goal of 0 to -1.  Was agitated overnight and sedation increased.  Even received 1 dose of paralytic to maintain patient ventilator synchrony    Ammonia- 46 - 2/13  Failed SAT and SBT.    CT of the head reviewed and does not show any acute changes.  Sedation vacation     Narrative & Impression   INDICATION: Altered mental status.     COMPARISON: No relevant priors available     TECHNIQUE: Axial CT images of the head were obtained without IV  contrast. Coronal and sagittal reformations were reviewed.     FINDINGS:  Gray-white differentiation is relatively preserved. No mass, mass effect  or midline shift. And chronic ischemic white matter changes. No evidence  of acute large territorial infarction or acute intracranial  hemorrhage.  Ventricles appear normal in size. Basal cisterns are patent. No  depressed calvarial fracture.     IMPRESSION:  No acute intracranial process.      Latest Reference Range & Units 02/09/25 00:10   Ammonia 16 - 60 umol/L 40         Respiratory-acute hypoxic and hypercarbic respiratory failure-likely due to aspiration pneumonia and ongoing metabolic stress.    Peak and plateau pressures reviewed.  Ventilator settings and graphics reviewed.    Respiratory cultures reviewed.  Antibiotics reviewed and continued.    Blood gases better after hemodialysis.  Auto PEEP better-likely due to better lung compliance.    FiO2 requirement went overnight.  Likely due to diffusion abnormality due to fluid overloaded state because of renal failure.  Will be getting another session of hemodialysis today.    VBG reviewed and ventilator settings adjusted      Will repeat VBG in the morning    FiO2 requirement reviewed and adjusted to maintain saturation 88 to 92%.    Continue steroids-dose reviewed  Continue nebs  Vent Settings          Resp Rate (Set): 24  Pressure Support (cm H2O): 8 cm H20 (per Dr Ledezma)  FiO2 (%): 65 %  PEEP/CPAP (cm H2O): 8 cm H20    Minute Ventilation (L/min) (Obs): 14.3 L/min  Resp Rate (Observed) Vent: 24     I:E Ratio (Obs): 1:1.9    PIP Observed (cm H2O): 25 cm H2O         Microbiology reviewed.  Growing Streptococcus.  Continue current treatment.  Procalitonin Results:      Lab 02/12/25  0412 02/11/25  0234 02/08/25  1349   PROCALCITONIN 6.34* 9.49* 5.90*      Procalcitonin better.  Will continue current treatment.  .  Spiked fever.  2 sets of blood cultures ordered.  If continues to spike fever will consider endocarditis in order transesophageal echo accordingly.  Transthoracic echo did not show any vegetations  VAP- precautions  Head end - 30 %  Mouth care   DVT prophylaxis-continue    Chest x-ray-reviewed    ABG-latest reviewed  Fluids adjusted    Aspiration pneumonia-respiratory culture  obtained.  Continue broad-spectrum antibiotics.  Growing Streptococcus.  Continue current antibiotics.    Medications reviewed      Continue aspiration precautions.    COPD-longtime smoker.    Continue to titrate FiO2 down to maintain saturation 88 to 92%.      Cardiology- hemodynamically -unstable.  Continue midodrine.    Vasopressor requirement reviewed and adjusted for a MAP of 65 and above.     Decreased stress steroids dose.    Results for orders placed during the hospital encounter of 02/08/25    Adult Transthoracic Echo Complete W/ Cont if Necessary Per Protocol    Interpretation Summary    Left ventricle is normal in size and function with estimate ejection fraction of 55 to 60%. Normal diastolic function.    Right ventricle appears normal in size and function.    Normal left atrial cavity size noted. No evidence of a patent foramen ovale. No evidence of an atrial septal defect present.    Normal right atrial cavity size noted. The inferior vena cava is dilated. Partial IVC inspiratory collapse of less than 50% noted.    Aortic valve appears trileaflet. There is no significant aortic stenosis or regurgitation.    There is mild mitral regurgitation.    Estimated right ventricular systolic pressure from tricuspid regurgitation is moderately elevated (45-55 mmHg). Mild tricuspid regurgitation. Estimated RVSP is 45 mmHg with estimated RA pressure of 10 mmHg.    There is no evidence of pericardial effusion. . There is evidence of a fat pad present.    The aortic root measures 3.9 cm.       Continue to monitor HR- rate and rhythm, BP   Atrial fibrillation-started on heparin drip.  Will repeat CBC as patient has been having coffee-ground emesis.    Hemoglobin levels reviewed and is stable    Hemoglobin reviewed.    Recommend discontinuing amiodarone drip and starting oral amiodarone      Nephrology-continue hemodialysis.  Nephrology input appreciated    Appreciate the input.  GI- PPI IV twice daily-if coffee-ground  emesis continues.  Continue to monitor hemoglobin.  Tube feeds  Watch for refeeding syndrome.  Patient is electrolytes are already very low.  Vitamin D 3 level within normal limits.  Continue current treatment      Hematology- latest CBC reviewed.  Transfuse for hemoglobin less than 8.    ID-sepsis with septic shock-likely due to aspiration pneumonia.  Continue broad-spectrum antibiotics.  If needed will do bronchoscopy-specific cultures.  Procalcitonin level in the morning  Repeat cultures negative so far  Repeat chest x-ray reviewed      Latest microbiology reviewed.  Antibiotics reviewed    Endrocrinology- Maintain Blood sugar 140 -180  Discontinued stress dose steroids.  Blood sugars were high started insulin drip        Electrolytes-electrolytes are on the very lower side continue replacement and continue monitoring them.  Low likely due to poor nutritional status    DVT prophylaxis-continue          Echo-pending  Results for orders placed during the hospital encounter of 02/08/25    Adult Transthoracic Echo Complete W/ Cont if Necessary Per Protocol    Interpretation Summary    Left ventricle is normal in size and function with estimate ejection fraction of 55 to 60%. Normal diastolic function.    Right ventricle appears normal in size and function.    Normal left atrial cavity size noted. No evidence of a patent foramen ovale. No evidence of an atrial septal defect present.    Normal right atrial cavity size noted. The inferior vena cava is dilated. Partial IVC inspiratory collapse of less than 50% noted.    Aortic valve appears trileaflet. There is no significant aortic stenosis or regurgitation.    There is mild mitral regurgitation.    Estimated right ventricular systolic pressure from tricuspid regurgitation is moderately elevated (45-55 mmHg). Mild tricuspid regurgitation. Estimated RVSP is 45 mmHg with estimated RA pressure of 10 mmHg.    There is no evidence of pericardial effusion. . There is evidence  of a fat pad present.    The aortic root measures 3.9 cm.   Patient is currently critically ill due to septic severe sepsis with septic shock end organ failure respiratory, renal and cardiovascular, hypercarbic respiratory failure, renal failure and change in mental status.  I reviewed patient's drips and adjusted them.  Reviewed ventilator settings and adjusted.  Case discussed with primary team patient's nurse and RT.  Patient's overall prognosis with multiple comorbidities and multiorgan involvement remains poor.    Critical Care time spent in direct patient care: 33 minutes (excluding procedure time, if applicable) including high complexity decision making to assess, manipulate, and support vital organ system failure in this individual who has impairment of one or more vital organ systems such that there is a high probability of imminent or life threatening deterioration in the patient’s condition.  Patient is critically ill and is at higher risk for further mortality/morbidity. Continue ICU care . .           Septic shock          Dangelo Ledezma MD  02/13/25  08:14 EST

## 2025-02-14 NOTE — PROGRESS NOTES
Pulmonary critical care progress note    Referring Provider: Dr Blankenship  Reason for Consultation: Respiratory failure and ventilator management      Chief complaint -could not be obtained as patient was intubated and sedated on my assessment      Sub-still spiking fever.  Ordered respiratory panel.  Flu positive - started tamiflu   Fever   Overnight events reviewed.  All the labs medications ins and outs and vitals reviewed.  Case discussed with family member at bedside.  Case discussed with Perative care team.  SAT- not following commands   Drips- heparin amio   Recommedn oral   VBG from morning reviewed  Ammonia level in am   SBT       Review of Systems  Could not be obtained as patient on my assessment was intubated and sedated.      History  Past Medical History:   Diagnosis Date    Abdominal bloating 01/06/2023    Atrial fibrillation 12/15/2022    Basal cell carcinoma of skin 12/15/2022    Bradycardia 03/13/2024    Chronic low back pain 05/02/2023    Chronic obstructive lung disease 05/02/2023    Chronic post-COVID-19 syndrome 05/02/2023    Community acquired pneumonia 01/06/2023    Constipation 08/11/2023    COPD (chronic obstructive pulmonary disease)     Dental caries 12/15/2022    Difficult or painful urination 04/19/2023    Essential hypertension 12/15/2022    GERD (gastroesophageal reflux disease)     Herpes zoster 02/03/2023    Hiatal hernia     History of degenerative disc disease     uses motorized chair    Hyperlipidemia     Hypertension     Hypokalemia 01/06/2023    Osteoarthritis of knee 02/03/2023    Peripheral edema 03/04/2024    PONV (postoperative nausea and vomiting)     Rib pain 04/19/2023   ,   Past Surgical History:   Procedure Laterality Date    CATARACT EXTRACTION      COLONOSCOPY      [facility]    Procedure: ESOPHAGOGASTRODUODENOSCOPY;  Surgeon: Morris Wynne MD;  Location: [facility] ENDOSCOPY;  Service: Gastroenterology;  Laterality: N/A;  DISTAL ESOPHAGUS  DILATED WITH 18-20 MM BALLOON DILATOR.    REPLACEMENT TOTAL KNEE Left    , History reviewed. No pertinent family history.,   Social History     Tobacco Use    Smoking status: Every Day     Current packs/day: 2.00     Average packs/day: 2.0 packs/day for 2.1 years (4.2 ttl pk-yrs)     Types: Cigarettes     Start date: 1/1/2023    Smokeless tobacco: Never   Vaping Use    Vaping status: Never Used   Substance Use Topics    Alcohol use: Yes     Comment: 2 pints liquor/day    Drug use: Never   ,   Medications Prior to Admission   Medication Sig Dispense Refill Last Dose/Taking    acetaminophen (TYLENOL) 325 MG tablet Take 2 tablets by mouth 3 (Three) Times a Day As Needed for Mild Pain.   Unknown    albuterol sulfate  (90 Base) MCG/ACT inhaler Inhale 2 puffs Every 6 (Six) Hours As Needed for Shortness of Air.   Unknown    amLODIPine (NORVASC) 5 MG tablet Take 1 tablet by mouth Every Morning.   Unknown    aspirin 81 MG EC tablet Take 1 tablet by mouth Daily.   Unknown    carvedilol (COREG) 25 MG tablet Take 0.5 tablets by mouth 2 (Two) Times a Day With Meals.   Unknown    Diclofenac Sodium (VOLTAREN) 1 % gel gel Apply 4 g topically to the appropriate area as directed Every 6 (Six) Hours As Needed (Joint Pain).   Unknown    DULoxetine (CYMBALTA) 30 MG capsule Take 1 capsule by mouth Daily. For MOOD   Unknown    finasteride (PROSCAR) 5 MG tablet Take 1 tablet by mouth Daily.   Unknown    flecainide (TAMBOCOR) 150 MG tablet Take 0.5 tablets by mouth 2 (Two) Times a Day.   Unknown    fluticasone (FLONASE) 50 MCG/ACT nasal spray Administer 1 spray into the nostril(s) as directed by provider 2 (Two) Times a Day As Needed for Allergies.   Unknown    Fluticasone-Salmeterol (ADVAIR/WIXELA) 250-50 MCG/ACT DISKUS Inhale 1 puff 2 (Two) Times a Day.   Unknown    furosemide (LASIX) 20 MG tablet Take 1 tablet by mouth Daily As Needed (SWELLING).   Unknown    gabapentin (NEURONTIN) 600 MG tablet Take 2 tablets by mouth 3 times a  day.   Unknown    loratadine (CLARITIN) 10 MG tablet Take 1 tablet by mouth Daily As Needed for Allergies.   Unknown    losartan (COZAAR) 50 MG tablet Take 1 tablet by mouth Daily.   Unknown    methocarbamol (ROBAXIN) 750 MG tablet Take 1 tablet by mouth 2 (Two) Times a Day As Needed for Muscle Spasms.   Unknown    nicotine (NICOTROL) 10 MG/ML solution nasal solution 10 sprays by Each Nare route Every 1 (One) Hour As Needed (Smoking Cessation). MAX OF 10 sprays per hour and 80 sprays per day   Unknown    omeprazole (priLOSEC) 20 MG capsule Take 2 capsules by mouth 2 (Two) Times a Day Before Meals.   Unknown    polyvinyl alcohol (LIQUIFILM) 1.4 % ophthalmic solution Administer 1 drop to both eyes 4 (Four) Times a Day As Needed for Dry Eyes.   Unknown    rosuvastatin (CRESTOR) 40 MG tablet Take 1 tablet by mouth Every Night.   Unknown    senna 8.6 MG tablet Take 1 tablet by mouth Daily As Needed for Constipation.   Unknown    tiotropium bromide monohydrate (SPIRIVA RESPIMAT) 2.5 MCG/ACT aerosol solution inhaler Inhale 2 puffs Daily.   Unknown    traMADol (ULTRAM) 50 MG tablet Take 1 tablet by mouth 3 (Three) Times a Day As Needed for Moderate Pain.   Unknown    vitamin B-12 (CYANOCOBALAMIN) 1000 MCG tablet Take 1 tablet by mouth Daily.   Unknown   , Scheduled Meds:  cefTRIAXone, 2,000 mg, Intravenous, Q24H  cetirizine, 5 mg, Oral, Daily  chlorhexidine, 15 mL, Mouth/Throat, Q12H  finasteride, 5 mg, Oral, Daily  folic acid, 1 mg, Oral, Daily  ipratropium-albuterol, 3 mL, Nebulization, 4x Daily - RT  midodrine, 15 mg, Oral, TID AC  pantoprazole, 40 mg, Intravenous, BID AC  [Held by provider] rosuvastatin, 10 mg, Oral, Nightly  sodium chloride, 10 mL, Intravenous, Q12H  sodium chloride, 10 mL, Intravenous, Q12H  sodium chloride, 10 mL, Intravenous, Q12H  sodium chloride, 10 mL, Intravenous, Q12H  sodium chloride, 10 mL, Intravenous, Q12H  sodium chloride, 10 mL, Intravenous, Q12H  thiamine, 200 mg, Per G Tube,  Daily  vitamin B-12, 1,000 mcg, Oral, Daily    , Continuous Infusions:  amiodarone, 0.5 mg/min, Last Rate: 0.5 mg/min (02/14/25 0445)  dexmedetomidine, 0.2-1.5 mcg/kg/hr (Dosing Weight), Last Rate: 1 mcg/kg/hr (02/13/25 2159)  fentanyl 10 mcg/mL,  mcg/hr, Last Rate: Stopped (02/13/25 1130)  heparin, 11.8 Units/kg/hr (Dosing Weight), Last Rate: 11.8 Units/kg/hr (02/14/25 0434)  insulin, 0-100 Units/hr, Last Rate: 2.3 Units/hr (02/14/25 0635)  midazolam, 1-10 mg/hr, Last Rate: 6 mg/hr (02/14/25 0446)  Pharmacy to Dose Heparin,   phenylephrine, 0.5-3 mcg/kg/min (Dosing Weight), Last Rate: Stopped (02/11/25 2225)  propofol, 5-50 mcg/kg/min (Dosing Weight), Last Rate: 25 mcg/kg/min (02/14/25 0449)     and Allergies:  Indomethacin er, Isoniazid, and Penicillins    Objective     Vital Signs   Temp:  [97.6 °F (36.4 °C)-102.2 °F (39 °C)] 99.1 °F (37.3 °C)  Heart Rate:  [] 70  Resp:  [18-24] 19  BP: ()/() 130/87  FiO2 (%):  [60 %-65 %] 60 %    Physical Exam:     GENERAL APPEARANCE: Intubated and sedated.  Appears to be resting comfortably in bed.     HEAD: normocephalic.    EYES: PERRLA.    THROAT: ET tube in place.     NECK: Neck supple.     CARDIAC: NSR    LUNGS: Synchronous with the ventilator    ABDOMEN: Grossly normal  EXTREMITIES: No significant deformity or joint abnormality. No edema.      NEUROLOGICAL: Unable to assess due to sedation status.     PSYCHIATRIC: Unable to assess due to sedation status                                                                       Results Review:    LABS:    Lab Results   Component Value Date    GLUCOSE 111 (H) 02/14/2025    BUN 62 (H) 02/14/2025    CREATININE 7.57 (H) 02/14/2025    BCR 8.2 02/14/2025    CO2 26.6 02/14/2025    CALCIUM 7.5 (L) 02/14/2025    ALBUMIN 3.0 (L) 02/13/2025    AST 72 (H) 02/13/2025    ALT 37 02/13/2025    WBC 8.19 02/14/2025    HGB 11.2 (L) 02/14/2025    HCT 33.5 (L) 02/14/2025    MCV 97.4 (H) 02/14/2025     02/14/2025    NA  139 02/14/2025    K 3.1 (L) 02/14/2025    K 3.1 (L) 02/14/2025    CL 95 (L) 02/14/2025    ANIONGAP 17.4 (H) 02/14/2025       Lab Results   Component Value Date    INR 1.16 (H) 02/10/2025    INR 1.21 (H) 02/08/2025    PROTIME 15.0 02/10/2025    PROTIME 15.4 (H) 02/08/2025       Results from last 7 days   Lab Units 02/10/25  0742 02/08/25  1349   INR  1.16* 1.21*   APTT seconds 35.8 35.2          I reviewed the patient's new clinical results.  I reviewed the patient's new imaging results and agree with the interpretation.       Latest Reference Range & Units 02/08/25 13:51 02/08/25 15:04   Ethanol % %  <0.010   Ethanol 0 - 10 mg/dL  <10   Amphetamine, Urine Qual Negative  Negative    Barbiturates Screen, Urine Negative  Negative    Benzodiazepine Screen, Urine Negative  Negative    Buprenorphine, Screen, Urine Negative  Negative    Cocaine Screen, Urine Negative  Negative    Fentanyl, Urine Negative  Negative    Methamphetamine, Ur Negative  Negative    Methadone Screen , Urine Negative  Negative    Opiate Screen Negative  Negative    Oxycodone Screen, Urine Negative  Negative    Phencyclidine (PCP), Urine Negative  Negative    THC Screen, Urine Negative  Negative    Tricyclic Antidepressants Screen Negative  Negative      Microbiology Results (last 10 days)       Procedure Component Value - Date/Time    Blood Culture - Blood, Arm, Right [[id number]]  (Normal) Collected: 02/10/25 0752    Lab Status: Preliminary result Specimen: Blood from Arm, Right Updated: 02/13/25 0900     Blood Culture No growth at 3 days    Blood Culture - Blood, Arm, Left [[id number]]  (Normal) Collected: 02/10/25 0752    Lab Status: Preliminary result Specimen: Blood from Arm, Left Updated: 02/13/25 0900     Blood Culture No growth at 3 days    Respiratory Culture - Sputum, ET Suction [[id number]]  (Abnormal) Collected: 02/09/25 0441    Lab Status: Final result Specimen: Sputum from ET Suction Updated: 02/10/25 1031     Respiratory Culture Heavy  growth (4+) Streptococcus agalactiae (Group B)     Comment:   This organism is considered to be universally susceptible to penicillin.  No further antibiotic testing will be performed. If Clindamycin or Erythromycin is the drug of choice, notify the laboratory within 7 days to request susceptibility testing.         Heavy growth (4+) Normal respiratory lu. No S. aureus or Pseudomonas aeruginosa detected. Final report.     Gram Stain Moderate (3+) WBCs observed - predominantly neutrophils      Rare (1+) Squamous epithelial cells      Few (2+) Gram positive cocci in chains      Rare (1+) Yeast      Rare (1+) Gram negative bacilli      Rare (1+) Gram positive bacilli    S. Pneumo Ag Urine or CSF - Urine, Urine, Clean Catch [[id number]]  (Normal) Collected: 02/09/25 0249    Lab Status: Final result Specimen: Urine, Clean Catch Updated: 02/09/25 1345     Strep Pneumo Ag Negative    Legionella Antigen, Urine - Urine, Urine, Catheter [[id number]]  (Normal) Collected: 02/08/25 1351    Lab Status: Final result Specimen: Urine, Catheter Updated: 02/08/25 2116     LEGIONELLA ANTIGEN, URINE Negative    Narrative:      Presumptive negative for L. pneumophilia serogroup 1 antigen, suggesting no recent or current infection.    Blood Culture - Blood, Arm, Left [[id number]]  (Normal) Collected: 02/08/25 1349    Lab Status: Final result Specimen: Blood from Arm, Left Updated: 02/13/25 1415     Blood Culture No growth at 5 days    Blood Culture - Blood, Arm, Right [[id number]]  (Abnormal) Collected: 02/08/25 1349    Lab Status: Final result Specimen: Blood from Arm, Right Updated: 02/10/25 0650     Blood Culture Staphylococcus, coagulase negative     Isolated from Aerobic Bottle     Gram Stain Aerobic Bottle Gram positive cocci in clusters    Narrative:      Probable contaminant requires clinical correlation, susceptibility not performed unless requested by physician.      Blood Culture ID, PCR - Blood, Arm, Right [[id number]]   (Abnormal) Collected: 02/08/25 1349    Lab Status: Final result Specimen: Blood from Arm, Right Updated: 02/09/25 1210     BCID, PCR Staph spp, not aureus or lugdunensis. Identification by BCID2 PCR.     BOTTLE TYPE Aerobic Bottle           (L): Data is abnormally low     Latest Reference Range & Units 02/14/25 02:08   CO2 Content 22 - 33 mmol/L 31.4   Carboxyhemoglobin Venous 0.0 - 5.0 % 1.5   Methemoglobin Venous 0.0 - 3.0 % 0.6   Oxyhemoglobin Venous 45.0 - 75.0 % 75.8 (H)   Hemoglobin, Blood Gas 14 - 18 g/dL 11.4 (L)   Site  Nurse/Dr Draw   Modality  Ventilator   FIO2 % 65   Ventilator Mode  VC/AC   Set Tidal Volume  550.00   Set Mech Resp Rate  18.0   PEEP  8.0   Barometric Pressure for Blood Gas mmHg 739   pH, Venous 7.320 - 7.420 pH Units 7.379   pCO2, Venous 41.0 - 51.0 mm Hg 50.6   pO2, Venous 27.0 - 53.0 mm Hg 45.0   HCO3, Venous 22.0 - 28.0 mmol/L 29.8 (H)   Base Excess 0.0 - 2.0 mmol/L 3.8 (H)   O2 Saturation, Venous 45.0 - 75.0 % 77.4 (H)   (H): Data is abnormally high  (L): Data is abnormally low  Assessment & Plan   Reviewed home medications  Neurology-intubated and sedated.  Sedated drips reviewed and adjusted for RASS goal of 0 to -1.  MS better.  Did not have agitation overnight.    Ammonia- 46 - 2/13  Failed SAT and SBT.  Ammonia level from morning reviewed  CT of the head reviewed and does not show any acute changes.  Sedation vacation     Narrative & Impression   INDICATION: Altered mental status.     COMPARISON: No relevant priors available     TECHNIQUE: Axial CT images of the head were obtained without IV  contrast. Coronal and sagittal reformations were reviewed.     FINDINGS:  Gray-white differentiation is relatively preserved. No mass, mass effect  or midline shift. And chronic ischemic white matter changes. No evidence  of acute large territorial infarction or acute intracranial hemorrhage.  Ventricles appear normal in size. Basal cisterns are patent. No  depressed calvarial fracture.      IMPRESSION:  No acute intracranial process.      Latest Reference Range & Units 02/09/25 00:10   Ammonia 16 - 60 umol/L 40         Respiratory-acute hypoxic and hypercarbic respiratory failure-likely due to aspiration pneumonia and ongoing metabolic stress.    Peak and plateau pressures reviewed.  Ventilator settings and graphics reviewed.  Latest blood gas reviewed and vent settings adjusted accordingly.    SAT and SBT on daily basis  Respiratory cultures reviewed.  Antibiotics reviewed and continued.      FiO2 requirement reviewed and adjusted to maintain saturation 88 to 92%    VBG reviewed and ventilator settings adjusted      Will repeat VBG in the morning    Continue steroids-dose reviewed  Continue nebs  Vent Settings          Resp Rate (Set): 18  Pressure Support (cm H2O): 8 cm H20 (per Dr Ledezma)  FiO2 (%): 60 %  PEEP/CPAP (cm H2O): 8 cm H20    Minute Ventilation (L/min) (Obs): 11.7 L/min  Resp Rate (Observed) Vent: 20     I:E Ratio (Obs): 1:2.3    PIP Observed (cm H2O): 26 cm H2O         Microbiology reviewed.  Growing Streptococcus.  Continue current treatment.  Procalitonin Results:      Lab 02/12/25  0412 02/11/25  0234 02/08/25  1349   PROCALCITONIN 6.34* 9.49* 5.90*      Procalcitonin better.  Will continue current treatment.    Transthoracic echo did not show any vegetations  VAP- precautions  Head end - 30 %  Mouth care   DVT prophylaxis-continue    Chest x-ray-reviewed    ABG-latest reviewed  Fluids adjusted    Aspiration pneumonia-respiratory culture obtained.  Continue broad-spectrum antibiotics.  Growing Streptococcus.  Continue current antibiotics.    Medications reviewed      Continue aspiration precautions.    COPD-longtime smoker.    Continue to titrate FiO2 down to maintain saturation 88 to 92%.      As patient was spiking fever I ordered respiratory panel which came back positive for influenza A-continue Tamiflu.  Continue isolation      Cardiology- hemodynamically -unstable.  Continue  midodrine.    Vasopressor requirement reviewed and adjusted for a MAP of 65 and above.     Decreased stress steroids dose.    Results for orders placed during the hospital encounter of 02/08/25    Adult Transthoracic Echo Complete W/ Cont if Necessary Per Protocol    Interpretation Summary    Left ventricle is normal in size and function with estimate ejection fraction of 55 to 60%. Normal diastolic function.    Right ventricle appears normal in size and function.    Normal left atrial cavity size noted. No evidence of a patent foramen ovale. No evidence of an atrial septal defect present.    Normal right atrial cavity size noted. The inferior vena cava is dilated. Partial IVC inspiratory collapse of less than 50% noted.    Aortic valve appears trileaflet. There is no significant aortic stenosis or regurgitation.    There is mild mitral regurgitation.    Estimated right ventricular systolic pressure from tricuspid regurgitation is moderately elevated (45-55 mmHg). Mild tricuspid regurgitation. Estimated RVSP is 45 mmHg with estimated RA pressure of 10 mmHg.    There is no evidence of pericardial effusion. . There is evidence of a fat pad present.    The aortic root measures 3.9 cm.       Continue to monitor HR- rate and rhythm, BP   Atrial fibrillation-started on heparin drip.  Will repeat CBC as patient has been having coffee-ground emesis.    Hemoglobin levels reviewed and is stable    Hemoglobin reviewed.    Recommend discontinuing amiodarone drip and starting oral amiodarone      Nephrology-continue hemodialysis.  Nephrology input appreciated    Appreciate the input.  GI- PPI IV twice daily-if coffee-ground emesis continues.  Continue to monitor hemoglobin.  Tube feeds  Watch for refeeding syndrome.  Patient is electrolytes are already very low.  Vitamin D 3 level within normal limits.  Continue current treatment      Hematology- latest CBC reviewed.  Transfuse for hemoglobin less than 8.    ID-sepsis with  septic shock-likely due to aspiration pneumonia.  Continue broad-spectrum antibiotics.  If needed will do bronchoscopy-specific cultures.  Latest procalcitonin levels reviewed.  Repeat cultures negative so far  Repeat chest x-ray reviewed and discussed with team      Latest microbiology reviewed.  Antibiotics reviewed    Endrocrinology- Maintain Blood sugar 140 -180  Discontinued stress dose steroids.  Blood sugars better          Electrolytes-electrolytes are on the very lower side continue replacement and continue monitoring them.  Low likely due to poor nutritional status    DVT prophylaxis-continue          Echo-pending  Results for orders placed during the hospital encounter of 02/08/25    Adult Transthoracic Echo Complete W/ Cont if Necessary Per Protocol    Interpretation Summary    Left ventricle is normal in size and function with estimate ejection fraction of 55 to 60%. Normal diastolic function.    Right ventricle appears normal in size and function.    Normal left atrial cavity size noted. No evidence of a patent foramen ovale. No evidence of an atrial septal defect present.    Normal right atrial cavity size noted. The inferior vena cava is dilated. Partial IVC inspiratory collapse of less than 50% noted.    Aortic valve appears trileaflet. There is no significant aortic stenosis or regurgitation.    There is mild mitral regurgitation.    Estimated right ventricular systolic pressure from tricuspid regurgitation is moderately elevated (45-55 mmHg). Mild tricuspid regurgitation. Estimated RVSP is 45 mmHg with estimated RA pressure of 10 mmHg.    There is no evidence of pericardial effusion. . There is evidence of a fat pad present.    The aortic root measures 3.9 cm.   Patient is currently critically ill due to septic severe sepsis with septic shock end organ failure respiratory, renal and cardiovascular, hypercarbic respiratory failure, renal failure and change in mental status.  I reviewed patient's  drips and adjusted them.  Reviewed ventilator settings and adjusted.  Case discussed with primary team patient's nurse and RT.  Patient's overall prognosis with multiple comorbidities and multiorgan involvement remains poor.    Critical Care time spent in direct patient care: 50 minutes (excluding procedure time, if applicable) including high complexity decision making to assess, manipulate, and support vital organ system failure in this individual who has impairment of one or more vital organ systems such that there is a high probability of imminent or life threatening deterioration in the patient’s condition.  Patient is critically ill and is at higher risk for further mortality/morbidity. Continue ICU care . .           Septic shock          Dangelo Ledezma MD  02/14/25  07:09 EST

## 2025-02-14 NOTE — PROGRESS NOTES
Adult Nutrition  Assessment/PES    Patient Name:  Phan Daniels  YOB: 1955  MRN: [id number]  Admit Date:  2/8/2025    Assessment Date:  2/14/2025    Comments:  follow-up    Novasource Renal 35ml/hr    Diprovan off    Recommend:  Increase Novasource Renal to 50 ml/hr to provide: 1200 ml, 2400 kcal, 112 gm pro, 217 gm CHO, 828 ml free water, >100% DRI    Free water 65 ml/hr would meet rest of fluid needs     NO BM per RN despite meds, consider changing bowel reigmen  Will cont to follow and monitor plan for nutrition.      Reason for Assessment       Row Name 02/14/25 1343          Reason for Assessment    Reason For Assessment follow-up protocol  [place]     Diagnosis infection/sepsis;pulmonary disease  sepsis, acute hypoxic resp failure, FLU, HD, possible cholecystitis hx ETOH                    Nutrition/Diet History       Row Name 02/14/25 1344          Nutrition/Diet History    Typical Intake (Food/Fluid/EN/PN) Pt remains on vent                    Labs/Tests/Procedures/Meds       Row Name 02/14/25 1344          Labs/Procedures/Meds    Lab Results Reviewed reviewed     Lab Results Comments K 3.4 L, BUn 62/creat 7.5 c/w HD        Diagnostic Tests/Procedures    Diagnostic Test/Procedure Reviewed reviewed        Medications    Pertinent Medications Reviewed reviewed     Pertinent Medications Comments diprovan off                    Physical Findings       Row Name 02/14/25 1344          Physical Findings    Overall Physical Appearance multiple skin issues see RN doucmentation                      Nutrition Prescription Ordered       Row Name 02/14/25 1344          Nutrition Prescription PO    Current PO Diet NPO        Nutrition Prescription EN    Product Novasource Renal (Nepro)     Continuous TF Goal Rate (mL/hr) 35 mL/hr                    Evaluation of Received Nutrient/Fluid Intake       Row Name 02/14/25 1345          EN Evaluation    Number of Days EN Intake Evaluated  Insufficient Data                       Problem/Interventions:   Problem 1       Row Name 02/14/25 1345          Nutrition Diagnoses Problem 1    Problem 1 Other (comment)  Increased nutrient needs related to sepsis, resp failu, HD as evidenced by enteral nutrition                          Intervention Goal       Row Name 02/14/25 1345          Intervention Goal    General Nutrition support treatment;Maintain nutrition     TF/PN Tolerate TF at goal                    Nutrition Intervention       Row Name 02/14/25 1345          Nutrition Intervention    RD/Tech Action Follow Tx progress                      Education/Evaluation       Row Name 02/14/25 1346          Education    Education Education not appropriate at this time        Monitor/Evaluation    Monitor Per protocol;I&O;Pertinent labs;TF delivery/tolerance;Weight                     Electronically signed by:  Alessandra Singer RD  02/14/25 13:50 EST

## 2025-02-14 NOTE — PROGRESS NOTES
NEPHROLOGY PROGRESS NOTE      INTERVAL HISTORY:    HPI, decreased level of consciousness respiratory distress  Patient remains intubated on mechanical ventilation, FiO2 is 60% remains vent dependent    STATUS OF ACUTE AND CHRONIC PROBLEMS;  1.  Patient's urine output is slightly better, 190 mL in last 24 hours  2.  Patient has completed 3 dialysis sessions back-to-back  3.continues to have hemodynamic instability with systolic blood pressure in low 90s        Intake/Output                         02/12/25 0701 - 02/13/25 0700 02/13/25 0701 - 02/14/25 0700     [id number] 4859-3827 Total 9854-8014 4738-9846 Total                 Intake    I.V.  318.3  877.7 1196  1007.7  904.3 1912    Other  --  0 0  0  0 0    Flush/ Irrigation Intake (mL) (NG/OG Tube Orogastric 16 Fr Left mouth) -- 0 0 0 0 0    NG/GT  370  346 716  547  379 926    IV Piggyback  --  100 100  --  100 100    Total Intake 688.3 1323.7 2012 1554.7 1383.3 2938       Output    Urine  35  30 65  150  40 190    Emesis/NG output  --  0 0  0  0 0    Stool  --  0 0  0  0 0    Dialysis  1700  -- 1700  3000  -- 3000    Total Output 1735 30 1765 3150 40 3190             VITAL SIGNS :     Temp:  [97.6 °F (36.4 °C)-100.1 °F (37.8 °C)] 98.9 °F (37.2 °C)  Heart Rate:  [] 75  Resp:  [18-21] 19  BP: ()/(68-97) 124/76  FiO2 (%):  [60 %-65 %] 60 %    PHYSICAL EXAMINATION:    GENERAL EXAM  Laying in bed, intubated  Comfortable on mechanical ventilation    MENTAL STATUS EXAM  Sedated    NECK EXAM  JVP is not elevated, carotid exam normal    CARDIOVASCULAR EXAM  Regular rate and rhythm, no murmurs noted, no added heart sounds, normal apical pulsation  no edema in the lower extremities  2+ radial pulses bilateral, 2+ femoral/tibial pulses bilaterally    MUSCULOSKELETAL EXAM  No cyanosis or clubbing noted in the digits    RESPIRATORY EXAM  Lungs clear to auscultation bilaterally, respiratory effort normal    ABDOMINAL EXAM  Abdomen soft and non tender, normal bowel  "sounds    SKIN EXAM  No new rashes      Laboratory Data :     Albumin Albumin   Date Value Ref Range Status   02/13/2025 3.0 (L) 3.5 - 5.2 g/dL Final      Magnesium No results found for: \"MG\"         PTH               No results found for: \"PTH\"    CBC and coagulation:  Results from last 7 days   Lab Units 02/14/25  0331 02/13/25  0157 02/12/25  0412 02/11/25  0234 02/10/25  1024 02/10/25  0742 02/09/25  0010 02/08/25  1349   PROCALCITONIN ng/mL  --   --  6.34* 9.49*  --   --   --  5.90*   LACTATE mmol/L  --  0.9  --   --   --   --   --  1.2   CRP mg/dL  --   --   --   --   --   --   --  9.12*   WBC 10*3/mm3 8.19  --   --  18.87* 19.56*  --    < > 8.18   HEMOGLOBIN g/dL 11.2*  --   --  11.9* 11.3*  --    < > 10.6*   HEMATOCRIT % 33.5*  --   --  36.1* 34.3*  --    < > 31.1*   MCV fL 97.4*  --   --  98.9* 98.6*  --    < > 95.7   MCHC g/dL 33.4  --   --  33.0 32.9  --    < > 34.1   PLATELETS 10*3/mm3 142  --   --  144 117*  --    < > 82*   INR   --   --   --   --   --  1.16*  --  1.21*    < > = values in this interval not displayed.     Acid/base balance:  Results from last 7 days   Lab Units 02/09/25  0742 02/08/25  1348   PH, ARTERIAL pH units 7.289* 7.318*   PO2 ART mm Hg 85.2 106.0   PCO2, ARTERIAL mm Hg 41.4 46.9*   HCO3 ART mmol/L 19.9* 24.1     Renal and electrolytes:    Results from last 7 days   Lab Units 02/14/25  0331 02/13/25  1859 02/13/25  1150 02/13/25  0157 02/12/25  1057 02/12/25  0412 02/11/25  0536 02/10/25  0928 02/10/25  0304 02/09/25  0800 02/09/25  0010 02/08/25  1349   SODIUM mmol/L 139  --   --  140  --  142 146*  --  142  --  141 144   POTASSIUM mmol/L 3.1*  3.1* 3.5 3.3* 3.4*  3.4*   < > 3.1* 3.9   < > 3.5   < > 2.7* 2.2*   MAGNESIUM mg/dL  --   --   --   --   --   --   --   --  1.9  --  2.0 1.2*   CHLORIDE mmol/L 95*  --   --  96*  --  97* 103  --  101  --  100 99   CO2 mmol/L 26.6  --   --  24.8  --  24.1 16.0*  --  18.3*  --  16.3* 21.8*   BUN mg/dL 62*  --   --  73*  --  76* 90*  --  " 76*  --  68* 70*   CREATININE mg/dL 7.57*  --   --  9.19*  --  10.21* 11.82*  --  10.76*  --  10.76* 10.92*   CALCIUM mg/dL 7.5*  --   --  7.0*  --  6.8* 5.9*  --  6.5*  --  7.4* 6.1*   PHOSPHORUS mg/dL  --   --   --  6.7*  --   --   --   --   --   --  8.3* 6.1*    < > = values in this interval not displayed.     Estimated Creatinine Clearance: 12 mL/min (A) (by C-G formula based on SCr of 7.57 mg/dL (H)).  @GFRCG:3@   Liver and pancreatic function:  Results from last 7 days   Lab Units 02/13/25  0157 02/10/25  0304 02/09/25  0010   ALBUMIN g/dL 3.0* 2.4* 2.8*   BILIRUBIN mg/dL 0.5 0.8 1.6*   ALK PHOS U/L 50 46 45   AST (SGOT) U/L 72* 136* 238*   ALT (SGPT) U/L 37 59* 73*   AMMONIA umol/L 46  --  40         Cardiac:      Liver and pancreatic function:  Results from last 7 days   Lab Units 02/13/25  0157 02/10/25  0304 02/09/25  0010   ALBUMIN g/dL 3.0* 2.4* 2.8*   BILIRUBIN mg/dL 0.5 0.8 1.6*   ALK PHOS U/L 50 46 45   AST (SGOT) U/L 72* 136* 238*   ALT (SGPT) U/L 37 59* 73*   AMMONIA umol/L 46  --  40         CLINICAL DATA REVIEW  CBC, Renal functions, Serum electrolytes, Acid base labs reviewed 1  Telemetry was reviewed and demonstrated sinus rhythm rate 89-1 34  Discussed the labs with Dr. Irby 1    MEDICINES:     cefTRIAXone, 2,000 mg, Intravenous, Q24H  cetirizine, 5 mg, Oral, Daily  chlorhexidine, 15 mL, Mouth/Throat, Q12H  finasteride, 5 mg, Oral, Daily  folic acid, 1 mg, Oral, Daily  ipratropium-albuterol, 3 mL, Nebulization, 4x Daily - RT  midodrine, 15 mg, Oral, TID AC  pantoprazole, 40 mg, Intravenous, BID AC  [Held by provider] rosuvastatin, 10 mg, Oral, Nightly  sodium chloride, 10 mL, Intravenous, Q12H  sodium chloride, 10 mL, Intravenous, Q12H  sodium chloride, 10 mL, Intravenous, Q12H  sodium chloride, 10 mL, Intravenous, Q12H  sodium chloride, 10 mL, Intravenous, Q12H  sodium chloride, 10 mL, Intravenous, Q12H  thiamine, 200 mg, Per G Tube, Daily  vitamin B-12, 1,000 mcg, Oral,  Daily      amiodarone, 0.5 mg/min, Last Rate: 0.5 mg/min (02/14/25 0445)  dexmedetomidine, 0.2-1.5 mcg/kg/hr (Dosing Weight), Last Rate: Stopped (02/14/25 0815)  fentanyl 10 mcg/mL,  mcg/hr, Last Rate: Stopped (02/13/25 1130)  heparin, 11.8 Units/kg/hr (Dosing Weight), Last Rate: 11.8 Units/kg/hr (02/14/25 0434)  insulin, 0-100 Units/hr, Last Rate: 2.4 Units/hr (02/14/25 0740)  midazolam, 1-10 mg/hr, Last Rate: Stopped (02/14/25 0715)  Pharmacy to Dose Heparin,   phenylephrine, 0.5-3 mcg/kg/min (Dosing Weight), Last Rate: Stopped (02/11/25 2225)  propofol, 5-50 mcg/kg/min (Dosing Weight), Last Rate: Stopped (02/14/25 0815)          Assessment & Plan       -Acute kidney injury  - Chronic kidney disease unspecified stage  - Acute hypercapnic hypoxic respiratory failure  - Acute metabolic acidosis  - Severe sepsis with septic shock  - Rhabdomyolysis  - Paroxysmal atrial fibrillation    Patient underwent 3 consecutive dialysis treatments, will give a dialysis holiday today and reassess tomorrow and likely transition to intermittent dialysis 3 times a week Tuesdays Thursdays and Saturdays  No signs of renal recovery, urine output is slightly better will watch and monitor closely    Acute kidney injury most likely due to acute tubular necrosis in settings shock  Had ASHLEY in October 2024 with creatinine peaked to 4 with some improvement close to 2  - As needed potassium replacement  - Strict intake and output record.  -Patient remains very high risk  - Please avoid any nephrotoxic agents, hypotension and adjust medications according to estimated GFR.       REVIEWED NOTES OF CLINICAL TEAMS INVOLVED WITH PATIENT CARE.     DISCUSSED IN DETAIL WITH PATIENT AND/OR FAMILY ABOUT CURRENT CLINICAL CONDITION AND RESPONSE TO ONGOING MANAGEMENT.     DISCUSSED IN DETAIL WITH PATIENT AND/OR FAMILY ABOUT RISKS ASSOCIATED WITH CURRENT CLINICAL CONDITION AND RISK INVOLVED WITH CURRENT MANAGEMENT.     DISCUSSED IN DETAIL WITH  HOSPITALIST    CODE STATUS REVIEWED,     Saba Bates MD  02/14/25  08:38 EST

## 2025-02-14 NOTE — PLAN OF CARE
Problem: Adult Inpatient Plan of Care  Goal: Plan of Care Review  Outcome: Progressing  Flowsheets (Taken 2/14/2025 0351)  Progress: no change  Plan of Care Reviewed With: patient  Goal: Patient-Specific Goal (Individualized)  Outcome: Progressing  Goal: Absence of Hospital-Acquired Illness or Injury  Outcome: Progressing  Intervention: Identify and Manage Fall Risk  Recent Flowsheet Documentation  Taken 2/14/2025 0300 by Jemma Tian RN  Safety Promotion/Fall Prevention: safety round/check completed  Taken 2/14/2025 0200 by Jemma Tian RN  Safety Promotion/Fall Prevention: safety round/check completed  Taken 2/14/2025 0100 by Jemma Tian RN  Safety Promotion/Fall Prevention: safety round/check completed  Taken 2/14/2025 0000 by Jemma Tian RN  Safety Promotion/Fall Prevention: safety round/check completed  Taken 2/13/2025 2300 by Jemma Tian RN  Safety Promotion/Fall Prevention: safety round/check completed  Taken 2/13/2025 2200 by Jemma Tian RN  Safety Promotion/Fall Prevention: safety round/check completed  Taken 2/13/2025 2100 by Jemma Tian RN  Safety Promotion/Fall Prevention: safety round/check completed  Taken 2/13/2025 2000 by Jemma Tian RN  Safety Promotion/Fall Prevention: safety round/check completed  Taken 2/13/2025 1900 by Jemma Tian RN  Safety Promotion/Fall Prevention: safety round/check completed  Intervention: Prevent Skin Injury  Recent Flowsheet Documentation  Taken 2/14/2025 0200 by Jemma Tian RN  Body Position:   turned   side-lying   right  Skin Protection:   transparent dressing maintained   skin sealant/moisture barrier applied   silicone foam dressing in place   pulse oximeter probe site changed   incontinence pads utilized   drying agents applied  Taken 2/14/2025 0000 by Jemma Tian RN  Body Position:   turned   side-lying   left  Taken 2/13/2025 2200 by Jemma Tian RN  Body Position:   turned   side-lying   right  Taken 2/13/2025 2000  by Jemma Tian RN  Body Position:   turned   side-lying   left  Skin Protection:   transparent dressing maintained   skin sealant/moisture barrier applied   silicone foam dressing in place   pulse oximeter probe site changed   incontinence pads utilized   drying agents applied  Intervention: Prevent and Manage VTE (Venous Thromboembolism) Risk  Recent Flowsheet Documentation  Taken 2/14/2025 0200 by Jemma Tian RN  VTE Prevention/Management: (see MAR)   bilateral   SCDs (sequential compression devices) on   other (see comments)  Taken 2/13/2025 2000 by Jemma Tian RN  VTE Prevention/Management: (bilat SCDs removed for skin assessment; see MAR)   other (see comments)   bilateral   SCDs (sequential compression devices) off  Intervention: Prevent Infection  Recent Flowsheet Documentation  Taken 2/14/2025 0200 by Jemma Tian RN  Infection Prevention:   hand hygiene promoted   rest/sleep promoted  Taken 2/13/2025 2000 by Jemma Tian RN  Infection Prevention:   hand hygiene promoted   rest/sleep promoted  Goal: Optimal Comfort and Wellbeing  Outcome: Progressing  Intervention: Provide Person-Centered Care  Recent Flowsheet Documentation  Taken 2/14/2025 0200 by Jemma Tian RN  Trust Relationship/Rapport:   care explained   choices provided  Taken 2/13/2025 2000 by Jemma Tian RN  Trust Relationship/Rapport:   care explained   choices provided  Goal: Readiness for Transition of Care  Outcome: Progressing     Problem: Violence Risk or Actual  Goal: Anger and Impulse Control  Outcome: Progressing  Intervention: Minimize Safety Risk  Recent Flowsheet Documentation  Taken 2/14/2025 0300 by Jemma Tian RN  Enhanced Safety Measures: bed alarm set  Taken 2/14/2025 0200 by Jemma Tian RN  Enhanced Safety Measures: bed alarm set  Taken 2/14/2025 0100 by Jemma Tian RN  Enhanced Safety Measures: bed alarm set  Taken 2/14/2025 0000 by Jemma Tian RN  Enhanced Safety Measures: bed alarm  set  Taken 2/13/2025 2300 by Jemma Tian RN  Enhanced Safety Measures: bed alarm set  Taken 2/13/2025 2200 by Jemma Tian RN  Enhanced Safety Measures: bed alarm set  Taken 2/13/2025 2100 by Jemma Tian RN  Enhanced Safety Measures: bed alarm set  Taken 2/13/2025 2000 by Jemma Tian RN  Enhanced Safety Measures: bed alarm set  Taken 2/13/2025 1900 by Jemma Tian RN  Enhanced Safety Measures: bed alarm set     Problem: Fall Injury Risk  Goal: Absence of Fall and Fall-Related Injury  Outcome: Progressing  Intervention: Identify and Manage Contributors  Recent Flowsheet Documentation  Taken 2/14/2025 0300 by Jemma Tian RN  Medication Review/Management: medications reviewed  Taken 2/14/2025 0200 by Jemma Tian RN  Medication Review/Management: medications reviewed  Taken 2/14/2025 0100 by Jemma Tian RN  Medication Review/Management: medications reviewed  Taken 2/14/2025 0000 by Jemma Tian RN  Medication Review/Management: medications reviewed  Taken 2/13/2025 2300 by Jemma Tian RN  Medication Review/Management: medications reviewed  Taken 2/13/2025 2200 by Jemma Tian RN  Medication Review/Management: medications reviewed  Taken 2/13/2025 2100 by Jemma Tian RN  Medication Review/Management: medications reviewed  Taken 2/13/2025 2000 by Jemma Tian RN  Medication Review/Management: medications reviewed  Taken 2/13/2025 1900 by Jemma Tian RN  Medication Review/Management: medications reviewed  Intervention: Promote Injury-Free Environment  Recent Flowsheet Documentation  Taken 2/14/2025 0300 by Jemma Tian RN  Safety Promotion/Fall Prevention: safety round/check completed  Taken 2/14/2025 0200 by Jemma Tian RN  Safety Promotion/Fall Prevention: safety round/check completed  Taken 2/14/2025 0100 by Jemma Tian RN  Safety Promotion/Fall Prevention: safety round/check completed  Taken 2/14/2025 0000 by Jemma Tian RN  Safety Promotion/Fall  Prevention: safety round/check completed  Taken 2/13/2025 2300 by Jemma Tian RN  Safety Promotion/Fall Prevention: safety round/check completed  Taken 2/13/2025 2200 by Jemma Tian RN  Safety Promotion/Fall Prevention: safety round/check completed  Taken 2/13/2025 2100 by Jemma Tian RN  Safety Promotion/Fall Prevention: safety round/check completed  Taken 2/13/2025 2000 by Jemma Tian RN  Safety Promotion/Fall Prevention: safety round/check completed  Taken 2/13/2025 1900 by Jemma Tian RN  Safety Promotion/Fall Prevention: safety round/check completed     Problem: Skin Injury Risk Increased  Goal: Skin Health and Integrity  Outcome: Progressing  Intervention: Optimize Skin Protection  Recent Flowsheet Documentation  Taken 2/14/2025 0200 by Jemma Tian RN  Activity Management: bedrest  Pressure Reduction Techniques:   frequent weight shift encouraged   heels elevated off bed   pressure points protected   weight shift assistance provided  Head of Bed (HOB) Positioning: HOB at 30-45 degrees  Pressure Reduction Devices:   specialty bed utilized   pressure-redistributing mattress utilized   positioning supports utilized   heel offloading device utilized  Skin Protection:   transparent dressing maintained   skin sealant/moisture barrier applied   silicone foam dressing in place   pulse oximeter probe site changed   incontinence pads utilized   drying agents applied  Taken 2/14/2025 0000 by Jemma Tian RN  Head of Bed (HOB) Positioning: HOB at 30-45 degrees  Taken 2/13/2025 2200 by Jemma Tian RN  Head of Bed (HOB) Positioning: HOB at 30-45 degrees  Taken 2/13/2025 2000 by Jemma Tian RN  Activity Management: bedrest  Pressure Reduction Techniques:   frequent weight shift encouraged   heels elevated off bed   pressure points protected   weight shift assistance provided  Head of Bed (HOB) Positioning: HOB at 30-45 degrees  Pressure Reduction Devices:   specialty bed utilized    pressure-redistributing mattress utilized   positioning supports utilized   heel offloading device utilized  Skin Protection:   transparent dressing maintained   skin sealant/moisture barrier applied   silicone foam dressing in place   pulse oximeter probe site changed   incontinence pads utilized   drying agents applied     Problem: Comorbidity Management  Goal: Maintenance of COPD Symptom Control  Outcome: Progressing  Intervention: Maintain COPD (Chronic Obstructive Pulmonary Disease) Symptom Control  Recent Flowsheet Documentation  Taken 2/14/2025 0300 by Jemma Tian RN  Medication Review/Management: medications reviewed  Taken 2/14/2025 0200 by Jemma Tian RN  Medication Review/Management: medications reviewed  Taken 2/14/2025 0100 by Jemma Tian RN  Medication Review/Management: medications reviewed  Taken 2/14/2025 0000 by Jemma Tian RN  Medication Review/Management: medications reviewed  Taken 2/13/2025 2300 by Jemma Tian RN  Medication Review/Management: medications reviewed  Taken 2/13/2025 2200 by Jemma Tian RN  Medication Review/Management: medications reviewed  Taken 2/13/2025 2100 by Jemma Tian RN  Medication Review/Management: medications reviewed  Taken 2/13/2025 2000 by Jemma Tian RN  Medication Review/Management: medications reviewed  Taken 2/13/2025 1900 by Jemma Tian RN  Medication Review/Management: medications reviewed     Problem: Sepsis/Septic Shock  Goal: Optimal Coping  Outcome: Progressing  Intervention: Support Patient and Family Response  Recent Flowsheet Documentation  Taken 2/14/2025 0200 by Jemma Tian RN  Family/Support System Care: support provided  Taken 2/13/2025 2000 by Jemma Tian RN  Family/Support System Care: support provided  Goal: Absence of Bleeding  Outcome: Progressing  Goal: Blood Glucose Level Within Target Range  Outcome: Progressing  Goal: Absence of Infection Signs and Symptoms  Outcome: Progressing  Intervention:  Initiate Sepsis Management  Recent Flowsheet Documentation  Taken 2/14/2025 0200 by Jemma Tian RN  Infection Prevention:   hand hygiene promoted   rest/sleep promoted  Taken 2/13/2025 2000 by Jemma Tian RN  Infection Prevention:   hand hygiene promoted   rest/sleep promoted  Intervention: Promote Recovery  Recent Flowsheet Documentation  Taken 2/14/2025 0200 by Jemma Tian RN  Activity Management: bedrest  Taken 2/13/2025 2000 by Jemma Tian RN  Activity Management: bedrest  Goal: Optimal Nutrition Delivery  Outcome: Progressing     Problem: Mechanical Ventilation Invasive  Goal: Effective Communication  Outcome: Progressing  Intervention: Ensure Effective Communication  Recent Flowsheet Documentation  Taken 2/14/2025 0200 by Jemma Tian RN  Trust Relationship/Rapport:   care explained   choices provided  Diversional Activities: television  Family/Support System Care: support provided  Taken 2/13/2025 2000 by Jemma Tian RN  Trust Relationship/Rapport:   care explained   choices provided  Diversional Activities: television  Family/Support System Care: support provided  Goal: Optimal Device Function  Outcome: Progressing  Intervention: Optimize Device Care and Function  Recent Flowsheet Documentation  Taken 2/14/2025 0200 by Jemma Tian RN  Airway Safety Measures: suction at bedside  Taken 2/14/2025 0000 by Jemma Tian RN  Oral Care:   swabbed with antiseptic solution   suction provided   lip/mouth moisturizer applied  Taken 2/13/2025 2000 by Jemma Tian RN  Oral Care:   swabbed with antiseptic solution   suction provided   lip/mouth moisturizer applied  Airway Safety Measures: suction at bedside  Goal: Mechanical Ventilation Liberation  Outcome: Progressing  Intervention: Promote Extubation and Mechanical Ventilation Liberation  Recent Flowsheet Documentation  Taken 2/14/2025 0300 by Jemma Tian RN  Medication Review/Management: medications reviewed  Taken 2/14/2025 0200 by  Jemma Tian RN  Medication Review/Management: medications reviewed  Taken 2/14/2025 0100 by Jemma Tian RN  Medication Review/Management: medications reviewed  Taken 2/14/2025 0000 by Jemma Tian RN  Medication Review/Management: medications reviewed  Taken 2/13/2025 2300 by Jemma Tian RN  Medication Review/Management: medications reviewed  Taken 2/13/2025 2200 by Jemma Tian RN  Medication Review/Management: medications reviewed  Taken 2/13/2025 2100 by Jemma Tian RN  Medication Review/Management: medications reviewed  Taken 2/13/2025 2000 by Jemma Tian RN  Medication Review/Management: medications reviewed  Taken 2/13/2025 1900 by Jemma Tian RN  Medication Review/Management: medications reviewed  Goal: Optimal Nutrition Delivery  Outcome: Progressing  Goal: Absence of Device-Related Skin and Tissue Injury  Outcome: Progressing  Intervention: Maintain Skin and Tissue Health  Recent Flowsheet Documentation  Taken 2/14/2025 0200 by Jemma Tian RN  Device Skin Pressure Protection:   tubing/devices free from skin contact   skin-to-skin areas padded   skin-to-device areas padded   pressure points protected   positioning supports utilized   adhesive use limited   absorbent pad utilized/changed  Taken 2/13/2025 2000 by eJmma Tian RN  Device Skin Pressure Protection:   tubing/devices free from skin contact   skin-to-skin areas padded   skin-to-device areas padded   pressure points protected   adhesive use limited   absorbent pad utilized/changed  Goal: Absence of Ventilator-Induced Lung Injury  Outcome: Progressing  Intervention: Prevent Ventilator-Associated Pneumonia  Recent Flowsheet Documentation  Taken 2/14/2025 0200 by Jemma Tian RN  Head of Bed (HOB) Positioning: HOB at 30-45 degrees  VAP Prevention Measures: completed  Taken 2/14/2025 0000 by Jemma Tian RN  Head of Bed (HOB) Positioning: HOB at 30-45 degrees  Oral Care:   swabbed with antiseptic solution    suction provided   lip/mouth moisturizer applied  Taken 2/13/2025 2200 by Jemma Tian, RN  Head of Bed ([facility]) Positioning: HOB at 30-45 degrees  Taken 2/13/2025 2000 by Jemma Tian RN  Head of Bed ([facility]) Positioning: HOB at 30-45 degrees  VAP Prevention Measures: completed  Oral Care:   swabbed with antiseptic solution   suction provided   lip/mouth moisturizer applied   Goal Outcome Evaluation:  Plan of Care Reviewed With: patient        Progress: no change

## 2025-02-14 NOTE — PROGRESS NOTES
"Palliative Care Daily Progress Note     S: Medical record reviewed, followed up with Primary RN Gisela and Dr Romero regarding patient's condition. When I saw Phan today he was sedated on vent at 60% FiO2 and 8 of peep, he appears to be mottling  He was also found to be flu A positive. No family at bedside at the time of my assessment  CT with volume overload/ascites, volume removal per hemodialysis, discussed fee fluid and initial CT concern for possible cholecystitis   O:   Palliative Performance Scale Score:     /77   Pulse 98   Temp 99.6 °F (37.6 °C)   Resp 18   Ht 182.9 cm (72\")   Wt 110 kg (242 lb 4.6 oz)   SpO2 94%   BMI 32.86 kg/m²     Intake/Output Summary (Last 24 hours) at 2/14/2025 1802  Last data filed at 2/14/2025 0506  Gross per 24 hour   Intake 1383.3 ml   Output 40 ml   Net 1343.3 ml       PE:  General Appearance:    Chronically ill appearing, intubated and sedation off on breathing trial NAD   HEENT:    NC/AT, without obvious abnormality, EOMI, anicteric    Neck:   supple, trachea midline, no JVD   Lungs:     Sedated off vent at 60% and 8 of peep, (on breathing trial)diminished breath sounds, coarse breath sounds    Heart:    Irregular rate/rhythm, no M/R/G   Abdomen:     Soft, NT, ND, NABS    Extremities:   Moves all extremities weakly , trace bilateral lower extremity edema   Pulses:   Pulses palpable and equal bilaterally   Skin:   Warm, dry   Neurologic: Sedated on vent   Psych: Sedated on vent             Meds: Reviewed and changes noted    Labs:   Results from last 7 days   Lab Units 02/14/25  0331   WBC 10*3/mm3 8.19   HEMOGLOBIN g/dL 11.2*   HEMATOCRIT % 33.5*   PLATELETS 10*3/mm3 142     Results from last 7 days   Lab Units 02/14/25  1722 02/14/25  0955 02/14/25  0331   SODIUM mmol/L  --   --  139   POTASSIUM mmol/L 4.0   < > 3.1*  3.1*   CHLORIDE mmol/L  --   --  95*   CO2 mmol/L  --   --  26.6   BUN mg/dL  --   --  62*   CREATININE mg/dL  --   --  7.57*   GLUCOSE " mg/dL  --   --  111*   CALCIUM mg/dL  --   --  7.5*    < > = values in this interval not displayed.     Results from last 7 days   Lab Units 02/14/25  1722 02/14/25  0955 02/14/25  0331 02/13/25  1150 02/13/25  0157   SODIUM mmol/L  --   --  139  --  140   POTASSIUM mmol/L 4.0   < > 3.1*  3.1*   < > 3.4*  3.4*   CHLORIDE mmol/L  --   --  95*  --  96*   CO2 mmol/L  --   --  26.6  --  24.8   BUN mg/dL  --   --  62*  --  73*   CREATININE mg/dL  --   --  7.57*  --  9.19*   CALCIUM mg/dL  --   --  7.5*  --  7.0*   BILIRUBIN mg/dL  --   --   --   --  0.5   ALK PHOS U/L  --   --   --   --  50   ALT (SGPT) U/L  --   --   --   --  37   AST (SGOT) U/L  --   --   --   --  72*   GLUCOSE mg/dL  --   --  111*  --  308*    < > = values in this interval not displayed.     Imaging Results (Last 72 Hours)       Procedure Component Value Units Date/Time    CT Head Without Contrast [[id number]] Collected: 02/13/25 1515     Updated: 02/13/25 1518    Narrative:      CT HEAD WO CONTRAST-     CLINICAL INDICATION: Sepsis; A41.9-Sepsis, unspecified organism;  N17.9-Acute kidney failure, unspecified; R79.89-Other specified abnormal  findings of blood chemistry; J18.9-Pneumonia, unspecified organism        COMPARISON: 2/8/2025     TECHNIQUE: Axial images of the brain were obtained with out intravenous  contrast.  Reformatted images were created in the sagittal and coronal  planes.     DOSE:     Radiation dose reduction techniques were utilized per ALARA protocol.  Automated exposure control was initiated through either or [employer] or  DoseRight software packages by  protocol.        FINDINGS:    BRAIN:  Unremarkable.  No hemorrhage.  No significant white matter  disease.  No edema.       VENTRICLES:  Unremarkable.  No ventriculomegaly.       BONES/JOINTS:  Unremarkable.  No acute fracture.       SOFT TISSUES:  Unremarkable.       SINUSES:  no air fluid levels       MASTOID AIR CELLS:  Unremarkable as visualized.  No mastoid  effusion.          Impression:        Unremarkable exam demonstrating no CT evidence of acute intracranial  findings.     This report was finalized on 2/13/2025 3:15 PM by Dr. Tripp Berrios MD.       CT Chest Without Contrast Diagnostic [[id number]] Collected: 02/13/25 1514     Updated: 02/13/25 1517    Narrative:      EXAM: CT CHEST WO CONTRAST DIAGNOSTIC-      CLINICAL INDICATION:Sepsis; A41.9-Sepsis, unspecified organism;  N17.9-Acute kidney failure, unspecified; R79.89-Other specified abnormal  findings of blood chemistry; J18.9-Pneumonia, unspecified organism      COMPARISON: 2/8/2025     TECHNIQUE: Multiple axial CT images were obtained from lung apex through  upper abdomen without the administration of IV contrast. Reformatted  images in the coronal and/or sagittal plane(s) were generated from the  axial data set to facilitate diagnostic accuracy and/or surgical  planning.     Radiation dose reduction techniques were utilized per ALARA protocol.  Automated exposure control was initiated through either or [employer] or  DoseRight software packages by  protocol.    DOSE (DLP mGy-cm):        FINDINGS:     LUNGS: Bibasilar consolidation     HEART: Coronary artery calcifications and cardiomegaly     MEDIASTINUM: No masses. No enlarged lymph nodes.  No fluid collections.     PLEURA: Small bilateral pleural effusions     VASCULATURE: No evidence of aneurysm.     BONES: No acute bony abnormality.     VISUALIZED UPPER ABDOMEN: Cholelithiasis     Other: None.       Impression:         1. Bilateral pleural effusions and bibasilar consolidation  2. Cholelithiasis and free fluid in the abdomen  3. Cardiomegaly and coronary artery calcifications                 This report was finalized on 2/13/2025 3:15 PM by Dr. Tripp Berrios MD.       CT Abdomen Pelvis Without Contrast [[id number]] Collected: 02/13/25 1510     Updated: 02/13/25 1516    Narrative:      EXAM: CT ABDOMEN PELVIS WO CONTRAST-         TECHNIQUE:  Multiple axial CT images were obtained from lung bases  through pubic symphysis WITHOUT administration of IV contrast.  Reformatted images in the coronal and/or sagittal plane(s) were  generated from the axial data set to facilitate diagnostic accuracy  and/or surgical planning.  Oral Contrast:NONE.     Radiation dose reduction techniques were utilized per ALARA protocol.  Automated exposure control was initiated through either or [employer] or  [employer] software packages by  protocol.    DOSE:     Clinical information Sepsis; A41.9-Sepsis, unspecified organism;  N17.9-Acute kidney failure, unspecified; R79.89-Other specified abnormal  findings of blood chemistry; J18.9-Pneumonia, unspecified organism      Comparison 2/8/2025     FINDINGS:     Lower thorax: Bibasilar consolidation     Abdomen:     Liver: Homogeneous. No focal hepatic mass or ductal dilatation.     Gallbladder: Cholelithiasis     Pancreas: Unremarkable. No mass or ductal dilatation.     Spleen: Homogeneous. No splenomegaly.     Adrenals: No mass.     Kidneys/ureters: Right renal cyst. Bilateral perinephric stranding but  no obstructive uropathy.     GI tract: Non-dilated. No definite wall thickening.. There is no  evidence of appendicitis     MESENTERY: Small amount of free fluid in the abdomen and pelvis.  Uncertain etiology     Vasculature: No evidence of aneurysm.     Abdominal wall: Mild degree of anasarca     Bladder: Collazo catheter is present     Reproductive: Unremarkable as visualized     Bones: No acute bony abnormality.       Impression:         1. Small amount of free fluid in the abdomen and pelvis.     2. Perinephric stranding bilaterally but no obstructive uropathy     3. No focal bowel wall thickening.                          This report was finalized on 2/13/2025 3:14 PM by Dr. Tripp Berrios MD.       US Venous Doppler Lower Extremity Bilateral (duplex) [[id number]] Collected: 02/13/25 1253     Updated: 02/13/25 1255     Narrative:      US VENOUS DOPPLER LOWER EXTREMITY BILATERAL (DUPLEX)-     CLINICAL INDICATION: Mottled coloration of limbs; A41.9-Sepsis,  unspecified organism; N17.9-Acute kidney failure, unspecified;  R79.89-Other specified abnormal findings of blood chemistry;  J18.9-Pneumonia, unspecified organism        COMPARISON: None available     TECHNIQUE: Color Doppler imaging was used with compression and  augmentation to evaluate the lower extremity deep venous system.     FINDINGS:   There is patent spontaneous flow from the common femoral vein through  the posterior tibial veins.  There was no internal clot or area of noncompressibility.  Normal augmentation was elicited where applicable.       Impression:      No DVT in the lower extremities on today's exam.      This report was finalized on 2/13/2025 12:53 PM by Dr. Tripp Berrios MD.       XR Abdomen KUB [[id number]] Collected: 02/13/25 0507     Updated: 02/13/25 0510    Narrative:      PROCEDURE: X-ray examination of the abdomen performed on February 13, 2025. 2 films.     HISTORY: Abdominal distention. Patient on ventilator. Hypoxia.     COMPARISON: None.     FINDINGS:     Enteric drain in place with tip in the stomach.  Nonobstructive bowel gas pattern.  No pneumatosis or free air.  Mild degenerative disc disease in the lumbar spine.       Impression:         Nonobstructive bowel gas pattern.  Enteric drain in place with tip at the stomach.  No pneumatosis or free air.  Mild degenerative disc disease in the lumbar spine.     This report was finalized on 2/13/2025 5:08 AM by Scott Campbell MD.       XR Chest 1 View [[id number]] Collected: 02/13/25 0505     Updated: 02/13/25 0509    Narrative:      PROCEDURE: Portable chest x-ray examination performed on February 13, 2025. Single view.     HISTORY: Hypoxia. Abdominal distention.     FINDINGS:     Mild enlarged heart size.  Central pulmonary vascular congestion with edema.  Right lower lobe and left lower lobe  atelectasis.  Small right and left pleural effusion.  Mild elevated right hemidiaphragm.  Left neck central vascular catheter with tip to the SVC.  Right neck central vascular catheter with tip to the SVC.  Endotracheal tube in place with tip 4.7 cm above the lissette.  No pneumothorax.  No pneumomediastinum.  No free air in the upper abdomen.       Impression:         Mild enlarged heart size  Central pulmonary vascular congestion with edema.  Mild atelectasis at the lung bases.  Small right and left pleural effusion.  Satisfactory position of the tubes and lines.  No pneumothorax.        This report was finalized on 2/13/2025 5:07 AM by Scott Campbell MD.                 Diagnostics: Reviewed    A: Phan Daniels is a 69 y.o. male admitted on 2/8/2025 due to respiratory distress. Phan has a medical history of afib?, COPD, HTN, HLD, GERD, ETOH abuse who presented after being found unresponsive. Per family at bedside Phan resides in [place] however recently came to [place] to stay with his new girlfriend. Also per noted he drank a 1/5 of liquor/day, and has had multiple recent hospital stays for pneumonia per family. EMS was called to the home and Phan was found unresponsive with no one else in the home, Phan apparently had dried feces and appeared to have been down for some time. Phan was intubated upon arrival to emergency department and was also in shock and started on levophed. Patient admitted to CCU on vent at FiO2 80% and PEEP of 10. This morning when I saw Phan he was on vent at 65% and 10 of peep on propofol, fentanyl, heparin , bicarb and phenylephrine. Palliative care consulted for GOC/ACP and support for pt and family.          P:  I attempted to reach out to patients brother Dada however reached his voicemail, as well as tried to call sister Ivy but she was unavailable as well. Dr Ledezma suggest that we have a family meeting regarding goals of care on Monday. I discussed this with Dr Irby and  let him know that I would discuss with Hospitalist on Monday am.      We will continue to follow along. Please do not hesitate to contact us regarding further sx mgmt or GOC needs, including after hours or on weekends via our on call provider at [phone].     Lynnette Campa, APRN    2/14/2025

## 2025-02-14 NOTE — PROGRESS NOTES
[facility] HOSPITALIST PROGRESS NOTE     Patient Identification:  Name:  Phan Daniels  Age:  69 y.o.  Sex:  male  :  1955  MRN:  [id number]  Visit Number:  [id number]  ROOM: [address]     Primary Care Provider:  Cesia Diaz APRN    Length of stay in inpatient status:  6    Subjective     Chief Compliant:    Chief Complaint   Patient presents with   • Respiratory Distress     History of Presenting Illness:    Patient remains critically ill, intubated for airway protection, remains sedated, febrile yesterday, workup so far unrevealing until this AM respiratory panel with flu A +, added tamiflu, remains on fluids and antibiotics, CT with volume overload/ascites, volume removal per hemodialysis, discussed fee fluid and initial CT concern for possible cholecystitis with General Surgery, believe this is unlikely and no need for intervention or antibiotic coverage.   Objective     Current Hospital Meds:  cefTRIAXone, 2,000 mg, Intravenous, Q24H  cetirizine, 5 mg, Oral, Daily  chlorhexidine, 15 mL, Mouth/Throat, Q12H  finasteride, 5 mg, Oral, Daily  folic acid, 1 mg, Oral, Daily  ipratropium-albuterol, 3 mL, Nebulization, 4x Daily - RT  midodrine, 15 mg, Oral, TID AC  pantoprazole, 40 mg, Intravenous, BID AC  [Held by provider] rosuvastatin, 10 mg, Oral, Nightly  sodium chloride, 10 mL, Intravenous, Q12H  sodium chloride, 10 mL, Intravenous, Q12H  sodium chloride, 10 mL, Intravenous, Q12H  sodium chloride, 10 mL, Intravenous, Q12H  sodium chloride, 10 mL, Intravenous, Q12H  sodium chloride, 10 mL, Intravenous, Q12H  thiamine, 200 mg, Per G Tube, Daily  vitamin B-12, 1,000 mcg, Oral, Daily      amiodarone, 0.5 mg/min, Last Rate: 0.5 mg/min (25 2555)  dexmedetomidine, 0.2-1.5 mcg/kg/hr (Dosing Weight), Last Rate: Stopped (25 0815)  fentanyl 10 mcg/mL,  mcg/hr, Last Rate: Stopped (25 1130)  heparin, 11.8 Units/kg/hr (Dosing Weight), Last Rate: 11.8 Units/kg/hr  (02/14/25 2574)  insulin, 0-100 Units/hr, Last Rate: 2.4 Units/hr (02/14/25 0720)  midazolam, 1-10 mg/hr, Last Rate: Stopped (02/14/25 0715)  Pharmacy to Dose Heparin,   Pharmacy to Dose Oseltamivir (TAMIFLU),   phenylephrine, 0.5-3 mcg/kg/min (Dosing Weight), Last Rate: Stopped (02/11/25 2225)  propofol, 5-50 mcg/kg/min (Dosing Weight), Last Rate: Stopped (02/14/25 0815)      ----------------------------------------------------------------------------------------------------------------------  Vital Signs:  Temp:  [97.6 °F (36.4 °C)-100.1 °F (37.8 °C)] 98.9 °F (37.2 °C)  Heart Rate:  [] 76  Resp:  [18-21] 21  BP: ()/(68-97) 124/76  FiO2 (%):  [60 %-65 %] 60 %  SpO2:  [91 %-98 %] 94 %  on   ;   Device (Oxygen Therapy): ventilator  Body mass index is 32.86 kg/m².      Intake/Output Summary (Last 24 hours) at 2/14/2025 0918  Last data filed at 2/14/2025 0506  Gross per 24 hour   Intake 2878 ml   Output 3190 ml   Net -312 ml      ----------------------------------------------------------------------------------------------------------------------  Physical exam:  Constitutional:  Intubated/Sedated, no acute distress.      HENT:  Head:  Normocephalic and atraumatic.  Mouth:  Moist mucous membranes.    Eyes:  Conjunctivae are normal. No scleral icterus.    Neck:  Neck supple.  No JVD present.    Cardiovascular:  Tachycardic rate, irregular rhythm and normal heart sounds with no murmur.  Pulmonary/Chest:  Ventilated, bilateral equal rise of chest, on Fi02 60%  Abdominal:  Soft. mild distension and no tenderness.   Musculoskeletal:  No tenderness, and no deformity.  No red or swollen joints anywhere.    Neurological:  Unable to assess due to sedation    Skin:  Skin is warm and dry. No rash noted. No pallor.   Peripheral vascular:  No clubbing, no cyanosis, 1+ edema.  Psychiatric: Unable to assess due to sedation  : Collazo in place, trace dark urine  Edited by: Kendall Irby MD at 2/14/2025  "0931  ----------------------------------------------------------------------------------------------------------------------  WBC/HGB/HCT/PLT   8.19/11.2/33.5/142 (02/14 0331)  BUN/CREAT/GLUC/ALT/AST/CARLA/LIP    62/7.57/111/--/--/--/-- (02/14 0331)  LYTES - Na/K/Cl/CO2: 139/3.1*, 3.1*/95*/26.6 (02/14 0331)        No results found for: \"URINECX\"  Blood Culture   Date Value Ref Range Status   02/10/2025 No growth at 4 days  Preliminary   02/10/2025 No growth at 4 days  Preliminary   02/08/2025 No growth at 5 days  Final   02/08/2025 Staphylococcus, coagulase negative (C)  Final       I have personally looked at the labs and they are summarized above.  ----------------------------------------------------------------------------------------------------------------------  Detailed radiology reports for the last 24 hours:  CT Head Without Contrast    Result Date: 2/13/2025    Unremarkable exam demonstrating no CT evidence of acute intracranial findings.  This report was finalized on 2/13/2025 3:15 PM by Dr. Tripp Berrios MD.      CT Chest Without Contrast Diagnostic    Result Date: 2/13/2025   1. Bilateral pleural effusions and bibasilar consolidation 2. Cholelithiasis and free fluid in the abdomen 3. Cardiomegaly and coronary artery calcifications      This report was finalized on 2/13/2025 3:15 PM by Dr. Tripp Berrios MD.      CT Abdomen Pelvis Without Contrast    Result Date: 2/13/2025   1. Small amount of free fluid in the abdomen and pelvis.  2. Perinephric stranding bilaterally but no obstructive uropathy  3. No focal bowel wall thickening.         This report was finalized on 2/13/2025 3:14 PM by Dr. Tripp Berrios MD.      US Venous Doppler Lower Extremity Bilateral (duplex)    Result Date: 2/13/2025  No DVT in the lower extremities on today's exam.  This report was finalized on 2/13/2025 12:53 PM by Dr. Tripp Berrios MD.      XR Abdomen KUB    Result Date: 2/13/2025   Nonobstructive bowel gas pattern. Enteric drain " in place with tip at the stomach. No pneumatosis or free air. Mild degenerative disc disease in the lumbar spine.  This report was finalized on 2/13/2025 5:08 AM by Scott Campbell MD.      XR Chest 1 View    Result Date: 2/13/2025   Mild enlarged heart size Central pulmonary vascular congestion with edema. Mild atelectasis at the lung bases. Small right and left pleural effusion. Satisfactory position of the tubes and lines. No pneumothorax.   This report was finalized on 2/13/2025 5:07 AM by Scott Campbell MD.     Assessment & Plan    69M History Long-term Incarceration, Obese by BMI PMH GERD, Hypertension, Hyperlipidemia, Atrial Fibrillation, Alcohol abuse, COPD, was found down at home and unresponsive, was intubated on arrival to emergency room for airway protection.    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 complicated by possible ARDS, HAGMA, Mild Rhabdomyolysis, Now with Viral Upper Respiratory Infection due to Influenza A  #Atrial Fibrillation with Rapid Ventricular Rate   #Elevated HS Troponins, suspected NSTEMI Type II due to shock  - Pulmonary consulted and following   - Nephrology consulted and following, temporary catheter placed, hemodialysis per Nephrology, volume removal per hemodialysis   - Palliative Care consulted and following   - Continue Ceftriaxone, follow up cultures, CT without definitive source of infection, free abdominal fluid likely ascites/volume overload, General Surgery not concerned for cholecystitis at this time, also now Flu A + and adding Tamiflu  - CT Head unremarkable, continue spontaneous awakening trial as able, low threshold to involve TeleNeurology   - Continue pain and sedation drips for comfort  - Continue IV Amiodarone  drip   - Continue Heparin drip   - Continue IV pressors for SBP < 90 or MAPs consistently < 65  - Continue IV PPI for Gi prophylaxis  - Continue Tylenol as needed for fevers, Duonebs as needed  - Admitted to CCU, monitor on telemetry, continue 02 but wean as able, spontaneous awakening trial/spontaneous breathing trial when appropriate    #Electrolyte Abnormalities  - Acute Severe Hypokalemia - Replacing, on protocol  - Acute Severe Hypomagnesemia - Replacing, on protocol    #Pre-Diabetes with steroid induced hyperglycemia  - Hgb A1c = 6.4%  - Continue FSBG and SSI, add long acting if indicated.    #Alcohol Use Disorder, Severe, with Dependence complicated   - Continue cardiac monitoring, seizure precautions, monitor for withdrawal     #Obesity by BMI  - Body mass index is 30.41 kg/m².; complicates all aspects of care     F: NPO, mIVFs  E: Monitor & Replace as needed  N: NPO Diet NPO Type: Strict NPO  Ppx: Heparin drip   Code Status (Patient has no pulse and is not breathing): CPR  Medical Interventions (Patient has pulse or is breathing): Full Support    Dispo: Pending clinical improvement     *This patient is considered high risk due to sepsis, acute respiratory failure, Pneumonia, intubation and mechanical ventilation, Rapid Ventricular Rate, Acute Kidney Injury, Acute Metabolic Encephalopathy.     I have spent 35 minutes of critical care time (7:50-8:25AM) with > 50% of time spent in direct patient care, evaluating the patient at bedside, reviewing all labs and images, reviewing ABG and vent settings, reviewing pain and sedation drips, communicating plan of care with nursing staff, utilizing high complexity medical decision making to assess and treat vital organ system failure in an individual who has impairment of one or more vital organ systems such that there is a high probability of imminent or life threatening deterioration in the patient’s condition. Failure to initiate the above interventions on an urgent  basis would likely result in sudden, clinically significant or life threatening deterioration in the patient's condition.  Edited by: Kendall Irby MD at 2/14/2025 0688  [facility]

## 2025-02-14 NOTE — PROGRESS NOTES
LOS: 6 days     Name: Phan Daniels  Age/Sex: 69 y.o. male  :  1955        PCP: Cesia Diaz APRN  REF: No ref. provider found    Principal Problem:    Septic shock      Reason for follow-up: Found unresponsive    Subjective       Subjective   Phan Daniels is a 69-year-old male with a past medical history significant for A-fib, COPD, hypertension, dyslipidemia, GERD, alcohol abuse, and smoking.  He was admitted on 2025 after he was found unresponsive on the floor for an unknown amount of time.  Patient actually came to visit his girlfriend from [place].  He was found apparently by neighbor and was covered in stool with irregular breathing with O2 sat in the 50s.  Patient was put on a nonrebreather and was subsequently intubated in the ER.  He was given Narcan with no improvement in mental status.  He was found to be hypotensive as well and started on Levophed.  Central line was placed.  The patient was in atrial fibrillation with intermittent RVR but did convert back to sinus rhythm on IV amiodarone.  Interval History: The patient remains intubated and sedated.  Requiring IV pressors. He is now on p.o. amiodarone.  He has been in sinus rhythm with PVCs.  Potassium was 3.4 today.    ROS    Vital Signs  Temp:  [98.9 °F (37.2 °C)-100.1 °F (37.8 °C)] 99.4 °F (37.4 °C)  Heart Rate:  [70-89] 89  Resp:  [15-21] 18  BP: (104-149)/(66-97) 109/66  FiO2 (%):  [60 %-65 %] 60 %  Vital Signs (last 72 hrs)          0700   0659  0700   0659  0700   0659  0700   1631   Most Recent      Temp (°F) 98.4 -  102.3    97.8 -  101.2    97.6 -  102.9    98.9 -  99.4     99.4 (37.4)  1200    Heart Rate 96 -  123    89 -  134    66 -  128    71 -  89     89  1455    Resp   24    13 -  33    18 -  24    15 -  21     18  1455    BP 85/60 -  141/105    93/73 -  159/103    94/68 -  150/101    104/74 -  135/85     109/66  1330    SpO2 (%) 90 -  98    89 -   "98    91 -  98    92 -  97     94 02/14 1455    Oxygen Concentration (%) 50 -  60    50 -  75    60 -  65      60     60 02/14 1455          Documented weights    02/08/25 1322 02/08/25 1800 02/09/25 0615 02/10/25 0600   Weight: 102 kg (224 lb 13.9 oz) 98.8 kg (217 lb 13 oz) 98.9 kg (218 lb 1.1 oz) 102 kg (224 lb 3.3 oz)    02/11/25 0551 02/13/25 0500 02/14/25 0445   Weight: 109 kg (240 lb 8.4 oz) 113 kg (248 lb 0.3 oz) 110 kg (242 lb 4.6 oz)      Body mass index is 32.86 kg/m².    Intake/Output Summary (Last 24 hours) at 2/14/2025 1631  Last data filed at 2/14/2025 0506  Gross per 24 hour   Intake 2668 ml   Output 190 ml   Net 2478 ml     Objective:  Vital signs: (most recent): Blood pressure 122/70, pulse 87, temperature 99.6 °F (37.6 °C), resp. rate 18, height 182.9 cm (72\"), weight 110 kg (242 lb 4.6 oz), SpO2 93%.    Heart: Normal rate.  Regular rhythm.    Abdomen: Abdomen is soft.  Bowel sounds are normal.     Skin:  Warm and dry.                Objective       Physical Exam:  .  Physical Exam  Constitutional:       Appearance: He is well-developed.      Comments: Intubated and sedated   HENT:      Head: Normocephalic and atraumatic.   Cardiovascular:      Rate and Rhythm: Normal rate and regular rhythm.      Heart sounds: Normal heart sounds.   Pulmonary:      Comments: Intubated, bronchial breath sounds  Abdominal:      General: Bowel sounds are normal.      Palpations: Abdomen is soft.   Musculoskeletal:      Right lower leg: Edema present.      Left lower leg: Edema present.   Skin:     General: Skin is warm and dry.   Neurological:      Comments: Sedated   Psychiatric:      Comments: Sedated               Procedures    Results review       Results Review:   Results from last 7 days   Lab Units 02/14/25  0331 02/11/25  0234 02/10/25  1024 02/10/25  0304 02/09/25  0010 02/08/25  1349   WBC 10*3/mm3 8.19 18.87* 19.56* 18.44* 14.32* 8.18   HEMOGLOBIN g/dL 11.2* 11.9* 11.3* 12.0* 11.8* 10.6*   PLATELETS " 10*3/mm3 142 144 117* 115* 89* 82*     Results from last 7 days   Lab Units 02/14/25  0955 02/14/25  0331 02/13/25  1859 02/13/25  1150 02/13/25  0157 02/12/25  1843 02/12/25  1057 02/12/25  0412 02/11/25  0536 02/10/25  0928 02/10/25  0304 02/09/25  0800 02/09/25  0010 02/08/25  1349   SODIUM mmol/L  --  139  --   --  140  --   --  142 146*  --  142  --  141 144   POTASSIUM mmol/L 3.4* 3.1*  3.1* 3.5 3.3* 3.4*  3.4* 3.1* 3.4* 3.1* 3.9   < > 3.5   < > 2.7* 2.2*   CHLORIDE mmol/L  --  95*  --   --  96*  --   --  97* 103  --  101  --  100 99   CO2 mmol/L  --  26.6  --   --  24.8  --   --  24.1 16.0*  --  18.3*  --  16.3* 21.8*   BUN mg/dL  --  62*  --   --  73*  --   --  76* 90*  --  76*  --  68* 70*   CREATININE mg/dL  --  7.57*  --   --  9.19*  --   --  10.21* 11.82*  --  10.76*  --  10.76* 10.92*   CALCIUM mg/dL  --  7.5*  --   --  7.0*  --   --  6.8* 5.9*  --  6.5*  --  7.4* 6.1*   GLUCOSE mg/dL  --  111*  --   --  308*  --   --  282* 189*  --  179*  --  215* 134*   ALT (SGPT) U/L  --   --   --   --  37  --   --   --   --   --  59*  --  73* 70*   AST (SGOT) U/L  --   --   --   --  72*  --   --   --   --   --  136*  --  238* 271*    < > = values in this interval not displayed.     Results from last 7 days   Lab Units 02/12/25  0412 02/10/25  0304 02/09/25  0010 02/08/25  1504 02/08/25  1349   CK TOTAL U/L 3,260* 2,404* 3,281*  --  2,687*   HSTROP T ng/L  --   --   --  750* 745*     Lab Results   Component Value Date    INR 1.16 (H) 02/10/2025    INR 1.21 (H) 02/08/2025     Lab Results   Component Value Date    MG 1.9 02/10/2025    MG 2.0 02/09/2025    MG 1.2 (L) 02/08/2025     Lab Results   Component Value Date    TSH 0.196 (L) 02/09/2025    TRIG 225 (H) 02/12/2025      Imaging Results (Last 48 Hours)       Procedure Component Value Units Date/Time    CT Head Without Contrast [[id number]] Collected: 02/13/25 1515     Updated: 02/13/25 1518    Narrative:      CT HEAD WO CONTRAST-     CLINICAL INDICATION: Sepsis;  A41.9-Sepsis, unspecified organism;  N17.9-Acute kidney failure, unspecified; R79.89-Other specified abnormal  findings of blood chemistry; J18.9-Pneumonia, unspecified organism        COMPARISON: 2/8/2025     TECHNIQUE: Axial images of the brain were obtained with out intravenous  contrast.  Reformatted images were created in the sagittal and coronal  planes.     DOSE:     Radiation dose reduction techniques were utilized per ALARA protocol.  Automated exposure control was initiated through either or [employer] or  [employer] software packages by  protocol.        FINDINGS:    BRAIN:  Unremarkable.  No hemorrhage.  No significant white matter  disease.  No edema.       VENTRICLES:  Unremarkable.  No ventriculomegaly.       BONES/JOINTS:  Unremarkable.  No acute fracture.       SOFT TISSUES:  Unremarkable.       SINUSES:  no air fluid levels       MASTOID AIR CELLS:  Unremarkable as visualized.  No mastoid effusion.          Impression:        Unremarkable exam demonstrating no CT evidence of acute intracranial  findings.     This report was finalized on 2/13/2025 3:15 PM by Dr. Tripp Berrios MD.       CT Chest Without Contrast Diagnostic [[id number]] Collected: 02/13/25 1514     Updated: 02/13/25 1517    Narrative:      EXAM: CT CHEST WO CONTRAST DIAGNOSTIC-      CLINICAL INDICATION:Sepsis; A41.9-Sepsis, unspecified organism;  N17.9-Acute kidney failure, unspecified; R79.89-Other specified abnormal  findings of blood chemistry; J18.9-Pneumonia, unspecified organism      COMPARISON: 2/8/2025     TECHNIQUE: Multiple axial CT images were obtained from lung apex through  upper abdomen without the administration of IV contrast. Reformatted  images in the coronal and/or sagittal plane(s) were generated from the  axial data set to facilitate diagnostic accuracy and/or surgical  planning.     Radiation dose reduction techniques were utilized per ALARA protocol.  Automated exposure control was initiated through  either or [employer] or  [employer]ose[employer] software packages by  protocol.    DOSE (DLP mGy-cm):        FINDINGS:     LUNGS: Bibasilar consolidation     HEART: Coronary artery calcifications and cardiomegaly     MEDIASTINUM: No masses. No enlarged lymph nodes.  No fluid collections.     PLEURA: Small bilateral pleural effusions     VASCULATURE: No evidence of aneurysm.     BONES: No acute bony abnormality.     VISUALIZED UPPER ABDOMEN: Cholelithiasis     Other: None.       Impression:         1. Bilateral pleural effusions and bibasilar consolidation  2. Cholelithiasis and free fluid in the abdomen  3. Cardiomegaly and coronary artery calcifications                 This report was finalized on 2/13/2025 3:15 PM by Dr. Tripp Berrios MD.       CT Abdomen Pelvis Without Contrast [[id number]] Collected: 02/13/25 1510     Updated: 02/13/25 1516    Narrative:      EXAM: CT ABDOMEN PELVIS WO CONTRAST-         TECHNIQUE: Multiple axial CT images were obtained from lung bases  through pubic symphysis WITHOUT administration of IV contrast.  Reformatted images in the coronal and/or sagittal plane(s) were  generated from the axial data set to facilitate diagnostic accuracy  and/or surgical planning.  Oral Contrast:NONE.     Radiation dose reduction techniques were utilized per ALARA protocol.  Automated exposure control was initiated through either or [employer] or  [employer] software packages by  protocol.    DOSE:     Clinical information Sepsis; A41.9-Sepsis, unspecified organism;  N17.9-Acute kidney failure, unspecified; R79.89-Other specified abnormal  findings of blood chemistry; J18.9-Pneumonia, unspecified organism      Comparison 2/8/2025     FINDINGS:     Lower thorax: Bibasilar consolidation     Abdomen:     Liver: Homogeneous. No focal hepatic mass or ductal dilatation.     Gallbladder: Cholelithiasis     Pancreas: Unremarkable. No mass or ductal dilatation.     Spleen: Homogeneous. No splenomegaly.      Adrenals: No mass.     Kidneys/ureters: Right renal cyst. Bilateral perinephric stranding but  no obstructive uropathy.     GI tract: Non-dilated. No definite wall thickening.. There is no  evidence of appendicitis     MESENTERY: Small amount of free fluid in the abdomen and pelvis.  Uncertain etiology     Vasculature: No evidence of aneurysm.     Abdominal wall: Mild degree of anasarca     Bladder: Collazo catheter is present     Reproductive: Unremarkable as visualized     Bones: No acute bony abnormality.       Impression:         1. Small amount of free fluid in the abdomen and pelvis.     2. Perinephric stranding bilaterally but no obstructive uropathy     3. No focal bowel wall thickening.                          This report was finalized on 2/13/2025 3:14 PM by Dr. Tripp Berrios MD.       US Venous Doppler Lower Extremity Bilateral (duplex) [[id number]] Collected: 02/13/25 1253     Updated: 02/13/25 1255    Narrative:      US VENOUS DOPPLER LOWER EXTREMITY BILATERAL (DUPLEX)-     CLINICAL INDICATION: Mottled coloration of limbs; A41.9-Sepsis,  unspecified organism; N17.9-Acute kidney failure, unspecified;  R79.89-Other specified abnormal findings of blood chemistry;  J18.9-Pneumonia, unspecified organism        COMPARISON: None available     TECHNIQUE: Color Doppler imaging was used with compression and  augmentation to evaluate the lower extremity deep venous system.     FINDINGS:   There is patent spontaneous flow from the common femoral vein through  the posterior tibial veins.  There was no internal clot or area of noncompressibility.  Normal augmentation was elicited where applicable.       Impression:      No DVT in the lower extremities on today's exam.      This report was finalized on 2/13/2025 12:53 PM by Dr. Tripp Berrios MD.       XR Abdomen KUB [[id number]] Collected: 02/13/25 0507     Updated: 02/13/25 0510    Narrative:      PROCEDURE: X-ray examination of the abdomen performed on February  "13, 2025. 2 films.     HISTORY: Abdominal distention. Patient on ventilator. Hypoxia.     COMPARISON: None.     FINDINGS:     Enteric drain in place with tip in the stomach.  Nonobstructive bowel gas pattern.  No pneumatosis or free air.  Mild degenerative disc disease in the lumbar spine.       Impression:         Nonobstructive bowel gas pattern.  Enteric drain in place with tip at the stomach.  No pneumatosis or free air.  Mild degenerative disc disease in the lumbar spine.     This report was finalized on 2/13/2025 5:08 AM by Scott Campbell MD.       XR Chest 1 View [[id number]] Collected: 02/13/25 0505     Updated: 02/13/25 0509    Narrative:      PROCEDURE: Portable chest x-ray examination performed on February 13, 2025. Single view.     HISTORY: Hypoxia. Abdominal distention.     FINDINGS:     Mild enlarged heart size.  Central pulmonary vascular congestion with edema.  Right lower lobe and left lower lobe atelectasis.  Small right and left pleural effusion.  Mild elevated right hemidiaphragm.  Left neck central vascular catheter with tip to the SVC.  Right neck central vascular catheter with tip to the SVC.  Endotracheal tube in place with tip 4.7 cm above the lissette.  No pneumothorax.  No pneumomediastinum.  No free air in the upper abdomen.       Impression:         Mild enlarged heart size  Central pulmonary vascular congestion with edema.  Mild atelectasis at the lung bases.  Small right and left pleural effusion.  Satisfactory position of the tubes and lines.  No pneumothorax.        This report was finalized on 2/13/2025 5:07 AM by Scott Campbell MD.             No results found for: \"BNP\"               Echo   Results for orders placed during the hospital encounter of 02/08/25    Adult Transthoracic Echo Complete W/ Cont if Necessary Per Protocol    Interpretation Summary  •  Left ventricle is normal in size and function with estimate ejection fraction of 55 to 60%. Normal diastolic function.  •  " Right ventricle appears normal in size and function.  •  Normal left atrial cavity size noted. No evidence of a patent foramen ovale. No evidence of an atrial septal defect present.  •  Normal right atrial cavity size noted. The inferior vena cava is dilated. Partial IVC inspiratory collapse of less than 50% noted.  •  Aortic valve appears trileaflet. There is no significant aortic stenosis or regurgitation.  •  There is mild mitral regurgitation.  •  Estimated right ventricular systolic pressure from tricuspid regurgitation is moderately elevated (45-55 mmHg). Mild tricuspid regurgitation. Estimated RVSP is 45 mmHg with estimated RA pressure of 10 mmHg.  •  There is no evidence of pericardial effusion. . There is evidence of a fat pad present.  •  The aortic root measures 3.9 cm.           I reviewed the patient's new clinical results.    Telemetry: sinus rhythm in the 60's with PVCs       Medication Review:   amiodarone, 400 mg, Oral, Q24H   Followed by  [START ON 2/28/2025] amiodarone, 200 mg, Oral, Q24H  cefTRIAXone, 2,000 mg, Intravenous, Q24H  cetirizine, 5 mg, Oral, Daily  chlorhexidine, 15 mL, Mouth/Throat, Q12H  finasteride, 5 mg, Oral, Daily  folic acid, 1 mg, Oral, Daily  ipratropium-albuterol, 3 mL, Nebulization, 4x Daily - RT  midodrine, 15 mg, Oral, TID AC  oseltamivir, 30 mg, Oral, Once per day on Monday Wednesday Friday  pantoprazole, 40 mg, Intravenous, BID AC  [Held by provider] rosuvastatin, 10 mg, Oral, Nightly  sodium chloride, 10 mL, Intravenous, Q12H  sodium chloride, 10 mL, Intravenous, Q12H  sodium chloride, 10 mL, Intravenous, Q12H  sodium chloride, 10 mL, Intravenous, Q12H  sodium chloride, 10 mL, Intravenous, Q12H  sodium chloride, 10 mL, Intravenous, Q12H  thiamine, 200 mg, Per G Tube, Daily  vitamin B-12, 1,000 mcg, Oral, Daily        dexmedetomidine, 0.2-1.5 mcg/kg/hr (Dosing Weight), Last Rate: Stopped (02/14/25 0815)  fentanyl 10 mcg/mL,  mcg/hr, Last Rate: Stopped (02/13/25  1130)  heparin, 12.8 Units/kg/hr (Dosing Weight), Last Rate: 12.8 Units/kg/hr (02/14/25 1229)  insulin, 0-100 Units/hr, Last Rate: 3 Units/hr (02/14/25 1455)  midazolam, 1-10 mg/hr, Last Rate: Stopped (02/14/25 0715)  Pharmacy to Dose Heparin,   propofol, 5-50 mcg/kg/min (Dosing Weight), Last Rate: Stopped (02/14/25 0815)        Assessment      Assessment:    1.  Septic shock with pneumonia and respiratory failure requiring mechanical ventilation  2.  Encephalopathy secondary to above  3.  Atrial fibrillation with intermittent RVR [id number] score of at least 2, patient converted back to SR on IV amio, NSR maintainig for past 24 hrs  4.  Elevated high-sensitivity troponin consistent with NSTEMI type I versus type II most likely type II  5.  Hypokalemia  6.  History of alcohol abuse  7.  Tobacco abuse  8.  History of esophageal dilatation last on 6/13/2024  9.  ASHLEY on CKD nephrology is following  10. Rhabdomyolysis      Plan     Recommendations:     -Will switch to oral amiodarone 400 Mg daily for 2 weeks followed by 200 Mg daily [per OG tube].  -On heparin drip for thromboembolic prophylaxis.  Consider DOAC on discharge if H&H stable.  -Recommend aspirin statin.  -As per chart review documented on 12/15/2022 that patient has history of paroxysmal A-fib and is on flecainide but not on anticoagulation.  Reason is not mentioned.  - Please maintain K>4, Mg>2. Patient noted to be hypokalemic.  -Guarded prognosis.    Cardiology will follow from distance.  Please call for any questions.    Electronically signed by GAGE Kelley, 02/14/25, 4:37 PM EST.  Electronically signed by Sabrina Marsh MD, 02/14/25, 4:59 PM EST.           Please note that portions of this note were completed with a voice recognition program.

## 2025-02-14 NOTE — CASE MANAGEMENT/SOCIAL WORK
Discharge Planning Assessment   [place]     Patient Name: Phan Daniels  MRN: [id number]  Today's Date: 2/14/2025    Admit Date: 2/8/2025    Plan: SS discussed pt with CM on this date. Pt remains intubated at this time. Pt receiving dialysis.  Pt lives alone prior to admission. Pt does not utilize home health services. [facility] Pt  [facility], walker and cane [facility]. [facility] (not [facility]), no POA. Per family pt is independent with all ADL's prior to admission. SS to follow.       Discharge Plan       Row Name 02/14/25 1418       Plan    Plan SS discussed pt with CM on this date. Pt remains intubated at this time. Pt receiving dialysis.  Pt lives alone prior to admission. Pt does not utilize home health services. [facility]. Pt  has wheelchair, walker and cane [facility]. [facility] (not [facility]), no POA. Per family pt is independent with all ADL's prior to admission. SS to follow.        Continued Care and Services - Admitted Since 2/8/2025    No active coordination exists for this encounter.       Expected Discharge Date and Time       Expected Discharge Date Expected Discharge Time    Feb 17, 2025      LEOBARDO Hughes

## 2025-02-14 NOTE — NURSING NOTE
Consult received for left gluteal DTPI and a bilateral midline coccyx fissure. Assessed patient with GREGORIO Higgins - left gluteal has a dark purple, maroon/purple non-blanching intact DTPI noted with an intact dry, red wild wound that blanches at this time.    There is a stage 2 pressure injury to the coccyx fissure that is open with a dry, pink/red non-blanching wound base; wild wound is dry, intact, pink and blanching at this time.     Order to assess and cleanse coccyx/gluteals with foam cleanser, pat dry and apply Z guard BID and prn; leave open to air.    Re-assessed right hip DTPI per RN request. Right hip is red, moist non-blanching with peeling/shearing skin. Modified treatment order to continue betadine and leave open to air.     Keith score 10. PI prevention orders in place.     02/14/25 0843   Wound 02/08/25 1818 Right lateral thigh pressure injury   Placement Date/Time: 02/08/25 1818   Side: Right  Orientation: lateral  Location: thigh  Primary Wound Type: Pressure Injury  Type: pressure injury  Present on Original Admission: Yes   Pressure Injury Stage 2   Dressing Appearance intact;moist drainage   Closure None   Base moist;nonblanchable;red;pink   Periwound intact;dry;pink   Periwound Temperature warm   Periwound Skin Turgor soft   Edges irregular;open   Drainage Characteristics/Odor clear   Drainage Amount scant   Wound 02/14/25 0024 Left gluteal pressure injury   Placement Date/Time: 02/14/25 0024   Side: Left  Location: gluteal  Primary Wound Type: Pressure Injury  Type: pressure injury  Present on Original Admission: No   Pressure Injury Stage DTPI   Dressing Appearance open to air   Closure None   Base nonblanchable;purple;maroon/purple;dry   Periwound intact;dry;redness   Periwound Temperature warm   Periwound Skin Turgor soft   Edges rolled/closed   Drainage Amount none   Care, Wound barrier applied   Wound 02/14/25 0030 Bilateral midline coccyx pressure injury   Placement Date/Time: 02/14/25  0030   Side: Bilateral  Orientation: midline  Location: coccyx  Primary Wound Type: Fissure  Type: pressure injury  Present on Original Admission: No   Pressure Injury Stage 2   Dressing Appearance open to air   Base nonblanchable;red;pink   Periwound intact;pink;dry   Periwound Temperature warm   Edges irregular;open   Drainage Amount none   Care, Wound barrier applied

## 2025-02-14 NOTE — PROGRESS NOTES
HEPARIN INFUSION  Phan Daniels is a  69 y.o. male receiving heparin infusion.     Therapy for (VTE/Cardiac):   cardiac  Patient Dosing Weight: 102 kg  Initial Bolus (Y/N):   N  Any Bolus (Y/N):   Y        Signs or Symptoms of Bleeding: N    Cardiac or Other (Not VTE)   Initial Bolus: 60 units/kg (Max 4,000 units)  Initial rate: 12 units/kg/hr (Max 1,000 units/hr)   Anti Xa Rebolus Infusion Hold time Change infusion Dose (Units/kg/hr) Next Anti Xa or aPTT Level Due   < 0.11 50 Units/kg  (4000 Units Max) None Increase by  3 Units/kg/hr 6 hours   0.11- 0.19 25 Units/kg  (2000 Units Max) None Increase by  2 Units/kg/hr 6 hours   0.2 - 0.29 0 None Increase by  1 Units/kg/hr 6 hours   0.3 - 0.5 0 None No Change 6 hours (after 2 consecutive levels in range check q24h @0700)   0.51 - 0.6 0 None Decrease by  1 Units/kg/hr 6 hours   0.61 - 0.8 0 30 Minutes Decrease by  2 Units/kg/hr 6 hours   0.81 - 1 0 60 Minutes Decrease by  3 Units/kg/hr 6 hours   >1 0 Hold  After Anti Xa less than 0.5 decrease previous rate by  4 Units/kg/hr  Every 2 hours until Anti Xa  less than 0.5 then when infusion restarts in 6 hours         Recommend anti-Xa every 6 hours.          Date   Time   Anti-Xa Current Rate (Unit/kg/hr) Bolus   (Units) Rate Change   (Unit/kg/hr) New Rate (Unit/kg/hr) Next   Anti-Xa Comments  Pump Check Daily   2/10 0742 <0.1  No initial  9.8 1500 D/w GREGORIO Brown   2/10 1612 0.32 9.8 - - 9.8 2200 Therapeutic,  no s/s bleeding   02/10 2230 0.41 9.8 - - 9.8 0700  Tx x 2, no changes, no s/s bleeding per GREGORIO Damon     2/11 0815 0.49 9.8 - - 9.8 0700 on 2/12 Therapeutic again. Continue same. No s/s bleeding noted. Spoke with GREGORIO Perkins.     2/12 0800 0.53 9.8 - -1 8.8 1430 Spoke with Amina RN in CCU. No s/s bleeding noted. Decrease rate.    2/12 1455 0.64 8.8 - Hold x 30 minutes then -2 6.8 2130 Spoke with Alonzo Savage RN. No s/s bleeding noted. Hold x 30 minute and reduce rate.    2/12 2200 0.3 6.8 - - 6.8 0400  Therapeutic x 1. No s/s of bleeding per GREGORIO Coelho.    2/13 0430 0.19 6.8 2000 +2 8.8 1200 Discussed with GREGORIO Cordero. She said that the patient does have some blood around the dialysis catheter site, but nothing significant.    2/13 1150 0.26 8.8  +1 9.8 2100 D/w GREGORIO Alcantar   2/13 2141 0.21 9.8  +1 10.8 0400 Nurse Laurita does note the same bleeding noted preivously around the dialysis catheter site. However, the extent and severity has not changed from her observation.      2/14 0400 0.24 10.8  +1 11.8 1000 Discussed with GREGORIO Rodriguez. Pt still has the same bleeding as described before. The provider is aware and it hasn't gotten any worse.     2/14 0955 0.25 11.8  +1 12.8 1800 D/w GREGORIO Higgins no s/s                                                                                                        Pharmacy will continue to follow anti-Xa results and monitor for signs and symptoms of bleeding or thrombosis.

## 2025-02-14 NOTE — PLAN OF CARE
Goal Outcome Evaluation:  Plan of Care Reviewed With: patient        Progress: no change  Outcome Evaluation: Patient remains intubated/sedated. Care ongoing.

## 2025-02-14 NOTE — CONSULTS
DOS: 2025  NAME: Phan Daniels   : 1955  PCP: Cesia Diaz APRN  CC: Was found down and out on 2025 for unknown period of time and ever since then has not woken up  Referring MD: Kendall Irby MD    Neurological Problem and Interval History:  69 y.o. handed white male with a Hx of chronic obstructive pulmonary disease, hypertension, hyperlipidemia and chronic low back pain was found down and out in his place for an unknown period of time and had to be intubated for airway protection and brought to the emergency room.  He has been taken off sedation since 8:00 this morning.  When I saw this patient around 2:30 PM through the telemedicine device, I noticed that his eyes were closed but he was arousable on deep sternal rub.  Although he does not follow any commands or track with his eyes, he blinks to visual threats and he momentarily establishes good eye contact.  He grimaces to painful stimuli applying to the sides of the neck but does not rotate his head side-to-side.  The doll's eye movements are present.  On application of noxious stimulation to the upper and lower extremities he just grimaces in the face but does not withdraw to painful stimuli.  No seizure activity has been noted so far.  Patient is currently intubated and on the ventilator.  For that reason he could not undergo an MRI of the brain.    Past Medical/Surgical Hx:  Past Medical History:   Diagnosis Date    Abdominal bloating 2023    Atrial fibrillation 12/15/2022    Basal cell carcinoma of skin 12/15/2022    Bradycardia 2024    Chronic low back pain 2023    Chronic obstructive lung disease 2023    Chronic post-COVID-19 syndrome 2023    Community acquired pneumonia 2023    Constipation 2023    COPD (chronic obstructive pulmonary disease)     Dental caries 12/15/2022    Difficult or painful urination 2023    Essential hypertension 12/15/2022    GERD  (gastroesophageal reflux disease)     Herpes zoster 02/03/2023    Hiatal hernia     History of degenerative disc disease     uses motorized chair    Hyperlipidemia     Hypertension     Hypokalemia 01/06/2023    Osteoarthritis of knee 02/03/2023    Peripheral edema 03/04/2024    PONV (postoperative nausea and vomiting)     Rib pain 04/19/2023     Past Surgical History:   Procedure Laterality Date    CATARACT EXTRACTION      [facility]      [facility]13/2024    Procedure: ESOPHAGOGASTRODUODENOSCOPY;  Surgeon: Morris Wynne MD;  Location: [facility] ENDOSCOPY;  Service: Gastroenterology;  Laterality: N/A;  DISTAL ESOPHAGUS DILATED WITH 18-20 MM BALLOON DILATOR.    REPLACEMENT TOTAL KNEE Left        Review of Systems:    Constitutional: Patient obtunded but arousable and intubated on the ventilator.  Cardiovascular: No chest pain or palpitations noted.  Respiratory: Intubated and on the ventilator and overriding the ventilator.  Gastrointestinal: Has a nasogastric tube.  Genitourinary: Has a Collazo catheter.  Musculoskeletal: Does not moving extremities to noxious stimulation.  Dermatological: No skin breakdown noted.  Neurological: The Carriere Coma Scale is 6.  Psychiatric: Unable to quantify at this time.  Ophthalmological: Opens his eyes and momentarily establishes eye contact and has positive doll's eye movements but does not track with his eyes.          Medications On Admission  Medications Prior to Admission   Medication Sig Dispense Refill Last Dose/Taking    acetaminophen (TYLENOL) 325 MG tablet Take 2 tablets by mouth 3 (Three) Times a Day As Needed for Mild Pain.   Unknown    albuterol sulfate  (90 Base) MCG/ACT inhaler Inhale 2 puffs Every 6 (Six) Hours As Needed for Shortness of Air.   Unknown    amLODIPine (NORVASC) 5 MG tablet Take 1 tablet by mouth Every Morning.   Unknown    aspirin 81 MG EC tablet Take 1 tablet by mouth Daily.   Unknown    carvedilol (COREG) 25 MG tablet  Take 0.5 tablets by mouth 2 (Two) Times a Day With Meals.   Unknown    Diclofenac Sodium (VOLTAREN) 1 % gel gel Apply 4 g topically to the appropriate area as directed Every 6 (Six) Hours As Needed (Joint Pain).   Unknown    DULoxetine (CYMBALTA) 30 MG capsule Take 1 capsule by mouth Daily. For MOOD   Unknown    finasteride (PROSCAR) 5 MG tablet Take 1 tablet by mouth Daily.   Unknown    flecainide (TAMBOCOR) 150 MG tablet Take 0.5 tablets by mouth 2 (Two) Times a Day.   Unknown    fluticasone (FLONASE) 50 MCG/ACT nasal spray Administer 1 spray into the nostril(s) as directed by provider 2 (Two) Times a Day As Needed for Allergies.   Unknown    Fluticasone-Salmeterol (ADVAIR/WIXELA) 250-50 MCG/ACT DISKUS Inhale 1 puff 2 (Two) Times a Day.   Unknown    furosemide (LASIX) 20 MG tablet Take 1 tablet by mouth Daily As Needed (SWELLING).   Unknown    gabapentin (NEURONTIN) 600 MG tablet Take 2 tablets by mouth 3 times a day.   Unknown    loratadine (CLARITIN) 10 MG tablet Take 1 tablet by mouth Daily As Needed for Allergies.   Unknown    losartan (COZAAR) 50 MG tablet Take 1 tablet by mouth Daily.   Unknown    methocarbamol (ROBAXIN) 750 MG tablet Take 1 tablet by mouth 2 (Two) Times a Day As Needed for Muscle Spasms.   Unknown    nicotine (NICOTROL) 10 MG/ML solution nasal solution 10 sprays by Each Nare route Every 1 (One) Hour As Needed (Smoking Cessation). MAX OF 10 sprays per hour and 80 sprays per day   Unknown    omeprazole (priLOSEC) 20 MG capsule Take 2 capsules by mouth 2 (Two) Times a Day Before Meals.   Unknown    polyvinyl alcohol (LIQUIFILM) 1.4 % ophthalmic solution Administer 1 drop to both eyes 4 (Four) Times a Day As Needed for Dry Eyes.   Unknown    rosuvastatin (CRESTOR) 40 MG tablet Take 1 tablet by mouth Every Night.   Unknown    senna 8.6 MG tablet Take 1 tablet by mouth Daily As Needed for Constipation.   Unknown    tiotropium bromide monohydrate (SPIRIVA RESPIMAT) 2.5 MCG/ACT aerosol solution  inhaler Inhale 2 puffs Daily.   Unknown    traMADol (ULTRAM) 50 MG tablet Take 1 tablet by mouth 3 (Three) Times a Day As Needed for Moderate Pain.   Unknown    vitamin B-12 (CYANOCOBALAMIN) 1000 MCG tablet Take 1 tablet by mouth Daily.   Unknown       Prior to Admission medications    Medication Sig Start Date End Date Taking? Authorizing Provider   acetaminophen (TYLENOL) 325 MG tablet Take 2 tablets by mouth 3 (Three) Times a Day As Needed for Mild Pain.    Yarely Brewster MD   albuterol sulfate  (90 Base) MCG/ACT inhaler Inhale 2 puffs Every 6 (Six) Hours As Needed for Shortness of Air.    Yarely Brewster MD   amLODIPine (NORVASC) 5 MG tablet Take 1 tablet by mouth Every Morning.    Yraely Brewster MD   aspirin 81 MG EC tablet Take 1 tablet by mouth Daily.    Yarely Brewster MD   carvedilol (COREG) 25 MG tablet Take 0.5 tablets by mouth 2 (Two) Times a Day With Meals.    Yarely Brewster MD   Diclofenac Sodium (VOLTAREN) 1 % gel gel Apply 4 g topically to the appropriate area as directed Every 6 (Six) Hours As Needed (Joint Pain).    Yarely Brewster MD   DULoxetine (CYMBALTA) 30 MG capsule Take 1 capsule by mouth Daily. For MOOD    Yarely Brewster MD   finasteride (PROSCAR) 5 MG tablet Take 1 tablet by mouth Daily.    Yarely Brewster MD   flecainide (TAMBOCOR) 150 MG tablet Take 0.5 tablets by mouth 2 (Two) Times a Day.    Yarely Brewster MD   fluticasone (FLONASE) 50 MCG/ACT nasal spray Administer 1 spray into the nostril(s) as directed by provider 2 (Two) Times a Day As Needed for Allergies.    Yarely Brewster MD   Fluticasone-Salmeterol (ADVAIR/WIXELA) 250-50 MCG/ACT DISKUS Inhale 1 puff 2 (Two) Times a Day.    Yarely Brewster MD   furosemide (LASIX) 20 MG tablet Take 1 tablet by mouth Daily As Needed (SWELLING).    Yarely Brewster MD   gabapentin (NEURONTIN) 600 MG tablet Take 2 tablets by mouth 3 times a day.    Narcisa  MD Yarely   loratadine (CLARITIN) 10 MG tablet Take 1 tablet by mouth Daily As Needed for Allergies.    Yarely Brewster MD   losartan (COZAAR) 50 MG tablet Take 1 tablet by mouth Daily.    Yarely Brewster MD   methocarbamol (ROBAXIN) 750 MG tablet Take 1 tablet by mouth 2 (Two) Times a Day As Needed for Muscle Spasms.    Yarely Brewster MD   nicotine (NICOTROL) 10 MG/ML solution nasal solution 10 sprays by Each Nare route Every 1 (One) Hour As Needed (Smoking Cessation). MAX OF 10 sprays per hour and 80 sprays per day    Yarely Brewster MD   omeprazole (priLOSEC) 20 MG capsule Take 2 capsules by mouth 2 (Two) Times a Day Before Meals.    Yarely Brewster MD   polyvinyl alcohol (LIQUIFILM) 1.4 % ophthalmic solution Administer 1 drop to both eyes 4 (Four) Times a Day As Needed for Dry Eyes.    Yarely Brewster MD   rosuvastatin (CRESTOR) 40 MG tablet Take 1 tablet by mouth Every Night.    Yarely Brewster MD   senna 8.6 MG tablet Take 1 tablet by mouth Daily As Needed for Constipation.    Yarely Brewster MD   tiotropium bromide monohydrate (SPIRIVA RESPIMAT) 2.5 MCG/ACT aerosol solution inhaler Inhale 2 puffs Daily.    Yarely Brewster MD   traMADol (ULTRAM) 50 MG tablet Take 1 tablet by mouth 3 (Three) Times a Day As Needed for Moderate Pain.    Yarely Brewster MD   vitamin B-12 (CYANOCOBALAMIN) 1000 MCG tablet Take 1 tablet by mouth Daily.    Yarely Brewster MD        Allergies:  Allergies   Allergen Reactions    Indomethacin Er Nausea And Vomiting    Isoniazid Rash    Penicillins Rash       Social Hx:  Social History     Socioeconomic History    Marital status:    Tobacco Use    Smoking status: Every Day     Current packs/day: 2.00     Average packs/day: 2.0 packs/day for 2.1 years (4.2 ttl pk-yrs)     Types: Cigarettes     Start date: 1/1/2023    Smokeless tobacco: Never   Vaping Use    Vaping status: Never Used   Substance and Sexual  Activity    Alcohol use: Yes     Comment: 2 pints liquor/day    Drug use: Never    Sexual activity: Defer       Family Hx:  History reviewed. No pertinent family history.    Review of Imaging (Interpretation of images not reports): CT CHEST WO CONTRAST DIAGNOSTIC-      CLINICAL INDICATION:Sepsis; A41.9-Sepsis, unspecified organism;  N17.9-Acute kidney failure, unspecified; R79.89-Other specified abnormal  findings of blood chemistry; J18.9-Pneumonia, unspecified organism      COMPARISON: 2/8/2025     TECHNIQUE: Multiple axial CT images were obtained from lung apex through  upper abdomen without the administration of IV contrast. Reformatted  images in the coronal and/or sagittal plane(s) were generated from the  axial data set to facilitate diagnostic accuracy and/or surgical  planning.     Radiation dose reduction techniques were utilized per ALARA protocol.  Automated exposure control was initiated through either or [employer] or  [employer] software packages by  protocol.    DOSE (DLP mGy-cm):        FINDINGS:     LUNGS: Bibasilar consolidation     HEART: Coronary artery calcifications and cardiomegaly     MEDIASTINUM: No masses. No enlarged lymph nodes.  No fluid collections.     PLEURA: Small bilateral pleural effusions     VASCULATURE: No evidence of aneurysm.     BONES: No acute bony abnormality.     VISUALIZED UPPER ABDOMEN: Cholelithiasis     Other: None.     IMPRESSION:     1. Bilateral pleural effusions and bibasilar consolidation  2. Cholelithiasis and free fluid in the abdomen  3. Cardiomegaly and coronary artery calcifications       CT Head Without Contrast    Result Date: 2/13/2025  CT HEAD WO CONTRAST-  CLINICAL INDICATION: Sepsis; A41.9-Sepsis, unspecified organism; N17.9-Acute kidney failure, unspecified; R79.89-Other specified abnormal findings of blood chemistry; J18.9-Pneumonia, unspecified organism   COMPARISON: 2/8/2025  TECHNIQUE: Axial images of the brain were obtained with out  intravenous contrast.  Reformatted images were created in the sagittal and coronal planes.  DOSE:  Radiation dose reduction techniques were utilized per ALARA protocol. Automated exposure control was initiated through either or [employer] or [employer] software packages by  protocol.    FINDINGS:   BRAIN:  Unremarkable.  No hemorrhage.  No significant white matter disease.  No edema.    VENTRICLES:  Unremarkable.  No ventriculomegaly.    BONES/JOINTS:  Unremarkable.  No acute fracture.    SOFT TISSUES:  Unremarkable.    SINUSES:  no air fluid levels    MASTOID AIR CELLS:  Unremarkable as visualized.  No mastoid effusion.       Impression:   Unremarkable exam demonstrating no CT evidence of acute intracranial findings.  This report was finalized on 2/13/2025 3:15 PM by Dr. Tripp Berrios MD.      CT Head Without Contrast    Result Date: 2/8/2025  INDICATION: Altered mental status.  COMPARISON: No relevant priors available  TECHNIQUE: Axial CT images of the head were obtained without IV contrast. Coronal and sagittal reformations were reviewed.  FINDINGS: Gray-white differentiation is relatively preserved. No mass, mass effect or midline shift. And chronic ischemic white matter changes. No evidence of acute large territorial infarction or acute intracranial hemorrhage. Ventricles appear normal in size. Basal cisterns are patent. No depressed calvarial fracture.      Impression: No acute intracranial process.  This report was finalized on 2/8/2025 3:11 PM by Alex Pallas, DO.               Additional Tests Performed: CT ABDOMEN PELVIS WO CONTRAST-         TECHNIQUE: Multiple axial CT images were obtained from lung bases  through pubic symphysis WITHOUT administration of IV contrast.  Reformatted images in the coronal and/or sagittal plane(s) were  generated from the axial data set to facilitate diagnostic accuracy  and/or surgical planning.  Oral Contrast:NONE.     Radiation dose reduction techniques were  utilized per ALARA protocol.  Automated exposure control was initiated through either or [employer] or  [employer] software packages by  protocol.    DOSE:     Clinical information Sepsis; A41.9-Sepsis, unspecified organism;  N17.9-Acute kidney failure, unspecified; R79.89-Other specified abnormal  findings of blood chemistry; J18.9-Pneumonia, unspecified organism      Comparison 2/8/2025     FINDINGS:     Lower thorax: Bibasilar consolidation     Abdomen:     Liver: Homogeneous. No focal hepatic mass or ductal dilatation.     Gallbladder: Cholelithiasis     Pancreas: Unremarkable. No mass or ductal dilatation.     Spleen: Homogeneous. No splenomegaly.     Adrenals: No mass.     Kidneys/ureters: Right renal cyst. Bilateral perinephric stranding but  no obstructive uropathy.     GI tract: Non-dilated. No definite wall thickening.. There is no  evidence of appendicitis     MESENTERY: Small amount of free fluid in the abdomen and pelvis.  Uncertain etiology     Vasculature: No evidence of aneurysm.     Abdominal wall: Mild degree of anasarca     Bladder: Collazo catheter is present     Reproductive: Unremarkable as visualized     Bones: No acute bony abnormality.     IMPRESSION:     1. Small amount of free fluid in the abdomen and pelvis.     2. Perinephric stranding bilaterally but no obstructive uropathy     3. No focal bowel wall thickening.       Results for orders placed during the hospital encounter of 02/08/25    Adult Transthoracic Echo Complete W/ Cont if Necessary Per Protocol    Interpretation Summary    Left ventricle is normal in size and function with estimate ejection fraction of 55 to 60%. Normal diastolic function.    Right ventricle appears normal in size and function.    Normal left atrial cavity size noted. No evidence of a patent foramen ovale. No evidence of an atrial septal defect present.    Normal right atrial cavity size noted. The inferior vena cava is dilated. Partial IVC inspiratory  "collapse of less than 50% noted.    Aortic valve appears trileaflet. There is no significant aortic stenosis or regurgitation.    There is mild mitral regurgitation.    Estimated right ventricular systolic pressure from tricuspid regurgitation is moderately elevated (45-55 mmHg). Mild tricuspid regurgitation. Estimated RVSP is 45 mmHg with estimated RA pressure of 10 mmHg.    There is no evidence of pericardial effusion. . There is evidence of a fat pad present.    The aortic root measures 3.9 cm.            Laboratory Results:   Lab Results   Component Value Date    GLUCOSE 111 (H) 02/14/2025    CALCIUM 7.5 (L) 02/14/2025     02/14/2025    K 3.4 (L) 02/14/2025    CO2 26.6 02/14/2025    CL 95 (L) 02/14/2025    BUN 62 (H) 02/14/2025    CREATININE 7.57 (H) 02/14/2025    BCR 8.2 02/14/2025    ANIONGAP 17.4 (H) 02/14/2025     Lab Results   Component Value Date    WBC 8.19 02/14/2025    HGB 11.2 (L) 02/14/2025    HCT 33.5 (L) 02/14/2025    MCV 97.4 (H) 02/14/2025     02/14/2025       Lab Results   Component Value Date    TRIG 225 (H) 02/12/2025     Lab Results   Component Value Date    HGBA1C 6.4 (H) 10/15/2024     Lab Results   Component Value Date    INR 1.16 (H) 02/10/2025    INR 1.21 (H) 02/08/2025    PROTIME 15.0 02/10/2025    PROTIME 15.4 (H) 02/08/2025     Lab Results   Component Value Date    VYTEBWHD79 >2,000 (H) 02/09/2025     Lab Results   Component Value Date    FOLATE 10.20 02/09/2025     TSH          2/8/2025    15:04 2/9/2025    00:10   TSH   TSH 0.315  0.196        Latest Reference Range & Units Most Recent 02/09/25 00:10 02/13/25 01:57   Ammonia 16 - 60 umol/L 46  2/13/25 01:57 40 46       Physical Examination:  /66   Pulse 89   Temp 99.4 °F (37.4 °C) (Esophageal)   Resp 18   Ht 182.9 cm (72\")   Wt 110 kg (242 lb 4.6 oz)   SpO2 94%   BMI 32.86 kg/m²   General Appearance:   Well developed, well nourished, well groomed, comatose with arousable with Melva Coma Scale between 6 and " 8..  HEENT: Normocephalic.    Neck and Spine: Normal range of motion.  Normal alignment. No mass or tenderness. No bruits.    Extremities:    No edema or deformities. Normal joint ROM.   Skin:    No rashes or birth marks.    Neurological examination:  Higher Integrative  Function: He is currently comatose but arousable to deep sternal rub and then he momentarily establishes good eye contact.  However he does not track with his eyes..  CN II:  Pupils are equal, round, and reactive to light.  Visual fields could not be tested.    CN III IV VI: Extraocular movements are full with positive doll's eye movements.   CN V:  Grimaces to noxious stimulation applied to either shoulder.  CN VII:  Facial movements are symmetric. No weakness.  CN VIII: Auditory acuity could not be tested  CN IX & X: Has good gag response on deep suctioning.    CN XI:  Sternocleidomastoid and trapezius could not be tested as even on noxious stimulation to the shoulders, he did not turn his head from side-to-side..  CN XII:  The tongue is midline.  No atrophy or fasciculations.  Motor:  Flaccid upper and lower extremities noted.  Did not move them even on noxious stimulation..  Sensation: Grimaces to noxious stimulation but does not pull up his arms and legs.  Station and Gait: Could not be tested at this time.  Coordination:  Could not be tested at this time.    Diagnoses / Discussion:  69 y.o. who presents with Sx of acute metabolic/hypoxic encephalopathy.    Plan:  With elevated creatinine and low EGFR, it will take some time for all those sedatives to be off the system.  However the patient is also getting dialyzed.  Will repeat a CT scan of the brain tomorrow and it seems that the patient has started opening his eyes and responding and see how he does tomorrow.  Requesting an EEG for today to see how the baseline is and whether he is got any triphasic waves or any underlying subtle seizure activity.  This is currently in progress..     I have  discussed the above with the care team and he will be followed up by our inpatient teleneurology service tomorrow with me.  Time spent with patient: 70 minutes in face-to-face evaluation and management of the patient using the dedicated telemedicine device without any interruption with the help of the ICU nurse with the patient located at the [facility] and myself at a remote location.    Electronically signed by Opal Osborne MD, 02/14/25, 2:58 PM EST.    Dictated using Dragon dictation.

## 2025-02-15 NOTE — PROGRESS NOTES
Progress Note Critical Care Pulmonary        Subjective  On vent , , getting dialysis.  Noted in mild respiratory distress    Interval History: event overnight: As described above        Review of Systems:    On vent   Vital Signs  Temp:  [98.7 °F (37.1 °C)-99.6 °F (37.6 °C)] 99.6 °F (37.6 °C)  Heart Rate:  [] 110  Resp:  [15-26] 25  BP: ()/() 133/81  FiO2 (%):  [60 %] 60 %  Body mass index is 33.67 kg/m².    Intake/Output Summary (Last 24 hours) at 2/15/2025 1217  Last data filed at 2/15/2025 1145  Gross per 24 hour   Intake 883.72 ml   Output 4050 ml   Net -3166.28 ml     I/O this shift:  In: 0   Out: 3000     Physical Exam:  General- normal in appearance, mild respiratory distress, using accessory    HEENT- pupils equally reactive to light, normal in size, no scleral icterus    Neck-supple    Respiratory-bilateral air entry, few basal rales  Cardiovascular-  Normal S1 and S2. No S3, S4 or murmurs. No JVD, no carotid bruit and no edema, pulses normal bilaterally     GI-nontender nondistended bowel sounds positive    CNS- grossly nonfocal    Extremities-no clubbing and edema        Results Review:      Results from last 7 days   Lab Units 02/15/25  0729 02/14/25  0331 02/11/25  0234   WBC 10*3/mm3 10.52 8.19 18.87*   HEMOGLOBIN g/dL 10.3* 11.2* 11.9*   PLATELETS 10*3/mm3 155 142 144     Results from last 7 days   Lab Units 02/15/25  0416 02/14/25  1722 02/14/25  0955 02/14/25  0331 02/13/25  1150 02/13/25  0157 02/10/25  0928 02/10/25  0304 02/09/25  0800 02/09/25  0010 02/08/25  1349   SODIUM mmol/L 139  --   --  139  --  140   < > 142  --  141 144   POTASSIUM mmol/L 3.9 4.0 3.4* 3.1*  3.1*   < > 3.4*  3.4*   < > 3.5   < > 2.7* 2.2*   CHLORIDE mmol/L 97*  --   --  95*  --  96*   < > 101  --  100 99   CO2 mmol/L 25.0  --   --  26.6  --  24.8   < > 18.3*  --  16.3* 21.8*   BUN mg/dL 83*  --   --  62*  --  73*   < > 76*  --  68* 70*   CREATININE mg/dL 8.49*  --   --  7.57*  --  9.19*   < > 10.76*   --  10.76* 10.92*   CALCIUM mg/dL 7.5*  --   --  7.5*  --  7.0*   < > 6.5*  --  7.4* 6.1*   GLUCOSE mg/dL 94  --   --  111*  --  308*   < > 179*  --  215* 134*   MAGNESIUM mg/dL  --   --   --   --   --   --   --  1.9  --  2.0 1.2*    < > = values in this interval not displayed.     Lab Results   Component Value Date    INR 0.98 02/15/2025    INR 1.16 (H) 02/10/2025    INR 1.21 (H) 02/08/2025    PROTIME 13.1 02/15/2025    PROTIME 15.0 02/10/2025    PROTIME 15.4 (H) 02/08/2025     Results from last 7 days   Lab Units 02/13/25  0157 02/10/25  0304 02/09/25  0010   ALK PHOS U/L 50 46 45   BILIRUBIN mg/dL 0.5 0.8 1.6*   BILIRUBIN DIRECT mg/dL 0.4*  --   --    ALT (SGPT) U/L 37 59* 73*   AST (SGOT) U/L 72* 136* 238*     Results from last 7 days   Lab Units 02/09/25  0742   PH, ARTERIAL pH units 7.289*   PO2 ART mm Hg 85.2   PCO2, ARTERIAL mm Hg 41.4   HCO3 ART mmol/L 19.9*     Imaging Results (Last 24 Hours)       ** No results found for the last 24 hours. **                 amiodarone, 400 mg, Oral, Q24H   Followed by  [START ON 2/28/2025] amiodarone, 200 mg, Oral, Q24H  cetirizine, 5 mg, Oral, Daily  chlorhexidine, 15 mL, Mouth/Throat, Q12H  finasteride, 5 mg, Oral, Daily  folic acid, 1 mg, Oral, Daily  ipratropium-albuterol, 3 mL, Nebulization, 4x Daily - RT  midodrine, 15 mg, Oral, TID AC  oseltamivir, 30 mg, Oral, Once per day on Monday Wednesday Friday  pantoprazole, 40 mg, Intravenous, BID AC  [Held by provider] rosuvastatin, 10 mg, Oral, Nightly  sodium chloride, 10 mL, Intravenous, Q12H  sodium chloride, 10 mL, Intravenous, Q12H  sodium chloride, 10 mL, Intravenous, Q12H  sodium chloride, 10 mL, Intravenous, Q12H  sodium chloride, 10 mL, Intravenous, Q12H  thiamine, 200 mg, Per G Tube, Daily  vitamin B-12, 1,000 mcg, Oral, Daily      dexmedetomidine, 0.2-1.5 mcg/kg/hr (Dosing Weight), Last Rate: 0.2 mcg/kg/hr (02/15/25 1130)  fentanyl 10 mcg/mL,  mcg/hr, Last Rate: Stopped (02/13/25 1130)  heparin, 13.8  Units/kg/hr (Dosing Weight), Last Rate: Stopped (02/14/25 1945)  Pharmacy to Dose Heparin,         Medication Review:     Assessment & Plan      #1: Acute hypoxic and hypercarbic respiratory failure-likely due to aspiration pneumonia/influenza A.    Is getting dialysis.  Seems to be in respiratory distress.  Will put him on low-dose Precedex for vent tolerance.  Attempt SAT/SBT on daily basis  Venous blood gases reviewed.  pH 7.42 pCO2 42.  Respiratory cultures reviewed.  Antibiotics reviewed and continued.        FiO2 requirement reviewed and adjusted to maintain saturation 88 to 92%      Continue nebs  Microbiology reviewed.  Growing Streptococcus.  Continue current treatment.  Transthoracic echo did not show any vegetations  VAP- precautions  Head end - 30 %  Mouth care   COPD-longtime smoker.  Continue systemic steroid and DuoNeb via nebulizer  Continue to titrate FiO2 down to maintain saturation 88 to 92%.      continue Tamiflu.  Continue isolation        Cardiology- hemodynamically -unstable.  Continue midodrine.     Vasopressor requirement reviewed and adjusted for a MAP of 65 and above.        Hemoglobin reviewed.    \       Nephrology-continue hemodialysis.  Nephrology input appreciated     Appreciate the input.  GI- PPI IV twice daily-    SAT/SBT trial today after dialysis.  Hematology- latest CBC reviewed.  Transfuse for hemoglobin less than 8.     ID-sepsis with septic shock-likely due to aspiration pneumonia.  Continue broad-spectrum antibiotics.    Endrocrinology- Maintain Blood sugar 140 -180     DVT prophylaxis-continue               Vent Settings          Resp Rate (Set): 18  Pressure Support (cm H2O): 8 cm H20  FiO2 (%): 60 %  PEEP/CPAP (cm H2O): 8 cm H20    Minute Ventilation (L/min) (Obs): 11.3 L/min  Resp Rate (Observed) Vent: 19     I:E Ratio (Obs): 1:2.7    PIP Observed (cm H2O): 22 cm H2O           I have personally reviewed x-ray chest, labs, medication list.      Critical Care time spent in  direct patient care: 35 minutes (excluding procedure time, if applicable) including high complexity decision making to assess, manipulate, and support vital organ system failure in this individual who has impairment of one or more vital organ systems such that there is a high probability of imminent or life threatening deterioration in the patient’s condition.  Patient is critically ill and is at higher risk for further mortality/morbidity. Continue ICU care .      Donavan Hernandez MD  02/15/25  12:17 EST

## 2025-02-15 NOTE — PROGRESS NOTES
[facility] HOSPITALIST PROGRESS NOTE     Patient Identification:  Name:  Phan Daniels  Age:  69 y.o.  Sex:  male  :  1955  MRN:  [id number]  Visit Number:  [id number]  ROOM: [address]     Primary Care Provider:  Cesia Diaz APRN    Length of stay in inpatient status:  7    Subjective     Chief Compliant:    Chief Complaint   Patient presents with   • Respiratory Distress     History of Presenting Illness:    Patient remains critically ill, intubated for airway protection, Fi02 requirement is stable, having bleeding around hemodialysis catheter and central line, Heparin drip on hold, Tube Feeds on hold as well, on tamiflu, fevers improved, oral Amiodarone per cards, Pulmonary consulted and following, Cardiology consulted and following, goals of care conversations ongoing with Palliative Care, code status updated, prognosis guarded. Checking DIC panel today.   Objective     Current Hospital Meds:  amiodarone, 400 mg, Oral, Q24H   Followed by  [START ON 2025] amiodarone, 200 mg, Oral, Q24H  cetirizine, 5 mg, Oral, Daily  chlorhexidine, 15 mL, Mouth/Throat, Q12H  finasteride, 5 mg, Oral, Daily  folic acid, 1 mg, Oral, Daily  ipratropium-albuterol, 3 mL, Nebulization, 4x Daily - RT  midodrine, 15 mg, Oral, TID AC  oseltamivir, 30 mg, Oral, Once per day on   pantoprazole, 40 mg, Intravenous, BID AC  [Held by provider] rosuvastatin, 10 mg, Oral, Nightly  thiamine, 200 mg, Per G Tube, Daily  vitamin B-12, 1,000 mcg, Oral, Daily  dexmedetomidine, 0.2-1.5 mcg/kg/hr (Dosing Weight), Last Rate: Stopped (02/15/25 0415)  fentanyl 10 mcg/mL,  mcg/hr, Last Rate: Stopped (25 1130)  heparin, 13.8 Units/kg/hr (Dosing Weight), Last Rate: Stopped (25 1945)  midazolam, 1-10 mg/hr, Last Rate: Stopped (25 0715)  Pharmacy to Dose Heparin,   propofol, 5-50 mcg/kg/min (Dosing Weight), Last Rate: Stopped (25  "0815)      ----------------------------------------------------------------------------------------------------------------------  Vital Signs:  Temp:  [98.7 °F (37.1 °C)-99.6 °F (37.6 °C)] 99 °F (37.2 °C)  Heart Rate:  [] 114  Resp:  [15-26] 25  BP: ()/(60-96) 105/88  FiO2 (%):  [60 %] 60 %  SpO2:  [88 %-98 %] 93 %  on   ;   Device (Oxygen Therapy): ventilator  Body mass index is 33.67 kg/m².      Intake/Output Summary (Last 24 hours) at 2/15/2025 1032  Last data filed at 2/15/2025 0715  Gross per 24 hour   Intake 883.72 ml   Output 1050 ml   Net -166.28 ml      ----------------------------------------------------------------------------------------------------------------------  Physical exam:  Constitutional:  Intubated/Sedated, no acute distress.      HENT:  Head:  Normocephalic and atraumatic.  Mouth:  Moist mucous membranes.    Eyes:  Conjunctivae are normal. No scleral icterus.    Neck:  Neck supple.  No JVD present.  lines both with bleeding noted  Cardiovascular:  Mildly tachycardic rate, irregular rhythm and normal heart sounds with no murmur.  Pulmonary/Chest:  Ventilated, bilateral equal rise of chest, on Fi02 60%  Abdominal:  Soft. mild distension and no tenderness.   Musculoskeletal:  No tenderness, and no deformity.  No red or swollen joints anywhere.    Neurological:  Unable to assess due to sedation    Skin:  Skin is warm and dry. No rash noted. No pallor.   Peripheral vascular:  No clubbing, no cyanosis, 1+ edema.  Psychiatric: Unable to assess due to sedation  : Collazo in place, trace dark urine  Edited by: Kendall Irby MD at 2/15/2025 1028  ----------------------------------------------------------------------------------------------------------------------  WBC/HGB/HCT/PLT   10.52/10.3/31.9/155 (02/15 0729)  BUN/CREAT/GLUC/ALT/AST/CARLA/LIP    83/8.49/94/--/--/--/-- (02/15 0416)  LYTES - Na/K/Cl/CO2: 139/3.9/97*/25.0 (02/15 0416)     No results found for: \"URINECX\"  Blood Culture "   Date Value Ref Range Status   02/13/2025 No growth at 24 hours  Preliminary   02/13/2025 Staphylococcus, coagulase negative (C)  Final   02/10/2025 No growth at 5 days  Final   02/10/2025 No growth at 5 days  Final   02/08/2025 No growth at 5 days  Final   02/08/2025 Staphylococcus, coagulase negative (C)  Final     I have personally looked at the labs and they are summarized above.  ----------------------------------------------------------------------------------------------------------------------  Detailed radiology reports for the last 24 hours:  EEG    Result Date: 2/14/2025  Diffuse cerebral dysfunction of moderate degree, nonspecific.  This is most commonly seen due to toxic/metabolic or hypoxemic/ischemic cause This report is transcribed using the Dragon dictation system.      CT Head Without Contrast    Result Date: 2/13/2025    Unremarkable exam demonstrating no CT evidence of acute intracranial findings.  This report was finalized on 2/13/2025 3:15 PM by Dr. Tripp Berrios MD.      CT Chest Without Contrast Diagnostic    Result Date: 2/13/2025   1. Bilateral pleural effusions and bibasilar consolidation 2. Cholelithiasis and free fluid in the abdomen 3. Cardiomegaly and coronary artery calcifications      This report was finalized on 2/13/2025 3:15 PM by Dr. Tripp Berrios MD.      CT Abdomen Pelvis Without Contrast    Result Date: 2/13/2025   1. Small amount of free fluid in the abdomen and pelvis.  2. Perinephric stranding bilaterally but no obstructive uropathy  3. No focal bowel wall thickening.         This report was finalized on 2/13/2025 3:14 PM by Dr. Tripp Berrios MD.      US Venous Doppler Lower Extremity Bilateral (duplex)    Result Date: 2/13/2025  No DVT in the lower extremities on today's exam.  This report was finalized on 2/13/2025 12:53 PM by Dr. Tripp Berrios MD.     Assessment & Plan    69M History Long-term Incarceration, Obese by BMI PMH GERD, Hypertension, Hyperlipidemia, Atrial  Fibrillation, Alcohol abuse, COPD, was found down at home and unresponsive, was intubated on arrival to emergency room for airway protection.    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 complicated by possible ARDS, HAGMA, Mild Rhabdomyolysis, Now with Viral Upper Respiratory Infection due to Influenza A  #Atrial Fibrillation with Rapid Ventricular Rate   #Elevated HS Troponins, suspected NSTEMI Type II due to shock  - Pulmonary consulted and following   - Nephrology consulted and following, temporary catheter placed, hemodialysis per Nephrology, volume removal per hemodialysis, per tech today having trouble this AM with catheter  - Palliative Care consulted and following   - General Surgery consulted and following, don't suspect cholecystitis   - TeleNeurology consulted and following   - Completed Ceftriaxone, continue tamiflu  - Continue pain and sedation drips for comfort  - Continue oral Amiodarone now  - Heparin drip on hold, check DIC panel   - Continue IV pressors for SBP < 90 or MAPs consistently < 65  - Continue IV PPI for Gi prophylaxis  - Continue Tylenol as needed for fevers, Duonebs as needed  - Admitted to CCU, monitor on telemetry, continue 02 but wean as able, spontaneous awakening trial/spontaneous breathing trial when appropriate    #Electrolyte Abnormalities  - Acute Severe Hypokalemia - Replacing, on protocol  - Acute Severe Hypomagnesemia - Replacing, on protocol    #Pre-Diabetes with steroid induced hyperglycemia  - Hgb A1c = 6.4%  - Continue FSBG and SSI, add long acting if indicated.    #Alcohol Use Disorder, Severe, with Dependence complicated   - Continue cardiac monitoring, seizure precautions, monitor for withdrawal     #Obesity by BMI  -  Body mass index is 30.41 kg/m².; complicates all aspects of care     F: NPO  E: Monitor & Replace as needed  N: NPO Diet NPO Type: Strict NPO; Tube Feeds on hold   Ppx: Heparin drip   Code Status (Patient has no pulse and is not breathing): No CPR  Medical Interventions (Patient has pulse or is breathing): Full Support  *Palliative Care consulted and following for goals of care Conversations     Dispo: Pending clinical improvement     *This patient is considered high risk due to sepsis, acute respiratory failure, Pneumonia, intubation and mechanical ventilation, Rapid Ventricular Rate, Acute Kidney Injury, Acute Metabolic Encephalopathy.     I have spent 40 minutes of critical care time (7:40-8:20AM) with > 50% of time spent in direct patient care, evaluating the patient at bedside, reviewing all labs and images, reviewing ABG and vent settings, reviewing pain and sedation drips, communicating plan of care with nursing staff, utilizing high complexity medical decision making to assess and treat vital organ system failure in an individual who has impairment of one or more vital organ systems such that there is a high probability of imminent or life threatening deterioration in the patient’s condition. Failure to initiate the above interventions on an urgent basis would likely result in sudden, clinically significant or life threatening deterioration in the patient's condition.    Edited by: Kendall Irby MD at 2/15/2025 1032  [facility]

## 2025-02-15 NOTE — PLAN OF CARE
Goal Outcome Evaluation:        Problem: Mechanical Ventilation Invasive  Goal: Effective Communication  Outcome: Progressing  Goal: Optimal Device Function  Outcome: Progressing  Intervention: Optimize Device Care and Function  Flowsheets (Taken 2/15/2025 0637)  Airway/Ventilation Management: airway patency maintained  Airway Safety Measures:   manual resuscitator/mask at bedside   oxygen flowmeter at bedside   suction at bedside  Goal: Mechanical Ventilation Liberation  Outcome: Progressing  Goal: Absence of Device-Related Skin and Tissue Injury  Outcome: Progressing  Goal: Absence of Ventilator-Induced Lung Injury  Outcome: Progressing  Intervention: Facilitate Lung-Protection Measures  Flowsheets (Taken 2/15/2025 0637)  Lung Protection Measures:   low tidal volume provided   lung compliance monitored   plateau/inspiratory pressure monitored   ventilator synchrony promoted   ventilator waveforms monitored  Intervention: Prevent Ventilator-Associated Pneumonia  Flowsheets (Taken 2/15/2025 0637)  Head of Bed (HOB) Positioning: HOB at 30-45 degrees  VAP Prevention Bundle:   HOB elevation maintained   vent circuit breaks minimized

## 2025-02-15 NOTE — PLAN OF CARE
Problem: Mechanical Ventilation Invasive  Goal: Effective Communication  Outcome: Progressing  Goal: Optimal Device Function  Outcome: Progressing  Intervention: Optimize Device Care and Function  Recent Flowsheet Documentation  Taken 2/14/2025 1830 by Cierra Casey RRT  Airway Safety Measures:   manual resuscitator/mask at bedside   oxygen flowmeter at bedside   suction at bedside  Goal: Mechanical Ventilation Liberation  Outcome: Progressing  Goal: Absence of Device-Related Skin and Tissue Injury  Outcome: Progressing  Goal: Absence of Ventilator-Induced Lung Injury  Outcome: Progressing  Intervention: Prevent Ventilator-Associated Pneumonia  Recent Flowsheet Documentation  Taken 2/14/2025 2105 by Cierra Casey RRT  Head of Bed (HOB) Positioning: HOB at 30-45 degrees  Taken 2/14/2025 1830 by Cierra Casey RRT  Head of Bed (HOB) Positioning: HOB at 30-45 degrees   Goal Outcome Evaluation:

## 2025-02-15 NOTE — PLAN OF CARE
Goal Outcome Evaluation:  Plan of Care Reviewed With: patient        Progress: no change  Patient remains intubated. Care ongoing.

## 2025-02-15 NOTE — PROGRESS NOTES
NEPHROLOGY PROGRESS NOTE      INTERVAL HISTORY:    HPI, decreased level of consciousness respiratory distress  Patient remains intubated on mechanical ventilation with no improvement in FiO2 remains at 60%.    STATUS OF ACUTE AND CHRONIC PROBLEMS;  1.  Patient remains oliguric with urine output less than 400 mL in last 24 hours  2.  3 L of ultrafiltration was done with dialysis  3.blood pressure is slightly better        Intake/Output                               02/13/25 0701 - 02/14/25 0700 02/14/25 0701 - 02/15/25 0700 02/15/25 0701 - 02/16/25 0700     [id number] 4477-7186 Total [id number] 1901-0700 Total [id number] 1901-0700 Total                    Intake    I.V.  1007.7  904.3 1912  355.8  42.9 398.7  0  -- 0    Other  0  0 0  0  -- 0  --  -- --    Flush/ Irrigation Intake (mL) (NG/OG Tube Orogastric 16 Fr Left mouth) 0 0 0 0 -- 0 -- -- --    NG/GT  547  379 926  385  -- 385  --  -- --    IV Piggyback  --  100 100  --  100 100  --  -- --    Total Intake 1554.7 1383.3 2938 740.8 142.9 883.7 0 -- 0       Output    Urine  150  40 190  200  200 400  --  -- --    Emesis/NG output  0  0 0  0  550 550  --  -- --    Stool  0  0 0  --  100 100  --  -- --    Dialysis  3000  -- 3000  --  -- --  3000  -- 3000    Total Output 3150 40 3190  3000 -- 3000             VITAL SIGNS :     Temp:  [98.7 °F (37.1 °C)-99.6 °F (37.6 °C)] 99.6 °F (37.6 °C)  Heart Rate:  [] 108  Resp:  [15-26] 21  BP: ()/() 133/81  FiO2 (%):  [60 %] 60 %    PHYSICAL EXAMINATION:    GENERAL EXAM  Laying in bed, intubated  Comfortable on mechanical ventilation    MENTAL STATUS EXAM  Sedated    NECK EXAM  JVP is not elevated, carotid exam normal    CARDIOVASCULAR EXAM  Regular rate and rhythm, no murmurs noted, no added heart sounds, normal apical pulsation  no edema in the lower extremities  2+ radial pulses bilateral, 2+ femoral/tibial pulses bilaterally    MUSCULOSKELETAL EXAM  No cyanosis or clubbing noted in the  "digits    RESPIRATORY EXAM  Lungs clear to auscultation bilaterally, respiratory effort normal    ABDOMINAL EXAM  Abdomen soft and non tender, normal bowel sounds    SKIN EXAM  No new rashes      Laboratory Data :     Albumin Albumin   Date Value Ref Range Status   02/13/2025 3.0 (L) 3.5 - 5.2 g/dL Final      Magnesium No results found for: \"MG\"         PTH               No results found for: \"PTH\"    CBC and coagulation:  Results from last 7 days   Lab Units 02/15/25  1041 02/15/25  0729 02/14/25  0331 02/13/25  0157 02/12/25  0412 02/11/25  0234 02/10/25  1024 02/10/25  0742 02/09/25  0010 02/08/25  1349   PROCALCITONIN ng/mL  --   --   --   --  6.34* 9.49*  --   --   --  5.90*   LACTATE mmol/L  --   --   --  0.9  --   --   --   --   --  1.2   CRP mg/dL  --   --   --   --   --   --   --   --   --  9.12*   WBC 10*3/mm3  --  10.52 8.19  --   --  18.87*   < >  --    < > 8.18   HEMOGLOBIN g/dL  --  10.3* 11.2*  --   --  11.9*   < >  --    < > 10.6*   HEMATOCRIT %  --  31.9* 33.5*  --   --  36.1*   < >  --    < > 31.1*   MCV fL  --  99.4* 97.4*  --   --  98.9*   < >  --    < > 95.7   MCHC g/dL  --  32.3 33.4  --   --  33.0   < >  --    < > 34.1   PLATELETS 10*3/mm3  --  155 142  --   --  144   < >  --    < > 82*   INR  0.98  --   --   --   --   --   --  1.16*  --  1.21*   D DIMER QUANT MCGFEU/mL 10.03*  --   --   --   --   --   --   --   --   --     < > = values in this interval not displayed.     Acid/base balance:  Results from last 7 days   Lab Units 02/09/25  0742 02/08/25  1348   PH, ARTERIAL pH units 7.289* 7.318*   PO2 ART mm Hg 85.2 106.0   PCO2, ARTERIAL mm Hg 41.4 46.9*   HCO3 ART mmol/L 19.9* 24.1     Renal and electrolytes:    Results from last 7 days   Lab Units 02/15/25  0416 02/14/25  1722 02/14/25  0955 02/14/25  0331 02/13/25  1150 02/13/25  0157 02/12/25  1057 02/12/25  0412 02/11/25  0536 02/10/25  0928 02/10/25  0304 02/09/25  0800 02/09/25  0010 02/08/25  1349   SODIUM mmol/L 139  --   --  139  --  " 140  --  142 146*  --  142  --  141 144   POTASSIUM mmol/L 3.9 4.0 3.4* 3.1*  3.1*   < > 3.4*  3.4*   < > 3.1* 3.9   < > 3.5   < > 2.7* 2.2*   MAGNESIUM mg/dL  --   --   --   --   --   --   --   --   --   --  1.9  --  2.0 1.2*   CHLORIDE mmol/L 97*  --   --  95*  --  96*  --  97* 103  --  101  --  100 99   CO2 mmol/L 25.0  --   --  26.6  --  24.8  --  24.1 16.0*  --  18.3*  --  16.3* 21.8*   BUN mg/dL 83*  --   --  62*  --  73*  --  76* 90*  --  76*  --  68* 70*   CREATININE mg/dL 8.49*  --   --  7.57*  --  9.19*  --  10.21* 11.82*  --  10.76*  --  10.76* 10.92*   CALCIUM mg/dL 7.5*  --   --  7.5*  --  7.0*  --  6.8* 5.9*  --  6.5*  --  7.4* 6.1*   PHOSPHORUS mg/dL  --   --   --   --   --  6.7*  --   --   --   --   --   --  8.3* 6.1*    < > = values in this interval not displayed.     Estimated Creatinine Clearance: 10.8 mL/min (A) (by C-G formula based on SCr of 8.49 mg/dL (H)).  @GFRCG:3@   Liver and pancreatic function:  Results from last 7 days   Lab Units 02/15/25  0416 02/13/25  0157 02/10/25  0304 02/09/25  0010   ALBUMIN g/dL  --  3.0* 2.4* 2.8*   BILIRUBIN mg/dL  --  0.5 0.8 1.6*   ALK PHOS U/L  --  50 46 45   AST (SGOT) U/L  --  72* 136* 238*   ALT (SGPT) U/L  --  37 59* 73*   AMMONIA umol/L 23 46  --  40         Cardiac:      Liver and pancreatic function:  Results from last 7 days   Lab Units 02/15/25  0416 02/13/25  0157 02/10/25  0304 02/09/25  0010   ALBUMIN g/dL  --  3.0* 2.4* 2.8*   BILIRUBIN mg/dL  --  0.5 0.8 1.6*   ALK PHOS U/L  --  50 46 45   AST (SGOT) U/L  --  72* 136* 238*   ALT (SGPT) U/L  --  37 59* 73*   AMMONIA umol/L 23 46  --  40         CLINICAL DATA REVIEW  CBC, Renal functions, Serum electrolytes, Acid base labs reviewed 1  Telemetry was reviewed and demonstrated sinus rhythm rate 82-1 1 8  Discussed the labs with Dr. Irby 1    MEDICINES:     amiodarone, 400 mg, Oral, Q24H   Followed by  [START ON 2/28/2025] amiodarone, 200 mg, Oral, Q24H  cetirizine, 5 mg, Oral,  Daily  chlorhexidine, 15 mL, Mouth/Throat, Q12H  finasteride, 5 mg, Oral, Daily  folic acid, 1 mg, Oral, Daily  ipratropium-albuterol, 3 mL, Nebulization, 4x Daily - RT  midodrine, 15 mg, Oral, TID AC  oseltamivir, 30 mg, Oral, Once per day on Monday Wednesday Friday  pantoprazole, 40 mg, Intravenous, BID AC  [Held by provider] rosuvastatin, 10 mg, Oral, Nightly  sodium chloride, 10 mL, Intravenous, Q12H  sodium chloride, 10 mL, Intravenous, Q12H  sodium chloride, 10 mL, Intravenous, Q12H  sodium chloride, 10 mL, Intravenous, Q12H  sodium chloride, 10 mL, Intravenous, Q12H  thiamine, 200 mg, Per G Tube, Daily  vitamin B-12, 1,000 mcg, Oral, Daily      dexmedetomidine, 0.2-1.5 mcg/kg/hr (Dosing Weight), Last Rate: 0.2 mcg/kg/hr (02/15/25 1130)  fentanyl 10 mcg/mL,  mcg/hr, Last Rate: Stopped (02/13/25 1130)  heparin, 13.8 Units/kg/hr (Dosing Weight), Last Rate: Stopped (02/14/25 1945)  Pharmacy to Dose Heparin,           Assessment & Plan       -Acute kidney injury  - Chronic kidney disease unspecified stage  - Acute hypercapnic hypoxic respiratory failure  - Acute metabolic acidosis  - Severe sepsis with septic shock  - Rhabdomyolysis  - Paroxysmal atrial fibrillation    Patient remains oliguric with no signs of renal recovery and mandates to continue on dialysis.  Dialysis today and continue on intermittent dialysis 3 times a week Tuesdays Thursdays and Saturdays    Acute kidney injury most likely due to acute tubular necrosis in settings shock  Had ASHLEY in October 2024 with creatinine peaked to 4 with some improvement close to 2  - As needed potassium replacement  - Monitor for renal recovery closely  - Strict intake and output record.  -Patient remains very high risk  - Please avoid any nephrotoxic agents, hypotension and adjust medications according to estimated GFR.       REVIEWED NOTES OF CLINICAL TEAMS INVOLVED WITH PATIENT CARE.     DISCUSSED IN DETAIL WITH PATIENT AND/OR FAMILY ABOUT CURRENT CLINICAL  CONDITION AND RESPONSE TO ONGOING MANAGEMENT.     DISCUSSED IN DETAIL WITH PATIENT AND/OR FAMILY ABOUT RISKS ASSOCIATED WITH CURRENT CLINICAL CONDITION AND RISK INVOLVED WITH CURRENT MANAGEMENT.     DISCUSSED IN DETAIL WITH HOSPITALIST    CODE STATUS REVIEWED,     Saba Bates MD  02/15/25  12:28 EST

## 2025-02-15 NOTE — PLAN OF CARE
Problem: Adult Inpatient Plan of Care  Goal: Plan of Care Review  Outcome: Progressing  Flowsheets (Taken 2/15/2025 0251)  Progress: declining  Plan of Care Reviewed With: patient  Goal: Patient-Specific Goal (Individualized)  Outcome: Progressing  Goal: Absence of Hospital-Acquired Illness or Injury  Outcome: Progressing  Intervention: Identify and Manage Fall Risk  Recent Flowsheet Documentation  Taken 2/15/2025 0200 by Jemma Tian RN  Safety Promotion/Fall Prevention: safety round/check completed  Taken 2/15/2025 0100 by Jemma Tian RN  Safety Promotion/Fall Prevention: safety round/check completed  Taken 2/15/2025 0000 by Jemma Tian RN  Safety Promotion/Fall Prevention: safety round/check completed  Taken 2/14/2025 2300 by Jemma Tian RN  Safety Promotion/Fall Prevention: safety round/check completed  Taken 2/14/2025 2200 by Jemma Tian RN  Safety Promotion/Fall Prevention: safety round/check completed  Taken 2/14/2025 2100 by Jemma Tian RN  Safety Promotion/Fall Prevention: safety round/check completed  Taken 2/14/2025 2000 by Jemma Tian RN  Safety Promotion/Fall Prevention: safety round/check completed  Taken 2/14/2025 1900 by Jemma Tian RN  Safety Promotion/Fall Prevention: safety round/check completed  Intervention: Prevent Skin Injury  Recent Flowsheet Documentation  Taken 2/15/2025 0200 by Jemma Tian RN  Body Position:   turned   side-lying   right  Skin Protection:   transparent dressing maintained   skin sealant/moisture barrier applied   silicone foam dressing in place   pulse oximeter probe site changed   incontinence pads utilized   drying agents applied  Taken 2/15/2025 0000 by Jemma Tian RN  Body Position:   turned   side-lying   left  Taken 2/14/2025 2200 by Jemma Tian RN  Body Position:   turned   side-lying   right  Taken 2/14/2025 2000 by Jemma Tian RN  Body Position:   turned   side-lying   left  Skin Protection:   transparent dressing  maintained   skin sealant/moisture barrier applied   pulse oximeter probe site changed   silicone foam dressing in place   incontinence pads utilized   drying agents applied  Intervention: Prevent and Manage VTE (Venous Thromboembolism) Risk  Recent Flowsheet Documentation  Taken 2/15/2025 0200 by Jemma Tian RN  VTE Prevention/Management: (see MAR)   bilateral   SCDs (sequential compression devices) on   other (see comments)  Taken 2/14/2025 2000 by Jemma Tian RN  VTE Prevention/Management: (bilateral SCDs removed for skin assessment. see MAR)   bilateral   SCDs (sequential compression devices) off   other (see comments)  Intervention: Prevent Infection  Recent Flowsheet Documentation  Taken 2/15/2025 0200 by Jemma Tian RN  Infection Prevention:   hand hygiene promoted   rest/sleep promoted  Taken 2/14/2025 2000 by Jemma Tian RN  Infection Prevention:   hand hygiene promoted   rest/sleep promoted  Goal: Optimal Comfort and Wellbeing  Outcome: Progressing  Intervention: Provide Person-Centered Care  Recent Flowsheet Documentation  Taken 2/15/2025 0200 by Jemma Tian RN  Trust Relationship/Rapport:   choices provided   care explained  Taken 2/14/2025 2000 by Jemma Tian RN  Trust Relationship/Rapport:   care explained   choices provided  Goal: Readiness for Transition of Care  Outcome: Progressing     Problem: Violence Risk or Actual  Goal: Anger and Impulse Control  Outcome: Progressing  Intervention: Minimize Safety Risk  Recent Flowsheet Documentation  Taken 2/15/2025 0200 by Jemma Tian RN  Enhanced Safety Measures: bed alarm set  Taken 2/15/2025 0100 by Jemma Tian RN  Enhanced Safety Measures: bed alarm set  Taken 2/15/2025 0000 by Jemma Tian RN  Enhanced Safety Measures: bed alarm set  Taken 2/14/2025 2300 by Jemma Tian RN  Enhanced Safety Measures: bed alarm set  Taken 2/14/2025 2200 by Jemma Tian RN  Enhanced Safety Measures: bed alarm set  Taken 2/14/2025  2100 by Jemma Tian RN  Enhanced Safety Measures: bed alarm set  Taken 2/14/2025 2000 by Jemma Tian RN  Enhanced Safety Measures: bed alarm set  Taken 2/14/2025 1900 by Jemma Tian RN  Enhanced Safety Measures: bed alarm set     Problem: Fall Injury Risk  Goal: Absence of Fall and Fall-Related Injury  Outcome: Progressing  Intervention: Identify and Manage Contributors  Recent Flowsheet Documentation  Taken 2/15/2025 0200 by Jemma Tian RN  Medication Review/Management: medications reviewed  Taken 2/15/2025 0100 by Jemma Tian RN  Medication Review/Management: medications reviewed  Taken 2/15/2025 0000 by Jemma Tian RN  Medication Review/Management: medications reviewed  Taken 2/14/2025 2300 by Jemma Tian RN  Medication Review/Management: medications reviewed  Taken 2/14/2025 2200 by Jemma Tian RN  Medication Review/Management: medications reviewed  Taken 2/14/2025 2100 by Jemma Tian RN  Medication Review/Management: medications reviewed  Taken 2/14/2025 2000 by Jemma Tian RN  Medication Review/Management: medications reviewed  Taken 2/14/2025 1900 by Jemma Tian RN  Medication Review/Management: medications reviewed  Intervention: Promote Injury-Free Environment  Recent Flowsheet Documentation  Taken 2/15/2025 0200 by Jemma Tian RN  Safety Promotion/Fall Prevention: safety round/check completed  Taken 2/15/2025 0100 by Jemma Tian RN  Safety Promotion/Fall Prevention: safety round/check completed  Taken 2/15/2025 0000 by Jemma Tian RN  Safety Promotion/Fall Prevention: safety round/check completed  Taken 2/14/2025 2300 by Jemma Tian RN  Safety Promotion/Fall Prevention: safety round/check completed  Taken 2/14/2025 2200 by Jemma Tian RN  Safety Promotion/Fall Prevention: safety round/check completed  Taken 2/14/2025 2100 by Jemma Tian RN  Safety Promotion/Fall Prevention: safety round/check completed  Taken 2/14/2025 2000 by Asmita  GREGORIO Easton  Safety Promotion/Fall Prevention: safety round/check completed  Taken 2/14/2025 1900 by Jemma Tian RN  Safety Promotion/Fall Prevention: safety round/check completed     Problem: Skin Injury Risk Increased  Goal: Skin Health and Integrity  Outcome: Progressing  Intervention: Optimize Skin Protection  Recent Flowsheet Documentation  Taken 2/15/2025 0200 by Jemma Tian RN  Activity Management: bedrest  Pressure Reduction Techniques:   frequent weight shift encouraged   heels elevated off bed   pressure points protected   weight shift assistance provided  Head of Bed (HOB) Positioning: HOB at 30-45 degrees  Pressure Reduction Devices:   specialty bed utilized   pressure-redistributing mattress utilized   positioning supports utilized   heel offloading device utilized  Skin Protection:   transparent dressing maintained   skin sealant/moisture barrier applied   silicone foam dressing in place   pulse oximeter probe site changed   incontinence pads utilized   drying agents applied  Taken 2/15/2025 0000 by Jemma Tian RN  Head of Bed (HOB) Positioning: HOB at 30-45 degrees  Taken 2/14/2025 2200 by Jemma Tian RN  Head of Bed (HOB) Positioning: HOB at 30-45 degrees  Taken 2/14/2025 2000 by Jemma Tian RN  Activity Management: bedrest  Pressure Reduction Techniques:   frequent weight shift encouraged   heels elevated off bed   pressure points protected   weight shift assistance provided  Head of Bed (HOB) Positioning: HOB at 30-45 degrees  Pressure Reduction Devices:   specialty bed utilized   pressure-redistributing mattress utilized   positioning supports utilized   heel offloading device utilized  Skin Protection:   transparent dressing maintained   skin sealant/moisture barrier applied   pulse oximeter probe site changed   silicone foam dressing in place   incontinence pads utilized   drying agents applied     Problem: Comorbidity Management  Goal: Maintenance of COPD Symptom  Control  Outcome: Progressing  Intervention: Maintain COPD (Chronic Obstructive Pulmonary Disease) Symptom Control  Recent Flowsheet Documentation  Taken 2/15/2025 0200 by Jemma Tian RN  Medication Review/Management: medications reviewed  Taken 2/15/2025 0100 by Jemma Tian RN  Medication Review/Management: medications reviewed  Taken 2/15/2025 0000 by Jemma Tian RN  Medication Review/Management: medications reviewed  Taken 2/14/2025 2300 by Jemma Tian RN  Medication Review/Management: medications reviewed  Taken 2/14/2025 2200 by Jemma Tian RN  Medication Review/Management: medications reviewed  Taken 2/14/2025 2100 by Jemma Tian RN  Medication Review/Management: medications reviewed  Taken 2/14/2025 2000 by Jemma Tian RN  Medication Review/Management: medications reviewed  Taken 2/14/2025 1900 by Jemma Tian RN  Medication Review/Management: medications reviewed     Problem: Sepsis/Septic Shock  Goal: Optimal Coping  Outcome: Progressing  Intervention: Support Patient and Family Response  Recent Flowsheet Documentation  Taken 2/15/2025 0200 by Jemma Tian RN  Family/Support System Care: support provided  Taken 2/14/2025 2000 by Jemma Tian RN  Family/Support System Care: support provided  Goal: Absence of Bleeding  Outcome: Progressing  Goal: Blood Glucose Level Within Target Range  Outcome: Progressing  Goal: Absence of Infection Signs and Symptoms  Outcome: Progressing  Intervention: Initiate Sepsis Management  Recent Flowsheet Documentation  Taken 2/15/2025 0200 by Jemma Tian RN  Infection Prevention:   hand hygiene promoted   rest/sleep promoted  Taken 2/14/2025 2000 by Jemma Tian RN  Infection Prevention:   hand hygiene promoted   rest/sleep promoted  Intervention: Promote Recovery  Recent Flowsheet Documentation  Taken 2/15/2025 0200 by Jemma Tian RN  Activity Management: bedrest  Taken 2/14/2025 2000 by Jemma Tian RN  Activity Management:  bedrest  Goal: Optimal Nutrition Delivery  Outcome: Progressing     Problem: Mechanical Ventilation Invasive  Goal: Effective Communication  Outcome: Progressing  Intervention: Ensure Effective Communication  Recent Flowsheet Documentation  Taken 2/15/2025 0200 by Jemma Tian RN  Trust Relationship/Rapport:   choices provided   care explained  Diversional Activities: television  Family/Support System Care: support provided  Taken 2/14/2025 2000 by Jemma Tian RN  Trust Relationship/Rapport:   care explained   choices provided  Diversional Activities: television  Family/Support System Care: support provided  Goal: Optimal Device Function  Outcome: Progressing  Intervention: Optimize Device Care and Function  Recent Flowsheet Documentation  Taken 2/15/2025 0200 by Jemma Tian RN  Airway Safety Measures: suction at bedside  Taken 2/15/2025 0000 by Jemma Tian RN  Oral Care:   swabbed with antiseptic solution   suction provided   lip/mouth moisturizer applied  Taken 2/14/2025 2000 by Jemma Tian RN  Oral Care:   swabbed with antiseptic solution   suction provided   lip/mouth moisturizer applied  Airway Safety Measures: suction at bedside  Goal: Mechanical Ventilation Liberation  Outcome: Progressing  Intervention: Promote Extubation and Mechanical Ventilation Liberation  Recent Flowsheet Documentation  Taken 2/15/2025 0200 by Jemma Tian RN  Medication Review/Management: medications reviewed  Taken 2/15/2025 0100 by Jemma Tian RN  Medication Review/Management: medications reviewed  Taken 2/15/2025 0000 by Jemma Tian RN  Medication Review/Management: medications reviewed  Taken 2/14/2025 2300 by Jemma Tian RN  Medication Review/Management: medications reviewed  Taken 2/14/2025 2200 by Jemma Tian RN  Medication Review/Management: medications reviewed  Taken 2/14/2025 2100 by Jemma Tian RN  Medication Review/Management: medications reviewed  Taken 2/14/2025 2000 by Asmita  GREGORIO Easton  Medication Review/Management: medications reviewed  Taken 2/14/2025 1900 by Jemma Tian RN  Medication Review/Management: medications reviewed  Goal: Optimal Nutrition Delivery  Outcome: Progressing  Goal: Absence of Device-Related Skin and Tissue Injury  Outcome: Progressing  Intervention: Maintain Skin and Tissue Health  Recent Flowsheet Documentation  Taken 2/15/2025 0200 by Jemma Tian RN  Device Skin Pressure Protection:   tubing/devices free from skin contact   skin-to-skin areas padded   skin-to-device areas padded   pressure points protected   positioning supports utilized   adhesive use limited   absorbent pad utilized/changed  Taken 2/14/2025 2000 by Jemma Tian RN  Device Skin Pressure Protection:   tubing/devices free from skin contact   skin-to-skin areas padded   skin-to-device areas padded   pressure points protected   positioning supports utilized   absorbent pad utilized/changed   adhesive use limited  Goal: Absence of Ventilator-Induced Lung Injury  Outcome: Progressing  Intervention: Prevent Ventilator-Associated Pneumonia  Recent Flowsheet Documentation  Taken 2/15/2025 0200 by Jemma Tian RN  Head of Bed ([facility]) Positioning: HOB at 30-45 degrees  Taken 2/15/2025 0000 by Jemma Tian RN  Head of Bed ([facility]) Positioning: HOB at 30-45 degrees  Oral Care:   swabbed with antiseptic solution   suction provided   lip/mouth moisturizer applied  Taken 2/14/2025 2200 by Jemma Tian RN  Head of Bed ([facility]) Positioning: HOB at 30-45 degrees  Taken 2/14/2025 2000 by Jemma Tian RN  Head of Bed ([facility]) Positioning: HOB at 30-45 degrees  VAP Prevention Bundle:   HOB elevation maintained   oral care regularly provided   vent circuit breaks minimized   VTE prophylaxis provided   stress ulcer prophylaxis provided   spontaneous breathing trial performed  VAP Prevention Measures: completed  Oral Care:   swabbed with antiseptic solution   suction provided   lip/mouth moisturizer  applied   Goal Outcome Evaluation:  Plan of Care Reviewed With: patient        Progress: declining

## 2025-02-15 NOTE — PROGRESS NOTES
THC Physician - Brief Progress Note  PERMANENT  02/14/2025 21:30    [facility] [place] [facility] [place] [facility] - [address] - [place] ([facility])    NIRAV WHITNEY JENIFFER    Date of Service 02/14/2025 21:30    HPI/Events of Note [facility] Intervention [facility]    Patient extremely agitated and elert pushed. Noted pccm preference to stay off sedation. Will trial pushes of versed to allow for vent compliance       __x___   Video Assessment performed  x          Contact [employer] for any needs if bedside physician is not present.       The patient is critically ill with neurologic failure  and requires high complexity decision making for assessment and support including extensive interpretation of multiple databases; Critical care; 10 minutes total evaluation time     Care during the described time interval was provided by me.  I have reviewed this patient's available data, including medical history, events of note, physical examination and test results as part of my evaluation.    Interventions Major-Change in mental status - evaluation and management  Intermediate-Communication with other healthcare providers and/or family        Electronically Signed by: Ashutosh Ahumada) on 02/14/2025 21:31

## 2025-02-15 NOTE — PROGRESS NOTES
"DOS: 2/15/2025  NAME: Phan Daniels   : 1955  PCP: Cesia Diaz APRN  Chief Complaint   Patient presents with    Respiratory Distress       Chief complaint: He is currently intubated and very tachypneic and also perspiring.  Had a low-grade temperature yesterday.  Subjective: Completed his hemodialysis and his blood pressure did shoot up but currently getting better after a dose of antihypertensive medication.  On deep sternal rub he opens his eyes but does not track.  Today central nervous system responses are depressed and he barely winces to painful response because he just completed the hemodialysis protocol.    Objective:  Vital signs: BP (!) 75/47   Pulse 96   Temp 99.6 °F (37.6 °C) (Esophageal)   Resp 21   Ht 182.9 cm (72\")   Wt 113 kg (248 lb 3.8 oz)   SpO2 93%   BMI 33.67 kg/m²    Gen: NAD, vitals reviewed  MS: Opens his eyes momentarily but does not establish any eye contact.  CN: Pupils reactive light.  Doll's eye movements could not be tested today because he just completed the hemodialysis and is kind of lethargic.  Motor: Does not move any extremities.  Sensory: Does not withdraw to any painful stimuli but did have some grimacing in the face when pinched in the extremities.  Coordination: Could not be tested at this time.  Gait: Not weightbearing at this time.    ROS:  General: Currently intubated and on the ventilator hyperventilating.  Neurological: Comatose with Melva Coma Scale of 4.    Laboratory results:  Lab Results   Component Value Date    GLUCOSE 94 02/15/2025    CALCIUM 7.5 (L) 02/15/2025     02/15/2025    K 3.9 02/15/2025    CO2 25.0 02/15/2025    CL 97 (L) 02/15/2025    BUN 83 (H) 02/15/2025    CREATININE 8.49 (H) 02/15/2025    BCR 9.8 02/15/2025    ANIONGAP 17.0 (H) 02/15/2025     Lab Results   Component Value Date    WBC 10.52 02/15/2025    HGB 10.3 (L) 02/15/2025    HCT 31.9 (L) 02/15/2025    MCV 99.4 (H) 02/15/2025     02/15/2025     Review of " labs: The creatinine is elevated at 8.49 and the BUN is elevated 83 and the EGFR is low at 6.3.  However the patient made approximately 200 mL of urine last night.     CMP:        Lab 02/15/25  0416 02/14/25  1722 02/14/25  0955 02/14/25  0331 02/13/25  1859 02/13/25  1150 02/13/25  0157 02/12/25  1057 02/12/25  0412 02/11/25  0536 02/10/25  0928 02/10/25  0304 02/09/25  0800 02/09/25  0010 02/08/25  1349   SODIUM 139  --   --  139  --   --  140  --  142 146*  --  142  --  141 144   POTASSIUM 3.9 4.0 3.4* 3.1*  3.1* 3.5   < > 3.4*  3.4*   < > 3.1* 3.9   < > 3.5   < > 2.7* 2.2*   CHLORIDE 97*  --   --  95*  --   --  96*  --  97* 103  --  101  --  100 99   CO2 25.0  --   --  26.6  --   --  24.8  --  24.1 16.0*  --  18.3*  --  16.3* 21.8*   ANION GAP 17.0*  --   --  17.4*  --   --  19.2*  --  20.9* 27.0*  --  22.7*  --  24.7* 23.2*   BUN 83*  --   --  62*  --   --  73*  --  76* 90*  --  76*  --  68* 70*   CREATININE 8.49*  --   --  7.57*  --   --  9.19*  --  10.21* 11.82*  --  10.76*  --  10.76* 10.92*   EGFR 6.3*  --   --  7.2*  --   --  5.7*  --  5.0* 4.2*  --  4.7*  --  4.7* 4.7*   GLUCOSE 94  --   --  111*  --   --  308*  --  282* 189*  --  179*  --  215* 134*   CALCIUM 7.5*  --   --  7.5*  --   --  7.0*  --  6.8* 5.9*  --  6.5*  --  7.4* 6.1*   MAGNESIUM  --   --   --   --   --   --   --   --   --   --   --  1.9  --  2.0 1.2*   PHOSPHORUS  --   --   --   --   --   --  6.7*  --   --   --   --   --   --  8.3* 6.1*   TOTAL PROTEIN  --   --   --   --   --   --  6.4  --   --   --   --  6.3  --  6.4 6.4   ALBUMIN  --   --   --   --   --   --  3.0*  --   --   --   --  2.4*  --  2.8* 3.2*   GLOBULIN  --   --   --   --   --   --   --   --   --   --   --  3.9  --  3.6 3.2   ALT (SGPT)  --   --   --   --   --   --  37  --   --   --   --  59*  --  73* 70*   AST (SGOT)  --   --   --   --   --   --  72*  --   --   --   --  136*  --  238* 271*   BILIRUBIN  --   --   --   --   --   --  0.5  --   --   --   --  0.8  --  1.6* 1.1    INDIRECT BILIRUBIN  --   --   --   --   --   --  0.1  --   --   --   --   --   --   --   --    BILIRUBIN DIRECT  --   --   --   --   --   --  0.4*  --   --   --   --   --   --   --   --    ALK PHOS  --   --   --   --   --   --  50  --   --   --   --  46  --  45 45    < > = values in this interval not displayed.        TSH          2/8/2025    15:04 2/9/2025    00:10   TSH   TSH 0.315  0.196         Lipid Panel          2/12/2025    04:12   Lipid Panel   Triglycerides 225         Lab Results   Component Value Date    SONVQCUS24 >2,000 (H) 02/09/2025       Lab Results   Component Value Date    FOLATE 10.20 02/09/2025       Lab Results   Component Value Date    HGBA1C 6.4 (H) 10/15/2024           Review and interpretation of imaging: CT CHEST WO CONTRAST DIAGNOSTIC-      CLINICAL INDICATION:Sepsis; A41.9-Sepsis, unspecified organism;  N17.9-Acute kidney failure, unspecified; R79.89-Other specified abnormal  findings of blood chemistry; J18.9-Pneumonia, unspecified organism      COMPARISON: 2/8/2025     TECHNIQUE: Multiple axial CT images were obtained from lung apex through  upper abdomen without the administration of IV contrast. Reformatted  images in the coronal and/or sagittal plane(s) were generated from the  axial data set to facilitate diagnostic accuracy and/or surgical  planning.     Radiation dose reduction techniques were utilized per ALARA protocol.  Automated exposure control was initiated through either or [employer] or  [employer] software packages by  protocol.    DOSE (DLP mGy-cm):        FINDINGS:     LUNGS: Bibasilar consolidation     HEART: Coronary artery calcifications and cardiomegaly     MEDIASTINUM: No masses. No enlarged lymph nodes.  No fluid collections.     PLEURA: Small bilateral pleural effusions     VASCULATURE: No evidence of aneurysm.     BONES: No acute bony abnormality.     VISUALIZED UPPER ABDOMEN: Cholelithiasis     Other: None.     IMPRESSION:     1. Bilateral pleural  effusions and bibasilar consolidation  2. Cholelithiasis and free fluid in the abdomen  3. Cardiomegaly and coronary artery calcifications    CT Head Without Contrast    Result Date: 2/13/2025  CT HEAD WO CONTRAST-  CLINICAL INDICATION: Sepsis; A41.9-Sepsis, unspecified organism; N17.9-Acute kidney failure, unspecified; R79.89-Other specified abnormal findings of blood chemistry; J18.9-Pneumonia, unspecified organism   COMPARISON: 2/8/2025  TECHNIQUE: Axial images of the brain were obtained with out intravenous contrast.  Reformatted images were created in the sagittal and coronal planes.  DOSE:  Radiation dose reduction techniques were utilized per ALARA protocol. Automated exposure control was initiated through either or [employer] or [email or web address] software packages by  protocol.    FINDINGS:   BRAIN:  Unremarkable.  No hemorrhage.  No significant white matter disease.  No edema.    VENTRICLES:  Unremarkable.  No ventriculomegaly.    BONES/JOINTS:  Unremarkable.  No acute fracture.    SOFT TISSUES:  Unremarkable.    SINUSES:  no air fluid levels    MASTOID AIR CELLS:  Unremarkable as visualized.  No mastoid effusion.       Impression:   Unremarkable exam demonstrating no CT evidence of acute intracranial findings.  This report was finalized on 2/13/2025 3:15 PM by Dr. Tripp Berrios MD.      CT Head Without Contrast    Result Date: 2/8/2025  INDICATION: Altered mental status.  COMPARISON: No relevant priors available  TECHNIQUE: Axial CT images of the head were obtained without IV contrast. Coronal and sagittal reformations were reviewed.  FINDINGS: Gray-white differentiation is relatively preserved. No mass, mass effect or midline shift. And chronic ischemic white matter changes. No evidence of acute large territorial infarction or acute intracranial hemorrhage. Ventricles appear normal in size. Basal cisterns are patent. No depressed calvarial fracture.      Impression: No acute intracranial process.  This  report was finalized on 2/8/2025 3:11 PM by Alex Pallas, DO.             Workup to date:A 19 channel digital EEG was performed using the international 10-20 placement system, including eye leads and EKG leads.     Duration: 26 minutes     Findings:     The patient is intubated.  Diffuse low amplitude 2-4 Hz delta is seen symmetrically over both hemispheres.  EMG artifact is prominent over the temporal leads and the patient is observed to be clamping down on the ET tube.  Over the course of the study, no focal features or epileptiform activity are present.  No electrographic seizures are seen.  Photic stimulation does not change the background.  Hyperventilation is not performed.     Video: Available     Technical quality: Good     Rhythm strip: Regular,70 bpm     SUMMARY:     Moderate generalized slow and suppressed     No focal features or epileptiform activity are seen     IMPRESSION:     Diffuse cerebral dysfunction of moderate degree, nonspecific.  This is most commonly seen due to toxic/metabolic or hypoxemic/ischemic cause    Results for orders placed during the hospital encounter of 02/08/25    Adult Transthoracic Echo Complete W/ Cont if Necessary Per Protocol    Interpretation Summary    Left ventricle is normal in size and function with estimate ejection fraction of 55 to 60%. Normal diastolic function.    Right ventricle appears normal in size and function.    Normal left atrial cavity size noted. No evidence of a patent foramen ovale. No evidence of an atrial septal defect present.    Normal right atrial cavity size noted. The inferior vena cava is dilated. Partial IVC inspiratory collapse of less than 50% noted.    Aortic valve appears trileaflet. There is no significant aortic stenosis or regurgitation.    There is mild mitral regurgitation.    Estimated right ventricular systolic pressure from tricuspid regurgitation is moderately elevated (45-55 mmHg). Mild tricuspid regurgitation. Estimated RVSP is  45 mmHg with estimated RA pressure of 10 mmHg.    There is no evidence of pericardial effusion. . There is evidence of a fat pad present.    The aortic root measures 3.9 cm.       Diagnoses:Patient with underlying metabolic encephalopathy.      Comment: Patient has depressed CNS functions today worse than yesterday because of the recent completion of hemodialysis.    Plan:  1.  Continue the drug holiday and not to use any sedation but if he gets agitated to use Precedex slowly.  2.  Will try to reassess his condition tomorrow.  3.  Will get a repeat CT of the brain coming Monday to see if there is any evidence of anoxic brain damage or not.  4.  Continue current aggressive management of underlying metabolic condition.    Discussed with Dr. Kendall Irby his hospitalist and he will be followed up by our inpatient teleneurology service.    Spent a total of 30 minutes in face-to-face evaluation and management of the patient using the dedicated telemedicine device without any interruption with the help of the rounding nurse with the patient located at the [facility] and myself at a remote location.    Electronically signed by Opal Osborne MD, 02/15/25, 1:18 PM EST.

## 2025-02-16 NOTE — PLAN OF CARE
Problem: Adult Inpatient Plan of Care  Goal: Plan of Care Review  Outcome: Not Progressing  Goal: Patient-Specific Goal (Individualized)  Outcome: Not Progressing  Goal: Absence of Hospital-Acquired Illness or Injury  Outcome: Not Progressing  Intervention: Identify and Manage Fall Risk  Recent Flowsheet Documentation  Taken 2/16/2025 0000 by Anahi Blancas RN  Safety Promotion/Fall Prevention: safety round/check completed  Taken 2/15/2025 2300 by Anahi Blancas RN  Safety Promotion/Fall Prevention: safety round/check completed  Taken 2/15/2025 2200 by Anahi Blancas RN  Safety Promotion/Fall Prevention: safety round/check completed  Taken 2/15/2025 2100 by Anahi Blancas RN  Safety Promotion/Fall Prevention: safety round/check completed  Taken 2/15/2025 2000 by Anahi Blancas RN  Safety Promotion/Fall Prevention: safety round/check completed  Taken 2/15/2025 1900 by Anahi Blancas RN  Safety Promotion/Fall Prevention: safety round/check completed  Intervention: Prevent Skin Injury  Recent Flowsheet Documentation  Taken 2/16/2025 0000 by Anahi Blancas RN  Body Position:   turned   side-lying   right  Taken 2/15/2025 2200 by Anahi Blancas RN  Body Position:   left   turned   side-lying  Taken 2/15/2025 2000 by Anahi Blancas RN  Body Position:   turned   side-lying   right  Skin Protection: incontinence pads utilized  Intervention: Prevent and Manage VTE (Venous Thromboembolism) Risk  Recent Flowsheet Documentation  Taken 2/15/2025 2000 by Anahi Blancas RN  VTE Prevention/Management: (Heparin) other (see comments)  Goal: Optimal Comfort and Wellbeing  Outcome: Not Progressing  Intervention: Provide Person-Centered Care  Recent Flowsheet Documentation  Taken 2/15/2025 2000 by Anahi Blancas RN  Trust Relationship/Rapport:   care explained   reassurance provided  Goal: Readiness for Transition of Care  Outcome: Not Progressing     Problem: Violence Risk or Actual  Goal: Anger and Impulse  Control  Outcome: Not Progressing  Intervention: Minimize Safety Risk  Recent Flowsheet Documentation  Taken 2/16/2025 0000 by Anahi Blancas RN  Enhanced Safety Measures: bed alarm set  Taken 2/15/2025 2300 by Anahi Blancas RN  Enhanced Safety Measures: bed alarm set  Taken 2/15/2025 2200 by Anahi Blancas RN  Enhanced Safety Measures: bed alarm set  Taken 2/15/2025 2100 by Anahi Blancas RN  Enhanced Safety Measures: bed alarm set  Taken 2/15/2025 2000 by Anahi Blancas RN  Enhanced Safety Measures: bed alarm set  Taken 2/15/2025 1900 by Anahi Blancas RN  Enhanced Safety Measures: bed alarm set     Problem: Fall Injury Risk  Goal: Absence of Fall and Fall-Related Injury  Outcome: Not Progressing  Intervention: Identify and Manage Contributors  Recent Flowsheet Documentation  Taken 2/16/2025 0000 by Anahi Blancas RN  Medication Review/Management: medications reviewed  Taken 2/15/2025 2300 by Anahi Blancas RN  Medication Review/Management: medications reviewed  Taken 2/15/2025 2200 by Anahi Blancas RN  Medication Review/Management: medications reviewed  Taken 2/15/2025 2100 by Anahi Blancas RN  Medication Review/Management: medications reviewed  Taken 2/15/2025 2000 by Anahi Blancas RN  Medication Review/Management: medications reviewed  Taken 2/15/2025 1900 by Anahi Blancas RN  Medication Review/Management: medications reviewed  Intervention: Promote Injury-Free Environment  Recent Flowsheet Documentation  Taken 2/16/2025 0000 by Anahi Blancas RN  Safety Promotion/Fall Prevention: safety round/check completed  Taken 2/15/2025 2300 by Anahi Blancas RN  Safety Promotion/Fall Prevention: safety round/check completed  Taken 2/15/2025 2200 by Anahi Blancas RN  Safety Promotion/Fall Prevention: safety round/check completed  Taken 2/15/2025 2100 by Anahi Blancas RN  Safety Promotion/Fall Prevention: safety round/check completed  Taken 2/15/2025 2000 by Anahi Blancas RN  Safety Promotion/Fall  Prevention: safety round/check completed  Taken 2/15/2025 1900 by Anahi Blancas RN  Safety Promotion/Fall Prevention: safety round/check completed     Problem: Skin Injury Risk Increased  Goal: Skin Health and Integrity  Outcome: Not Progressing  Intervention: Optimize Skin Protection  Recent Flowsheet Documentation  Taken 2/16/2025 0000 by Anahi Blancas RN  Head of Bed (HOB) Positioning: HOB at 30-45 degrees  Taken 2/15/2025 2200 by Anahi Blancas RN  Head of Bed (HOB) Positioning: HOB at 30-45 degrees  Taken 2/15/2025 2000 by Anahi Blancas RN  Activity Management: bedrest  Pressure Reduction Techniques: frequent weight shift encouraged  Head of Bed (HOB) Positioning: HOB at 30-45 degrees  Pressure Reduction Devices: pressure-redistributing mattress utilized  Skin Protection: incontinence pads utilized     Problem: Comorbidity Management  Goal: Maintenance of COPD Symptom Control  Outcome: Not Progressing  Intervention: Maintain COPD (Chronic Obstructive Pulmonary Disease) Symptom Control  Recent Flowsheet Documentation  Taken 2/16/2025 0000 by Anahi Blancas RN  Medication Review/Management: medications reviewed  Taken 2/15/2025 2300 by Anahi Blancas RN  Medication Review/Management: medications reviewed  Taken 2/15/2025 2200 by Anahi Blancas RN  Medication Review/Management: medications reviewed  Taken 2/15/2025 2100 by Anahi Blancas RN  Medication Review/Management: medications reviewed  Taken 2/15/2025 2000 by Anahi Blancas RN  Medication Review/Management: medications reviewed  Taken 2/15/2025 1900 by Anahi Blancas RN  Medication Review/Management: medications reviewed     Problem: Sepsis/Septic Shock  Goal: Optimal Coping  Outcome: Not Progressing  Intervention: Support Patient and Family Response  Recent Flowsheet Documentation  Taken 2/15/2025 2000 by Anahi Blancas RN  Family/Support System Care: support provided  Goal: Absence of Bleeding  Outcome: Not Progressing  Goal: Blood Glucose  Level Within Target Range  Outcome: Not Progressing  Intervention: Optimize Glycemic Control  Recent Flowsheet Documentation  Taken 2/15/2025 2000 by Anahi Blancas RN  Hyperglycemia Management: blood glucose monitored  Hypoglycemia Management: blood glucose monitored  Goal: Absence of Infection Signs and Symptoms  Outcome: Not Progressing  Intervention: Promote Stabilization  Recent Flowsheet Documentation  Taken 2/15/2025 2000 by Anahi Blancas RN  Lung Protection Measures: ventilator synchrony promoted  Fever Reduction/Comfort Measures: lightweight bedding  Intervention: Promote Recovery  Recent Flowsheet Documentation  Taken 2/15/2025 2000 by Anahi Blancas RN  Activity Management: bedrest  Airway/Ventilation Management: airway patency maintained  Sleep/Rest Enhancement: consistent schedule promoted  Goal: Optimal Nutrition Delivery  Outcome: Not Progressing     Problem: Mechanical Ventilation Invasive  Goal: Effective Communication  Outcome: Not Progressing  Intervention: Ensure Effective Communication  Recent Flowsheet Documentation  Taken 2/15/2025 2000 by Anahi Blancas RN  Trust Relationship/Rapport:   care explained   reassurance provided  Diversional Activities: television  Family/Support System Care: support provided  Goal: Optimal Device Function  Outcome: Not Progressing  Intervention: Optimize Device Care and Function  Recent Flowsheet Documentation  Taken 2/16/2025 0000 by Anahi Blancas RN  Oral Care: swabbed with antiseptic solution  Taken 2/15/2025 2000 by Anahi Blancas RN  Airway/Ventilation Management: airway patency maintained  Oral Care: swabbed with antiseptic solution  Goal: Mechanical Ventilation Liberation  Outcome: Not Progressing  Intervention: Promote Extubation and Mechanical Ventilation Liberation  Recent Flowsheet Documentation  Taken 2/16/2025 0000 by Anahi Blancas RN  Medication Review/Management: medications reviewed  Taken 2/15/2025 2300 by Anahi Blancas RN  Medication  Review/Management: medications reviewed  Taken 2/15/2025 2200 by Anahi Blancas RN  Medication Review/Management: medications reviewed  Taken 2/15/2025 2100 by Anahi Blancas RN  Medication Review/Management: medications reviewed  Taken 2/15/2025 2000 by Anahi Blancas RN  Sleep/Rest Enhancement: consistent schedule promoted  Medication Review/Management: medications reviewed  Taken 2/15/2025 1900 by Anahi Blancas RN  Medication Review/Management: medications reviewed  Goal: Optimal Nutrition Delivery  Outcome: Not Progressing  Goal: Absence of Device-Related Skin and Tissue Injury  Outcome: Not Progressing  Intervention: Maintain Skin and Tissue Health  Recent Flowsheet Documentation  Taken 2/15/2025 2000 by Anahi Blancas RN  Device Skin Pressure Protection: absorbent pad utilized/changed  Goal: Absence of Ventilator-Induced Lung Injury  Outcome: Not Progressing  Intervention: Facilitate Lung-Protection Measures  Recent Flowsheet Documentation  Taken 2/15/2025 2000 by Anahi Blancas RN  Lung Protection Measures: ventilator synchrony promoted  Intervention: Prevent Ventilator-Associated Pneumonia  Recent Flowsheet Documentation  Taken 2/16/2025 0000 by Anahi Blancas RN  Head of Bed ([facility]) Positioning: HOB at 30-45 degrees  Oral Care: swabbed with antiseptic solution  Taken 2/15/2025 2200 by Anahi Blancas RN  Head of Bed ([facility]) Positioning: HOB at 30-45 degrees  Taken 2/15/2025 2000 by Anahi Blancas RN  Head of Bed ([facility]) Positioning: HOB at 30-45 degrees  VAP Prevention Measures: completed  Oral Care: swabbed with antiseptic solution   Goal Outcome Evaluation:

## 2025-02-16 NOTE — PROGRESS NOTES
Progress Note Critical Care Pulmonary        Subjective  On vent , sedated.  Oxygen requirement went up to 60%.  PEEP of 8.    I adjusted the ventilator increase the PEEP to 12 and ordered stat x-ray chest.        Interval History: event overnight: As described above        Review of Systems:    On vent   Vital Signs  Temp:  [98.4 °F (36.9 °C)-99.7 °F (37.6 °C)] 98.4 °F (36.9 °C)  Heart Rate:  [] 130  Resp:  [19-24] 24  BP: ()/(45-99) 150/76  FiO2 (%):  [60 %] 60 %  Body mass index is 33.67 kg/m².    Intake/Output Summary (Last 24 hours) at 2/16/2025 1122  Last data filed at 2/16/2025 0500  Gross per 24 hour   Intake 594.3 ml   Output 3780 ml   Net -3185.7 ml     No intake/output data recorded.    Physical Exam:  General- normal in appearance, mild respiratory distress, using accessory    HEENT- pupils equally reactive to light, normal in size, no scleral icterus    Neck-supple    Respiratory-bilateral air entry, few basal rales  Cardiovascular-  Normal S1 and S2. No S3, S4 or murmurs. No JVD, no carotid bruit and no edema, pulses normal bilaterally     GI-nontender nondistended bowel sounds positive    CNS- grossly nonfocal    Extremities-no clubbing .  Trace bipedal edema      Results Review:      Results from last 7 days   Lab Units 02/15/25  0729 02/14/25  0331 02/11/25  0234   WBC 10*3/mm3 10.52 8.19 18.87*   HEMOGLOBIN g/dL 10.3* 11.2* 11.9*   PLATELETS 10*3/mm3 155 142 144     Results from last 7 days   Lab Units 02/16/25  0156 02/15/25  0416 02/14/25  1722 02/14/25  0955 02/14/25  0331 02/10/25  0928 02/10/25  0304   SODIUM mmol/L 139 139  --   --  139   < > 142   POTASSIUM mmol/L 4.1 3.9 4.0   < > 3.1*  3.1*   < > 3.5   CHLORIDE mmol/L 99 97*  --   --  95*   < > 101   CO2 mmol/L 22.2 25.0  --   --  26.6   < > 18.3*   BUN mg/dL 77* 83*  --   --  62*   < > 76*   CREATININE mg/dL 7.66* 8.49*  --   --  7.57*   < > 10.76*   CALCIUM mg/dL 6.9* 7.5*  --   --  7.5*   < > 6.5*   GLUCOSE mg/dL 99 94  --    --  111*   < > 179*   MAGNESIUM mg/dL  --   --   --   --   --   --  1.9    < > = values in this interval not displayed.     Lab Results   Component Value Date    INR 0.98 02/15/2025    INR 1.16 (H) 02/10/2025    INR 1.21 (H) 02/08/2025    PROTIME 13.1 02/15/2025    PROTIME 15.0 02/10/2025    PROTIME 15.4 (H) 02/08/2025     Results from last 7 days   Lab Units 02/13/25  0157 02/10/25  0304   ALK PHOS U/L 50 46   BILIRUBIN mg/dL 0.5 0.8   BILIRUBIN DIRECT mg/dL 0.4*  --    ALT (SGPT) U/L 37 59*   AST (SGOT) U/L 72* 136*     Results from last 7 days   Lab Units 02/15/25  1430   PH, ARTERIAL pH units 7.386   PO2 ART mm Hg 90.8   PCO2, ARTERIAL mm Hg 42.9   HCO3 ART mmol/L 25.7     Imaging Results (Last 24 Hours)       Procedure Component Value Units Date/Time    XR Chest 1 View [[id number]] Collected: 02/15/25 1457     Updated: 02/15/25 1501    Narrative:      PROCEDURE: Portable chest x-ray examination performed on February 15,  2025. Single view. Upright position.     HISTORY: Breast.     COMPARISON: None.     FINDINGS:     Normal heart size.  Coarsened bronchovascular pattern to the lungs  Mild central pulmonary vascular congestion with edema.  No pleural effusion. No pneumothorax  Endotracheal tube in place with tip by 8.9 cm above the lissette.  Right and left central neck vascular catheters with tips terminating at  the SVC.  Enteric drain in place with tip to the stomach.  Hypoinflated lungs.  No pneumothorax.       Impression:         Normal heart size.  Central pulmonary vascular congestion with edema.  No pleural effusion or pneumothorax  Satisfactory position of the tubes and lines.  No pneumothorax.        This report was finalized on 2/15/2025 2:59 PM by Scott Campbell MD.                    amiodarone, 400 mg, Oral, Q24H   Followed by  [START ON 2/28/2025] amiodarone, 200 mg, Oral, Q24H  cetirizine, 5 mg, Oral, Daily  chlorhexidine, 15 mL, Mouth/Throat, Q12H  finasteride, 5 mg, Oral, Daily  folic acid, 1 mg,  Oral, Daily  insulin regular, 2-7 Units, Subcutaneous, Q6H  ipratropium-albuterol, 3 mL, Nebulization, 4x Daily - RT  midodrine, 15 mg, Oral, TID AC  oseltamivir, 30 mg, Oral, Once per day on Monday Wednesday Friday  pantoprazole, 40 mg, Intravenous, BID AC  [Held by provider] rosuvastatin, 10 mg, Oral, Nightly  sodium chloride, 10 mL, Intravenous, Q12H  sodium chloride, 10 mL, Intravenous, Q12H  sodium chloride, 10 mL, Intravenous, Q12H  sodium chloride, 10 mL, Intravenous, Q12H  sodium chloride, 10 mL, Intravenous, Q12H  thiamine, 200 mg, Per G Tube, Daily  vitamin B-12, 1,000 mcg, Oral, Daily      dexmedetomidine, 0.2-1.5 mcg/kg/hr (Dosing Weight), Last Rate: 0.7 mcg/kg/hr (02/16/25 1115)  heparin, 13.8 Units/kg/hr (Dosing Weight), Last Rate: 13.8 Units/kg/hr (02/16/25 0855)  ketamine, 0.06-2.5 mg/kg/hr (Dosing Weight), Last Rate: Stopped (02/16/25 0800)  Pharmacy to Dose Heparin,   phenylephrine, 0.5-3 mcg/kg/min (Dosing Weight), Last Rate: Stopped (02/16/25 0910)        Medication Review:     Assessment & Plan      #1: Acute hypoxic and hypercarbic respiratory failure-likely due to aspiration pneumonia/influenza A.  Worsening gas exchange.  Adjusted ventilator setting and increase the PEEP to 12 decrease tidal volume to 500.  Stat x-ray chest ordered.    Respiratory cultures reviewed.  Antibiotics reviewed and continued.      Continue nebs  Microbiology reviewed.  Growing Streptococcus.  Continue current treatment.  Transthoracic echo did not show any vegetations  VAP- precautions  Head end - 30 %  Mouth care   COPD-longtime smoker.  Continue systemic steroid and DuoNeb via nebulizer  Continue to titrate FiO2 down to maintain saturation 88 to 92%.      continue Tamiflu.  Continue isolation        Cardiology- hemodynamically -unstable.  Continue midodrine.     Vasopressor requirement reviewed and adjusted for a MAP of 65 and above.        Hemoglobin reviewed.    \       Nephrology-continue hemodialysis.   Nephrology input appreciated     Appreciate the input.  GI- PPI IV twice daily-    SAT/SBT trial today after dialysis.  Hematology- latest CBC reviewed.  Transfuse for hemoglobin less than 8.     ID-sepsis with septic shock-likely due to aspiration pneumonia.  Continue broad-spectrum antibiotics.    Endrocrinology- Maintain Blood sugar 140 -180     DVT prophylaxis-continue               Vent Settings          Resp Rate (Set): 24  Pressure Support (cm H2O): 8 cm H20  FiO2 (%): 60 %  PEEP/CPAP (cm H2O): 12 cm H20    Minute Ventilation (L/min) (Obs): 13.3 L/min  Resp Rate (Observed) Vent: 26     I:E Ratio (Obs): 1:2.2    PIP Observed (cm H2O): 21 cm H2O           I have personally reviewed x-ray chest, labs, medication list.      Critical Care time spent in direct patient care: 35 minutes (excluding procedure time, if applicable) including high complexity decision making to assess, manipulate, and support vital organ system failure in this individual who has impairment of one or more vital organ systems such that there is a high probability of imminent or life threatening deterioration in the patient’s condition.  Patient is critically ill and is at higher risk for further mortality/morbidity. Continue ICU care .      Donavan Hernandez MD  02/16/25  11:22 EST

## 2025-02-16 NOTE — PROGRESS NOTES
NEPHROLOGY PROGRESS NOTE      INTERVAL HISTORY:    HPI, decreased level of consciousness respiratory distress  Patient's respiratory status worsened with FiO2 went up to 60% overnight.    STATUS OF ACUTE AND CHRONIC PROBLEMS;  1.  Patient remains oliguric with urine output less than 500 in last 24 hours  2.  Patient tolerated dialysis very well with 3 L of ultrafiltration  3.  Patient appears relatively stable        Intake/Output                         02/14/25 0701 - 02/15/25 0700 02/15/25 0701 - 02/16/25 0700 0701-1900 1901-0700 Total 5559-3986 4539-2319 Total                 Intake    I.V.  355.8  42.9 398.7  100.9  363.4 464.3    Other  0  -- 0  50  80 130    Intake (mL) (Rectal Tube With balloon) -- -- -- 50 50 100    Flush/ Irrigation Intake (mL) (NG/OG Tube Orogastric 16 Fr Left mouth) 0 -- 0 -- 30 30    NG/GT  385  -- 385  --  0 0    IV Piggyback  --  100 100  --  -- --    Total Intake 740.8 142.9 883.7 150.9 443.4 594.3       Output    Urine  200  200 400  150  300 450    Emesis/NG output  0  550 550  200  30 230    Stool  --  100 100  50  50 100    Dialysis  --  -- --  3000  -- 3000    Total Output  3400 380 3780             VITAL SIGNS :     Temp:  [98.4 °F (36.9 °C)-101 °F (38.3 °C)] 101 °F (38.3 °C)  Heart Rate:  [] 123  Resp:  [19-24] 24  BP: ()/(56-91) 116/70  FiO2 (%):  [60 %] 60 %    PHYSICAL EXAMINATION:    GENERAL EXAM  Laying in bed, intubated  Comfortable on mechanical ventilation    MENTAL STATUS EXAM  Sedated    NECK EXAM  JVP is not elevated, carotid exam normal    CARDIOVASCULAR EXAM  Regular rate and rhythm, no murmurs noted, no added heart sounds, normal apical pulsation  no edema in the lower extremities  2+ radial pulses bilateral, 2+ femoral/tibial pulses bilaterally    MUSCULOSKELETAL EXAM  No cyanosis or clubbing noted in the digits    RESPIRATORY EXAM  Lungs clear to auscultation bilaterally, respiratory effort normal    ABDOMINAL EXAM  Abdomen soft and  "non tender, normal bowel sounds    SKIN EXAM  No new rashes      Laboratory Data :     Albumin No results found for: \"ALBUMIN\"     Magnesium No results found for: \"MG\"         PTH               No results found for: \"PTH\"    CBC and coagulation:  Results from last 7 days   Lab Units 02/15/25  1041 02/15/25  0729 02/14/25  0331 02/13/25  0157 02/12/25  0412 02/11/25  0234 02/10/25  1024 02/10/25  0742   PROCALCITONIN ng/mL  --   --   --   --  6.34* 9.49*  --   --    LACTATE mmol/L  --   --   --  0.9  --   --   --   --    WBC 10*3/mm3  --  10.52 8.19  --   --  18.87*   < >  --    HEMOGLOBIN g/dL  --  10.3* 11.2*  --   --  11.9*   < >  --    HEMATOCRIT %  --  31.9* 33.5*  --   --  36.1*   < >  --    MCV fL  --  99.4* 97.4*  --   --  98.9*   < >  --    MCHC g/dL  --  32.3 33.4  --   --  33.0   < >  --    PLATELETS 10*3/mm3  --  155 142  --   --  144   < >  --    INR  0.98  --   --   --   --   --   --  1.16*   D DIMER QUANT MCGFEU/mL 10.03*  --   --   --   --   --   --   --     < > = values in this interval not displayed.     Acid/base balance:  Results from last 7 days   Lab Units 02/15/25  1430   PH, ARTERIAL pH units 7.386   PO2 ART mm Hg 90.8   PCO2, ARTERIAL mm Hg 42.9   HCO3 ART mmol/L 25.7     Renal and electrolytes:    Results from last 7 days   Lab Units 02/16/25  0156 02/15/25  0416 02/14/25  1722 02/14/25  0955 02/14/25  0331 02/13/25  1150 02/13/25  0157 02/12/25  1057 02/12/25  0412 02/10/25  0928 02/10/25  0304   SODIUM mmol/L 139 139  --   --  139  --  140  --  142   < > 142   POTASSIUM mmol/L 4.1 3.9 4.0   < > 3.1*  3.1*   < > 3.4*  3.4*   < > 3.1*   < > 3.5   MAGNESIUM mg/dL  --   --   --   --   --   --   --   --   --   --  1.9   CHLORIDE mmol/L 99 97*  --   --  95*  --  96*  --  97*   < > 101   CO2 mmol/L 22.2 25.0  --   --  26.6  --  24.8  --  24.1   < > 18.3*   BUN mg/dL 77* 83*  --   --  62*  --  73*  --  76*   < > 76*   CREATININE mg/dL 7.66* 8.49*  --   --  7.57*  --  9.19*  --  10.21*   < > " 10.76*   CALCIUM mg/dL 6.9* 7.5*  --   --  7.5*  --  7.0*  --  6.8*   < > 6.5*   PHOSPHORUS mg/dL  --   --   --   --   --   --  6.7*  --   --   --   --     < > = values in this interval not displayed.     Estimated Creatinine Clearance: 12 mL/min (A) (by C-G formula based on SCr of 7.66 mg/dL (H)).  @GFRCG:3@   Liver and pancreatic function:  Results from last 7 days   Lab Units 02/15/25  0416 02/13/25  0157 02/10/25  0304   ALBUMIN g/dL  --  3.0* 2.4*   BILIRUBIN mg/dL  --  0.5 0.8   ALK PHOS U/L  --  50 46   AST (SGOT) U/L  --  72* 136*   ALT (SGPT) U/L  --  37 59*   AMMONIA umol/L 23 46  --          Cardiac:      Liver and pancreatic function:  Results from last 7 days   Lab Units 02/15/25  0416 02/13/25  0157 02/10/25  0304   ALBUMIN g/dL  --  3.0* 2.4*   BILIRUBIN mg/dL  --  0.5 0.8   ALK PHOS U/L  --  50 46   AST (SGOT) U/L  --  72* 136*   ALT (SGPT) U/L  --  37 59*   AMMONIA umol/L 23 46  --          CLINICAL DATA REVIEW  CBC, Renal functions, Serum electrolytes, Acid base labs reviewed 1  Telemetry was reviewed and demonstrated sinus rhythm rate   Discussed the labs with Dr. Irby    MEDICINES:     amiodarone, 400 mg, Oral, Q24H   Followed by  [START ON 2/28/2025] amiodarone, 200 mg, Oral, Q24H  cetirizine, 5 mg, Oral, Daily  chlorhexidine, 15 mL, Mouth/Throat, Q12H  finasteride, 5 mg, Oral, Daily  folic acid, 1 mg, Oral, Daily  insulin regular, 2-7 Units, Subcutaneous, Q6H  ipratropium-albuterol, 3 mL, Nebulization, 4x Daily - RT  midodrine, 15 mg, Oral, TID AC  oseltamivir, 30 mg, Oral, Once per day on Monday Wednesday Friday  pantoprazole, 40 mg, Intravenous, BID AC  [Held by provider] rosuvastatin, 10 mg, Oral, Nightly  sodium chloride, 10 mL, Intravenous, Q12H  sodium chloride, 10 mL, Intravenous, Q12H  sodium chloride, 10 mL, Intravenous, Q12H  sodium chloride, 10 mL, Intravenous, Q12H  sodium chloride, 10 mL, Intravenous, Q12H  thiamine, 200 mg, Per G Tube, Daily  vitamin B-12, 1,000 mcg, Oral,  Daily      dexmedetomidine, 0.2-1.5 mcg/kg/hr (Dosing Weight), Last Rate: 1.5 mcg/kg/hr (02/16/25 1217)  heparin, 13.8 Units/kg/hr (Dosing Weight), Last Rate: 13.8 Units/kg/hr (02/16/25 0855)  ketamine, 0.06-2.5 mg/kg/hr (Dosing Weight), Last Rate: Stopped (02/16/25 0800)  Pharmacy to Dose Heparin,   phenylephrine, 0.5-3 mcg/kg/min (Dosing Weight), Last Rate: Stopped (02/16/25 0910)          Assessment & Plan       -Acute kidney injury  - Chronic kidney disease unspecified stage  - Acute hypercapnic hypoxic respiratory failure  - Acute metabolic acidosis  - Severe sepsis with septic shock  - Rhabdomyolysis  - Paroxysmal atrial fibrillation    Patient remains oliguric with no signs of renal recovery, will continue watch and monitor closely but will keep on intermittent dialysis for now  Next dialysis is going to be on Tuesday but we will reassess tomorrow    Acute kidney injury most likely due to acute tubular necrosis in settings shock  Had ASHLEY in October 2024 with creatinine peaked to 4 with some improvement close to 2  - As needed potassium replacement  - Monitor for renal recovery closely  - Strict intake and output record.  -Patient remains very high risk  - Please avoid any nephrotoxic agents, hypotension and adjust medications according to estimated GFR.       REVIEWED NOTES OF CLINICAL TEAMS INVOLVED WITH PATIENT CARE.     DISCUSSED IN DETAIL WITH PATIENT AND/OR FAMILY ABOUT CURRENT CLINICAL CONDITION AND RESPONSE TO ONGOING MANAGEMENT.     DISCUSSED IN DETAIL WITH PATIENT AND/OR FAMILY ABOUT RISKS ASSOCIATED WITH CURRENT CLINICAL CONDITION AND RISK INVOLVED WITH CURRENT MANAGEMENT.     DISCUSSED IN DETAIL WITH HOSPITALIST    CODE STATUS REVIEWED,     Saba Bates MD  02/16/25  15:02 EST

## 2025-02-16 NOTE — PROGRESS NOTES
[facility] HOSPITALIST PROGRESS NOTE     Patient Identification:  Name:  Phan Daniels  Age:  69 y.o.  Sex:  male  :  1955  MRN:  [id number]  Visit Number:  [id number]  ROOM: [address]     Primary Care Provider:  Cesia Diaz APRN    Length of stay in inpatient status:  8    Subjective     Chief Compliant:    Chief Complaint   Patient presents with   • Respiratory Distress     History of Presenting Illness:    Patient remains critically ill, intubated for airway protection, pressures improved today, remains afebrile, completed antibiotics, continued on tamiflu, bleeding improved and resuming Heparin drip, discussed with bedside Nurse, weaning sedation for Neurology follow up examination today.   Objective     Current Hospital Meds:  amiodarone, 400 mg, Oral, Q24H   Followed by  [START ON 2025] amiodarone, 200 mg, Oral, Q24H  cetirizine, 5 mg, Oral, Daily  chlorhexidine, 15 mL, Mouth/Throat, Q12H  finasteride, 5 mg, Oral, Daily  folic acid, 1 mg, Oral, Daily  insulin regular, 2-7 Units, Subcutaneous, Q6H  ipratropium-albuterol, 3 mL, Nebulization, 4x Daily - RT  midodrine, 15 mg, Oral, TID AC  oseltamivir, 30 mg, Oral, Once per day on   pantoprazole, 40 mg, Intravenous, BID AC  [Held by provider] rosuvastatin, 10 mg, Oral, Nightly  thiamine, 200 mg, Per G Tube, Daily  vitamin B-12, 1,000 mcg, Oral, Daily  dexmedetomidine, 0.2-1.5 mcg/kg/hr (Dosing Weight), Last Rate: 0.6 mcg/kg/hr (02/15/25 2150)  heparin, 13.8 Units/kg/hr (Dosing Weight), Last Rate: 13.8 Units/kg/hr (25 0855)  ketamine, 0.06-2.5 mg/kg/hr (Dosing Weight), Last Rate: 0.26 mg/kg/hr (02/15/25 2223)  ----------------------------------------------------------------------------------------------------------------------  Vital Signs:  Temp:  [98.4 °F (36.9 °C)-99.7 °F (37.6 °C)] 98.4 °F (36.9 °C)  Heart Rate:  [] 106  Resp:  [19-25] 22  BP: ()/() 100/60  FiO2 (%):  [60 %]  "60 %  SpO2:  [92 %-98 %] 95 %  on   ;   Device (Oxygen Therapy): ventilator  Body mass index is 33.67 kg/m².      Intake/Output Summary (Last 24 hours) at 2/16/2025 0954  Last data filed at 2/16/2025 0500  Gross per 24 hour   Intake 594.3 ml   Output 3780 ml   Net -3185.7 ml      ----------------------------------------------------------------------------------------------------------------------  Physical exam:  Constitutional:  Intubated/Sedated, no acute distress.      HENT:  Head:  Normocephalic and atraumatic.  Mouth:  Moist mucous membranes.    Eyes:  Conjunctivae are normal. No scleral icterus.    Neck:  Neck supple.  No JVD present.  lines both with bleeding noted  Cardiovascular:  Mildly tachycardic rate, irregular rhythm and normal heart sounds with no murmur.  Pulmonary/Chest:  Ventilated, bilateral equal rise of chest, on Fi02 60%  Abdominal:  Soft. mild distension and no tenderness.   Musculoskeletal:  No tenderness, and no deformity.  No red or swollen joints anywhere.    Neurological:  Unable to assess due to sedation    Skin:  Skin is warm and dry. No rash noted. No pallor.   Peripheral vascular:  No clubbing, no cyanosis, 1+ edema.  Psychiatric: Unable to assess due to sedation  : Collazo in place, trace dark urine    *Examination unchanged today 2/16  Edited by: Kendall Irby MD at 2/16/2025 0954  ----------------------------------------------------------------------------------------------------------------------     BUN/CREAT/GLUC/ALT/AST/CARLA/LIP    77/7.66/99/--/--/--/-- (02/16 0156)  LYTES - Na/K/Cl/CO2: 139/4.1/99/22.2 (02/16 0156)  COAG - PT/INR/PTT: 13.1/0.98/28.7 (02/15 1041)  pH/pCO2/pO2/HCO3   7.386/42.9/90.8/25.7 (02/15 1430)  No results found for: \"URINECX\"  Blood Culture   Date Value Ref Range Status   02/13/2025 No growth at 2 days  Preliminary   02/13/2025 Staphylococcus, coagulase negative (C)  Final   02/10/2025 No growth at 5 days  Final   02/10/2025 No growth at 5 days  Final "     I have personally looked at the labs and they are summarized above.  ----------------------------------------------------------------------------------------------------------------------  Detailed radiology reports for the last 24 hours:  XR Chest 1 View    Result Date: 2/15/2025   Normal heart size. Central pulmonary vascular congestion with edema. No pleural effusion or pneumothorax Satisfactory position of the tubes and lines. No pneumothorax.   This report was finalized on 2/15/2025 2:59 PM by Scott Campbell MD.      EEG    Result Date: 2/14/2025  Diffuse cerebral dysfunction of moderate degree, nonspecific.  This is most commonly seen due to toxic/metabolic or hypoxemic/ischemic cause This report is transcribed using the Dragon dictation system.     Assessment & Plan    69M History Long-term Incarceration, Obese by BMI PMH GERD, Hypertension, Hyperlipidemia, Atrial Fibrillation, Alcohol abuse, COPD, was found down at home and unresponsive, was intubated on arrival to emergency room for airway protection.    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 complicated by possible ARDS, HAGMA, Mild Rhabdomyolysis, Now with Viral Upper Respiratory Infection due to Influenza A  #Atrial Fibrillation with Rapid Ventricular Rate   #Elevated HS Troponins, suspected NSTEMI Type II due to shock  - Pulmonary consulted and following   - Nephrology consulted and following, temporary catheter placed, hemodialysis per Nephrology, volume removal per hemodialysis  - General Surgery consulted and following, don't suspect cholecystitis   - TeleNeurology consulted and following   - Palliative Care consulted and following   - Completed tamiflu x 5 days   - Continue pain and sedation  drips for comfort  - Continue oral Amiodarone now  - Bleeding improved, resume Heparin drip today and monitor for recurrent bleeding   - Continue IV pressors for SBP < 90 or MAPs consistently < 65  - Continue IV PPI for Gi prophylaxis  - Continue Tylenol as needed for fevers, Duonebs as needed  - Admitted to CCU, monitor on telemetry, continue 02 but wean as able, spontaneous awakening trial/spontaneous breathing trial when appropriate    #Electrolyte Abnormalities  - Acute Severe Hypokalemia - Replacing, on protocol  - Acute Severe Hypomagnesemia - Replacing, on protocol    #Pre-Diabetes with steroid induced hyperglycemia  - Hgb A1c = 6.4%  - Continue FSBG and SSI, add long acting if indicated.    #Alcohol Use Disorder, Severe, with Dependence complicated   - Continue cardiac monitoring, seizure precautions, monitor for withdrawal     #Obesity by BMI  - Body mass index is 30.41 kg/m².; complicates all aspects of care     F: NPO  E: Monitor & Replace as needed  N: NPO Diet NPO Type: Strict NPO; Tube Feeds  Ppx: Heparin drip   Code Status (Patient has no pulse and is not breathing): No CPR  Medical Interventions (Patient has pulse or is breathing): Full Support  *Palliative Care consulted and following for goals of care Conversations     Dispo: Pending clinical improvement     *This patient is considered high risk due to sepsis, acute respiratory failure, Pneumonia, intubation and mechanical ventilation, Rapid Ventricular Rate, Acute Kidney Injury, Acute Metabolic Encephalopathy.     I have spent 35 minutes of critical care time (7:55-8:30AM) with > 50% of time spent in direct patient care, evaluating the patient at bedside, reviewing all labs and images, reviewing ABG and vent settings, reviewing pain and sedation drips, communicating plan of care with nursing staff, utilizing high complexity medical decision making to assess and treat vital organ system failure in an individual who has impairment of one or more  vital organ systems such that there is a high probability of imminent or life threatening deterioration in the patient’s condition. Failure to initiate the above interventions on an urgent basis would likely result in sudden, clinically significant or life threatening deterioration in the patient's condition.    Edited by: Kendall Irby MD at 2/16/2025 8578  [facility]

## 2025-02-16 NOTE — PROGRESS NOTES
"DOS: 2025  NAME: Phan Daniels   : 1955  PCP: Cesia Diaz APRN  Chief Complaint   Patient presents with    Respiratory Distress       Chief complaint: Hyperventilating with accessory muscles of the neck being hyperactive.  Subjective: Not responding much.  However he does grimace to painful stimuli and also on deep suctioning.    Objective:  Vital signs: /69   Pulse 119   Temp 98.4 °F (36.9 °C)   Resp 24   Ht 182.9 cm (72\")   Wt 113 kg (248 lb 3.8 oz)   SpO2 96%   BMI 33.67 kg/m²    Gen: NAD, vitals reviewed  MS: Comatose with Melva Coma Scale of 5.  CN: Today he is not opening his eyes or establishing any eye contact momentarily.  The doll's eye movements are absent.  Pupils are sluggishly reacting to light.  Does grimace to painful stimulus symmetrically.  Motor: Flaccid in both upper and lower extremities.  Sensory: Does not withdraw to any painful stimuli in the upper and lower extremities but grimaces symmetrically in the face.  Coordination: Could not be tested at this time.  Gait: Not weightbearing at this time.    ROS:  General: Currently intubated and on the ventilator hyperventilating.  The accessory muscles of the neck are hyperactive.  Neurological: Comatose with Chappell Coma Scale of 5.    Laboratory results:  Lab Results   Component Value Date    GLUCOSE 99 2025    CALCIUM 6.9 (L) 2025     2025    K 4.1 2025    CO2 22.2 2025    CL 99 2025    BUN 77 (H) 2025    CREATININE 7.66 (H) 2025    BCR 10.1 2025    ANIONGAP 17.8 (H) 2025     Lab Results   Component Value Date    WBC 10.52 02/15/2025    HGB 10.3 (L) 02/15/2025    HCT 31.9 (L) 02/15/2025    MCV 99.4 (H) 02/15/2025     02/15/2025     No results found for: \"LDL\"         Review of labs: Low hemoglobin of 10.3 and low hematocrit of 31.9.  The white cell count is normal at 10,520.  The BUN is 77 and the creatinine of 7.66.  Patient got " hemodialyzed yesterday.     CMP:        Lab 02/16/25  0156 02/15/25  0416 02/14/25  1722 02/14/25  0955 02/14/25  0331 02/13/25  1150 02/13/25  0157 02/12/25  1057 02/12/25  0412 02/10/25  0928 02/10/25  0304   SODIUM 139 139  --   --  139  --  140  --  142   < > 142   POTASSIUM 4.1 3.9 4.0 3.4* 3.1*  3.1*   < > 3.4*  3.4*   < > 3.1*   < > 3.5   CHLORIDE 99 97*  --   --  95*  --  96*  --  97*   < > 101   CO2 22.2 25.0  --   --  26.6  --  24.8  --  24.1   < > 18.3*   ANION GAP 17.8* 17.0*  --   --  17.4*  --  19.2*  --  20.9*   < > 22.7*   BUN 77* 83*  --   --  62*  --  73*  --  76*   < > 76*   CREATININE 7.66* 8.49*  --   --  7.57*  --  9.19*  --  10.21*   < > 10.76*   EGFR 7.1* 6.3*  --   --  7.2*  --  5.7*  --  5.0*   < > 4.7*   GLUCOSE 99 94  --   --  111*  --  308*  --  282*   < > 179*   CALCIUM 6.9* 7.5*  --   --  7.5*  --  7.0*  --  6.8*   < > 6.5*   MAGNESIUM  --   --   --   --   --   --   --   --   --   --  1.9   PHOSPHORUS  --   --   --   --   --   --  6.7*  --   --   --   --    TOTAL PROTEIN  --   --   --   --   --   --  6.4  --   --   --  6.3   ALBUMIN  --   --   --   --   --   --  3.0*  --   --   --  2.4*   GLOBULIN  --   --   --   --   --   --   --   --   --   --  3.9   ALT (SGPT)  --   --   --   --   --   --  37  --   --   --  59*   AST (SGOT)  --   --   --   --   --   --  72*  --   --   --  136*   BILIRUBIN  --   --   --   --   --   --  0.5  --   --   --  0.8   INDIRECT BILIRUBIN  --   --   --   --   --   --  0.1  --   --   --   --    BILIRUBIN DIRECT  --   --   --   --   --   --  0.4*  --   --   --   --    ALK PHOS  --   --   --   --   --   --  50  --   --   --  46    < > = values in this interval not displayed.        TSH          2/8/2025    15:04 2/9/2025    00:10   TSH   TSH 0.315  0.196         Lipid Panel          2/12/2025    04:12   Lipid Panel   Triglycerides 225         Lab Results   Component Value Date    SVHOSSHT48 >2,000 (H) 02/09/2025       Lab Results   Component Value Date     FOLATE 10.20 02/09/2025       Lab Results   Component Value Date    HGBA1C 6.4 (H) 10/15/2024           Review and interpretation of imaging: CT ABDOMEN PELVIS WO CONTRAST-         TECHNIQUE: Multiple axial CT images were obtained from lung bases  through pubic symphysis WITHOUT administration of IV contrast.  Reformatted images in the coronal and/or sagittal plane(s) were  generated from the axial data set to facilitate diagnostic accuracy  and/or surgical planning.  Oral Contrast:NONE.     Radiation dose reduction techniques were utilized per ALARA protocol.  Automated exposure control was initiated through either or VHX or  Dishcrawl software packages by  protocol.    DOSE:     Clinical information emesis; A41.9-Sepsis, unspecified organism;  N17.9-Acute kidney failure, unspecified; R79.89-Other specified abnormal  findings of blood chemistry; J18.9-Pneumonia, unspecified organism      Comparison 2/13/2025     FINDINGS:     Lower thorax: Bilateral consolidation, right worse than left     Abdomen:     Liver: Homogeneous. No focal hepatic mass or ductal dilatation.     Gallbladder: Cholelithiasis     Pancreas: Unremarkable. No mass or ductal dilatation.     Spleen: Homogeneous. No splenomegaly.     Adrenals: No mass.     Kidneys/ureters: Mild perinephric stranding bilaterally but no  obstructive uropathy. Right renal cyst     GI tract: Non-dilated. No definite wall thickening.. There is no  evidence of appendicitis     MESENTERY: Small amount of free fluid in the abdomen and pelvis     Vasculature: Evidence of atherosclerotic vascular disease     Abdominal wall: No focal hernia or mass.        Bladder: Collazo catheter is present     Reproductive: Unremarkable as visualized     Bones: No acute bony abnormality.     IMPRESSION:     1. Small amount of free fluid in the abdomen and pelvis     2. Cholelithiasis     3. Bibasilar consolidation.    CT Head Without Contrast    Result Date: 2/16/2025  CT HEAD  WO CONTRAST-  CLINICAL INDICATION: poor mentation off sedation; A41.9-Sepsis, unspecified organism; N17.9-Acute kidney failure, unspecified; R79.89-Other specified abnormal findings of blood chemistry; J18.9-Pneumonia, unspecified organism   COMPARISON: 2/13/2025  TECHNIQUE: Axial images of the brain were obtained with out intravenous contrast.  Reformatted images were created in the sagittal and coronal planes.  DOSE:  Radiation dose reduction techniques were utilized per ALARA protocol. Automated exposure control was initiated through either or [employer] or S[employer] software packages by  protocol.    FINDINGS:   BRAIN:  Unremarkable.  No hemorrhage.  No significant white matter disease.  No edema.    VENTRICLES:  Unremarkable.  No ventriculomegaly.    BONES/JOINTS:  Unremarkable.  No acute fracture.    SOFT TISSUES:  Unremarkable.    SINUSES:  no air fluid levels    MASTOID AIR CELLS:  Unremarkable as visualized.  No mastoid effusion.       Impression:  1. No parenchymal mass, hemorrhage, or midline shift  This report was finalized on 2/16/2025 1:09 PM by Dr. Tripp Berrios MD.      CT Head Without Contrast    Result Date: 2/13/2025  CT HEAD WO CONTRAST-  CLINICAL INDICATION: Sepsis; A41.9-Sepsis, unspecified organism; N17.9-Acute kidney failure, unspecified; R79.89-Other specified abnormal findings of blood chemistry; J18.9-Pneumonia, unspecified organism   COMPARISON: 2/8/2025  TECHNIQUE: Axial images of the brain were obtained with out intravenous contrast.  Reformatted images were created in the sagittal and coronal planes.  DOSE:  Radiation dose reduction techniques were utilized per ALARA protocol. Automated exposure control was initiated through either or [employer] or DoseRight software packages by  protocol.    FINDINGS:   BRAIN:  Unremarkable.  No hemorrhage.  No significant white matter disease.  No edema.    VENTRICLES:  Unremarkable.  No ventriculomegaly.    BONES/JOINTS:   Unremarkable.  No acute fracture.    SOFT TISSUES:  Unremarkable.    SINUSES:  no air fluid levels    MASTOID AIR CELLS:  Unremarkable as visualized.  No mastoid effusion.       Impression:   Unremarkable exam demonstrating no CT evidence of acute intracranial findings.  This report was finalized on 2/13/2025 3:15 PM by Dr. Tripp Berrios MD.      CT Head Without Contrast    Result Date: 2/8/2025  INDICATION: Altered mental status.  COMPARISON: No relevant priors available  TECHNIQUE: Axial CT images of the head were obtained without IV contrast. Coronal and sagittal reformations were reviewed.  FINDINGS: Gray-white differentiation is relatively preserved. No mass, mass effect or midline shift. And chronic ischemic white matter changes. No evidence of acute large territorial infarction or acute intracranial hemorrhage. Ventricles appear normal in size. Basal cisterns are patent. No depressed calvarial fracture.      Impression: No acute intracranial process.  This report was finalized on 2/8/2025 3:11 PM by Alex Pallas, DO.                  Workup to date:A 19 channel digital EEG was performed using the international 10-20 placement system, including eye leads and EKG leads.     Duration: 26 minutes     Findings:     The patient is intubated.  Diffuse low amplitude 2-4 Hz delta is seen symmetrically over both hemispheres.  EMG artifact is prominent over the temporal leads and the patient is observed to be clamping down on the ET tube.  Over the course of the study, no focal features or epileptiform activity are present.  No electrographic seizures are seen.  Photic stimulation does not change the background.  Hyperventilation is not performed.     Video: Available     Technical quality: Good     Rhythm strip: Regular,70 bpm     SUMMARY:     Moderate generalized slow and suppressed     No focal features or epileptiform activity are seen     IMPRESSION:     Diffuse cerebral dysfunction of moderate degree, nonspecific.   This is most commonly seen due to toxic/metabolic or hypoxemic/ischemic cause    Results for orders placed during the hospital encounter of 02/08/25    Adult Transthoracic Echo Complete W/ Cont if Necessary Per Protocol    Interpretation Summary    Left ventricle is normal in size and function with estimate ejection fraction of 55 to 60%. Normal diastolic function.    Right ventricle appears normal in size and function.    Normal left atrial cavity size noted. No evidence of a patent foramen ovale. No evidence of an atrial septal defect present.    Normal right atrial cavity size noted. The inferior vena cava is dilated. Partial IVC inspiratory collapse of less than 50% noted.    Aortic valve appears trileaflet. There is no significant aortic stenosis or regurgitation.    There is mild mitral regurgitation.    Estimated right ventricular systolic pressure from tricuspid regurgitation is moderately elevated (45-55 mmHg). Mild tricuspid regurgitation. Estimated RVSP is 45 mmHg with estimated RA pressure of 10 mmHg.    There is no evidence of pericardial effusion. . There is evidence of a fat pad present.    The aortic root measures 3.9 cm.            Diagnoses: Patient currently hyperventilating with accessory muscles of the neck being hyperactive.      Comment: [place] Coma Scale has diminished since last Friday.    Plan:  1.  CT scan is not showing any evidence of change from the prior 2 CT scans which means there is no evidence of anoxic brain injury.  2.  We are unable to do an MRI of the brain in this institution because he is intubated and on the ventilator.  3.  To repeat an EEG with photic stimulation and noxious stimulation to see if there is reactivity or improvement in the background activity when noxious stimulation is applied.  This helps guide prognosis.  4.  To avoid sedation as much as possible but the patient is also hyperventilating so the question is whether he is in respiratory distress or  not.    Discussed with Dr. Kendall Irby, his hospitalist and he will be followed up tomorrow by inpatient teleneurology service with Mr. Jhoan Way, APRN    Spent a total of 30 minutes in face-to-face evaluation and management of the patient using the dedicated telemedicine device without any interruption with the help of the rounding nurse with the patient located at the [facility] and myself at a remote location.    Electronically signed by Opal Osborne MD, 02/16/25, 1:16 PM EST.

## 2025-02-16 NOTE — PLAN OF CARE
Goal Outcome Evaluation:           Problem: Mechanical Ventilation Invasive  Goal: Effective Communication  Outcome: Progressing  Goal: Optimal Device Function  Outcome: Progressing  Intervention: Optimize Device Care and Function  Flowsheets (Taken 2/16/2025 0707)  Airway/Ventilation Management: airway patency maintained  Airway Safety Measures:   manual resuscitator/mask at bedside   oxygen flowmeter at bedside   suction at bedside  Goal: Mechanical Ventilation Liberation  Outcome: Progressing  Goal: Absence of Device-Related Skin and Tissue Injury  Outcome: Progressing  Goal: Absence of Ventilator-Induced Lung Injury  Outcome: Progressing  Intervention: Facilitate Lung-Protection Measures  Flowsheets (Taken 2/16/2025 0707)  Lung Protection Measures:   low tidal volume provided   lung compliance monitored   plateau/inspiratory pressure monitored   ventilator synchrony promoted   ventilator waveforms monitored  Intervention: Prevent Ventilator-Associated Pneumonia  Flowsheets  Taken 2/16/2025 0707 by Florina Banks RRT  Head of Bed (HOB) Positioning: HOB at 30-45 degrees  VAP Prevention Bundle:   HOB elevation maintained   vent circuit breaks minimized  Taken 2/16/2025 0200 by Anahi Blancas RN  VAP Prevention Measures: completed

## 2025-02-16 NOTE — PROGRESS NOTES
HEPARIN INFUSION  Phan Daniels is a  69 y.o. male receiving heparin infusion.     Therapy for (VTE/Cardiac):   cardiac  Patient Dosing Weight: 102 kg  Initial Bolus (Y/N):   N  Any Bolus (Y/N):   Y        Signs or Symptoms of Bleeding: N    Cardiac or Other (Not VTE)   Initial Bolus: 60 units/kg (Max 4,000 units)  Initial rate: 12 units/kg/hr (Max 1,000 units/hr)   Anti Xa Rebolus Infusion Hold time Change infusion Dose (Units/kg/hr) Next Anti Xa or aPTT Level Due   < 0.11 50 Units/kg  (4000 Units Max) None Increase by  3 Units/kg/hr 6 hours   0.11- 0.19 25 Units/kg  (2000 Units Max) None Increase by  2 Units/kg/hr 6 hours   0.2 - 0.29 0 None Increase by  1 Units/kg/hr 6 hours   0.3 - 0.5 0 None No Change 6 hours (after 2 consecutive levels in range check q24h @0700)   0.51 - 0.6 0 None Decrease by  1 Units/kg/hr 6 hours   0.61 - 0.8 0 30 Minutes Decrease by  2 Units/kg/hr 6 hours   0.81 - 1 0 60 Minutes Decrease by  3 Units/kg/hr 6 hours   >1 0 Hold  After Anti Xa less than 0.5 decrease previous rate by  4 Units/kg/hr  Every 2 hours until Anti Xa  less than 0.5 then when infusion restarts in 6 hours         Recommend anti-Xa every 6 hours.          Date   Time   Anti-Xa Current Rate (Unit/kg/hr) Bolus   (Units) Rate Change   (Unit/kg/hr) New Rate (Unit/kg/hr) Next   Anti-Xa Comments  Pump Check Daily   2/10 0742 <0.1  No initial  9.8 1500 D/w GREGORIO Brown   2/10 1612 0.32 9.8 - - 9.8 2200 Therapeutic,  no s/s bleeding   02/10 2230 0.41 9.8 - - 9.8 0700  Tx x 2, no changes, no s/s bleeding per GREGORIO Damon     2/11 0815 0.49 9.8 - - 9.8 0700 on 2/12 Therapeutic again. Continue same. No s/s bleeding noted. Spoke with GREGORIO Perkins.     2/12 0800 0.53 9.8 - -1 8.8 1430 Spoke with Amina RN in CCU. No s/s bleeding noted. Decrease rate.    2/12 1455 0.64 8.8 - Hold x 30 minutes then -2 6.8 2130 Spoke with Alonzo Savage RN. No s/s bleeding noted. Hold x 30 minute and reduce rate.    2/12 2200 0.3 6.8 - - 6.8 0400  Therapeutic x 1. No s/s of bleeding per GREGORIO Coelho.    2/13 0430 0.19 6.8 2000 +2 8.8 1200 Discussed with GREGORIO Cordero. She said that the patient does have some blood around the dialysis catheter site, but nothing significant.    2/13 1150 0.26 8.8  +1 9.8 2100 D/w GREGORIO Alcantar   2/13 2141 0.21 9.8  +1 10.8 0400 Nurse Laurita does note the same bleeding noted preivously around the dialysis catheter site. However, the extent and severity has not changed from her observation.      2/14 0400 0.24 10.8  +1 11.8 1000 Discussed with GREGORIO Rodriguez. Pt still has the same bleeding as described before. The provider is aware and it hasn't gotten any worse.     2/14 0955 0.25 11.8  +1 12.8 1800 D/w GREGORIO Higgins no s/s   2/14 1819 0.23 12.8  +1 13.8 0030 No s/s of bleeding per nurse Gisela   2/14 1947  13.8     Per patient nurse, Consuelo, patient is bleeding from central line area fairly badly and a little from the HD cath. Heparin has been stopped and Dr. Silveira has been informed. Told the nurse to contact us if/when the heparin drip is restarted. Heparin Xa level has been taken out for now.     2/16 0900 - hold  restart 13.8 1500 Discussed with CCU nurse. Restarting heparin drip. Will restart at previous rate.                                                                       Pharmacy will continue to follow anti-Xa results and monitor for signs and symptoms of bleeding or thrombosis.

## 2025-02-17 NOTE — PLAN OF CARE
Problem: Adult Inpatient Plan of Care  Goal: Plan of Care Review  Outcome: Not Progressing  Goal: Patient-Specific Goal (Individualized)  Outcome: Not Progressing  Goal: Absence of Hospital-Acquired Illness or Injury  Outcome: Not Progressing  Intervention: Identify and Manage Fall Risk  Recent Flowsheet Documentation  Taken 2/17/2025 0200 by Anahi Blancas RN  Safety Promotion/Fall Prevention: safety round/check completed  Taken 2/17/2025 0100 by Anahi Blancas RN  Safety Promotion/Fall Prevention: safety round/check completed  Taken 2/17/2025 0000 by Anahi Blancas RN  Safety Promotion/Fall Prevention: safety round/check completed  Taken 2/16/2025 2300 by Anahi Blancas RN  Safety Promotion/Fall Prevention: safety round/check completed  Taken 2/16/2025 2200 by Anahi Blancas RN  Safety Promotion/Fall Prevention: safety round/check completed  Taken 2/16/2025 2100 by Anahi Blancas RN  Safety Promotion/Fall Prevention: safety round/check completed  Taken 2/16/2025 2000 by Anahi Blancas RN  Safety Promotion/Fall Prevention: safety round/check completed  Taken 2/16/2025 1900 by Anahi Blancas RN  Safety Promotion/Fall Prevention: safety round/check completed  Intervention: Prevent Skin Injury  Recent Flowsheet Documentation  Taken 2/17/2025 0200 by Anahi Blancas RN  Body Position:   left   turned   side-lying  Taken 2/17/2025 0000 by Anahi Blancas RN  Body Position:   turned   side-lying   right  Taken 2/16/2025 2200 by Anahi Blancas RN  Body Position:   left   turned   side-lying  Taken 2/16/2025 2000 by Anahi Blancas RN  Body Position:   turned   side-lying   right  Skin Protection: incontinence pads utilized  Intervention: Prevent and Manage VTE (Venous Thromboembolism) Risk  Recent Flowsheet Documentation  Taken 2/16/2025 2000 by Anahi Blancas RN  VTE Prevention/Management: SCDs (sequential compression devices) off  Goal: Optimal Comfort and Wellbeing  Outcome: Not Progressing  Intervention:  Provide Person-Centered Care  Recent Flowsheet Documentation  Taken 2/16/2025 2000 by Anahi Blancas RN  Trust Relationship/Rapport:   care explained   choices provided   emotional support provided   questions answered   thoughts/feelings acknowledged  Goal: Readiness for Transition of Care  Outcome: Not Progressing     Problem: Violence Risk or Actual  Goal: Anger and Impulse Control  Outcome: Not Progressing  Intervention: Minimize Safety Risk  Recent Flowsheet Documentation  Taken 2/17/2025 0200 by Anahi Blancas RN  Enhanced Safety Measures: bed alarm set  Taken 2/17/2025 0100 by Anahi Blancas RN  Enhanced Safety Measures: bed alarm set  Taken 2/17/2025 0000 by Anahi Balncas RN  Enhanced Safety Measures: bed alarm set  Taken 2/16/2025 2300 by Anahi Blancas RN  Enhanced Safety Measures: bed alarm set  Taken 2/16/2025 2200 by Anahi Blancas RN  Enhanced Safety Measures: bed alarm set  Taken 2/16/2025 2100 by Anahi Blancas RN  Enhanced Safety Measures: bed alarm set  Taken 2/16/2025 2000 by Anahi Blancas RN  Sensory Stimulation Regulation:   care clustered   lighting decreased   quiet environment promoted  Enhanced Safety Measures: bed alarm set  Taken 2/16/2025 1900 by Anahi Blancas RN  Enhanced Safety Measures: bed alarm set     Problem: Fall Injury Risk  Goal: Absence of Fall and Fall-Related Injury  Outcome: Not Progressing  Intervention: Identify and Manage Contributors  Recent Flowsheet Documentation  Taken 2/17/2025 0200 by Anahi Blancas RN  Medication Review/Management: medications reviewed  Taken 2/17/2025 0100 by Anahi Blancas RN  Medication Review/Management: medications reviewed  Taken 2/17/2025 0000 by Anahi Blancas RN  Medication Review/Management: medications reviewed  Taken 2/16/2025 2300 by Anahi Blancas RN  Medication Review/Management: medications reviewed  Taken 2/16/2025 2200 by Anahi Blancas RN  Medication Review/Management: medications reviewed  Taken 2/16/2025 2100  by Anahi Blancas RN  Medication Review/Management: medications reviewed  Taken 2/16/2025 2000 by Anahi Blancas RN  Medication Review/Management: medications reviewed  Taken 2/16/2025 1900 by Anahi Blancas RN  Medication Review/Management: medications reviewed  Intervention: Promote Injury-Free Environment  Recent Flowsheet Documentation  Taken 2/17/2025 0200 by Anahi Blancas RN  Safety Promotion/Fall Prevention: safety round/check completed  Taken 2/17/2025 0100 by Anahi Blancas RN  Safety Promotion/Fall Prevention: safety round/check completed  Taken 2/17/2025 0000 by Anahi Blancas RN  Safety Promotion/Fall Prevention: safety round/check completed  Taken 2/16/2025 2300 by Anahi Blancas RN  Safety Promotion/Fall Prevention: safety round/check completed  Taken 2/16/2025 2200 by Anahi Blancas RN  Safety Promotion/Fall Prevention: safety round/check completed  Taken 2/16/2025 2100 by Anahi Blancas RN  Safety Promotion/Fall Prevention: safety round/check completed  Taken 2/16/2025 2000 by Anahi Blancas RN  Safety Promotion/Fall Prevention: safety round/check completed  Taken 2/16/2025 1900 by Anahi Blancas RN  Safety Promotion/Fall Prevention: safety round/check completed     Problem: Skin Injury Risk Increased  Goal: Skin Health and Integrity  Outcome: Not Progressing  Intervention: Optimize Skin Protection  Recent Flowsheet Documentation  Taken 2/17/2025 0200 by Anahi Blancas RN  Head of Bed (HOB) Positioning: HOB at 30-45 degrees  Taken 2/17/2025 0000 by Anahi Blancas RN  Head of Bed (HOB) Positioning: HOB at 30-45 degrees  Taken 2/16/2025 2200 by Anahi Blancas RN  Head of Bed (HOB) Positioning: HOB at 30-45 degrees  Taken 2/16/2025 2000 by Anahi Blancas RN  Activity Management: bedrest  Pressure Reduction Techniques: frequent weight shift encouraged  Head of Bed (HOB) Positioning: HOB at 30-45 degrees  Pressure Reduction Devices: pressure-redistributing mattress utilized  Skin  Protection: incontinence pads utilized     Problem: Comorbidity Management  Goal: Maintenance of COPD Symptom Control  Outcome: Not Progressing  Intervention: Maintain COPD (Chronic Obstructive Pulmonary Disease) Symptom Control  Recent Flowsheet Documentation  Taken 2/17/2025 0200 by Anahi Blancas RN  Medication Review/Management: medications reviewed  Taken 2/17/2025 0100 by Anahi Blancas RN  Medication Review/Management: medications reviewed  Taken 2/17/2025 0000 by Anahi Blancas RN  Medication Review/Management: medications reviewed  Taken 2/16/2025 2300 by Anahi Blancas RN  Medication Review/Management: medications reviewed  Taken 2/16/2025 2200 by Anahi Blancas RN  Medication Review/Management: medications reviewed  Taken 2/16/2025 2100 by Anahi Blancas RN  Medication Review/Management: medications reviewed  Taken 2/16/2025 2000 by Anahi Blancas RN  Medication Review/Management: medications reviewed  Taken 2/16/2025 1900 by Anahi Blancas RN  Medication Review/Management: medications reviewed     Problem: Sepsis/Septic Shock  Goal: Optimal Coping  Outcome: Not Progressing  Intervention: Support Patient and Family Response  Recent Flowsheet Documentation  Taken 2/16/2025 2000 by Anahi Blancas RN  Family/Support System Care: support provided  Goal: Absence of Bleeding  Outcome: Not Progressing  Goal: Blood Glucose Level Within Target Range  Outcome: Not Progressing  Intervention: Optimize Glycemic Control  Recent Flowsheet Documentation  Taken 2/16/2025 2000 by Anahi Blancas RN  Hyperglycemia Management: blood glucose monitored  Hypoglycemia Management: blood glucose monitored  Goal: Absence of Infection Signs and Symptoms  Outcome: Not Progressing  Intervention: Promote Stabilization  Recent Flowsheet Documentation  Taken 2/16/2025 2000 by Anahi Blancas RN  Lung Protection Measures: ventilator synchrony promoted  Fever Reduction/Comfort Measures: lightweight bedding  Intervention: Promote  Recovery  Recent Flowsheet Documentation  Taken 2/16/2025 2000 by Anahi Blancas RN  Activity Management: bedrest  Airway/Ventilation Management: airway patency maintained  Sleep/Rest Enhancement: consistent schedule promoted  Goal: Optimal Nutrition Delivery  Outcome: Not Progressing  Intervention: Optimize Nutrition Delivery  Recent Flowsheet Documentation  Taken 2/16/2025 2000 by Anahi Blancas RN  Nutrition Support Management: tube feeding rate increased     Problem: Mechanical Ventilation Invasive  Goal: Effective Communication  Outcome: Not Progressing  Intervention: Ensure Effective Communication  Recent Flowsheet Documentation  Taken 2/16/2025 2000 by Anahi Blancas RN  Trust Relationship/Rapport:   care explained   choices provided   emotional support provided   questions answered   thoughts/feelings acknowledged  Diversional Activities: television  Family/Support System Care: support provided  Communication Enhancement Strategies: extra time allowed for response  Goal: Optimal Device Function  Outcome: Not Progressing  Intervention: Optimize Device Care and Function  Recent Flowsheet Documentation  Taken 2/17/2025 0000 by Anahi Blancas RN  Oral Care: swabbed with antiseptic solution  Taken 2/16/2025 2000 by Anahi Blancas RN  Airway/Ventilation Management: airway patency maintained  Oral Care: swabbed with antiseptic solution  Goal: Mechanical Ventilation Liberation  Outcome: Not Progressing  Intervention: Promote Extubation and Mechanical Ventilation Liberation  Recent Flowsheet Documentation  Taken 2/17/2025 0200 by Anahi Blancas RN  Medication Review/Management: medications reviewed  Taken 2/17/2025 0100 by Anahi Blancas RN  Medication Review/Management: medications reviewed  Taken 2/17/2025 0000 by Anahi Blancas RN  Medication Review/Management: medications reviewed  Taken 2/16/2025 2300 by Anahi Blancas RN  Medication Review/Management: medications reviewed  Taken 2/16/2025 2200 by  Anahi Blancas RN  Medication Review/Management: medications reviewed  Taken 2/16/2025 2100 by Anahi Blancas RN  Medication Review/Management: medications reviewed  Taken 2/16/2025 2000 by Anahi Blancas RN  Sleep/Rest Enhancement: consistent schedule promoted  Medication Review/Management: medications reviewed  Taken 2/16/2025 1900 by Anahi Blancas RN  Medication Review/Management: medications reviewed  Goal: Optimal Nutrition Delivery  Outcome: Not Progressing  Intervention: Optimize Nutrition Delivery  Recent Flowsheet Documentation  Taken 2/16/2025 2000 by Anahi Blancas RN  Nutrition Support Management: tube feeding rate increased  Goal: Absence of Device-Related Skin and Tissue Injury  Outcome: Not Progressing  Intervention: Maintain Skin and Tissue Health  Recent Flowsheet Documentation  Taken 2/16/2025 2000 by Anahi Blancas RN  Device Skin Pressure Protection: absorbent pad utilized/changed  Goal: Absence of Ventilator-Induced Lung Injury  Outcome: Not Progressing  Intervention: Facilitate Lung-Protection Measures  Recent Flowsheet Documentation  Taken 2/16/2025 2000 by Anahi Blancas RN  Lung Protection Measures: ventilator synchrony promoted  Intervention: Prevent Ventilator-Associated Pneumonia  Recent Flowsheet Documentation  Taken 2/17/2025 0200 by Anahi Blancas RN  Head of Bed ([facility] [place] [facility]) Positioning: HOB at 30-45 degrees  Taken 2/17/2025 0000 by Anahi Blancas RN  Head of Bed ([facility] [place]) Positioning: HOB at 30-45 degrees  Oral Care: swabbed with antiseptic solution  Taken 2/16/2025 2200 by Anahi Blancas RN  Head of Bed ([facility] [place]) Positioning: HOB at 30-45 degrees  Taken 2/16/2025 2000 by Anahi Blancas RN  Head of Bed ([facility] [place]) Positioning: HOB at 30-45 degrees  VAP Prevention Measures: completed  Oral Care: swabbed with antiseptic solution   Goal Outcome Evaluation:

## 2025-02-17 NOTE — PROGRESS NOTES
"DOS: 2025  NAME: Phan Daniels   : 1955  PCP: Cesia Diaz APRN  Chief Complaint   Patient presents with    Respiratory Distress       Chief complaint: Altered mental status    Subjective: Patient has been seen and evaluated, no acute events overnight.  Continues to be intubated and sedated Precedex and ketamine.  No change in neurological examination.    Objective:  Vital signs: /83   Pulse 104   Temp 100.1 °F (37.8 °C) (Esophageal)   Resp 27   Ht 182.9 cm (72\")   Wt 113 kg (248 lb 3.8 oz)   SpO2 95%   BMI 33.67 kg/m²    Gen: NAD, vitals reviewed  MS: Does not open eyes to voice, does not follow commands  CN: no blink to threat b/l, PERRL, conjugate gaze cough and gag absent  Motor: no response to pain all 4 ext, normal tone throughout. No spontaneous movements  Sensory: no response to pain        Laboratory results:  Lab Results   Component Value Date    GLUCOSE 132 (H) 2025    CALCIUM 7.6 (L) 2025     2025    K 4.8 2025    CO2 21.4 (L) 2025    CL 93 (L) 2025    BUN 96 (H) 2025    CREATININE 8.65 (H) 2025    BCR 11.1 2025    ANIONGAP 21.6 (H) 2025     Lab Results   Component Value Date    WBC 18.19 (H) 2025    HGB 9.4 (L) 2025    HCT 29.8 (L) 2025    .5 (H) 2025     2025     No results found for: \"LDL\"         Vitals  Systolic blood pressure 120s to 130  Respiratory rate 27 to 30  Heart rate 80- 107  Afebrile    Review of labs:   Sodium 136  Creatinine 8.65  BUN 96  Blood glucose 132  AST 72 ALT 37  Ammonia 23    A1c 6.4  B12 2000 and folate 10    WBC 18.1  Hemoglobin 9.4 and platelets 190    Urine analysis showed blood protein bilirubin and WBC bacteria and yeast  Respiratory panel positive for influenza  Blood cultures showing staph coagulase-negative since , last culture 2025 also positive  Respiratory cultures also growing staph agalactiae  Tox " neck    Review and interpretation of imaging:     EEG pending 2/17/2025  EEG 2/14/2025 showed diffuse slowing no epileptiform discharges or seizure    CT head 2/13/2025 no acute hypodensity or hyperdensity could not appreciate any old infarcts    CT head 2/16/2025 no acute hypodensity or hyperdensity, no interval change    CT from 2/8/2025 did not show any vegetations 55 to 60% EF normal LAE    Diagnoses:  Acute metabolic encephalopathy  Acute hypoxic respiratory failure  Acute viral influenza infection  Sepsis secondary to bacteremia from staph coagulase-negative  End-stage renal disease on dialysis  Atrial fibrillation with rapid ventricular response  Electrolyte derangement secondary to end-stage renal disease  COPD  History of alcoholic    Patient is a 69-year-old with history of GERD hypertension hyperlipidemia atrial fibrillation alcohol abuse COPD was found at home unresponsive.  Neurology has been consulted for altered mental status.    Acute metabolic encephalopathy  Etiology multifactorial given new influenza viral infection, sepsis, uremic encephalopathy  Continue medical management of influenza he is on Tamiflu  Continue medical management of bacteremia he is on antibiotics  Less concerned about CNS etiology his repeat CT heads look negative no concern for underlying seizures EEG has been unremarkable for seizures.  History of alcohol abuse continue thiamine replacement    Overall patient is medically very sick he has multiple acute on chronic medical problems ongoing, would recommend palliative care consult and goals of care discussion.    Neurology will continue to follow.      MDM   Reviewed: Previous charts, nursing notes and vitals   Reviewed: Previous labs and CT scan    Interpretation: Labs and CT scan   Total time providing critical care is :30-74 minutes. This excluded time spent performing separately reportable procedures and services  Consults :Neurology/Stroke    Please note that portions of  this note were completed with a voice recognition program.     Von Allen MD  Neuro Hospitalist /Vascular Neurology.

## 2025-02-17 NOTE — PROGRESS NOTES
Adult Nutrition  Assessment/PES    Patient Name:  Phan Daniels  YOB: 1955  MRN: [id number]  Admit Date:  2/8/2025    Assessment Date:  2/17/2025    Comments:  follow-up    Novasource Renal 35 ml/hr   Increase to 50 ml/hr to meet estimated needs.    Will cont to follow and monitor.      Reason for Assessment       Row Name 02/17/25 1545          Reason for Assessment    Reason For Assessment follow-up protocol  [place], [place]     Diagnosis infection/sepsis;pulmonary disease  sepsis, acute hypoxic resp failure, FLU, HD, possible cholecystitis hx ETOH                    Nutrition/Diet History       Row Name 02/17/25 1547          Nutrition/Diet History    Typical Intake (Food/Fluid/EN/PN) pt on vent and EN                    Labs/Tests/Procedures/Meds       Row Name 02/17/25 1547          Labs/Procedures/Meds    Lab Results Reviewed reviewed     Lab Results Comments glu 141, 144, 178, BUN 96/creat 8.6        Diagnostic Tests/Procedures    Diagnostic Test/Procedure Reviewed reviewed        Medications    Pertinent Medications Reviewed reviewed     Pertinent Medications Comments B12, thiamine, humlin, iron                        Nutrition Prescription Ordered       Row Name 02/17/25 1548          Nutrition Prescription PO    Current PO Diet NPO        Nutrition Prescription EN    Product Novasource Renal (Nepro)     Continuous TF Goal Rate (mL/hr) 35 mL/hr                    Evaluation of Received Nutrient/Fluid Intake       Row Name 02/17/25 1548          EN Evaluation    Number of Days EN Intake Evaluated 2 days     EN Average Volume Delivered (mL/day) 355 mL/day                       Problem/Interventions:   Problem 1       Row Name 02/17/25 1549          Nutrition Diagnoses Problem 1    Problem 1 Other (comment)  Increased nutrient needs related to sepsis, resp failu, HD as evidenced by enteral nutrition                            Nutrition Intervention       Row Name 02/17/25 1547           Nutrition Intervention    RD/Tech Action Follow Tx progress                      Education/Evaluation       Row Name 02/17/25 1549          Education    Education Education not appropriate at this time        Monitor/Evaluation    Monitor Per protocol;I&O;Pertinent labs;TF delivery/tolerance;Weight;Skin status                     Electronically signed by:  Alessandra Singer RD  02/17/25 15:50 EST

## 2025-02-17 NOTE — CASE MANAGEMENT/SOCIAL WORK
Discharge Planning Assessment   [place]     Patient Name: Phan Daniels  MRN: [id number]  Today's Date: 2/17/2025    Admit Date: 2/8/2025     Discharge Plan       Row Name 02/17/25 1055       Plan    Plan Pt remains intubated at this time. SS noted Palliative care consulted and planning on family meeting tomorrow to discuss GOC. Pt lives alone prior to admission. Pt does not utilize home health services. [facility]  [facility], walker and cane [facility]. [facility] (not [facility]), no POA. Per family pt is independent with all ADL's prior to admission. SS to follow.               SONIA GarciaW

## 2025-02-17 NOTE — PROGRESS NOTES
[facility] HOSPITALIST PROGRESS NOTE     Patient Identification:  Name:  Phan Daniels  Age:  69 y.o.  Sex:  male  :  1955  MRN:  [id number]  Visit Number:  [id number]  ROOM: [address]     Primary Care Provider:  Cesia Diaz APRN    Length of stay in inpatient status:  9    Subjective     Chief Compliant:    Chief Complaint   Patient presents with   • Respiratory Distress       History of Presenting Illness:    Patient remains intubated receiving mechanical ventilation. SBT limited by tachycardia and tachypnea per nursing report. Patient made 350 cc of urine overnight. Patient not requiring prssor support. Patient receiving ketamine and precedex for sedation. Patient had bleeding overnight arund IV catheters and heparin gtt stopped. No family bedside.     ROS:  Otherwise 10 point ROS negative other than documented above in HPI.     Objective     Current Hospital Meds:amiodarone, 400 mg, Oral, Q24H   Followed by  [START ON 2025] amiodarone, 200 mg, Oral, Q24H  cetirizine, 5 mg, Oral, Daily  chlorhexidine, 15 mL, Mouth/Throat, Q12H  finasteride, 5 mg, Oral, Daily  folic acid, 1 mg, Oral, Daily  insulin regular, 2-7 Units, Subcutaneous, Q6H  ipratropium-albuterol, 3 mL, Nebulization, 4x Daily - RT  midodrine, 15 mg, Oral, TID AC  oseltamivir, 30 mg, Oral, Once per day on   pantoprazole, 40 mg, Intravenous, BID AC  [Held by provider] rosuvastatin, 10 mg, Oral, Nightly  sodium chloride, 10 mL, Intravenous, Q12H  sodium chloride, 10 mL, Intravenous, Q12H  sodium chloride, 10 mL, Intravenous, Q12H  sodium chloride, 10 mL, Intravenous, Q12H  sodium chloride, 10 mL, Intravenous, Q12H  thiamine, 200 mg, Per G Tube, Daily  vitamin B-12, 1,000 mcg, Oral, Daily    dexmedetomidine, 0.2-1.5 mcg/kg/hr (Dosing Weight), Last Rate: 1.5 mcg/kg/hr (25 0255)  heparin, 17.8 Units/kg/hr (Dosing Weight), Last Rate: Stopped (02/16/25 2312)  ketamine, 0.06-2.5 mg/kg/hr  (Dosing Weight), Last Rate: 0.36 mg/kg/hr (02/16/25 2015)  Pharmacy to Dose Heparin,   phenylephrine, 0.5-3 mcg/kg/min (Dosing Weight), Last Rate: Stopped (02/16/25 0910)        Current Antimicrobial Therapy:  Anti-Infectives (From admission, onward)      Ordered     Dose/Rate Route Frequency Start Stop    02/14/25 1023  oseltamivir (TAMIFLU) capsule 30 mg        Ordering Provider: Kendall Irby MD    30 mg Oral Once per day on Monday Wednesday Friday 02/14/25 1200 02/21/25 1159    02/11/25 1404  cefTRIAXone (ROCEPHIN) 2,000 mg in sodium chloride 0.9 % 100 mL IVPB-VTB        Ordering Provider: Dangelo Ledezma MD    2,000 mg  200 mL/hr over 30 Minutes Intravenous Every 24 Hours 02/12/25 0600 02/15/25 0634    02/11/25 1404  metroNIDAZOLE (FLAGYL) IVPB 500 mg        Ordering Provider: Dangelo Ledezma MD    500 mg  200 mL/hr over 30 Minutes Intravenous Every 8 Hours 02/11/25 2000 02/11/25 2106    02/08/25 2054  Vancomycin Pharmacy Intermittent/Pulse Dosing        Ordering Provider: Mignon Hanley, PharmD     Not Applicable Daily 02/09/25 1200 02/13/25 0859    02/08/25 1846  cefepime 1000 mg IVPB in 100 mL NS (VTB)  Status:  Discontinued        Ordering Provider: Dangelo Ledezma MD    1,000 mg  over 30 Minutes Intravenous Every 24 Hours 02/09/25 0600 02/11/25 1404    02/08/25 1853  vancomycin IVPB 2000 mg in 0.9% Sodium Chloride 500 mL        Ordering Provider: Talat Blankenship MD    20 mg/kg × 98.8 kg  250 mL/hr over 120 Minutes Intravenous Once 02/08/25 2100 02/08/25 2345    02/08/25 1831  metroNIDAZOLE (FLAGYL) IVPB 500 mg  Status:  Discontinued        Ordering Provider: Talat Blankenship MD    500 mg  200 mL/hr over 30 Minutes Intravenous Every 8 Hours 02/08/25 2000 02/11/25 1404    02/08/25 1846  cefepime 1000 mg IVPB in 100 mL NS (VTB)        Ordering Provider: Talat Blankenship MD    1,000 mg  over 30 Minutes Intravenous Once 02/08/25 1945 02/08/25 2215    02/08/25 183  Pharmacy to dose vancomycin  Status:   Discontinued        Ordering Provider: Talat Blankenship MD     Not Applicable Continuous PRN 02/08/25 1831 02/09/25 1406    02/08/25 1331  cefepime 2000 mg IVPB in 100 mL NS (VTB)        Ordering Provider: Darren Bowman MD    2,000 mg  over 30 Minutes Intravenous Once 02/08/25 1347 02/08/25 1501    02/08/25 1331  metroNIDAZOLE (FLAGYL) IVPB 500 mg        Ordering Provider: Darren Bowman MD    500 mg  200 mL/hr over 30 Minutes Intravenous Once 02/08/25 1347 02/08/25 1458    02/08/25 1331  vancomycin IVPB 1750 mg in 0.9% Sodium Chloride (premix) 500 mL        Ordering Provider: Darren Bowman MD    20 mg/kg × 86.7 kg (Adjusted)  285.7 mL/hr over 105 Minutes Intravenous Once 02/08/25 1347 02/08/25 1751          Current Diuretic Therapy:  Diuretics (From admission, onward)      None          ----------------------------------------------------------------------------------------------------------------------  Vital Signs:  Temp:  [96 °F (35.6 °C)-101 °F (38.3 °C)] 100.1 °F (37.8 °C)  Heart Rate:  [] 105  Resp:  [21-31] 27  BP: ()/(60-88) 128/73  FiO2 (%):  [60 %] 60 %  SpO2:  [93 %-98 %] 97 %  on   ;   Device (Oxygen Therapy): ventilator  Body mass index is 33.67 kg/m².    Wt Readings from Last 3 Encounters:   02/15/25 113 kg (248 lb 3.8 oz)   06/06/24 102 kg (225 lb 10.3 oz)     Intake & Output (last 3 days)         02/14 0701  02/15 0700 02/15 0701  02/16 0700 02/16 0701  02/17 0700 02/17 0701 02/18 0700    I.V. (mL/kg) 398.7 (3.9) 464.3 (4.6) 1529.7 (15)     Other 0 130 180     NG/ 0 406     IV Piggyback 100       Total Intake(mL/kg) 883.7 (8.7) 594.3 (5.8) 2115.7 (20.7)     Urine (mL/kg/hr) 400 (0.2) 450 (0.2) 750 (0.3)     Emesis/NG output 550 230 0     Stool 100 100 150     Dialysis  3000      Total Output 1050 3780 900     Net -166.3 -3185.7 +1215.7             Stool Unmeasured Occurrence 4 x       Emesis Unmeasured Occurrence 1 x 0 x 0 x           No diet orders on  file  ----------------------------------------------------------------------------------------------------------------------  Physical exam:  GENERAL:  Patient intubated and sedated.   SKIN:  Warm, dry without rashes, purpura or petechiae.  EYES:  Pupils equal, round and reactive to light.  Conjunctivae normal.  HEAD:  Normocephalic. Atraumatic.   EARS/NOSE/MOUTH/THROAT:  Septum midline.  No excoriations or nasal discharge. No stomatitis or ulcers.  Lips normal. Oropharynx without lesions or exudates.  NECK:  Supple with good range of motion; no thyromegaly or masses  LYMPHATICS:  No cervical, supraclavicular, axillary adenopathy.  RESP:  Lungs clear to auscultation. Good airflow. Intubated receiving mechanical ventilation.   CARDIAC:  Regular rate and rhythm without murmurs, rubs or gallops. Normal S1,S2. No edema  GI:  Soft, nontender, no hepatosplenomegaly, normal bowel sounds  MSK:  No clubbing or cyanosis, No joint swelling noted in hands  NEUROLOGICAL:  No focal deficit. Pupils equal and reactive.   ----------------------------------------------------------------------------------------------------------------------  Tele:    ----------------------------------------------------------------------------------------------------------------------  Results from last 7 days   Lab Units 02/16/25  2234 02/15/25  1041 02/15/25  0729 02/14/25  0331 02/13/25  0157   LACTATE mmol/L  --   --   --   --  0.9   WBC 10*3/mm3 18.19*  --  10.52 8.19  --    HEMOGLOBIN g/dL 9.4*  --  10.3* 11.2*  --    HEMATOCRIT % 29.8*  --  31.9* 33.5*  --    MCV fL 103.5*  --  99.4* 97.4*  --    MCHC g/dL 31.5  --  32.3 33.4  --    PLATELETS 10*3/mm3 190  --  155 142  --    INR   --  0.98  --   --   --      Results from last 7 days   Lab Units 02/15/25  1430   PH, ARTERIAL pH units 7.386   PO2 ART mm Hg 90.8   PCO2, ARTERIAL mm Hg 42.9   HCO3 ART mmol/L 25.7     Results from last 7 days   Lab Units 02/16/25 2234 02/16/25  0156 02/15/25  0416  "02/13/25  1150 02/13/25  0157   SODIUM mmol/L 136 139 139   < > 140   POTASSIUM mmol/L 4.8 4.1 3.9   < > 3.4*  3.4*   CHLORIDE mmol/L 93* 99 97*   < > 96*   CO2 mmol/L 21.4* 22.2 25.0   < > 24.8   BUN mg/dL 96* 77* 83*   < > 73*   CREATININE mg/dL 8.65* 7.66* 8.49*   < > 9.19*   CALCIUM mg/dL 7.6* 6.9* 7.5*   < > 7.0*   PHOSPHORUS mg/dL  --   --   --   --  6.7*   GLUCOSE mg/dL 132* 99 94   < > 308*   ALBUMIN g/dL  --   --   --   --  3.0*   BILIRUBIN mg/dL  --   --   --   --  0.5   ALK PHOS U/L  --   --   --   --  50   AST (SGOT) U/L  --   --   --   --  72*   ALT (SGPT) U/L  --   --   --   --  37    < > = values in this interval not displayed.   Estimated Creatinine Clearance: 10.6 mL/min (A) (by C-G formula based on SCr of 8.65 mg/dL (H)).  Ammonia   Date Value Ref Range Status   02/15/2025 23 16 - 60 umol/L Final     Results from last 7 days   Lab Units 02/12/25  0412   CK TOTAL U/L 3,260*         Results from last 7 days   Lab Units 02/12/25  0412   TRIGLYCERIDES mg/dL 225*     Glucose   Date/Time Value Ref Range Status   02/17/2025 0514 144 (H) 70 - 130 mg/dL Final   02/16/2025 2325 141 (H) 70 - 130 mg/dL Final   02/16/2025 1702 136 (H) 70 - 130 mg/dL Final   02/16/2025 1224 118 70 - 130 mg/dL Final   02/16/2025 0547 120 70 - 130 mg/dL Final   02/16/2025 0035 106 70 - 130 mg/dL Final   02/15/2025 1817 124 70 - 130 mg/dL Final   02/15/2025 1232 124 70 - 130 mg/dL Final     Lab Results   Component Value Date    TSH 0.196 (L) 02/09/2025    FREET4 0.85 (L) 02/09/2025     No results found for: \"PREGTESTUR\", \"PREGSERUM\", \"HCG\", \"HCGQUANT\"  Pain Management Panel          Latest Ref Rng & Units 2/8/2025   Pain Management Panel   Amphetamine, Urine Qual Negative Negative    Barbiturates Screen, Urine Negative Negative    Benzodiazepine Screen, Urine Negative Negative    Buprenorphine, Screen, Urine Negative Negative    Cocaine Screen, Urine Negative Negative    Fentanyl, Urine Negative Negative    Methadone Screen , " "Urine Negative Negative    Methamphetamine, Ur Negative Negative       Details                 Brief Urine Lab Results  (Last result in the past 365 days)        Color   Clarity   Blood   Leuk Est   Nitrite   Protein   CREAT   Urine HCG        02/08/25 1351 Dark Yellow   Cloudy   Small (1+)   Trace   Negative   100 mg/dL (2+)                 Blood Culture   Date Value Ref Range Status   02/13/2025 No growth at 3 days  Preliminary   02/13/2025 Staphylococcus, coagulase negative (C)  Final     No results found for: \"URINECX\"  No results found for: \"WOUNDCX\"  No results found for: \"STOOLCX\"  No results found for: \"RESPCX\"  No results found for: \"AFBCX\"  Results from last 7 days   Lab Units 02/13/25  0157 02/12/25  0412 02/11/25  0234   PROCALCITONIN ng/mL  --  6.34* 9.49*   LACTATE mmol/L 0.9  --   --        I have personally looked at the labs and they are summarized above.  ----------------------------------------------------------------------------------------------------------------------  Detailed radiology reports for the last 24 hours:    Imaging Results (Last 24 Hours)       Procedure Component Value Units Date/Time    CT Chest Without Contrast Diagnostic [[id number]] Collected: 02/16/25 1309     Updated: 02/16/25 1312    Narrative:      EXAM: CT CHEST WO CONTRAST DIAGNOSTIC-      CLINICAL INDICATION:vomiting, r/o aspiration; A41.9-Sepsis, unspecified  organism; N17.9-Acute kidney failure, unspecified; R79.89-Other  specified abnormal findings of blood chemistry; J18.9-Pneumonia,  unspecified organism      COMPARISON: 2/13/2025     TECHNIQUE: Multiple axial CT images were obtained from lung apex through  upper abdomen without the administration of IV contrast. Reformatted  images in the coronal and/or sagittal plane(s) were generated from the  axial data set to facilitate diagnostic accuracy and/or surgical  planning.     Radiation dose reduction techniques were utilized per ALARA protocol.  Automated exposure " control was initiated through either or Alset Wellen or  DoseRight software packages by  protocol.    DOSE (DLP mGy-cm):        FINDINGS:     LUNGS: Bibasilar consolidation, worse on the right than on the left     HEART: Mild coronary artery calcifications     MEDIASTINUM: No masses. No enlarged lymph nodes.  No fluid collections.     PLEURA: Small bibasilar pleural effusions     VASCULATURE: No evidence of aneurysm.     BONES: No acute bony abnormality.     VISUALIZED UPPER ABDOMEN: Please see the CT report for the abdomen and  pelvis     Other: None.       Impression:      Trace bibasilar effusions and bibasilar consolidation                 This report was finalized on 2/16/2025 1:10 PM by Dr. Tripp Berrios MD.       CT Head Without Contrast [[id number]] Collected: 02/16/25 1308     Updated: 02/16/25 1311    Narrative:      CT HEAD WO CONTRAST-     CLINICAL INDICATION: poor mentation off sedation; A41.9-Sepsis,  unspecified organism; N17.9-Acute kidney failure, unspecified;  R79.89-Other specified abnormal findings of blood chemistry;  J18.9-Pneumonia, unspecified organism        COMPARISON: 2/13/2025     TECHNIQUE: Axial images of the brain were obtained with out intravenous  contrast.  Reformatted images were created in the sagittal and coronal  planes.     DOSE:     Radiation dose reduction techniques were utilized per ALARA protocol.  Automated exposure control was initiated through either or Alset Wellen or  DoseRight software packages by  protocol.        FINDINGS:    BRAIN:  Unremarkable.  No hemorrhage.  No significant white matter  disease.  No edema.       VENTRICLES:  Unremarkable.  No ventriculomegaly.       BONES/JOINTS:  Unremarkable.  No acute fracture.       SOFT TISSUES:  Unremarkable.       SINUSES:  no air fluid levels       MASTOID AIR CELLS:  Unremarkable as visualized.  No mastoid effusion.          Impression:         1. No parenchymal mass, hemorrhage, or midline shift      This report was finalized on 2/16/2025 1:09 PM by Dr. Tripp Berrios MD.       CT Abdomen Pelvis Without Contrast [[id number]] Collected: 02/16/25 1305     Updated: 02/16/25 1310    Narrative:      EXAM: CT ABDOMEN PELVIS WO CONTRAST-         TECHNIQUE: Multiple axial CT images were obtained from lung bases  through pubic symphysis WITHOUT administration of IV contrast.  Reformatted images in the coronal and/or sagittal plane(s) were  generated from the axial data set to facilitate diagnostic accuracy  and/or surgical planning.  Oral Contrast:NONE.     Radiation dose reduction techniques were utilized per ALARA protocol.  Automated exposure control was initiated through either or [employer] or  DoseRight software packages by  protocol.    DOSE:     Clinical information emesis; A41.9-Sepsis, unspecified organism;  N17.9-Acute kidney failure, unspecified; R79.89-Other specified abnormal  findings of blood chemistry; J18.9-Pneumonia, unspecified organism      Comparison 2/13/2025     FINDINGS:     Lower thorax: Bilateral consolidation, right worse than left     Abdomen:     Liver: Homogeneous. No focal hepatic mass or ductal dilatation.     Gallbladder: Cholelithiasis     Pancreas: Unremarkable. No mass or ductal dilatation.     Spleen: Homogeneous. No splenomegaly.     Adrenals: No mass.     Kidneys/ureters: Mild perinephric stranding bilaterally but no  obstructive uropathy. Right renal cyst     GI tract: Non-dilated. No definite wall thickening.. There is no  evidence of appendicitis     MESENTERY: Small amount of free fluid in the abdomen and pelvis     Vasculature: Evidence of atherosclerotic vascular disease     Abdominal wall: No focal hernia or mass.        Bladder: Collazo catheter is present     Reproductive: Unremarkable as visualized     Bones: No acute bony abnormality.       Impression:         1. Small amount of free fluid in the abdomen and pelvis     2. Cholelithiasis     3. Bibasilar  consolidation.                          This report was finalized on 2/16/2025 1:08 PM by Dr. Tripp Berrios MD.       XR Chest 1 View [[id number]] Collected: 02/16/25 1202     Updated: 02/16/25 1205    Narrative:      XR CHEST 1 VW-     CLINICAL INDICATION: Worsening hypoxia; A41.9-Sepsis, unspecified  organism; N17.9-Acute kidney failure, unspecified; R79.89-Other  specified abnormal findings of blood chemistry; J18.9-Pneumonia,  unspecified organism        COMPARISON: 2/15/2025     TECHNIQUE: Single frontal view of the chest.     FINDINGS:     LUNGS: Mild right-sided airspace disease     HEART AND MEDIASTINUM: Heart and mediastinal contours are unremarkable        SKELETON: Bony and soft tissue structures are unremarkable.             Impression:      Mild right-sided airspace disease.  No interval line change           This report was finalized on 2/16/2025 12:03 PM by Dr. Tripp Berrios MD.             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   - General Surgery consulted and following, don't suspect cholecystitis   - TeleNeurology consulted and following. EEG ordered for today.   - Palliative Care consulted and following   - Completed tamiflu x 5 days   - Continue pain and sedation drips for comfort  - Continue oral Amiodarone now  - Bleeding worse with  heparin gtt. Will stop and order subQ heparin for now.   - Continue IV pressors for SBP < 90 or MAPs consistently < 65. Currently not requiring.   - Continue IV PPI for Gi prophylaxis  - Continue Tylenol as needed for fevers, Duonebs as needed  - Admitted to CCU, monitor on telemetry, continue 02 but wean as able, spontaneous awakening trial/spontaneous breathing trial as appropriate      #Electrolyte Abnormalities  - Acute Severe Hypokalemia - Replacing, on protocol  - Acute Severe Hypomagnesemia - Replacing, on protocol     #Pre-Diabetes with steroid induced hyperglycemia  - Hgb A1c = 6.4%  - Continue FSBG and SSI, add long acting if indicated.     #Alcohol Use Disorder, Severe, with Dependence complicated   - Continue cardiac monitoring, seizure precautions, monitor for withdrawal      #Obesity by BMI  - Body mass index is 30.41 kg/m².; complicates all aspects of care     #Leukocytosis   - Afebrile. Will repeat chest x-ray and labs in AM. Blood cultures from 2/13 growing 1/2 coag negative staph which I suspect is contaminant.     F: TFs  A: PRN  S: Precedex, ketamine  T: SubQ heparin while heparin gtt on hold.   H: HOB elevated   U: Protonix   G: PRN  S: Daily   B: SubQ insulin   I: R IJ central line and ETT placed on 2/8, Hemodialysis catheter placed 2/11  D: None     Code status: DNR, otherwise full     Dispo: Pending clinical improvement. Guarded prognosis. Palliative care consulted and planning on family meeting tomorrow to discuss GOC. Siblings are NOK and patient has involved nephew.       Talat Blankenship MD  [facility] Hospitalist  02/17/25  10:19 EST

## 2025-02-17 NOTE — PLAN OF CARE
Problem: Adult Inpatient Plan of Care  Goal: Plan of Care Review  Outcome: Not Progressing  Flowsheets (Taken 2/17/2025 1701)  Progress: no change  Outcome Evaluation: pt with temp and blood/urine culture take this shift.  Plan of Care Reviewed With:   patient   family  Goal: Patient-Specific Goal (Individualized)  Outcome: Not Progressing  Goal: Absence of Hospital-Acquired Illness or Injury  Outcome: Not Progressing  Intervention: Identify and Manage Fall Risk  Description: Perform standard risk assessment on admission using a validated tool or comprehensive approach appropriate to the patient; reassess fall risk frequently, with change in status or transfer to another level of care.  Communicate risk to interprofessional healthcare team; ensure fall risk visible cue.  Determine need for increased observation, equipment and environmental modification, as well as use of supportive, nonskid footwear.  Adjust safety measures to individual needs and identified risk factors.  Reinforce the importance of active participation with fall risk prevention, safety, and physical activity with the patient and family.  Perform regular intentional rounding to assess need for position change, pain assessment and personal needs, including assistance with toileting.  Recent Flowsheet Documentation  Taken 2/17/2025 1400 by David Sargent, RN  Safety Promotion/Fall Prevention:   safety round/check completed   fall prevention program maintained  Taken 2/17/2025 1300 by David Sargent, RN  Safety Promotion/Fall Prevention:   safety round/check completed   fall prevention program maintained  Taken 2/17/2025 1200 by David Sargent, RN  Safety Promotion/Fall Prevention:   safety round/check completed   fall prevention program maintained  Taken 2/17/2025 1100 by David Sargent, RN  Safety Promotion/Fall Prevention:   safety round/check completed   fall prevention program maintained  Taken 2/17/2025 1000 by David Sargent,  RN  Safety Promotion/Fall Prevention:   safety round/check completed   fall prevention program maintained  Taken 2/17/2025 0900 by David Sargent RN  Safety Promotion/Fall Prevention:   safety round/check completed   fall prevention program maintained  Taken 2/17/2025 0812 by David Sargent RN  Safety Promotion/Fall Prevention:   safety round/check completed   fall prevention program maintained  Intervention: Prevent Skin Injury  Description: Perform a screening for skin injury risk, such as pressure or moisture-associated skin damage on admission and at regular intervals throughout hospital stay.  Keep all areas of skin (especially folds) clean and dry.  Maintain adequate skin hydration.  Relieve and redistribute pressure and protect bony prominences and skin at risk for injury; implement measures based on patient-specific risk factors.  Match turning and repositioning schedule to clinical condition.  Encourage weight shift frequently; assist with reposition if unable to complete independently.  Float heels off bed; avoid pressure on the Achilles tendon.  Keep skin free from extended contact with medical devices.  Optimize nutrition and hydration.  Encourage functional activity and mobility, as early as tolerated.  Use aids (e.g., slide boards, mechanical lift) during transfer.  Recent Flowsheet Documentation  Taken 2/17/2025 1400 by David Sargent, RN  Body Position:   left   side-lying  Taken 2/17/2025 1200 by David Sargent RN  Body Position:   right   side-lying  Taken 2/17/2025 1000 by David Sargent RN  Body Position:   left   side-lying  Taken 2/17/2025 0812 by David Sargent, RN  Skin Protection: incontinence pads utilized  Goal: Optimal Comfort and Wellbeing  Outcome: Not Progressing  Intervention: Provide Person-Centered Care  Description: Use a family-focused approach to care; encourage support system presence and participation.  Develop trust and rapport by proactively providing  information, encouraging questions, addressing concerns and offering reassurance.  Acknowledge emotional response to hospitalization.  Recognize and utilize personal coping strategies and strengths; develop goals via shared decision-making.  Honor spiritual and cultural preferences.  Recent Flowsheet Documentation  Taken 2/17/2025 1400 by David Sargent, RN  Trust Relationship/Rapport:   care explained   reassurance provided  Taken 2/17/2025 0800 by David Sargent, RN  Trust Relationship/Rapport:   care explained   reassurance provided  Goal: Readiness for Transition of Care  Outcome: Not Progressing   Goal Outcome Evaluation:  Plan of Care Reviewed With: patient, family        Progress: no change  Outcome Evaluation: pt with temp and blood/urine culture take this shift.

## 2025-02-17 NOTE — PROGRESS NOTES
Progress Note Critical Care Pulmonary        Subjective  On vent , sedated.  Oxygen requirement down to 50% after PEEP increased to 12.    X-ray chest reviewed.    Difficult ET tube secretion      Interval History: event overnight: As described above        Review of Systems:    On vent   Vital Signs  Temp:  [96 °F (35.6 °C)-101 °F (38.3 °C)] 100.1 °F (37.8 °C)  Heart Rate:  [] 104  Resp:  [21-31] 27  BP: ()/(60-88) 134/83  FiO2 (%):  [60 %] 60 %  Body mass index is 33.67 kg/m².    Intake/Output Summary (Last 24 hours) at 2/17/2025 1036  Last data filed at 2/17/2025 0500  Gross per 24 hour   Intake 2115.66 ml   Output 900 ml   Net 1215.66 ml     No intake/output data recorded.    Physical Exam:  General- normal in appearance, mild respiratory distress, using accessory    HEENT- pupils equally reactive to light, normal in size, no scleral icterus    Neck-supple    Respiratory-bilateral air entry, few basal rales  Cardiovascular-  Normal S1 and S2. No S3, S4 or murmurs. No JVD, no carotid bruit and no edema, pulses normal bilaterally     GI-nontender nondistended bowel sounds positive    CNS- grossly nonfocal    Extremities-no clubbing .  2 Plus bipedal edema      Results Review:      Results from last 7 days   Lab Units 02/16/25  2234 02/15/25  0729 02/14/25  0331   WBC 10*3/mm3 18.19* 10.52 8.19   HEMOGLOBIN g/dL 9.4* 10.3* 11.2*   PLATELETS 10*3/mm3 190 155 142     Results from last 7 days   Lab Units 02/16/25  2234 02/16/25  0156 02/15/25  0416   SODIUM mmol/L 136 139 139   POTASSIUM mmol/L 4.8 4.1 3.9   CHLORIDE mmol/L 93* 99 97*   CO2 mmol/L 21.4* 22.2 25.0   BUN mg/dL 96* 77* 83*   CREATININE mg/dL 8.65* 7.66* 8.49*   CALCIUM mg/dL 7.6* 6.9* 7.5*   GLUCOSE mg/dL 132* 99 94     Lab Results   Component Value Date    INR 0.98 02/15/2025    INR 1.16 (H) 02/10/2025    INR 1.21 (H) 02/08/2025    PROTIME 13.1 02/15/2025    PROTIME 15.0 02/10/2025    PROTIME 15.4 (H) 02/08/2025     Results from last 7 days    Lab Units 02/13/25  0157   ALK PHOS U/L 50   BILIRUBIN mg/dL 0.5   BILIRUBIN DIRECT mg/dL 0.4*   ALT (SGPT) U/L 37   AST (SGOT) U/L 72*     Results from last 7 days   Lab Units 02/15/25  1430   PH, ARTERIAL pH units 7.386   PO2 ART mm Hg 90.8   PCO2, ARTERIAL mm Hg 42.9   HCO3 ART mmol/L 25.7     Imaging Results (Last 24 Hours)       Procedure Component Value Units Date/Time    CT Chest Without Contrast Diagnostic [[id number]] Collected: 02/16/25 1309     Updated: 02/16/25 1312    Narrative:      EXAM: CT CHEST WO CONTRAST DIAGNOSTIC-      CLINICAL INDICATION:vomiting, r/o aspiration; A41.9-Sepsis, unspecified  organism; N17.9-Acute kidney failure, unspecified; R79.89-Other  specified abnormal findings of blood chemistry; J18.9-Pneumonia,  unspecified organism      COMPARISON: 2/13/2025     TECHNIQUE: Multiple axial CT images were obtained from lung apex through  upper abdomen without the administration of IV contrast. Reformatted  images in the coronal and/or sagittal plane(s) were generated from the  axial data set to facilitate diagnostic accuracy and/or surgical  planning.     Radiation dose reduction techniques were utilized per ALARA protocol.  Automated exposure control was initiated through either or [employer] or  [employer] software packages by  protocol.    DOSE (DLP mGy-cm):        FINDINGS:     LUNGS: Bibasilar consolidation, worse on the right than on the left     HEART: Mild coronary artery calcifications     MEDIASTINUM: No masses. No enlarged lymph nodes.  No fluid collections.     PLEURA: Small bibasilar pleural effusions     VASCULATURE: No evidence of aneurysm.     BONES: No acute bony abnormality.     VISUALIZED UPPER ABDOMEN: Please see the CT report for the abdomen and  pelvis     Other: None.       Impression:      Trace bibasilar effusions and bibasilar consolidation                 This report was finalized on 2/16/2025 1:10 PM by Dr. Tripp Berrios MD.       CT Head Without  Contrast [[id number]] Collected: 02/16/25 1308     Updated: 02/16/25 1311    Narrative:      CT HEAD WO CONTRAST-     CLINICAL INDICATION: poor mentation off sedation; A41.9-Sepsis,  unspecified organism; N17.9-Acute kidney failure, unspecified;  R79.89-Other specified abnormal findings of blood chemistry;  J18.9-Pneumonia, unspecified organism        COMPARISON: 2/13/2025     TECHNIQUE: Axial images of the brain were obtained with out intravenous  contrast.  Reformatted images were created in the sagittal and coronal  planes.     DOSE:     Radiation dose reduction techniques were utilized per ALARA protocol.  Automated exposure control was initiated through either or [employer] or  [employer] software packages by  protocol.        FINDINGS:    BRAIN:  Unremarkable.  No hemorrhage.  No significant white matter  disease.  No edema.       VENTRICLES:  Unremarkable.  No ventriculomegaly.       BONES/JOINTS:  Unremarkable.  No acute fracture.       SOFT TISSUES:  Unremarkable.       SINUSES:  no air fluid levels       MASTOID AIR CELLS:  Unremarkable as visualized.  No mastoid effusion.          Impression:         1. No parenchymal mass, hemorrhage, or midline shift     This report was finalized on 2/16/2025 1:09 PM by Dr. Tripp Berrios MD.       CT Abdomen Pelvis Without Contrast [[id number]] Collected: 02/16/25 1305     Updated: 02/16/25 1310    Narrative:      EXAM: CT ABDOMEN PELVIS WO CONTRAST-         TECHNIQUE: Multiple axial CT images were obtained from lung bases  through pubic symphysis WITHOUT administration of IV contrast.  Reformatted images in the coronal and/or sagittal plane(s) were  generated from the axial data set to facilitate diagnostic accuracy  and/or surgical planning.  Oral Contrast:NONE.     Radiation dose reduction techniques were utilized per ALARA protocol.  Automated exposure control was initiated through either or [employer] or  [employer] software packages by  protocol.     DOSE:     Clinical information emesis; A41.9-Sepsis, unspecified organism;  N17.9-Acute kidney failure, unspecified; R79.89-Other specified abnormal  findings of blood chemistry; J18.9-Pneumonia, unspecified organism      Comparison 2/13/2025     FINDINGS:     Lower thorax: Bilateral consolidation, right worse than left     Abdomen:     Liver: Homogeneous. No focal hepatic mass or ductal dilatation.     Gallbladder: Cholelithiasis     Pancreas: Unremarkable. No mass or ductal dilatation.     Spleen: Homogeneous. No splenomegaly.     Adrenals: No mass.     Kidneys/ureters: Mild perinephric stranding bilaterally but no  obstructive uropathy. Right renal cyst     GI tract: Non-dilated. No definite wall thickening.. There is no  evidence of appendicitis     MESENTERY: Small amount of free fluid in the abdomen and pelvis     Vasculature: Evidence of atherosclerotic vascular disease     Abdominal wall: No focal hernia or mass.        Bladder: Collazo catheter is present     Reproductive: Unremarkable as visualized     Bones: No acute bony abnormality.       Impression:         1. Small amount of free fluid in the abdomen and pelvis     2. Cholelithiasis     3. Bibasilar consolidation.                          This report was finalized on 2/16/2025 1:08 PM by Dr. Tripp Berrios MD.       XR Chest 1 View [[id number]] Collected: 02/16/25 1202     Updated: 02/16/25 1205    Narrative:      XR CHEST 1 VW-     CLINICAL INDICATION: Worsening hypoxia; A41.9-Sepsis, unspecified  organism; N17.9-Acute kidney failure, unspecified; R79.89-Other  specified abnormal findings of blood chemistry; J18.9-Pneumonia,  unspecified organism        COMPARISON: 2/15/2025     TECHNIQUE: Single frontal view of the chest.     FINDINGS:     LUNGS: Mild right-sided airspace disease     HEART AND MEDIASTINUM: Heart and mediastinal contours are unremarkable        SKELETON: Bony and soft tissue structures are unremarkable.             Impression:       Mild right-sided airspace disease.  No interval line change           This report was finalized on 2/16/2025 12:03 PM by Dr. Tripp Berrios MD.                    amiodarone, 400 mg, Oral, Q24H   Followed by  [START ON 2/28/2025] amiodarone, 200 mg, Oral, Q24H  cetirizine, 5 mg, Oral, Daily  chlorhexidine, 15 mL, Mouth/Throat, Q12H  finasteride, 5 mg, Oral, Daily  folic acid, 1 mg, Oral, Daily  heparin (porcine), 5,000 Units, Subcutaneous, Q8H  insulin regular, 2-7 Units, Subcutaneous, Q6H  ipratropium-albuterol, 3 mL, Nebulization, 4x Daily - RT  midodrine, 15 mg, Oral, TID AC  oseltamivir, 30 mg, Oral, Once per day on Monday Wednesday Friday  pantoprazole, 40 mg, Intravenous, BID AC  [Held by provider] rosuvastatin, 10 mg, Oral, Nightly  sodium chloride, 10 mL, Intravenous, Q12H  sodium chloride, 10 mL, Intravenous, Q12H  sodium chloride, 10 mL, Intravenous, Q12H  sodium chloride, 10 mL, Intravenous, Q12H  sodium chloride, 10 mL, Intravenous, Q12H  thiamine, 200 mg, Per G Tube, Daily  vitamin B-12, 1,000 mcg, Oral, Daily      dexmedetomidine, 0.2-1.5 mcg/kg/hr (Dosing Weight), Last Rate: 1.5 mcg/kg/hr (02/17/25 0255)  heparin, 17.8 Units/kg/hr (Dosing Weight), Last Rate: Stopped (02/16/25 2312)  ketamine, 0.06-2.5 mg/kg/hr (Dosing Weight), Last Rate: 0.36 mg/kg/hr (02/16/25 2015)  phenylephrine, 0.5-3 mcg/kg/min (Dosing Weight), Last Rate: Stopped (02/16/25 0910)        Medication Review:     Assessment & Plan      #1: Acute hypoxic and hypercarbic respiratory failure-likely due to aspiration pneumonia/influenza A.  4 gas exchange.   ventilator setting with PEEP to 12 decrease tidal volume to 500.  CT chest reviewed  Respiratory cultures reviewed.  Antibiotics reviewed and continued.      Continue nebs  Microbiology reviewed.  Grew Streptococcus.  Continue current treatment.  Transthoracic echo did not show any vegetations  VAP- precautions  Head end - 30 %  Mouth care   COPD-longtime smoker.  Continue systemic  steroid and DuoNeb via nebulizer  Continue to titrate FiO2 down to maintain saturation 88 to 92%.      continue Tamiflu.  Continue isolation        Cardiology- hemodynamically -unstable.  Continue midodrine.     Vasopressor requirement reviewed and adjusted for a MAP of 65 and above.        Hemoglobin reviewed.    \       Nephrology-continue hemodialysis.  Nephrology input appreciated     Appreciate the input.  GI- PPI IV twice daily-      Cut back on sedation for neurocheck.  SAT/SBT as planned.    Hematology- latest CBC reviewed.  Transfuse for hemoglobin less than 8.     ID-pleated antibiotic regimen.  On oseltamivir  Endrocrinology- Maintain Blood sugar 140 -180     DVT prophylaxis-continue               Vent Settings          Resp Rate (Set): 24  Pressure Support (cm H2O): 8 cm H20  FiO2 (%): 60 %  PEEP/CPAP (cm H2O): 12 cm H20    Minute Ventilation (L/min) (Obs): 13.1 L/min  Resp Rate (Observed) Vent: 27     I:E Ratio (Obs): 1:2.2    PIP Observed (cm H2O): 28 cm H2O           I have personally reviewed x-ray chest, labs, medication list.      Critical Care time spent in direct patient care: 35 minutes (excluding procedure time, if applicable) including high complexity decision making to assess, manipulate, and support vital organ system failure in this individual who has impairment of one or more vital organ systems such that there is a high probability of imminent or life threatening deterioration in the patient’s condition.  Patient is critically ill and is at higher risk for further mortality/morbidity. Continue ICU care .      Donavan Hernandez MD  02/17/25  10:36 EST

## 2025-02-17 NOTE — PROGRESS NOTES
"NEPHROLOGY PROGRESS NOTE      INTERVAL HISTORY:    02/17/2025 patient still on ventilator urine output is picking up blood pressure stable FiO2 is 35%        Intake/Output                         02/15/25 0701 - 02/16/25 0700 02/16/25 0701 - 02/17/25 0700     1344-2719 1207-0288 Total 9933-6775 9652-2977 Total                 Intake    I.V.  100.9  363.4 464.3  523.5  1006.2 1529.7    Other  50  80 130  100  80 180    Intake (mL) (Rectal Tube With balloon) 50 50 100 100 50 150    Flush/ Irrigation Intake (mL) (NG/OG Tube Orogastric 16 Fr Left mouth) -- 30 30 0 30 30    NG/GT  --  0 0  105  301 406    Total Intake 150.9 443.4 594.3 728.5 1387.2 2115.7       Output    Urine  150  300 450  400  350 750    Emesis/NG output  200  30 230  0  0 0    Stool  50  50 100  50  100 150    Dialysis  3000  -- 3000  --  -- --    Total Output 3400 380 3780 450 450 900             VITAL SIGNS :     Temp:  [96 °F (35.6 °C)-101 °F (38.3 °C)] 97.9 °F (36.6 °C)  Heart Rate:  [] 92  Resp:  [21-31] 30  BP: ()/(60-88) 128/77  FiO2 (%):  [60 %] 60 %    02/17/2025 examined and reviewed    PHYSICAL EXAMINATION:    GENERAL EXAM  Laying in bed, intubated  Comfortable on mechanical ventilation    MENTAL STATUS EXAM  Sedated    NECK EXAM  JVP is not elevated, carotid exam normal    CARDIOVASCULAR EXAM  Regular rate and rhythm, no murmurs noted, no added heart sounds, normal apical pulsation  no edema in the lower extremities      MUSCULOSKELETAL EXAM  No cyanosis or clubbing noted in the digits    RESPIRATORY EXAM  Lungs clear to auscultation bilaterally, respiratory effort normal    ABDOMINAL EXAM  Abdomen soft  normal bowel sounds    SKIN EXAM  No new rashes      Laboratory Data :     Albumin No results found for: \"ALBUMIN\"     Magnesium No results found for: \"MG\"         PTH               No results found for: \"PTH\"    CBC and coagulation:  Results from last 7 days   Lab Units 02/16/25  2234 02/15/25  1041 02/15/25  0729 " 02/14/25 0331 02/13/25  0157 02/12/25  0412 02/11/25  0234 02/10/25  1024 02/10/25  0742   PROCALCITONIN ng/mL  --   --   --   --   --  6.34* 9.49*  --   --    LACTATE mmol/L  --   --   --   --  0.9  --   --   --   --    WBC 10*3/mm3 18.19*  --  10.52 8.19  --   --  18.87*   < >  --    HEMOGLOBIN g/dL 9.4*  --  10.3* 11.2*  --   --  11.9*   < >  --    HEMATOCRIT % 29.8*  --  31.9* 33.5*  --   --  36.1*   < >  --    MCV fL 103.5*  --  99.4* 97.4*  --   --  98.9*   < >  --    MCHC g/dL 31.5  --  32.3 33.4  --   --  33.0   < >  --    PLATELETS 10*3/mm3 190  --  155 142  --   --  144   < >  --    INR   --  0.98  --   --   --   --   --   --  1.16*   D DIMER QUANT MCGFEU/mL  --  10.03*  --   --   --   --   --   --   --     < > = values in this interval not displayed.     Acid/base balance:  Results from last 7 days   Lab Units 02/15/25  1430   PH, ARTERIAL pH units 7.386   PO2 ART mm Hg 90.8   PCO2, ARTERIAL mm Hg 42.9   HCO3 ART mmol/L 25.7     Renal and electrolytes:    Results from last 7 days   Lab Units 02/16/25 2234 02/16/25 0156 02/15/25  0416 02/14/25  0955 02/14/25  0331 02/13/25  1150 02/13/25  0157   SODIUM mmol/L 136 139 139  --  139  --  140   POTASSIUM mmol/L 4.8 4.1 3.9   < > 3.1*  3.1*   < > 3.4*  3.4*   CHLORIDE mmol/L 93* 99 97*  --  95*  --  96*   CO2 mmol/L 21.4* 22.2 25.0  --  26.6  --  24.8   BUN mg/dL 96* 77* 83*  --  62*  --  73*   CREATININE mg/dL 8.65* 7.66* 8.49*  --  7.57*  --  9.19*   CALCIUM mg/dL 7.6* 6.9* 7.5*  --  7.5*  --  7.0*   PHOSPHORUS mg/dL  --   --   --   --   --   --  6.7*    < > = values in this interval not displayed.     Estimated Creatinine Clearance: 10.6 mL/min (A) (by C-G formula based on SCr of 8.65 mg/dL (H)).  @GFRCG:3@   Liver and pancreatic function:  Results from last 7 days   Lab Units 02/15/25  0416 02/13/25  0157   ALBUMIN g/dL  --  3.0*   BILIRUBIN mg/dL  --  0.5   ALK PHOS U/L  --  50   AST (SGOT) U/L  --  72*   ALT (SGPT) U/L  --  37   AMMONIA umol/L 23 46          Cardiac:      Liver and pancreatic function:  Results from last 7 days   Lab Units 02/15/25  0416 02/13/25  0157   ALBUMIN g/dL  --  3.0*   BILIRUBIN mg/dL  --  0.5   ALK PHOS U/L  --  50   AST (SGOT) U/L  --  72*   ALT (SGPT) U/L  --  37   AMMONIA umol/L 23 46         CLINICAL DATA REVIEW  CBC, Renal functions, Serum electrolytes, Acid base labs reviewed 1  Telemetry was reviewed and demonstrated sinus rhythm rate   Discussed the labs with Dr. Irby    MEDICINES:     amiodarone, 400 mg, Oral, Q24H   Followed by  [START ON 2/28/2025] amiodarone, 200 mg, Oral, Q24H  cetirizine, 5 mg, Oral, Daily  chlorhexidine, 15 mL, Mouth/Throat, Q12H  finasteride, 5 mg, Oral, Daily  folic acid, 1 mg, Oral, Daily  insulin regular, 2-7 Units, Subcutaneous, Q6H  ipratropium-albuterol, 3 mL, Nebulization, 4x Daily - RT  midodrine, 15 mg, Oral, TID AC  oseltamivir, 30 mg, Oral, Once per day on Monday Wednesday Friday  pantoprazole, 40 mg, Intravenous, BID AC  [Held by provider] rosuvastatin, 10 mg, Oral, Nightly  sodium chloride, 10 mL, Intravenous, Q12H  sodium chloride, 10 mL, Intravenous, Q12H  sodium chloride, 10 mL, Intravenous, Q12H  sodium chloride, 10 mL, Intravenous, Q12H  sodium chloride, 10 mL, Intravenous, Q12H  thiamine, 200 mg, Per G Tube, Daily  vitamin B-12, 1,000 mcg, Oral, Daily      dexmedetomidine, 0.2-1.5 mcg/kg/hr (Dosing Weight), Last Rate: 1.5 mcg/kg/hr (02/17/25 0255)  heparin, 17.8 Units/kg/hr (Dosing Weight), Last Rate: Stopped (02/16/25 2312)  ketamine, 0.06-2.5 mg/kg/hr (Dosing Weight), Last Rate: 0.36 mg/kg/hr (02/16/25 2015)  Pharmacy to Dose Heparin,   phenylephrine, 0.5-3 mcg/kg/min (Dosing Weight), Last Rate: Stopped (02/16/25 0910)          Assessment & Plan     02/17/2025 reviewed and updated    -Acute kidney injury  - Chronic kidney disease unspecified stage  - Acute hypercapnic hypoxic respiratory failure  - Acute metabolic acidosis  - Severe sepsis with septic shock  -  Rhabdomyolysis  - Paroxysmal atrial fibrillation    02/16/2025   Dr Bates  patient remains oliguric with no signs of renal recovery, will continue watch and monitor closely but will keep on intermittent dialysis for now  Next dialysis is going to be on Tuesday but we will reassess tomorrow  Acute kidney injury most likely due to acute tubular necrosis in settings shock  Had ASHLEY in October 2024 with creatinine peaked to 4 with some improvement close to 2  - As needed potassium replacement  - Monitor for renal recovery closely  - Strict intake and output record.  -Patient remains very high risk  - Please avoid any nephrotoxic agents, hypotension and adjust medications according to estimated GFR.   REVIEWED NOTES OF CLINICAL TEAMS INVOLVED WITH PATIENT CARE.  DISCUSSED IN DETAIL WITH PATIENT AND/OR FAMILY ABOUT CURRENT CLINICAL CONDITION AND RESPONSE TO ONGOING MANAGEMENT.   DISCUSSED IN DETAIL WITH PATIENT AND/OR FAMILY ABOUT RISKS ASSOCIATED WITH CURRENT CLINICAL CONDITION AND RISK INVOLVED WITH CURRENT MANAGEMENT.   DISCUSSED IN DETAIL WITH HOSPITALIST  CODE STATUS REVIEWED,     02/17/2025  Patient's urine output is picking up now creatinine is still 8.65 we will continue to watch urine output we will hold dialysis today and do dialysis in the morning if needed  Potassium is stable  Strict urine output record please    Discussed with RN    Prognosis critical    Reviewed hospitalist notes  Reviewed consultant notes  Reviewed the labs  Reviewed radiology    Please avoid nephrotoxic medication and pharmacy to adjust the medication according to the GFR.    Plan of care was discussed with the family patient understood, verbalized, agreed.      RIKY Wick MD  02/17/25  07:18 EST

## 2025-02-17 NOTE — PROGRESS NOTES
"Palliative Care Daily Progress Note     S: Medical record reviewed, followed up with Primary RN Gregorio and Dr Blankenship regarding patient's condition. When I saw Phan today he was sedated on ventilator at 50/12 peep. He was sedated on Ketamine, Precedex. Nephew at bedside      O:   Palliative Performance Scale Score:     /73   Pulse 105   Temp 100.1 °F (37.8 °C) (Esophageal)   Resp (!) 32   Ht 182.9 cm (72\")   Wt 113 kg (248 lb 3.8 oz)   SpO2 97%   BMI 33.67 kg/m²     Intake/Output Summary (Last 24 hours) at 2/17/2025 1723  Last data filed at 2/17/2025 1625  Gross per 24 hour   Intake 1387.21 ml   Output 925 ml   Net 462.21 ml       PE:  General Appearance:    Chronically ill appearing, intubated and sedated, NAD   HEENT:    NC/AT, without obvious abnormality, EOMI, anicteric    Neck:   supple, trachea midline, no JVD   Lungs:     Sedated off vent at 50% and 12 of peep, (on breathing trial)diminished breath sounds, coarse breath sounds    Heart:    Irregular rate/rhythm, no M/R/G   Abdomen:     Soft, NT, ND, NABS    Extremities:   Moves all extremities weakly , trace bilateral lower extremity edema   Pulses:   Pulses palpable and equal bilaterally   Skin:   Warm, dry   Neurologic: Sedated on vent   Psych: Sedated on vent                Meds: Reviewed and changes noted    Labs:   Results from last 7 days   Lab Units 02/16/25 2234   WBC 10*3/mm3 18.19*   HEMOGLOBIN g/dL 9.4*   HEMATOCRIT % 29.8*   PLATELETS 10*3/mm3 190     Results from last 7 days   Lab Units 02/16/25  2234   SODIUM mmol/L 136   POTASSIUM mmol/L 4.8   CHLORIDE mmol/L 93*   CO2 mmol/L 21.4*   BUN mg/dL 96*   CREATININE mg/dL 8.65*   GLUCOSE mg/dL 132*   CALCIUM mg/dL 7.6*     Results from last 7 days   Lab Units 02/16/25  2234 02/13/25  1150 02/13/25  0157   SODIUM mmol/L 136   < > 140   POTASSIUM mmol/L 4.8   < > 3.4*  3.4*   CHLORIDE mmol/L 93*   < > 96*   CO2 mmol/L 21.4*   < > 24.8   BUN mg/dL 96*   < > 73*   CREATININE mg/dL 8.65*   < > " 9.19*   CALCIUM mg/dL 7.6*   < > 7.0*   BILIRUBIN mg/dL  --   --  0.5   ALK PHOS U/L  --   --  50   ALT (SGPT) U/L  --   --  37   AST (SGOT) U/L  --   --  72*   GLUCOSE mg/dL 132*   < > 308*    < > = values in this interval not displayed.     Imaging Results (Last 72 Hours)       Procedure Component Value Units Date/Time    XR Chest 1 View [[id number]] Collected: 02/17/25 1652     Updated: 02/17/25 1655    Narrative:      EXAM:    XR Chest, 1 View     EXAM DATE:    2/17/2025 4:48 PM     CLINICAL HISTORY:    SIRS; A41.9-Sepsis, unspecified organism; N17.9-Acute kidney failure,  unspecified; R79.89-Other specified abnormal findings of blood  chemistry; J18.9-Pneumonia, unspecified organism     TECHNIQUE:    Frontal view of the chest.     COMPARISON:    2/16/2025     FINDINGS:    Lungs and pleural spaces:  Improvement in right perihilar airspace  disease.  Increase in left perihilar airspace disease.  No  consolidation.  No pneumothorax.    Heart:  Unremarkable.  No cardiomegaly.    Mediastinum:  Unremarkable.  Normal mediastinal contour.    Bones/joints:  Unremarkable.  No acute fracture.    Tubes, lines and devices:  Right IJ deep line positioning is stable.   ET tube positioning is stable.  NG tube extends into the stomach region.   Left IJ deep line positioning is stable.       Impression:      1.  Improvement in right perihilar airspace disease.  2.  Increase in left perihilar airspace disease.  3. Support devices as above.     This report was finalized on 2/17/2025 4:53 PM by Dr. Jhoan Farias MD.       CT Chest Without Contrast Diagnostic [[id number]] Collected: 02/16/25 1309     Updated: 02/16/25 1312    Narrative:      EXAM: CT CHEST WO CONTRAST DIAGNOSTIC-      CLINICAL INDICATION:vomiting, r/o aspiration; A41.9-Sepsis, unspecified  organism; N17.9-Acute kidney failure, unspecified; R79.89-Other  specified abnormal findings of blood chemistry; J18.9-Pneumonia,  unspecified organism      COMPARISON: 2/13/2025      TECHNIQUE: Multiple axial CT images were obtained from lung apex through  upper abdomen without the administration of IV contrast. Reformatted  images in the coronal and/or sagittal plane(s) were generated from the  axial data set to facilitate diagnostic accuracy and/or surgical  planning.     Radiation dose reduction techniques were utilized per ALARA protocol.  Automated exposure control was initiated through either or [employer] or  DoseRight software packages by  protocol.    DOSE (DLP mGy-cm):        FINDINGS:     LUNGS: Bibasilar consolidation, worse on the right than on the left     HEART: Mild coronary artery calcifications     MEDIASTINUM: No masses. No enlarged lymph nodes.  No fluid collections.     PLEURA: Small bibasilar pleural effusions     VASCULATURE: No evidence of aneurysm.     BONES: No acute bony abnormality.     VISUALIZED UPPER ABDOMEN: Please see the CT report for the abdomen and  pelvis     Other: None.       Impression:      Trace bibasilar effusions and bibasilar consolidation                 This report was finalized on 2/16/2025 1:10 PM by Dr. Tripp Berrios MD.       CT Head Without Contrast [[id number]] Collected: 02/16/25 1308     Updated: 02/16/25 1311    Narrative:      CT HEAD WO CONTRAST-     CLINICAL INDICATION: poor mentation off sedation; A41.9-Sepsis,  unspecified organism; N17.9-Acute kidney failure, unspecified;  R79.89-Other specified abnormal findings of blood chemistry;  J18.9-Pneumonia, unspecified organism        COMPARISON: 2/13/2025     TECHNIQUE: Axial images of the brain were obtained with out intravenous  contrast.  Reformatted images were created in the sagittal and coronal  planes.     DOSE:     Radiation dose reduction techniques were utilized per ALARA protocol.  Automated exposure control was initiated through either or [employer] or  DoseRight software packages by  protocol.        FINDINGS:    BRAIN:  Unremarkable.  No hemorrhage.  No  significant white matter  disease.  No edema.       VENTRICLES:  Unremarkable.  No ventriculomegaly.       BONES/JOINTS:  Unremarkable.  No acute fracture.       SOFT TISSUES:  Unremarkable.       SINUSES:  no air fluid levels       MASTOID AIR CELLS:  Unremarkable as visualized.  No mastoid effusion.          Impression:         1. No parenchymal mass, hemorrhage, or midline shift     This report was finalized on 2/16/2025 1:09 PM by Dr. Tripp Berrios MD.       CT Abdomen Pelvis Without Contrast [[id number]] Collected: 02/16/25 1305     Updated: 02/16/25 1310    Narrative:      EXAM: CT ABDOMEN PELVIS WO CONTRAST-         TECHNIQUE: Multiple axial CT images were obtained from lung bases  through pubic symphysis WITHOUT administration of IV contrast.  Reformatted images in the coronal and/or sagittal plane(s) were  generated from the axial data set to facilitate diagnostic accuracy  and/or surgical planning.  Oral Contrast:NONE.     Radiation dose reduction techniques were utilized per ALARA protocol.  Automated exposure control was initiated through either or [employer] or  DoseRight software packages by  protocol.    DOSE:     Clinical information emesis; A41.9-Sepsis, unspecified organism;  N17.9-Acute kidney failure, unspecified; R79.89-Other specified abnormal  findings of blood chemistry; J18.9-Pneumonia, unspecified organism      Comparison 2/13/2025     FINDINGS:     Lower thorax: Bilateral consolidation, right worse than left     Abdomen:     Liver: Homogeneous. No focal hepatic mass or ductal dilatation.     Gallbladder: Cholelithiasis     Pancreas: Unremarkable. No mass or ductal dilatation.     Spleen: Homogeneous. No splenomegaly.     Adrenals: No mass.     Kidneys/ureters: Mild perinephric stranding bilaterally but no  obstructive uropathy. Right renal cyst     GI tract: Non-dilated. No definite wall thickening.. There is no  evidence of appendicitis     MESENTERY: Small amount of free fluid  in the abdomen and pelvis     Vasculature: Evidence of atherosclerotic vascular disease     Abdominal wall: No focal hernia or mass.        Bladder: Collazo catheter is present     Reproductive: Unremarkable as visualized     Bones: No acute bony abnormality.       Impression:         1. Small amount of free fluid in the abdomen and pelvis     2. Cholelithiasis     3. Bibasilar consolidation.                          This report was finalized on 2/16/2025 1:08 PM by Dr. Tripp Berrios MD.       XR Chest 1 View [[id number]] Collected: 02/16/25 1202     Updated: 02/16/25 1205    Narrative:      XR CHEST 1 VW-     CLINICAL INDICATION: Worsening hypoxia; A41.9-Sepsis, unspecified  organism; N17.9-Acute kidney failure, unspecified; R79.89-Other  specified abnormal findings of blood chemistry; J18.9-Pneumonia,  unspecified organism        COMPARISON: 2/15/2025     TECHNIQUE: Single frontal view of the chest.     FINDINGS:     LUNGS: Mild right-sided airspace disease     HEART AND MEDIASTINUM: Heart and mediastinal contours are unremarkable        SKELETON: Bony and soft tissue structures are unremarkable.             Impression:      Mild right-sided airspace disease.  No interval line change           This report was finalized on 2/16/2025 12:03 PM by Dr. Tripp Berrios MD.       XR Chest 1 View [[id number]] Collected: 02/15/25 1457     Updated: 02/15/25 1501    Narrative:      PROCEDURE: Portable chest x-ray examination performed on February 15,  2025. Single view. Upright position.     HISTORY: Breast.     COMPARISON: None.     FINDINGS:     Normal heart size.  Coarsened bronchovascular pattern to the lungs  Mild central pulmonary vascular congestion with edema.  No pleural effusion. No pneumothorax  Endotracheal tube in place with tip by 8.9 cm above the lissette.  Right and left central neck vascular catheters with tips terminating at  the SVC.  Enteric drain in place with tip to the stomach.  Hypoinflated lungs.  No  pneumothorax.       Impression:         Normal heart size.  Central pulmonary vascular congestion with edema.  No pleural effusion or pneumothorax  Satisfactory position of the tubes and lines.  No pneumothorax.        This report was finalized on 2/15/2025 2:59 PM by Scott Campbell MD.                 Diagnostics: Reviewed    A: Phan Daniels is a 69 y.o. male  admitted on 2/8/2025 due to respiratory distress. Phan has a medical history of afib?, COPD, HTN, HLD, GERD, ETOH abuse who presented after being found unresponsive. Per family at bedside Phan resides in [place] however recently came to [place] to stay with his new girlfriend. Also per noted he drank a 1/5 of liquor/day, and has had multiple recent hospital stays for pneumonia per family. EMS was called to the home and Phan was found unresponsive with no one else in the home, Phan apparently had dried feces and appeared to have been down for some time. Phan was intubated upon arrival to emergency department and was also in shock and started on levophed. Patient admitted to CCU on vent at FiO2 80% and PEEP of 10. This morning when I saw Phan he was on vent at 65% and 10 of peep on propofol, fentanyl, heparin , bicarb and phenylephrine. Palliative care consulted for GOC/ACP and support for pt and family.       P:  Palliative was able to touch base with Phan's nephew Kunal at bedside as well as four of his sibling to set up family meeting conference call for 10:30 am with Dr Blankenship and palliative care.  We let Dr Blankenship know of time as well as GREGORIO Perkins.      We will continue to follow along. Please do not hesitate to contact us regarding further sx mgmt or GOC needs, including after hours or on weekends via our on call provider at [phone].     Lynnette Campa, APRN    2/17/2025

## 2025-02-17 NOTE — NURSING NOTE
Assessed patient with GREGORIO Moraes; pt intubated - mouth/lips are clean, dry and intact, no open areas noted/no pressure injuries visualized.    Coccyx fissure re-assessed per RN request - fissure remains open, non-blanching red base with a dry, intact blanching wild wound. The right upper gluteal/coccyx is non-blanching, mild MASD noted. Continue current wound care treatment orders. Bilateral heels are red, blanching and intact.

## 2025-02-18 NOTE — PLAN OF CARE
Problem: Adult Inpatient Plan of Care  Goal: Plan of Care Review  Outcome: Not Progressing  Goal: Patient-Specific Goal (Individualized)  Outcome: Not Progressing  Goal: Absence of Hospital-Acquired Illness or Injury  Outcome: Not Progressing  Intervention: Identify and Manage Fall Risk  Recent Flowsheet Documentation  Taken 2/18/2025 0100 by Anahi Blancas RN  Safety Promotion/Fall Prevention: safety round/check completed  Taken 2/18/2025 0000 by Anahi Blancas RN  Safety Promotion/Fall Prevention: safety round/check completed  Taken 2/17/2025 2300 by Anahi Blancas RN  Safety Promotion/Fall Prevention: safety round/check completed  Taken 2/17/2025 2200 by Anahi Blancas RN  Safety Promotion/Fall Prevention: safety round/check completed  Taken 2/17/2025 2100 by Anahi Blancas RN  Safety Promotion/Fall Prevention: safety round/check completed  Taken 2/17/2025 2000 by Anahi Blancas RN  Safety Promotion/Fall Prevention: safety round/check completed  Taken 2/17/2025 1900 by Anahi Blancas RN  Safety Promotion/Fall Prevention: safety round/check completed  Intervention: Prevent Skin Injury  Recent Flowsheet Documentation  Taken 2/18/2025 0000 by Anahi Blancas RN  Body Position:   side-lying   turned   right  Taken 2/17/2025 2200 by Anahi Blancas RN  Body Position:   left   turned   side-lying  Taken 2/17/2025 2000 by Anahi Blancas RN  Body Position:   turned   side-lying   right  Skin Protection: incontinence pads utilized  Intervention: Prevent and Manage VTE (Venous Thromboembolism) Risk  Recent Flowsheet Documentation  Taken 2/17/2025 2000 by Anahi Blancas RN  VTE Prevention/Management: (SEE MAR)   SCDs (sequential compression devices) off   other (see comments)  Goal: Optimal Comfort and Wellbeing  Outcome: Not Progressing  Intervention: Provide Person-Centered Care  Recent Flowsheet Documentation  Taken 2/17/2025 2000 by Anahi Blancas RN  Trust Relationship/Rapport:   care explained   choices  provided   emotional support provided  Goal: Readiness for Transition of Care  Outcome: Not Progressing     Problem: Violence Risk or Actual  Goal: Anger and Impulse Control  Outcome: Not Progressing  Intervention: Minimize Safety Risk  Recent Flowsheet Documentation  Taken 2/18/2025 0100 by Anahi Blancas RN  Enhanced Safety Measures: bed alarm set  Taken 2/18/2025 0000 by Anahi Blancas RN  Enhanced Safety Measures: bed alarm set  Taken 2/17/2025 2300 by Anahi Blancas RN  Enhanced Safety Measures: bed alarm set  Taken 2/17/2025 2200 by Anahi Blancas RN  Enhanced Safety Measures: bed alarm set  Taken 2/17/2025 2100 by Anahi Blancas RN  Enhanced Safety Measures: bed alarm set  Taken 2/17/2025 2000 by Anahi Blancas RN  Enhanced Safety Measures: bed alarm set  Taken 2/17/2025 1900 by Anahi Blancas RN  Enhanced Safety Measures: bed alarm set     Problem: Fall Injury Risk  Goal: Absence of Fall and Fall-Related Injury  Outcome: Not Progressing  Intervention: Identify and Manage Contributors  Recent Flowsheet Documentation  Taken 2/18/2025 0100 by Anahi Blancas RN  Medication Review/Management: medications reviewed  Taken 2/18/2025 0000 by Anahi Blancas RN  Medication Review/Management: medications reviewed  Taken 2/17/2025 2300 by Anahi Blancas RN  Medication Review/Management: medications reviewed  Taken 2/17/2025 2200 by Anahi Blancas RN  Medication Review/Management: medications reviewed  Taken 2/17/2025 2100 by Anahi Blancas RN  Medication Review/Management: medications reviewed  Taken 2/17/2025 2000 by Anahi Blancas RN  Medication Review/Management: medications reviewed  Taken 2/17/2025 1900 by Anahi Blancas RN  Medication Review/Management: medications reviewed  Intervention: Promote Injury-Free Environment  Recent Flowsheet Documentation  Taken 2/18/2025 0100 by Anahi Blancas RN  Safety Promotion/Fall Prevention: safety round/check completed  Taken 2/18/2025 0000 by Anahi Blancas  RN  Safety Promotion/Fall Prevention: safety round/check completed  Taken 2/17/2025 2300 by Anahi Blancas RN  Safety Promotion/Fall Prevention: safety round/check completed  Taken 2/17/2025 2200 by Anahi Blancas RN  Safety Promotion/Fall Prevention: safety round/check completed  Taken 2/17/2025 2100 by Anahi Blancas RN  Safety Promotion/Fall Prevention: safety round/check completed  Taken 2/17/2025 2000 by Anahi Blancas RN  Safety Promotion/Fall Prevention: safety round/check completed  Taken 2/17/2025 1900 by Anahi Blancas RN  Safety Promotion/Fall Prevention: safety round/check completed     Problem: Skin Injury Risk Increased  Goal: Skin Health and Integrity  Outcome: Not Progressing  Intervention: Optimize Skin Protection  Recent Flowsheet Documentation  Taken 2/18/2025 0000 by Anahi Blancas RN  Head of Bed (HOB) Positioning: HOB at 30-45 degrees  Taken 2/17/2025 2200 by Anahi Blancas RN  Head of Bed (HOB) Positioning: HOB at 30-45 degrees  Taken 2/17/2025 2000 by Anahi Blancas RN  Pressure Reduction Techniques: weight shift assistance provided  Head of Bed (HOB) Positioning: HOB at 30-45 degrees  Pressure Reduction Devices: positioning supports utilized  Skin Protection: incontinence pads utilized     Problem: Comorbidity Management  Goal: Maintenance of COPD Symptom Control  Outcome: Not Progressing  Intervention: Maintain COPD (Chronic Obstructive Pulmonary Disease) Symptom Control  Recent Flowsheet Documentation  Taken 2/18/2025 0100 by Anahi Blancas RN  Medication Review/Management: medications reviewed  Taken 2/18/2025 0000 by Anahi Blancas RN  Medication Review/Management: medications reviewed  Taken 2/17/2025 2300 by Anahi Blancas RN  Medication Review/Management: medications reviewed  Taken 2/17/2025 2200 by Anahi Blancas RN  Medication Review/Management: medications reviewed  Taken 2/17/2025 2100 by Anahi Blancas RN  Medication Review/Management: medications reviewed  Taken  2/17/2025 2000 by Anahi Blancas RN  Medication Review/Management: medications reviewed  Taken 2/17/2025 1900 by Anahi Blancas RN  Medication Review/Management: medications reviewed     Problem: Sepsis/Septic Shock  Goal: Optimal Coping  Outcome: Not Progressing  Intervention: Support Patient and Family Response  Recent Flowsheet Documentation  Taken 2/17/2025 2000 by Anahi Blancas RN  Family/Support System Care: support provided  Goal: Absence of Bleeding  Outcome: Not Progressing  Goal: Blood Glucose Level Within Target Range  Outcome: Not Progressing  Intervention: Optimize Glycemic Control  Recent Flowsheet Documentation  Taken 2/17/2025 2000 by Anahi Blancas RN  Hyperglycemia Management: blood glucose monitored  Hypoglycemia Management: glucagon given  Goal: Absence of Infection Signs and Symptoms  Outcome: Not Progressing  Intervention: Promote Stabilization  Recent Flowsheet Documentation  Taken 2/17/2025 2000 by Anahi Blancas RN  Fever Reduction/Comfort Measures: lightweight bedding  Intervention: Promote Recovery  Recent Flowsheet Documentation  Taken 2/17/2025 2000 by Anahi Blancas RN  Sleep/Rest Enhancement: consistent schedule promoted  Goal: Optimal Nutrition Delivery  Outcome: Not Progressing     Problem: Mechanical Ventilation Invasive  Goal: Effective Communication  Outcome: Not Progressing  Intervention: Ensure Effective Communication  Recent Flowsheet Documentation  Taken 2/17/2025 2000 by Anahi Blancas RN  Trust Relationship/Rapport:   care explained   choices provided   emotional support provided  Diversional Activities: television  Family/Support System Care: support provided  Goal: Optimal Device Function  Outcome: Not Progressing  Intervention: Optimize Device Care and Function  Recent Flowsheet Documentation  Taken 2/18/2025 0000 by Anahi Blancas RN  Oral Care: swabbed with antiseptic solution  Taken 2/17/2025 2000 by Anahi Blancas RN  Oral Care: swabbed with antiseptic  solution  Goal: Mechanical Ventilation Liberation  Outcome: Not Progressing  Intervention: Promote Extubation and Mechanical Ventilation Liberation  Recent Flowsheet Documentation  Taken 2/18/2025 0100 by Anahi Blancas RN  Medication Review/Management: medications reviewed  Taken 2/18/2025 0000 by Anahi Blancas RN  Medication Review/Management: medications reviewed  Taken 2/17/2025 2300 by Anahi Blancas RN  Medication Review/Management: medications reviewed  Taken 2/17/2025 2200 by Anahi Blancas RN  Medication Review/Management: medications reviewed  Taken 2/17/2025 2100 by Anahi Blancas RN  Medication Review/Management: medications reviewed  Taken 2/17/2025 2000 by Anahi Blancas RN  Sleep/Rest Enhancement: consistent schedule promoted  Medication Review/Management: medications reviewed  Taken 2/17/2025 1900 by Anahi Blancas RN  Medication Review/Management: medications reviewed  Goal: Optimal Nutrition Delivery  Outcome: Not Progressing  Goal: Absence of Device-Related Skin and Tissue Injury  Outcome: Not Progressing  Intervention: Maintain Skin and Tissue Health  Recent Flowsheet Documentation  Taken 2/17/2025 2000 by Anahi Blancas RN  Device Skin Pressure Protection: adhesive use limited  Goal: Absence of Ventilator-Induced Lung Injury  Outcome: Not Progressing  Intervention: Prevent Ventilator-Associated Pneumonia  Recent Flowsheet Documentation  Taken 2/18/2025 0000 by Anahi Blancas RN  Head of Bed ([facility] [place]) Positioning: HOB at 30-45 degrees  Oral Care: swabbed with antiseptic solution  Taken 2/17/2025 2200 by Anahi Blancas RN  Head of Bed ([facility] [place]) Positioning: HOB at 30-45 degrees  Taken 2/17/2025 2000 by Anahi Blancas RN  Head of Bed ([facility] [place]) Positioning: HOB at 30-45 degrees  VAP Prevention Measures: completed  Oral Care: swabbed with antiseptic solution   Goal Outcome Evaluation:

## 2025-02-18 NOTE — PROGRESS NOTES
"NEPHROLOGY PROGRESS NOTE      INTERVAL HISTORY:    02/17/2025 patient still on ventilator urine output is picking up blood pressure stable FiO2 is 35%    02/18/2025 patient still on ventilator family to decide further care    Intake/Output                         02/16/25 0701 - 02/17/25 0700 02/17/25 0701 - 02/18/25 0700     [id number] 0243-4226 Total 7637-1629 7208-7509 Total                 Intake    I.V.  523.5  1006.2 1529.7  885.4  1258.9 2144.3    Other  100  80 180  50  100 150    Intake (mL) (Rectal Tube With balloon) 100 50 150 -- 50 50    Flush/ Irrigation Intake (mL) (NG/OG Tube Orogastric 16 Fr Left mouth) 0 30 30 50 50 100    NG/GT  105  301 406  320  341 661    Total Intake 728.5 1387.2 2115.7 1255.4 1699.9 2955.3       Output    Urine  400  350 750  875  625 1500    Emesis/NG output  0  0 0  --  0 0    Stool  50  100 150  --  100 100    Total Output 450 450 900              VITAL SIGNS :     Temp:  [97.3 °F (36.3 °C)-100.8 °F (38.2 °C)] 98.2 °F (36.8 °C)  Heart Rate:  [] 101  Resp:  [17-32] 23  BP: (111-139)/(69-88) 132/86  FiO2 (%):  [50 %-60 %] 50 %    02/17/2025 examined and reviewed  02/18/2025 examined and reviewed    PHYSICAL EXAMINATION:    GENERAL EXAM  Laying in bed, intubated  Comfortable on mechanical ventilation    MENTAL STATUS EXAM  Sedated    NECK EXAM  JVP is not elevated, carotid exam normal    CARDIOVASCULAR EXAM  Regular rate and rhythm, no murmurs noted, no added heart sounds, normal apical pulsation  no edema in the lower extremities      MUSCULOSKELETAL EXAM  No cyanosis or clubbing noted in the digits    RESPIRATORY EXAM  Lungs clear to auscultation bilaterally, respiratory effort normal    ABDOMINAL EXAM  Abdomen soft  normal bowel sounds    SKIN EXAM  No new rashes      Laboratory Data :     Albumin Albumin   Date Value Ref Range Status   02/18/2025 2.4 (L) 3.5 - 5.2 g/dL Final        Magnesium No results found for: \"MG\"         PTH               No results " "found for: \"PTH\"    CBC and coagulation:  Results from last 7 days   Lab Units 02/18/25  0159 02/16/25  2234 02/15/25  1041 02/15/25  0729 02/14/25  0331 02/13/25  0157 02/12/25  0412   PROCALCITONIN ng/mL  --   --   --   --   --   --  6.34*   LACTATE mmol/L  --   --   --   --   --  0.9  --    WBC 10*3/mm3 12.76* 18.19*  --  10.52   < >  --   --    HEMOGLOBIN g/dL 8.5* 9.4*  --  10.3*   < >  --   --    HEMATOCRIT % 27.0* 29.8*  --  31.9*   < >  --   --    MCV fL 102.7* 103.5*  --  99.4*   < >  --   --    MCHC g/dL 31.5 31.5  --  32.3   < >  --   --    PLATELETS 10*3/mm3 179 190  --  155   < >  --   --    INR   --   --  0.98  --   --   --   --    D DIMER QUANT MCGFEU/mL  --   --  10.03*  --   --   --   --     < > = values in this interval not displayed.     Acid/base balance:  Results from last 7 days   Lab Units 02/18/25  0433 02/17/25  1256 02/15/25  1430   PH, ARTERIAL pH units 7.348* 7.375 7.386   PO2 ART mm Hg 90.9 109.0* 90.8   PCO2, ARTERIAL mm Hg 42.2 40.2 42.9   HCO3 ART mmol/L 23.2 23.5 25.7     Renal and electrolytes:    Results from last 7 days   Lab Units 02/18/25  0159 02/16/25 2234 02/16/25  0156 02/15/25  0416 02/14/25  0955 02/14/25  0331 02/13/25  1150 02/13/25  0157   SODIUM mmol/L 141 136 139 139  --  139  --  140   POTASSIUM mmol/L 4.1 4.8 4.1 3.9   < > 3.1*  3.1*   < > 3.4*  3.4*   CHLORIDE mmol/L 98 93* 99 97*  --  95*  --  96*   CO2 mmol/L 22.2 21.4* 22.2 25.0  --  26.6  --  24.8   BUN mg/dL 111* 96* 77* 83*  --  62*  --  73*   CREATININE mg/dL 9.12* 8.65* 7.66* 8.49*  --  7.57*  --  9.19*   CALCIUM mg/dL 8.0* 7.6* 6.9* 7.5*  --  7.5*  --  7.0*   PHOSPHORUS mg/dL  --   --   --   --   --   --   --  6.7*    < > = values in this interval not displayed.     Estimated Creatinine Clearance: 10.1 mL/min (A) (by C-G formula based on SCr of 9.12 mg/dL (H)).  @GFRCG:3@   Liver and pancreatic function:  Results from last 7 days   Lab Units 02/18/25  0159 02/15/25  0416 02/13/25  0157   ALBUMIN g/dL " 2.4*  --  3.0*   BILIRUBIN mg/dL 0.6  --  0.5   ALK PHOS U/L 127*  --  50   AST (SGOT) U/L 57*  --  72*   ALT (SGPT) U/L 31  --  37   AMMONIA umol/L  --  23 46         Cardiac:      Liver and pancreatic function:  Results from last 7 days   Lab Units 02/18/25  0159 02/15/25  0416 02/13/25  0157   ALBUMIN g/dL 2.4*  --  3.0*   BILIRUBIN mg/dL 0.6  --  0.5   ALK PHOS U/L 127*  --  50   AST (SGOT) U/L 57*  --  72*   ALT (SGPT) U/L 31  --  37   AMMONIA umol/L  --  23 46         CLINICAL DATA REVIEW  CBC, Renal functions, Serum electrolytes, Acid base labs reviewed 1  Telemetry was reviewed and demonstrated sinus rhythm rate   Discussed the labs with Dr. Irby    MEDICINES:     amiodarone, 400 mg, Oral, Q24H   Followed by  [START ON 2/28/2025] amiodarone, 200 mg, Oral, Q24H  cetirizine, 5 mg, Oral, Daily  chlorhexidine, 15 mL, Mouth/Throat, Q12H  finasteride, 5 mg, Oral, Daily  folic acid, 1 mg, Oral, Daily  heparin (porcine), 5,000 Units, Subcutaneous, Q8H  insulin regular, 2-7 Units, Subcutaneous, Q6H  ipratropium-albuterol, 3 mL, Nebulization, 4x Daily - RT  midodrine, 15 mg, Oral, TID AC  oseltamivir, 30 mg, Oral, Once per day on Monday Wednesday Friday  pantoprazole, 40 mg, Intravenous, BID AC  [Held by provider] rosuvastatin, 10 mg, Oral, Nightly  sodium chloride, 10 mL, Intravenous, Q12H  sodium chloride, 10 mL, Intravenous, Q12H  sodium chloride, 10 mL, Intravenous, Q12H  sodium chloride, 10 mL, Intravenous, Q12H  sodium chloride, 10 mL, Intravenous, Q12H  thiamine, 200 mg, Per G Tube, Daily  vitamin B-12, 1,000 mcg, Oral, Daily      dexmedetomidine, 0.2-1.5 mcg/kg/hr (Dosing Weight), Last Rate: 1.5 mcg/kg/hr (02/18/25 0012)  ketamine, 0.06-2.5 mg/kg/hr (Dosing Weight), Last Rate: 0.76 mg/kg/hr (02/18/25 0609)  phenylephrine, 0.5-3 mcg/kg/min (Dosing Weight), Last Rate: Stopped (02/16/25 0910)          Assessment & Plan     02/17/2025 reviewed and updated  02/18/2025 reviewed and updated    -Acute kidney  injury  - Chronic kidney disease unspecified stage  - Acute hypercapnic hypoxic respiratory failure  - Acute metabolic acidosis  - Severe sepsis with septic shock  - Rhabdomyolysis  - Paroxysmal atrial fibrillation    02/16/2025   Dr Bates  patient remains oliguric with no signs of renal recovery, will continue watch and monitor closely but will keep on intermittent dialysis for now  Next dialysis is going to be on Tuesday but we will reassess tomorrow  Acute kidney injury most likely due to acute tubular necrosis in settings shock  Had ASHLEY in October 2024 with creatinine peaked to 4 with some improvement close to 2  - As needed potassium replacement  - Monitor for renal recovery closely  - Strict intake and output record.  -Patient remains very high risk  - Please avoid any nephrotoxic agents, hypotension and adjust medications according to estimated GFR.   REVIEWED NOTES OF CLINICAL TEAMS INVOLVED WITH PATIENT CARE.  DISCUSSED IN DETAIL WITH PATIENT AND/OR FAMILY ABOUT CURRENT CLINICAL CONDITION AND RESPONSE TO ONGOING MANAGEMENT.   DISCUSSED IN DETAIL WITH PATIENT AND/OR FAMILY ABOUT RISKS ASSOCIATED WITH CURRENT CLINICAL CONDITION AND RISK INVOLVED WITH CURRENT MANAGEMENT.   DISCUSSED IN DETAIL WITH HOSPITALIST  CODE STATUS REVIEWED,     02/17/2025  Patient's urine output is picking up now creatinine is still 8.65 we will continue to watch urine output we will hold dialysis today and do dialysis in the morning if needed  Potassium is stable  Strict urine output record please     02/18/2025  Patient's creatinine is 9 still urine output 450 cc still on ventilator family to decide about further care they will dialyze the patient if family decides to continue the current management      Discussed with RN    Prognosis critical    Reviewed hospitalist notes  Reviewed consultant notes  Reviewed the labs  Reviewed radiology    Please avoid nephrotoxic medication and pharmacy to adjust the medication according to the  GFR.          S Juve Wick MD  02/18/25  07:11 EST

## 2025-02-18 NOTE — PLAN OF CARE
Goal Outcome Evaluation:           Progress: no change      Problem: Adult Inpatient Plan of Care  Goal: Plan of Care Review  Outcome: Not Progressing  Flowsheets (Taken 2/18/2025 1736)  Progress: no change  Outcome Evaluation: Pt remains intubated. Ketamine stopped for SAT, precedex continues to infuse. Pt overbreathing vent and restless at times. Not following commands. VSS. Dialysis this evening. Alarms active.  Goal: Patient-Specific Goal (Individualized)  Outcome: Not Progressing  Goal: Absence of Hospital-Acquired Illness or Injury  Outcome: Not Progressing  Intervention: Identify and Manage Fall Risk  Recent Flowsheet Documentation  Taken 2/18/2025 1700 by Shaneka Acosta RN  Safety Promotion/Fall Prevention:   fall prevention program maintained   safety round/check completed  Taken 2/18/2025 1600 by Shaneka Acosta RN  Safety Promotion/Fall Prevention:   fall prevention program maintained   safety round/check completed  Taken 2/18/2025 1500 by Shaneka Acosta RN  Safety Promotion/Fall Prevention:   fall prevention program maintained   safety round/check completed  Taken 2/18/2025 1400 by Shaneka Acosta RN  Safety Promotion/Fall Prevention:   fall prevention program maintained   safety round/check completed  Taken 2/18/2025 1300 by Shaneka Acosta RN  Safety Promotion/Fall Prevention:   fall prevention program maintained   safety round/check completed  Taken 2/18/2025 1200 by Shaneka Acosta RN  Safety Promotion/Fall Prevention:   fall prevention program maintained   safety round/check completed  Taken 2/18/2025 1100 by Shaneka Acosta RN  Safety Promotion/Fall Prevention:   fall prevention program maintained   safety round/check completed  Taken 2/18/2025 1000 by Shaneka Acosta RN  Safety Promotion/Fall Prevention:   fall prevention program maintained   safety round/check completed  Taken 2/18/2025 0900 by Shaneka Acosta RN  Safety Promotion/Fall Prevention:   fall prevention program maintained   safety round/check completed  Taken  2/18/2025 0800 by Shaneka Acosta RN  Safety Promotion/Fall Prevention:   fall prevention program maintained   safety round/check completed  Taken 2/18/2025 0700 by Shaneka Acosta RN  Safety Promotion/Fall Prevention:   fall prevention program maintained   safety round/check completed  Intervention: Prevent Skin Injury  Recent Flowsheet Documentation  Taken 2/18/2025 1600 by Shaneka Acosta RN  Body Position:   turned   left   side-lying  Taken 2/18/2025 1400 by Shaneka Acosta RN  Body Position:   turned   right   side-lying  Skin Protection:   pulse oximeter probe site changed   silicone foam dressing in place   drying agents applied   skin sealant/moisture barrier applied  Taken 2/18/2025 1200 by Shaneka Acosta RN  Body Position:   turned   left   side-lying  Taken 2/18/2025 1000 by Shaneka Acosta RN  Body Position:   turned   right   side-lying  Taken 2/18/2025 0800 by Shaneka Acosta RN  Body Position:   turned   left   side-lying  Skin Protection:   incontinence pads utilized   pulse oximeter probe site changed   silicone foam dressing in place   skin sealant/moisture barrier applied  Intervention: Prevent and Manage VTE (Venous Thromboembolism) Risk  Recent Flowsheet Documentation  Taken 2/18/2025 1400 by Shaneka Acosta RN  VTE Prevention/Management: SCDs (sequential compression devices) on  Taken 2/18/2025 0830 by Shaneka Acosta RN  VTE Prevention/Management: SCDs (sequential compression devices) on  Taken 2/18/2025 0800 by Shaneka Acosta RN  VTE Prevention/Management: (no skin breakdown present from SCD's) SCDs (sequential compression devices) off  Goal: Optimal Comfort and Wellbeing  Outcome: Not Progressing  Intervention: Monitor Pain and Promote Comfort  Recent Flowsheet Documentation  Taken 2/18/2025 0941 by Shaneka Acosta RN  Pain Management Interventions:   care clustered   pain medication given   pillow support provided   position adjusted  Intervention: Provide Person-Centered Care  Recent Flowsheet Documentation  Taken  2/18/2025 0800 by Shaneka Acosta, RN  Trust Relationship/Rapport:   care explained   reassurance provided  Goal: Readiness for Transition of Care  Outcome: Not Progressing

## 2025-02-18 NOTE — PROGRESS NOTES
Antimicrobial Length of Therapy:    Day 1 of 5 ceftriaxone  Day 1 of 5 doxycycline  Day 5 of 6 osteltamivir    Previously completed:  7 days cefepime followed by ceftriaxone  5 days vancomycin  4 days metronidazole    Thank you.  Mignon Hanley, Pharm.D.  2/18/2025  08:20 EST

## 2025-02-18 NOTE — PROGRESS NOTES
"Palliative Care Daily Progress Note     S: Medical record reviewed, followed up with GREGORIO Munroe and Dr Blankenship regarding patient's condition. When I saw Phan today he was sedated on vent  50% FiO2 and 12 of PEEP on Ketamine and Precedex and now requiring a vasopressor. Per report Phan was febrile overnight with tylenol and cooling blanket utilized.      O:   Palliative Performance Scale Score:     /79   Pulse 104   Temp 98.5 °F (36.9 °C)   Resp (!) 34   Ht 182.9 cm (72\")   Wt 113 kg (248 lb 3.8 oz)   SpO2 98%   BMI 33.67 kg/m²     Intake/Output Summary (Last 24 hours) at 2/18/2025 1607  Last data filed at 2/18/2025 0511  Gross per 24 hour   Intake 2955.29 ml   Output 1600 ml   Net 1355.29 ml       PE:  General Appearance:    Chronically ill appearing, intubated and sedated, NAD   HEENT:    NC/AT, without obvious abnormality, EOMI, anicteric    Neck:   supple, trachea midline, no JVD   Lungs:     Sedated off vent at 50% and 12 of peep, (on breathing trial)diminished breath sounds, coarse breath sounds    Heart:    Irregular rate/rhythm, no M/R/G   Abdomen:     Soft, NT, ND, NABS    Extremities:   Moves all extremities weakly , trace bilateral lower extremity edema   Pulses:   Pulses palpable and equal bilaterally   Skin:   Warm, dry   Neurologic: Sedated on vent   Psych: Sedated on vent         Meds: Reviewed and changes noted    Labs:   Results from last 7 days   Lab Units 02/18/25  0159   WBC 10*3/mm3 12.76*   HEMOGLOBIN g/dL 8.5*   HEMATOCRIT % 27.0*   PLATELETS 10*3/mm3 179     Results from last 7 days   Lab Units 02/18/25  0159   SODIUM mmol/L 141   POTASSIUM mmol/L 4.1   CHLORIDE mmol/L 98   CO2 mmol/L 22.2   BUN mg/dL 111*   CREATININE mg/dL 9.12*   GLUCOSE mg/dL 137*   CALCIUM mg/dL 8.0*     Results from last 7 days   Lab Units 02/18/25  0159   SODIUM mmol/L 141   POTASSIUM mmol/L 4.1   CHLORIDE mmol/L 98   CO2 mmol/L 22.2   BUN mg/dL 111*   CREATININE mg/dL 9.12*   CALCIUM mg/dL 8.0*   BILIRUBIN " mg/dL 0.6   ALK PHOS U/L 127*   ALT (SGPT) U/L 31   AST (SGOT) U/L 57*   GLUCOSE mg/dL 137*     Imaging Results (Last 72 Hours)       Procedure Component Value Units Date/Time    XR Chest 1 View [[id number]] Collected: 02/17/25 1652     Updated: 02/17/25 1655    Narrative:      EXAM:    XR Chest, 1 View     EXAM DATE:    2/17/2025 4:48 PM     CLINICAL HISTORY:    SIRS; A41.9-Sepsis, unspecified organism; N17.9-Acute kidney failure,  unspecified; R79.89-Other specified abnormal findings of blood  chemistry; J18.9-Pneumonia, unspecified organism     TECHNIQUE:    Frontal view of the chest.     COMPARISON:    2/16/2025     FINDINGS:    Lungs and pleural spaces:  Improvement in right perihilar airspace  disease.  Increase in left perihilar airspace disease.  No  consolidation.  No pneumothorax.    Heart:  Unremarkable.  No cardiomegaly.    Mediastinum:  Unremarkable.  Normal mediastinal contour.    Bones/joints:  Unremarkable.  No acute fracture.    Tubes, lines and devices:  Right IJ deep line positioning is stable.   ET tube positioning is stable.  NG tube extends into the stomach region.   Left IJ deep line positioning is stable.       Impression:      1.  Improvement in right perihilar airspace disease.  2.  Increase in left perihilar airspace disease.  3. Support devices as above.     This report was finalized on 2/17/2025 4:53 PM by Dr. Jhoan Farias MD.       CT Chest Without Contrast Diagnostic [[id number]] Collected: 02/16/25 1309     Updated: 02/16/25 1312    Narrative:      EXAM: CT CHEST WO CONTRAST DIAGNOSTIC-      CLINICAL INDICATION:vomiting, r/o aspiration; A41.9-Sepsis, unspecified  organism; N17.9-Acute kidney failure, unspecified; R79.89-Other  specified abnormal findings of blood chemistry; J18.9-Pneumonia,  unspecified organism      COMPARISON: 2/13/2025     TECHNIQUE: Multiple axial CT images were obtained from lung apex through  upper abdomen without the administration of IV contrast.  Reformatted  images in the coronal and/or sagittal plane(s) were generated from the  axial data set to facilitate diagnostic accuracy and/or surgical  planning.     Radiation dose reduction techniques were utilized per ALARA protocol.  Automated exposure control was initiated through either or [employer] or  DoseRight software packages by  protocol.    DOSE (DLP mGy-cm):        FINDINGS:     LUNGS: Bibasilar consolidation, worse on the right than on the left     HEART: Mild coronary artery calcifications     MEDIASTINUM: No masses. No enlarged lymph nodes.  No fluid collections.     PLEURA: Small bibasilar pleural effusions     VASCULATURE: No evidence of aneurysm.     BONES: No acute bony abnormality.     VISUALIZED UPPER ABDOMEN: Please see the CT report for the abdomen and  pelvis     Other: None.       Impression:      Trace bibasilar effusions and bibasilar consolidation                 This report was finalized on 2/16/2025 1:10 PM by Dr. Tripp Berrios MD.       CT Head Without Contrast [[id number]] Collected: 02/16/25 1308     Updated: 02/16/25 1311    Narrative:      CT HEAD WO CONTRAST-     CLINICAL INDICATION: poor mentation off sedation; A41.9-Sepsis,  unspecified organism; N17.9-Acute kidney failure, unspecified;  R79.89-Other specified abnormal findings of blood chemistry;  J18.9-Pneumonia, unspecified organism        COMPARISON: 2/13/2025     TECHNIQUE: Axial images of the brain were obtained with out intravenous  contrast.  Reformatted images were created in the sagittal and coronal  planes.     DOSE:     Radiation dose reduction techniques were utilized per ALARA protocol.  Automated exposure control was initiated through either or [employer] or  DoseRight software packages by  protocol.        FINDINGS:    BRAIN:  Unremarkable.  No hemorrhage.  No significant white matter  disease.  No edema.       VENTRICLES:  Unremarkable.  No ventriculomegaly.       BONES/JOINTS:  Unremarkable.   No acute fracture.       SOFT TISSUES:  Unremarkable.       SINUSES:  no air fluid levels       MASTOID AIR CELLS:  Unremarkable as visualized.  No mastoid effusion.          Impression:         1. No parenchymal mass, hemorrhage, or midline shift     This report was finalized on 2/16/2025 1:09 PM by Dr. Tripp Berrios MD.       CT Abdomen Pelvis Without Contrast [[id number]] Collected: 02/16/25 1305     Updated: 02/16/25 1310    Narrative:      EXAM: CT ABDOMEN PELVIS WO CONTRAST-         TECHNIQUE: Multiple axial CT images were obtained from lung bases  through pubic symphysis WITHOUT administration of IV contrast.  Reformatted images in the coronal and/or sagittal plane(s) were  generated from the axial data set to facilitate diagnostic accuracy  and/or surgical planning.  Oral Contrast:NONE.     Radiation dose reduction techniques were utilized per ALARA protocol.  Automated exposure control was initiated through either or [employer] or  DoseRight software packages by  protocol.    DOSE:     Clinical information emesis; A41.9-Sepsis, unspecified organism;  N17.9-Acute kidney failure, unspecified; R79.89-Other specified abnormal  findings of blood chemistry; J18.9-Pneumonia, unspecified organism      Comparison 2/13/2025     FINDINGS:     Lower thorax: Bilateral consolidation, right worse than left     Abdomen:     Liver: Homogeneous. No focal hepatic mass or ductal dilatation.     Gallbladder: Cholelithiasis     Pancreas: Unremarkable. No mass or ductal dilatation.     Spleen: Homogeneous. No splenomegaly.     Adrenals: No mass.     Kidneys/ureters: Mild perinephric stranding bilaterally but no  obstructive uropathy. Right renal cyst     GI tract: Non-dilated. No definite wall thickening.. There is no  evidence of appendicitis     MESENTERY: Small amount of free fluid in the abdomen and pelvis     Vasculature: Evidence of atherosclerotic vascular disease     Abdominal wall: No focal hernia or mass.         Bladder: Collazo catheter is present     Reproductive: Unremarkable as visualized     Bones: No acute bony abnormality.       Impression:         1. Small amount of free fluid in the abdomen and pelvis     2. Cholelithiasis     3. Bibasilar consolidation.                          This report was finalized on 2/16/2025 1:08 PM by Dr. Tripp Berrios MD.       XR Chest 1 View [[id number]] Collected: 02/16/25 1202     Updated: 02/16/25 1205    Narrative:      XR CHEST 1 VW-     CLINICAL INDICATION: Worsening hypoxia; A41.9-Sepsis, unspecified  organism; N17.9-Acute kidney failure, unspecified; R79.89-Other  specified abnormal findings of blood chemistry; J18.9-Pneumonia,  unspecified organism        COMPARISON: 2/15/2025     TECHNIQUE: Single frontal view of the chest.     FINDINGS:     LUNGS: Mild right-sided airspace disease     HEART AND MEDIASTINUM: Heart and mediastinal contours are unremarkable        SKELETON: Bony and soft tissue structures are unremarkable.             Impression:      Mild right-sided airspace disease.  No interval line change           This report was finalized on 2/16/2025 12:03 PM by Dr. Tripp Berrios MD.                 Diagnostics: Reviewed    A: Phan Daniels is a 69 y.o. male admitted on 2/8/2025 due to respiratory distress. Phan has a medical history of afib?, COPD, HTN, HLD, GERD, ETOH abuse who presented after being found unresponsive. Per family at bedside Phan resides in [place] however recently came to [place] to stay with his new girlfriend. Also per noted he drank a 1/5 of liquor/day, and has had multiple recent hospital stays for pneumonia per family. EMS was called to the home and Phan was found unresponsive with no one else in the home, Phan apparently had dried feces and appeared to have been down for some time. Phan was intubated upon arrival to emergency department and was also in shock and started on levophed. Patient admitted to CCU on vent at FiO2 80% and PEEP of  10. This morning when I saw Phan he was on vent at 65% and 10 of peep on propofol, fentanyl, heparin , bicarb and phenylephrine. Palliative care consulted for GOC/ACP and support for pt and family.          P:  Palliative care was able to discuss with Dr Blankenship and pts siblings Ivy, Baylee, Adolph and Dada to discuss Phan's condition and that he has not been waking up even when sedation is off, and that he does not follow commands with no purposeful movements. Discussed that based on the circumstances that he was found, it was unclear how long he was left their and may have experienced irreversible damage. After discussion they all state they would like for us to see how Phan does for a few more days and also were unanimous in saying they knew he would not want a tracheostomy or peg as that would not be a quality life. I will reach out to them Thursday afternoon to set up another family conference call for Friday am. I tried to call and get back up with Nephew Ashutosh to update him on meeting as I told him I would however his phone is out of minutes and kept missing him at bedside. Discussed plan with Shaneka PERKINS as well.      We will continue to follow along. Please do not hesitate to contact us regarding further sx mgmt or GOC needs, including after hours or on weekends via our on call provider at [phone].     Lynnette Campa, APRN    2/18/2025

## 2025-02-18 NOTE — PROGRESS NOTES
"DOS: 2025  NAME: Phan Daniels   : 1955  PCP: Cesia Diaz APRN  Chief Complaint   Patient presents with    Respiratory Distress       Chief complaint: Altered mental status    Subjective: Patient has been seen and evaluated, no acute events overnight.  Continues to be intubated and sedated Precedex and ketamine.  No change in neurological examination.  His nephew is present for the encounter.  He tells me that will be a palliative care meeting later today.  Per nephew report, patient has been chronically ill for some time.  He says patient spent time incarcerated.  May consider to rule out tuberculosis.  Will discuss with primary team.    Objective:  Vital signs: /84   Pulse 81   Temp 98.9 °F (37.2 °C)   Resp 28   Ht 182.9 cm (72\")   Wt 113 kg (248 lb 3.8 oz)   SpO2 94%   BMI 33.67 kg/m²    Gen: NAD, vitals reviewed  MS: Does not open eyes to voice, does not follow commands  CN: no blink to threat b/l, pupils 3 mm and reactive bilaterally, conjugate gaze cough and gag absent  Motor: no response to pain all 4 ext, normal tone throughout. No spontaneous movements  Sensory: no response to pain        Laboratory results:  Lab Results   Component Value Date    GLUCOSE 137 (H) 2025    CALCIUM 8.0 (L) 2025     2025    K 4.1 2025    CO2 22.2 2025    CL 98 2025     (H) 2025    CREATININE 9.12 (H) 2025    BCR 12.2 2025    ANIONGAP 20.8 (H) 2025     Lab Results   Component Value Date    WBC 12.76 (H) 2025    HGB 8.5 (L) 2025    HCT 27.0 (L) 2025    .7 (H) 2025     2025     No results found for: \"LDL\"         Vitals  Systolic blood pressure 120s to 130  Respiratory rate 27 to 30  Heart rate 80- 107  Afebrile    Review of labs:   Sodium 141  Creatinine 9.12    Blood glucose 137  AST 57 ALT 21  Ammonia 23    A1c 6.4  B12  and folate 10    WBC 18.1  Hemoglobin 9.4 " and platelets 190    Urine analysis showed blood protein bilirubin and WBC bacteria and yeast  Respiratory panel positive for influenza  Blood cultures showing staph coagulase-negative since 2/8, last culture 2/13/2025 also positive  Respiratory cultures also growing staph agalactiae  Tox negative    Review and interpretation of imaging:     EEG pending 2/17/2025  EEG 2/14/2025 showed diffuse slowing no epileptiform discharges or seizure    CT head 2/13/2025 no acute hypodensity or hyperdensity could not appreciate any old infarcts    CT head 2/16/2025 no acute hypodensity or hyperdensity, no interval change    CT from 2/8/2025 did not show any vegetations 55 to 60% EF normal LAE    Diagnoses:  Acute metabolic encephalopathy  Acute hypoxic respiratory failure  Acute viral influenza infection  Sepsis secondary to bacteremia from staph coagulase-negative  End-stage renal disease on dialysis  Atrial fibrillation with rapid ventricular response  Electrolyte derangement secondary to end-stage renal disease  COPD  History of alcoholism    Patient is a 69-year-old with history of GERD hypertension hyperlipidemia atrial fibrillation alcohol abuse COPD was found at home unresponsive.  Neurology has been consulted for altered mental status.    Acute metabolic encephalopathy  Etiology multifactorial given new influenza viral infection, sepsis, uremic encephalopathy  Continue medical management of influenza he is on Tamiflu  Continue medical management of bacteremia he is on antibiotics  Less concerned about CNS etiology as CT head negative, no concern for underlying seizures per EEG  MRI brain with and without contrast when able depending on goals of care  History of alcohol abuse continue thiamine replacement    Overall critical illness; patient has multiple acute on chronic ongoing medical comorbidities.  Palliative care discussion appropriate, scheduled 2/18.  Neurology will continue to follow.      MDM   Reviewed: Previous  charts, nursing notes and vitals   Reviewed: Previous labs and CT scan    Interpretation: Labs and CT scan   Total time providing critical care is: 30 minutes.   Consults: Neurology/Stroke    Please note that portions of this note were completed with a voice recognition program.     Tanya Arambula MD  Neuro Hospitalist /Vascular Neurology.

## 2025-02-18 NOTE — PROGRESS NOTES
[facility] HOSPITALIST PROGRESS NOTE     Patient Identification:  Name:  Phan Daniels  Age:  69 y.o.  Sex:  male  :  1955  MRN:  [id number]  Visit Number:  [id number]  ROOM: [address]     Primary Care Provider:  Cesia Diaz APRN    Length of stay in inpatient status:  10    Subjective     Chief Compliant:    Chief Complaint   Patient presents with   • Respiratory Distress       History of Presenting Illness:    Patient still sedated on ventilator this AM. Patient receiving ketamine and precedex. 50% FiO2 PEEP of 12. Patient not requiring pressors. Overnight patient with recurrent fevers requiring tylenol and cooling blanket. No family bedside with plan for family meeting around 10:30 this morning.     ROS:  Otherwise 10 point ROS negative other than documented above in HPI.     Objective     Current Hospital Meds:amiodarone, 400 mg, Oral, Q24H   Followed by  [START ON 2025] amiodarone, 200 mg, Oral, Q24H  cefTRIAXone, 1,000 mg, Intravenous, Q24H  cetirizine, 5 mg, Oral, Daily  chlorhexidine, 15 mL, Mouth/Throat, Q12H  doxycycline, 100 mg, Intravenous, Q12H  finasteride, 5 mg, Oral, Daily  folic acid, 1 mg, Oral, Daily  heparin (porcine), 5,000 Units, Subcutaneous, Q8H  insulin regular, 2-7 Units, Subcutaneous, Q6H  ipratropium-albuterol, 3 mL, Nebulization, 4x Daily - RT  midodrine, 15 mg, Oral, TID AC  oseltamivir, 30 mg, Oral, Once per day on   pantoprazole, 40 mg, Intravenous, BID AC  [Held by provider] rosuvastatin, 10 mg, Oral, Nightly  sodium chloride, 10 mL, Intravenous, Q12H  sodium chloride, 10 mL, Intravenous, Q12H  sodium chloride, 10 mL, Intravenous, Q12H  sodium chloride, 10 mL, Intravenous, Q12H  sodium chloride, 10 mL, Intravenous, Q12H  thiamine, 200 mg, Per G Tube, Daily  vitamin B-12, 1,000 mcg, Oral, Daily    dexmedetomidine, 0.2-1.5 mcg/kg/hr (Dosing Weight), Last Rate: 1.5 mcg/kg/hr (25 0809)  ketamine, 0.06-2.5 mg/kg/hr  (Dosing Weight), Last Rate: 0.76 mg/kg/hr (02/18/25 0609)  phenylephrine, 0.5-3 mcg/kg/min (Dosing Weight), Last Rate: Stopped (02/16/25 0910)        Current Antimicrobial Therapy:  Anti-Infectives (From admission, onward)      Ordered     Dose/Rate Route Frequency Start Stop    02/18/25 0815  cefTRIAXone (ROCEPHIN) 1,000 mg in sodium chloride 0.9 % 100 mL IVPB-VTB        Ordering Provider: Talat Blankenship MD    1,000 mg  200 mL/hr over 30 Minutes Intravenous Every 24 Hours 02/18/25 0915 02/23/25 0914    02/18/25 0815  doxycycline (VIBRAMYCIN) 100 mg in sodium chloride 0.9 % 100 mL IVPB-VTB        Ordering Provider: Talat Blankenship MD    100 mg Intravenous Every 12 Hours 02/18/25 0915 02/23/25 0914    02/14/25 1023  oseltamivir (TAMIFLU) capsule 30 mg        Ordering Provider: Kendall Irby MD    30 mg Oral Once per day on Monday Wednesday Friday 02/14/25 1200 02/21/25 1159    02/11/25 1404  cefTRIAXone (ROCEPHIN) 2,000 mg in sodium chloride 0.9 % 100 mL IVPB-VTB        Ordering Provider: Dangelo Ledezma MD    2,000 mg  200 mL/hr over 30 Minutes Intravenous Every 24 Hours 02/12/25 0600 02/15/25 0634    02/11/25 1404  metroNIDAZOLE (FLAGYL) IVPB 500 mg        Ordering Provider: Dangelo Ledezma MD    500 mg  200 mL/hr over 30 Minutes Intravenous Every 8 Hours 02/11/25 2000 02/11/25 2106    02/08/25 2054  Vancomycin Pharmacy Intermittent/Pulse Dosing        Ordering Provider: Mignon Hanley, PharmD     Not Applicable Daily 02/09/25 1200 02/13/25 0859    02/08/25 1846  cefepime 1000 mg IVPB in 100 mL NS (VTB)  Status:  Discontinued        Ordering Provider: Dangelo Ledezma MD    1,000 mg  over 30 Minutes Intravenous Every 24 Hours 02/09/25 0600 02/11/25 1404    02/08/25 1853  vancomycin IVPB 2000 mg in 0.9% Sodium Chloride 500 mL        Ordering Provider: Talat Blankenship MD    20 mg/kg × 98.8 kg  250 mL/hr over 120 Minutes Intravenous Once 02/08/25 2100 02/08/25 2345    02/08/25 2029  metroNIDAZOLE (FLAGYL) IVPB  500 mg  Status:  Discontinued        Ordering Provider: Talat Blankenship MD    500 mg  200 mL/hr over 30 Minutes Intravenous Every 8 Hours 02/08/25 2000 02/11/25 1404    02/08/25 1846  cefepime 1000 mg IVPB in 100 mL NS (VTB)        Ordering Provider: Talat Blankenship MD    1,000 mg  over 30 Minutes Intravenous Once 02/08/25 1945 02/08/25 2215    02/08/25 1831  Pharmacy to dose vancomycin  Status:  Discontinued        Ordering Provider: Talat Blankenship MD     Not Applicable Continuous PRN 02/08/25 1831 02/09/25 1406    02/08/25 1331  cefepime 2000 mg IVPB in 100 mL NS (VTB)        Ordering Provider: Darren Bowman MD    2,000 mg  over 30 Minutes Intravenous Once 02/08/25 1347 02/08/25 1501    02/08/25 1331  metroNIDAZOLE (FLAGYL) IVPB 500 mg        Ordering Provider: Darren Bowman MD    500 mg  200 mL/hr over 30 Minutes Intravenous Once 02/08/25 1347 02/08/25 1458    02/08/25 1331  vancomycin IVPB 1750 mg in 0.9% Sodium Chloride (premix) 500 mL        Ordering Provider: Darren Bowman MD    20 mg/kg × 86.7 kg (Adjusted)  285.7 mL/hr over 105 Minutes Intravenous Once 02/08/25 1347 02/08/25 1751          Current Diuretic Therapy:  Diuretics (From admission, onward)      None          ----------------------------------------------------------------------------------------------------------------------  Vital Signs:  Temp:  [97.3 °F (36.3 °C)-100.8 °F (38.2 °C)] 100.1 °F (37.8 °C)  Heart Rate:  [] 101  Resp:  [17-32] 29  BP: (111-139)/(69-88) 136/85  FiO2 (%):  [50 %] 50 %  SpO2:  [94 %-99 %] 97 %  on   ;   Device (Oxygen Therapy): ventilator  Body mass index is 33.67 kg/m².    Wt Readings from Last 3 Encounters:   02/15/25 113 kg (248 lb 3.8 oz)   06/06/24 102 kg (225 lb 10.3 oz)     Intake & Output (last 3 days)         02/15 0701  02/16 0700 02/16 0701  02/17 0700 02/17 0701  02/18 0700 02/18 0701  02/19 0700    I.V. (mL/kg) 464.3 (4.6) 1529.7 (15) 2144.3 (21)     Other 130 180 150     NG/GT 0 406  661     IV Piggyback        Total Intake(mL/kg) 594.3 (5.8) 2115.7 (20.7) 2955.3 (29)     Urine (mL/kg/hr) 450 (0.2) 750 (0.3) 1500 (0.6)     Emesis/NG output 230 0 0     Stool 100 150 100     Dialysis 3000       Total Output 3780 900 1600     Net -3185.7 +1215.7 +1355.3             Emesis Unmeasured Occurrence 0 x 0 x            No diet orders on file  ----------------------------------------------------------------------------------------------------------------------  Physical exam:  GENERAL:  Patient intubated and sedated.   SKIN:  Warm, dry without rashes, purpura or petechiae.  EYES:  Pupils equal, round and reactive to light.  Conjunctivae normal.  HEAD:  Normocephalic. Atraumatic.   EARS/NOSE/MOUTH/THROAT:  Septum midline.  No excoriations or nasal discharge. No stomatitis or ulcers.  Lips normal. Oropharynx without lesions or exudates.  NECK:  Supple with good range of motion; no thyromegaly or masses  LYMPHATICS:  No cervical, supraclavicular, axillary adenopathy.  RESP:  Lungs clear to auscultation. Good airflow. Intubated receiving mechanical ventilation.   CARDIAC:  Regular rate and rhythm without murmurs, rubs or gallops. Normal S1,S2. No edema  GI:  Soft, nontender, no hepatosplenomegaly, normal bowel sounds  MSK:  No clubbing or cyanosis, No joint swelling noted in hands  NEUROLOGICAL:  No focal deficit. Pupils equal and reactive.   ----------------------------------------------------------------------------------------------------------------------  Tele:    ----------------------------------------------------------------------------------------------------------------------  Results from last 7 days   Lab Units 02/18/25  0715 02/18/25  0159 02/16/25  2234 02/15/25  1041 02/15/25  0729 02/14/25  0331 02/13/25  0157   LACTATE mmol/L 0.8  --   --   --   --   --  0.9   WBC 10*3/mm3  --  12.76* 18.19*  --  10.52   < >  --    HEMOGLOBIN g/dL  --  8.5* 9.4*  --  10.3*   < >  --    HEMATOCRIT %  --  27.0*  "29.8*  --  31.9*   < >  --    MCV fL  --  102.7* 103.5*  --  99.4*   < >  --    MCHC g/dL  --  31.5 31.5  --  32.3   < >  --    PLATELETS 10*3/mm3  --  179 190  --  155   < >  --    INR   --   --   --  0.98  --   --   --     < > = values in this interval not displayed.     Results from last 7 days   Lab Units 02/18/25  0433   PH, ARTERIAL pH units 7.348*   PO2 ART mm Hg 90.9   PCO2, ARTERIAL mm Hg 42.2   HCO3 ART mmol/L 23.2     Results from last 7 days   Lab Units 02/18/25  0159 02/16/25  2234 02/16/25  0156 02/13/25  1150 02/13/25  0157   SODIUM mmol/L 141 136 139   < > 140   POTASSIUM mmol/L 4.1 4.8 4.1   < > 3.4*  3.4*   CHLORIDE mmol/L 98 93* 99   < > 96*   CO2 mmol/L 22.2 21.4* 22.2   < > 24.8   BUN mg/dL 111* 96* 77*   < > 73*   CREATININE mg/dL 9.12* 8.65* 7.66*   < > 9.19*   CALCIUM mg/dL 8.0* 7.6* 6.9*   < > 7.0*   PHOSPHORUS mg/dL  --   --   --   --  6.7*   GLUCOSE mg/dL 137* 132* 99   < > 308*   ALBUMIN g/dL 2.4*  --   --   --  3.0*   BILIRUBIN mg/dL 0.6  --   --   --  0.5   ALK PHOS U/L 127*  --   --   --  50   AST (SGOT) U/L 57*  --   --   --  72*   ALT (SGPT) U/L 31  --   --   --  37    < > = values in this interval not displayed.   Estimated Creatinine Clearance: 10.1 mL/min (A) (by C-G formula based on SCr of 9.12 mg/dL (H)).  No results found for: \"AMMONIA\"  Results from last 7 days   Lab Units 02/12/25  0412   CK TOTAL U/L 3,260*         Results from last 7 days   Lab Units 02/12/25  0412   TRIGLYCERIDES mg/dL 225*     Glucose   Date/Time Value Ref Range Status   02/18/2025 0506 110 70 - 130 mg/dL Final   02/17/2025 2310 154 (H) 70 - 130 mg/dL Final   02/17/2025 1732 157 (H) 70 - 130 mg/dL Final   02/17/2025 1137 178 (H) 70 - 130 mg/dL Final   02/17/2025 0514 144 (H) 70 - 130 mg/dL Final   02/16/2025 2325 141 (H) 70 - 130 mg/dL Final   02/16/2025 1702 136 (H) 70 - 130 mg/dL Final   02/16/2025 1224 118 70 - 130 mg/dL Final     Lab Results   Component Value Date    TSH 0.196 (L) 02/09/2025    " "FREET4 0.85 (L) 02/09/2025     No results found for: \"PREGTESTUR\", \"PREGSERUM\", \"HCG\", \"HCGQUANT\"  Pain Management Panel          Latest Ref Rng & Units 2/8/2025   Pain Management Panel   Amphetamine, Urine Qual Negative Negative    Barbiturates Screen, Urine Negative Negative    Benzodiazepine Screen, Urine Negative Negative    Buprenorphine, Screen, Urine Negative Negative    Cocaine Screen, Urine Negative Negative    Fentanyl, Urine Negative Negative    Methadone Screen , Urine Negative Negative    Methamphetamine, Ur Negative Negative       Details                 Brief Urine Lab Results  (Last result in the past 365 days)        Color   Clarity   Blood   Leuk Est   Nitrite   Protein   CREAT   Urine HCG        02/17/25 1704 Yellow   Clear   Large (3+)   Trace   Negative   100 mg/dL (2+)                 Blood Culture   Date Value Ref Range Status   02/13/2025 No growth at 4 days  Preliminary   02/13/2025 Staphylococcus, coagulase negative (C)  Final     No results found for: \"URINECX\"  No results found for: \"WOUNDCX\"  No results found for: \"STOOLCX\"  No results found for: \"RESPCX\"  No results found for: \"AFBCX\"  Results from last 7 days   Lab Units 02/18/25  0715 02/13/25  0157 02/12/25  0412   PROCALCITONIN ng/mL  --   --  6.34*   LACTATE mmol/L 0.8 0.9  --        I have personally looked at the labs and they are summarized above.  ----------------------------------------------------------------------------------------------------------------------  Detailed radiology reports for the last 24 hours:    Imaging Results (Last 24 Hours)       Procedure Component Value Units Date/Time    XR Chest 1 View [[id number]] Collected: 02/17/25 1652     Updated: 02/17/25 1655    Narrative:      EXAM:    XR Chest, 1 View     EXAM DATE:    2/17/2025 4:48 PM     CLINICAL HISTORY:    SIRS; A41.9-Sepsis, unspecified organism; N17.9-Acute kidney failure,  unspecified; R79.89-Other specified abnormal findings of blood  chemistry; " J18.9-Pneumonia, unspecified organism     TECHNIQUE:    Frontal view of the chest.     COMPARISON:    2/16/2025     FINDINGS:    Lungs and pleural spaces:  Improvement in right perihilar airspace  disease.  Increase in left perihilar airspace disease.  No  consolidation.  No pneumothorax.    Heart:  Unremarkable.  No cardiomegaly.    Mediastinum:  Unremarkable.  Normal mediastinal contour.    Bones/joints:  Unremarkable.  No acute fracture.    Tubes, lines and devices:  Right IJ deep line positioning is stable.   ET tube positioning is stable.  NG tube extends into the stomach region.   Left IJ deep line positioning is stable.       Impression:      1.  Improvement in right perihilar airspace disease.  2.  Increase in left perihilar airspace disease.  3. Support devices as above.     This report was finalized on 2/17/2025 4:53 PM by Dr. Jhoan Farias MD.             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   - General Surgery consulted and following, don't suspect cholecystitis   - TeleNeurology consulted and following. EEG obtained on 2/17 with slowing.   - Palliative Care consulted and following   - Completed tamiflu x 5 days   - Continue pain and sedation drips for comfort  - Continue oral Amiodarone now  - Bleeding worse with heparin gtt. Will stop  and order subQ heparin for now.   - Continue IV pressors for SBP < 90 or MAPs consistently < 65. Currently not requiring.   - Continue IV PPI for Gi prophylaxis  - Continue Tylenol as needed for fevers, Duonebs as needed  - Admitted to CCU, monitor on telemetry, continue 02 but wean as able, spontaneous awakening trial/spontaneous breathing trial as appropriate   - Patient on ketamine and precedex for sedation. Will add oral oxycodone for analgesia.      #Electrolyte Abnormalities  - Acute Severe Hypokalemia - Replacing, on protocol  - Acute Severe Hypomagnesemia - Replacing, on protocol     #Pre-Diabetes with steroid induced hyperglycemia  - Hgb A1c = 6.4%  - Continue FSBG and SSI, add long acting if indicated.     #Alcohol Use Disorder, Severe, with Dependence complicated   - Continue cardiac monitoring, seizure precautions, monitor for withdrawal      #Obesity by BMI  - Body mass index is 30.41 kg/m².; complicates all aspects of care      #Sepsis 2/2 recently diagnosed influenza A vs. L perihilar bacterial pneumonia   - Blood cultures from 2/13 growing 1/2 coag negative staph which I suspect is contaminant. Repeat ordered.   - Will follow urine culture.   - Patient tested positive for influenza A on 2/14.   - Plain film of chest with worsening L sided perihilar infiltrate which may be bacterial pneumonia. Will start ceftriaxone/doxycyline and get respiratory culture.      F: TFs  A: PO oxycodone.   S: Precedex, ketamine  T: SubQ heparin while heparin gtt on hold.   H: HOB elevated   U: Protonix   G: PRN  S: Daily   B: SubQ insulin   I: R IJ central line and ETT placed on 2/8, Hemodialysis catheter placed 2/11  D: None      Code status: DNR, otherwise full      Dispo: Pending clinical improvement. Guarded prognosis. Palliative care consulted and planning on family meeting tomorrow to discuss GOC. Siblings are NOK and patient has involved nephew.        Talat Blankenship MD  [facility]  Hospitalist  02/18/25  08:16 EST

## 2025-02-18 NOTE — PROGRESS NOTES
Progress Note Critical Care Pulmonary        Subjective  On vent , sedated.  Oxygen requirement down to 40% after PEEP increased to 12.  New temperature spike noted.  Patient was restarted on ceftriaxone and doxycycline by hospitalist team.    Cultures ordered.        Interval History: event overnight: As described above        Review of Systems:    On vent   Vital Signs  Temp:  [97.3 °F (36.3 °C)-100.8 °F (38.2 °C)] 98.9 °F (37.2 °C)  Heart Rate:  [] 97  Resp:  [17-33] 33  BP: (111-139)/(69-88) 120/76  FiO2 (%):  [50 %] 50 %  Body mass index is 33.67 kg/m².    Intake/Output Summary (Last 24 hours) at 2/18/2025 0929  Last data filed at 2/18/2025 0511  Gross per 24 hour   Intake 2955.29 ml   Output 1600 ml   Net 1355.29 ml     No intake/output data recorded.    Physical Exam:  General- normal in appearance, sedated  HEENT- pupils equally reactive to light, normal in size, no scleral icterus    Neck-supple    Respiratory-bilateral air entry, few basal rales  Cardiovascular-  Normal S1 and S2. No S3, S4 or murmurs. No JVD, no carotid bruit and no edema, pulses normal bilaterally     GI-nontender nondistended bowel sounds positive    CNS- grossly nonfocal    Extremities-no clubbing .  2 Plus bipedal edema      Results Review:      Results from last 7 days   Lab Units 02/18/25  0159 02/16/25  2234 02/15/25  0729   WBC 10*3/mm3 12.76* 18.19* 10.52   HEMOGLOBIN g/dL 8.5* 9.4* 10.3*   PLATELETS 10*3/mm3 179 190 155     Results from last 7 days   Lab Units 02/18/25  0159 02/16/25 2234 02/16/25  0156   SODIUM mmol/L 141 136 139   POTASSIUM mmol/L 4.1 4.8 4.1   CHLORIDE mmol/L 98 93* 99   CO2 mmol/L 22.2 21.4* 22.2   BUN mg/dL 111* 96* 77*   CREATININE mg/dL 9.12* 8.65* 7.66*   CALCIUM mg/dL 8.0* 7.6* 6.9*   GLUCOSE mg/dL 137* 132* 99     Lab Results   Component Value Date    INR 0.98 02/15/2025    INR 1.16 (H) 02/10/2025    INR 1.21 (H) 02/08/2025    PROTIME 13.1 02/15/2025    PROTIME 15.0 02/10/2025    PROTIME 15.4  (H) 02/08/2025     Results from last 7 days   Lab Units 02/18/25  0159 02/13/25  0157   ALK PHOS U/L 127* 50   BILIRUBIN mg/dL 0.6 0.5   BILIRUBIN DIRECT mg/dL  --  0.4*   ALT (SGPT) U/L 31 37   AST (SGOT) U/L 57* 72*     Results from last 7 days   Lab Units 02/18/25  0433   PH, ARTERIAL pH units 7.348*   PO2 ART mm Hg 90.9   PCO2, ARTERIAL mm Hg 42.2   HCO3 ART mmol/L 23.2     Imaging Results (Last 24 Hours)       Procedure Component Value Units Date/Time    XR Chest 1 View [[id number]] Collected: 02/17/25 1652     Updated: 02/17/25 1655    Narrative:      EXAM:    XR Chest, 1 View     EXAM DATE:    2/17/2025 4:48 PM     CLINICAL HISTORY:    SIRS; A41.9-Sepsis, unspecified organism; N17.9-Acute kidney failure,  unspecified; R79.89-Other specified abnormal findings of blood  chemistry; J18.9-Pneumonia, unspecified organism     TECHNIQUE:    Frontal view of the chest.     COMPARISON:    2/16/2025     FINDINGS:    Lungs and pleural spaces:  Improvement in right perihilar airspace  disease.  Increase in left perihilar airspace disease.  No  consolidation.  No pneumothorax.    Heart:  Unremarkable.  No cardiomegaly.    Mediastinum:  Unremarkable.  Normal mediastinal contour.    Bones/joints:  Unremarkable.  No acute fracture.    Tubes, lines and devices:  Right IJ deep line positioning is stable.   ET tube positioning is stable.  NG tube extends into the stomach region.   Left IJ deep line positioning is stable.       Impression:      1.  Improvement in right perihilar airspace disease.  2.  Increase in left perihilar airspace disease.  3. Support devices as above.     This report was finalized on 2/17/2025 4:53 PM by Dr. Jhoan Farias MD.                    amiodarone, 400 mg, Oral, Q24H   Followed by  [START ON 2/28/2025] amiodarone, 200 mg, Oral, Q24H  cefTRIAXone, 1,000 mg, Intravenous, Q24H  cetirizine, 5 mg, Oral, Daily  chlorhexidine, 15 mL, Mouth/Throat, Q12H  doxycycline, 100 mg, Intravenous, Q12H  finasteride,  5 mg, Oral, Daily  folic acid, 1 mg, Oral, Daily  heparin (porcine), 5,000 Units, Subcutaneous, Q8H  insulin regular, 2-7 Units, Subcutaneous, Q6H  ipratropium-albuterol, 3 mL, Nebulization, 4x Daily - RT  midodrine, 15 mg, Oral, TID AC  oseltamivir, 30 mg, Oral, Once per day on Monday Wednesday Friday  pantoprazole, 40 mg, Intravenous, BID AC  [Held by provider] rosuvastatin, 10 mg, Oral, Nightly  sodium chloride, 10 mL, Intravenous, Q12H  sodium chloride, 10 mL, Intravenous, Q12H  sodium chloride, 10 mL, Intravenous, Q12H  sodium chloride, 10 mL, Intravenous, Q12H  sodium chloride, 10 mL, Intravenous, Q12H  thiamine, 200 mg, Per G Tube, Daily  vitamin B-12, 1,000 mcg, Oral, Daily      dexmedetomidine, 0.2-1.5 mcg/kg/hr (Dosing Weight), Last Rate: 1.5 mcg/kg/hr (02/18/25 0809)  ketamine, 0.06-2.5 mg/kg/hr (Dosing Weight), Last Rate: 0.76 mg/kg/hr (02/18/25 0609)        Medication Review:     Assessment & Plan      #1: Acute hypoxic and hypercarbic respiratory failure-likely due to aspiration pneumonia/influenza A.  Temperature spike.  Ceftriaxone and doxycycline was added.    Respiratory cultures reviewed.     Continue nebs  Microbiology reviewed.    Transthoracic echo did not show any vegetations  VAP- precautions  Head end - 30 %  Mouth care   COPD-longtime smoker.  Continue systemic steroid and DuoNeb via nebulizer  Continue to titrate FiO2 down to maintain saturation 88 to 92%.      continue Tamiflu.  Continue isolation        Cardiology- hemodynamically -unstable.  Continue midodrine.     Vasopressor requirement reviewed and adjusted for a MAP of 65 and above.        Hemoglobin reviewed.    \       Nephrology-continue hemodialysis.  Nephrology input appreciated     Appreciate the input.  GI- PPI IV twice daily-      Cut back on sedation for neurocheck.  SAT/SBT as planned.    Hematology- latest CBC reviewed.  Transfuse for hemoglobin less than 8.     ID-  As described above.    Endrocrinology- Maintain Blood  sugar 140 -180     DVT prophylaxis-continue               Vent Settings          Resp Rate (Set): 24  Pressure Support (cm H2O): 8 cm H20  FiO2 (%): 50 %  PEEP/CPAP (cm H2O): 12 cm H20    Minute Ventilation (L/min) (Obs): 14.6 L/min  Resp Rate (Observed) Vent: 31     I:E Ratio (Obs): 1:2.2    PIP Observed (cm H2O): 34 cm H2O         Data pressure is 14  I have personally reviewed x-ray chest, labs, medication list.      Critical Care time spent in direct patient care: 35 minutes (excluding procedure time, if applicable) including high complexity decision making to assess, manipulate, and support vital organ system failure in this individual who has impairment of one or more vital organ systems such that there is a high probability of imminent or life threatening deterioration in the patient’s condition.  Patient is critically ill and is at higher risk for further mortality/morbidity. Continue ICU care .      Donavan Hernandez MD  02/18/25  09:29 EST

## 2025-02-19 NOTE — PLAN OF CARE
Problem: Adult Inpatient Plan of Care  Goal: Plan of Care Review  Outcome: Progressing  Goal: Patient-Specific Goal (Individualized)  Outcome: Progressing  Goal: Absence of Hospital-Acquired Illness or Injury  Outcome: Progressing  Intervention: Identify and Manage Fall Risk  Recent Flowsheet Documentation  Taken 2/19/2025 0300 by Anahi Blancas RN  Safety Promotion/Fall Prevention: safety round/check completed  Taken 2/19/2025 0200 by Anahi Blancas RN  Safety Promotion/Fall Prevention: safety round/check completed  Taken 2/19/2025 0100 by Anahi Blancas RN  Safety Promotion/Fall Prevention: safety round/check completed  Taken 2/19/2025 0000 by Anahi Blancas RN  Safety Promotion/Fall Prevention: safety round/check completed  Taken 2/18/2025 2300 by Anahi Blancas RN  Safety Promotion/Fall Prevention: safety round/check completed  Taken 2/18/2025 2200 by Anahi Blancas RN  Safety Promotion/Fall Prevention: safety round/check completed  Taken 2/18/2025 2100 by Anahi Blancas RN  Safety Promotion/Fall Prevention: safety round/check completed  Taken 2/18/2025 2000 by Anahi Blancas RN  Safety Promotion/Fall Prevention: safety round/check completed  Taken 2/18/2025 1900 by Anahi Blancas RN  Safety Promotion/Fall Prevention: safety round/check completed  Intervention: Prevent Skin Injury  Recent Flowsheet Documentation  Taken 2/19/2025 0200 by Anahi Blancas RN  Body Position:   turned   side-lying   right  Skin Protection: pulse oximeter probe site changed  Taken 2/19/2025 0000 by Anahi Blancas RN  Body Position:   left   turned   side-lying  Taken 2/18/2025 2200 by Anahi Blancas RN  Body Position:   turned   side-lying   right  Taken 2/18/2025 2000 by Anahi Blancas RN  Body Position:   left   turned   side-lying  Skin Protection: pulse oximeter probe site changed  Intervention: Prevent and Manage VTE (Venous Thromboembolism) Risk  Recent Flowsheet Documentation  Taken 2/19/2025 0200 by Yonny  GREGORIO Christian  VTE Prevention/Management: SCDs (sequential compression devices) on  Taken 2/18/2025 2000 by Anahi Blancas RN  VTE Prevention/Management: SCDs (sequential compression devices) on  Goal: Optimal Comfort and Wellbeing  Outcome: Progressing  Intervention: Monitor Pain and Promote Comfort  Recent Flowsheet Documentation  Taken 2/18/2025 2000 by Anahi Blancas RN  Pain Management Interventions:   care clustered   pain medication given   pillow support provided  Intervention: Provide Person-Centered Care  Recent Flowsheet Documentation  Taken 2/19/2025 0200 by Anahi Blancas RN  Trust Relationship/Rapport: care explained  Taken 2/18/2025 2000 by Anahi Blancas RN  Trust Relationship/Rapport: care explained  Goal: Readiness for Transition of Care  Outcome: Progressing     Problem: Violence Risk or Actual  Goal: Anger and Impulse Control  Outcome: Progressing  Intervention: Minimize Safety Risk  Recent Flowsheet Documentation  Taken 2/19/2025 0300 by Anahi Blancas RN  Enhanced Safety Measures: bed alarm set  Taken 2/19/2025 0200 by Anahi Blancas RN  Enhanced Safety Measures: bed alarm set  Taken 2/19/2025 0100 by Anahi Blancas RN  Enhanced Safety Measures: bed alarm set  Taken 2/19/2025 0000 by Anahi Blancas RN  Enhanced Safety Measures: bed alarm set  Taken 2/18/2025 2300 by Anahi Blancas RN  Enhanced Safety Measures: bed alarm set  Taken 2/18/2025 2200 by Anahi Blancas RN  Enhanced Safety Measures: bed alarm set  Taken 2/18/2025 2100 by Anahi Blancas RN  Enhanced Safety Measures: bed alarm set  Taken 2/18/2025 2000 by Anahi Blancas RN  Sensory Stimulation Regulation: care clustered  Enhanced Safety Measures: bed alarm set  Taken 2/18/2025 1900 by Anahi Blancas RN  Enhanced Safety Measures: bed alarm set     Problem: Fall Injury Risk  Goal: Absence of Fall and Fall-Related Injury  Outcome: Progressing  Intervention: Identify and Manage Contributors  Recent Flowsheet Documentation  Taken  2/19/2025 0300 by Anahi Blancas RN  Medication Review/Management: medications reviewed  Taken 2/19/2025 0200 by Anahi Blancas RN  Medication Review/Management: medications reviewed  Taken 2/19/2025 0100 by Anahi Blancas RN  Medication Review/Management: medications reviewed  Taken 2/19/2025 0000 by Anahi Blancas RN  Medication Review/Management: medications reviewed  Taken 2/18/2025 2300 by Anahi Blancas RN  Medication Review/Management: medications reviewed  Taken 2/18/2025 2200 by Anahi Blancas RN  Medication Review/Management: medications reviewed  Taken 2/18/2025 2100 by Anahi Blancas RN  Medication Review/Management: medications reviewed  Taken 2/18/2025 2000 by Anahi Blancas RN  Medication Review/Management: medications reviewed  Taken 2/18/2025 1900 by Anahi Blancas RN  Medication Review/Management: medications reviewed  Intervention: Promote Injury-Free Environment  Recent Flowsheet Documentation  Taken 2/19/2025 0300 by Anahi Blancas RN  Safety Promotion/Fall Prevention: safety round/check completed  Taken 2/19/2025 0200 by Anahi Blancas RN  Safety Promotion/Fall Prevention: safety round/check completed  Taken 2/19/2025 0100 by Anahi Blancas RN  Safety Promotion/Fall Prevention: safety round/check completed  Taken 2/19/2025 0000 by Anahi Blancas RN  Safety Promotion/Fall Prevention: safety round/check completed  Taken 2/18/2025 2300 by Anahi Blancas RN  Safety Promotion/Fall Prevention: safety round/check completed  Taken 2/18/2025 2200 by Anahi Blancas RN  Safety Promotion/Fall Prevention: safety round/check completed  Taken 2/18/2025 2100 by Anahi Blancas RN  Safety Promotion/Fall Prevention: safety round/check completed  Taken 2/18/2025 2000 by Anahi Blancas RN  Safety Promotion/Fall Prevention: safety round/check completed  Taken 2/18/2025 1900 by Anahi Blancas RN  Safety Promotion/Fall Prevention: safety round/check completed     Problem: Skin Injury Risk  Increased  Goal: Skin Health and Integrity  Outcome: Progressing  Intervention: Optimize Skin Protection  Recent Flowsheet Documentation  Taken 2/19/2025 0200 by Anahi Blancas RN  Pressure Reduction Techniques: heels elevated off bed  Head of Bed (HOB) Positioning: HOB at 30-45 degrees  Pressure Reduction Devices: positioning supports utilized  Skin Protection: pulse oximeter probe site changed  Taken 2/19/2025 0000 by Anahi Blancas RN  Head of Bed (HOB) Positioning: HOB at 30-45 degrees  Taken 2/18/2025 2200 by Anahi Blancas RN  Head of Bed (HOB) Positioning: HOB at 30-45 degrees  Taken 2/18/2025 2000 by Anahi Blancas RN  Activity Management: bedrest  Pressure Reduction Techniques: heels elevated off bed  Head of Bed (HOB) Positioning: HOB at 30-45 degrees  Pressure Reduction Devices: positioning supports utilized  Skin Protection: pulse oximeter probe site changed     Problem: Comorbidity Management  Goal: Maintenance of COPD Symptom Control  Outcome: Progressing  Intervention: Maintain COPD (Chronic Obstructive Pulmonary Disease) Symptom Control  Recent Flowsheet Documentation  Taken 2/19/2025 0300 by Anahi Blancas RN  Medication Review/Management: medications reviewed  Taken 2/19/2025 0200 by Anahi Blancas RN  Medication Review/Management: medications reviewed  Taken 2/19/2025 0100 by Anahi Blancas RN  Medication Review/Management: medications reviewed  Taken 2/19/2025 0000 by Anahi Blancas RN  Medication Review/Management: medications reviewed  Taken 2/18/2025 2300 by Anahi Blancas RN  Medication Review/Management: medications reviewed  Taken 2/18/2025 2200 by Anahi Blancas RN  Medication Review/Management: medications reviewed  Taken 2/18/2025 2100 by Anahi Blancas RN  Medication Review/Management: medications reviewed  Taken 2/18/2025 2000 by Anahi Blancas RN  Medication Review/Management: medications reviewed  Taken 2/18/2025 1900 by Anahi Blancas RN  Medication Review/Management:  medications reviewed     Problem: Sepsis/Septic Shock  Goal: Optimal Coping  Outcome: Progressing  Intervention: Support Patient and Family Response  Recent Flowsheet Documentation  Taken 2/19/2025 0200 by Anahi Blancas RN  Family/Support System Care: presence promoted  Taken 2/18/2025 2000 by Anahi Blancas RN  Family/Support System Care: presence promoted  Goal: Absence of Bleeding  Outcome: Progressing  Goal: Blood Glucose Level Within Target Range  Outcome: Progressing  Intervention: Optimize Glycemic Control  Recent Flowsheet Documentation  Taken 2/19/2025 0200 by Anahi Blancas RN  Hyperglycemia Management: blood glucose monitored  Taken 2/18/2025 2000 by Anahi Blancas RN  Hyperglycemia Management: blood glucose monitored  Goal: Absence of Infection Signs and Symptoms  Outcome: Progressing  Intervention: Promote Stabilization  Recent Flowsheet Documentation  Taken 2/19/2025 0200 by Anahi Blancas RN  Lung Protection Measures: ventilator synchrony promoted  Fever Reduction/Comfort Measures: cooling blanket applied  Taken 2/18/2025 2000 by Anahi Blancas RN  Fever Reduction/Comfort Measures: cooling blanket applied  Intervention: Promote Recovery  Recent Flowsheet Documentation  Taken 2/19/2025 0200 by Anahi Blancas RN  Airway/Ventilation Management: airway patency maintained  Sleep/Rest Enhancement:   noise level reduced   regular sleep/rest pattern promoted  Taken 2/18/2025 2000 by Anahi Blancas RN  Activity Management: bedrest  Airway/Ventilation Management: airway patency maintained  Sleep/Rest Enhancement: family presence promoted  Goal: Optimal Nutrition Delivery  Outcome: Progressing     Problem: Mechanical Ventilation Invasive  Goal: Effective Communication  Outcome: Progressing  Intervention: Ensure Effective Communication  Recent Flowsheet Documentation  Taken 2/19/2025 0200 by Anahi Blancas RN  Trust Relationship/Rapport: care explained  Diversional Activities: television  Family/Support  System Care: presence promoted  Taken 2/18/2025 2000 by Anahi Blancas RN  Trust Relationship/Rapport: care explained  Diversional Activities: television  Family/Support System Care: presence promoted  Communication Enhancement Strategies: extra time allowed for response  Goal: Optimal Device Function  Outcome: Progressing  Intervention: Optimize Device Care and Function  Recent Flowsheet Documentation  Taken 2/19/2025 0200 by Anahi Blancas RN  Airway/Ventilation Management: airway patency maintained  Taken 2/19/2025 0000 by Anahi Blancas RN  Oral Care: swabbed with antiseptic solution  Taken 2/18/2025 2000 by Anahi Blancas RN  Airway/Ventilation Management: airway patency maintained  Oral Care: swabbed with antiseptic solution  Goal: Mechanical Ventilation Liberation  Outcome: Progressing  Intervention: Promote Extubation and Mechanical Ventilation Liberation  Recent Flowsheet Documentation  Taken 2/19/2025 0300 by Anahi Blancas RN  Medication Review/Management: medications reviewed  Taken 2/19/2025 0200 by Anahi Blancas RN  Sleep/Rest Enhancement:   noise level reduced   regular sleep/rest pattern promoted  Medication Review/Management: medications reviewed  Taken 2/19/2025 0100 by Anahi Blancas RN  Medication Review/Management: medications reviewed  Taken 2/19/2025 0000 by Anahi Blancas RN  Medication Review/Management: medications reviewed  Taken 2/18/2025 2300 by Anahi Blancas RN  Medication Review/Management: medications reviewed  Taken 2/18/2025 2200 by Anahi Blancas RN  Medication Review/Management: medications reviewed  Taken 2/18/2025 2100 by Anahi Blancas RN  Medication Review/Management: medications reviewed  Taken 2/18/2025 2000 by Anahi Blancas RN  Sleep/Rest Enhancement: family presence promoted  Medication Review/Management: medications reviewed  Taken 2/18/2025 1900 by Anahi Blancas RN  Medication Review/Management: medications reviewed  Goal: Optimal Nutrition  Delivery  Outcome: Progressing  Goal: Absence of Device-Related Skin and Tissue Injury  Outcome: Progressing  Intervention: Maintain Skin and Tissue Health  Recent Flowsheet Documentation  Taken 2/19/2025 0200 by Anahi Blancas RN  Device Skin Pressure Protection: absorbent pad utilized/changed  Taken 2/18/2025 2000 by Anahi Blancas RN  Device Skin Pressure Protection: absorbent pad utilized/changed  Goal: Absence of Ventilator-Induced Lung Injury  Outcome: Progressing  Intervention: Facilitate Lung-Protection Measures  Recent Flowsheet Documentation  Taken 2/19/2025 0200 by Anahi Blancas RN  Lung Protection Measures: ventilator synchrony promoted  Intervention: Prevent Ventilator-Associated Pneumonia  Recent Flowsheet Documentation  Taken 2/19/2025 0200 by Anahi Blancas RN  Head of Bed ([facility]) Positioning: HOB at 30-45 degrees  VAP Prevention Measures: completed  Taken 2/19/2025 0000 by Anahi Blancas RN  Head of Bed ([facility]) Positioning: HOB at 30-45 degrees  Oral Care: swabbed with antiseptic solution  Taken 2/18/2025 2200 by Anahi Blancas RN  Head of Bed ([facility]) Positioning: HOB at 30-45 degrees  Taken 2/18/2025 2000 by Anahi Blancas RN  Head of Bed ([facility]) Positioning: HOB at 30-45 degrees  VAP Prevention Measures: completed  Oral Care: swabbed with antiseptic solution   Goal Outcome Evaluation:

## 2025-02-19 NOTE — PLAN OF CARE
Problem: Mechanical Ventilation Invasive  Goal: Effective Communication  Outcome: Progressing  Goal: Optimal Device Function  Outcome: Progressing  Intervention: Optimize Device Care and Function  Recent Flowsheet Documentation  Taken 2/19/2025 0700 by Sofy Denton RRT  Airway/Ventilation Management: airway patency maintained  Airway Safety Measures:   manual resuscitator/mask at bedside   oxygen flowmeter at bedside   suction at bedside  Goal: Mechanical Ventilation Liberation  Outcome: Progressing  Goal: Absence of Device-Related Skin and Tissue Injury  Outcome: Progressing  Goal: Absence of Ventilator-Induced Lung Injury  Outcome: Progressing  Intervention: Prevent Ventilator-Associated Pneumonia  Recent Flowsheet Documentation  Taken 2/19/2025 0700 by Sofy Denton RRT  Head of Bed (HOB) Positioning: HOB elevated   Goal Outcome Evaluation:

## 2025-02-19 NOTE — PROGRESS NOTES
NEPHROLOGY PROGRESS NOTE      INTERVAL HISTORY:    02/17/2025 patient still on ventilator urine output is picking up blood pressure stable FiO2 is 35%    02/18/2025 patient still on ventilator family to decide further care    02/19/2025 patient still on ventilator noted change family has decided to continue with current management and will decide further care on Thursday or Friday    Intake/Output                         02/17/25 0701 - 02/18/25 0700 02/18/25 0701 - 02/19/25 0700     [id number] 4348-2765 Total 4821-5394 2824-9242 Total                 Intake    I.V.  885.4  1258.9 2144.3  825.4  664.5 1489.9    Other  50  100 150  125  30 155    Intake (mL) ([REMOVED] Rectal Tube With balloon) -- 50 50 100 -- 100    Flush/ Irrigation Intake (mL) (NG/OG Tube Orogastric 16 Fr Left mouth) 50 50 100 25 30 55    NG/GT  320  341 661  545  550 1095    IV Piggyback  --  -- --  --  300 300    Total Intake 1255.4 1699.9 2955.3 1495.4 1544.5 3039.9       Output    Urine  875  625 1500  650  450 1100    Emesis/NG output  --  0 0  --  0 0    Stool  --  100 100  100  0 100    Dialysis  --  -- --  --  1550 1550    Total Output  750 2000 2750             VITAL SIGNS :     Temp:  [98.5 °F (36.9 °C)-101 °F (38.3 °C)] 98.7 °F (37.1 °C)  Heart Rate:  [] 106  Resp:  [28-34] 33  BP: ()/() 140/88  FiO2 (%):  [40 %-50 %] 40 %    02/17/2025 examined and reviewed  02/18/2025 examined and reviewed  02/19/2025 examined and reviewed    PHYSICAL EXAMINATION:    GENERAL EXAM  Laying in bed, intubated  Comfortable on mechanical ventilation    MENTAL STATUS EXAM  Sedated    NECK EXAM  JVP is not elevated, carotid exam normal    CARDIOVASCULAR EXAM  Regular rate and rhythm, no murmurs noted, no added heart sounds, normal apical pulsation  no edema in the lower extremities      MUSCULOSKELETAL EXAM  No cyanosis or clubbing noted in the digits    RESPIRATORY EXAM  Lungs clear to auscultation bilaterally, respiratory effort  "normal    ABDOMINAL EXAM  Abdomen soft  normal bowel sounds    SKIN EXAM  No new rashes    Access  Left IJ temporary dialysis catheter    Laboratory Data :     Albumin Albumin   Date Value Ref Range Status   02/19/2025 2.4 (L) 3.5 - 5.2 g/dL Final   02/18/2025 2.4 (L) 3.5 - 5.2 g/dL Final        Magnesium No results found for: \"MG\"         PTH               No results found for: \"PTH\"    CBC and coagulation:  Results from last 7 days   Lab Units 02/19/25  0102 02/18/25  0715 02/18/25  0159 02/16/25  2234 02/15/25  1041 02/14/25  0331 02/13/25  0157   LACTATE mmol/L  --  0.8  --   --   --   --  0.9   WBC 10*3/mm3 12.08*  --  12.76* 18.19*  --    < >  --    HEMOGLOBIN g/dL 8.3*  --  8.5* 9.4*  --    < >  --    HEMATOCRIT % 26.1*  --  27.0* 29.8*  --    < >  --    MCV fL 101.2*  --  102.7* 103.5*  --    < >  --    MCHC g/dL 31.8  --  31.5 31.5  --    < >  --    PLATELETS 10*3/mm3 189  --  179 190  --    < >  --    INR   --   --   --   --  0.98  --   --    D DIMER QUANT MCGFEU/mL  --   --   --   --  10.03*  --   --     < > = values in this interval not displayed.     Acid/base balance:  Results from last 7 days   Lab Units 02/19/25  0426 02/18/25  0433 02/17/25  1256   PH, ARTERIAL pH units 7.369 7.348* 7.375   PO2 ART mm Hg 74.8* 90.9 109.0*   PCO2, ARTERIAL mm Hg 43.9 42.2 40.2   HCO3 ART mmol/L 25.3 23.2 23.5     Renal and electrolytes:    Results from last 7 days   Lab Units 02/19/25  0102 02/18/25  0159 02/16/25  2234 02/16/25  0156 02/15/25  0416 02/13/25  1150 02/13/25  0157   SODIUM mmol/L 139 141 136 139 139   < > 140   POTASSIUM mmol/L 4.2 4.1 4.8 4.1 3.9   < > 3.4*  3.4*   CHLORIDE mmol/L 98 98 93* 99 97*   < > 96*   CO2 mmol/L 22.5 22.2 21.4* 22.2 25.0   < > 24.8   BUN mg/dL 101* 111* 96* 77* 83*   < > 73*   CREATININE mg/dL 7.56* 9.12* 8.65* 7.66* 8.49*   < > 9.19*   CALCIUM mg/dL 8.2* 8.0* 7.6* 6.9* 7.5*   < > 7.0*   PHOSPHORUS mg/dL  --   --   --   --   --   --  6.7*    < > = values in this interval not " displayed.     Estimated Creatinine Clearance: 12.1 mL/min (A) (by C-G formula based on SCr of 7.56 mg/dL (H)).  @GFRCG:3@   Liver and pancreatic function:  Results from last 7 days   Lab Units 02/19/25  0102 02/18/25  0159 02/15/25  0416 02/13/25  0157   ALBUMIN g/dL 2.4* 2.4*  --  3.0*   BILIRUBIN mg/dL 0.6 0.6  --  0.5   ALK PHOS U/L 158* 127*  --  50   AST (SGOT) U/L 69* 57*  --  72*   ALT (SGPT) U/L 35 31  --  37   AMMONIA umol/L  --   --  23 46         Cardiac:      Liver and pancreatic function:  Results from last 7 days   Lab Units 02/19/25  0102 02/18/25  0159 02/15/25  0416 02/13/25  0157   ALBUMIN g/dL 2.4* 2.4*  --  3.0*   BILIRUBIN mg/dL 0.6 0.6  --  0.5   ALK PHOS U/L 158* 127*  --  50   AST (SGOT) U/L 69* 57*  --  72*   ALT (SGPT) U/L 35 31  --  37   AMMONIA umol/L  --   --  23 46         CLINICAL DATA REVIEW  CBC, Renal functions, Serum electrolytes, Acid base labs reviewed 1  Telemetry was reviewed and demonstrated sinus rhythm rate   Discussed the labs with Dr. Irby    MEDICINES:     amiodarone, 400 mg, Oral, Q24H   Followed by  [START ON 2/28/2025] amiodarone, 200 mg, Oral, Q24H  cefTRIAXone, 1,000 mg, Intravenous, Q24H  cetirizine, 5 mg, Oral, Daily  chlorhexidine, 15 mL, Mouth/Throat, Q12H  doxycycline, 100 mg, Intravenous, Q12H  finasteride, 5 mg, Oral, Daily  folic acid, 1 mg, Oral, Daily  heparin (porcine), 5,000 Units, Subcutaneous, Q8H  insulin regular, 2-7 Units, Subcutaneous, Q6H  ipratropium-albuterol, 3 mL, Nebulization, 4x Daily - RT  midodrine, 10 mg, Oral, TID AC  oseltamivir, 30 mg, Oral, Once per day on Monday Wednesday Friday  pantoprazole, 40 mg, Intravenous, BID AC  [Held by provider] rosuvastatin, 10 mg, Oral, Nightly  sodium chloride, 10 mL, Intravenous, Q12H  sodium chloride, 10 mL, Intravenous, Q12H  sodium chloride, 10 mL, Intravenous, Q12H  sodium chloride, 10 mL, Intravenous, Q12H  sodium chloride, 10 mL, Intravenous, Q12H  thiamine, 200 mg, Per G Tube,  Daily  vitamin B-12, 1,000 mcg, Oral, Daily      dexmedetomidine, 0.2-1.5 mcg/kg/hr (Dosing Weight), Last Rate: 1.5 mcg/kg/hr (02/19/25 0354)  ketamine, 0.06-2.5 mg/kg/hr (Dosing Weight), Last Rate: 0.56 mg/kg/hr (02/19/25 0653)          Assessment & Plan     02/17/2025 reviewed and updated  02/18/2025 reviewed and updated  02/19/2025 reviewed and updated    -Acute kidney injury  - Chronic kidney disease unspecified stage  -Complication of access  - Acute hypercapnic hypoxic respiratory failure  - Acute metabolic acidosis  - Severe sepsis with septic shock  - Rhabdomyolysis  - Paroxysmal atrial fibrillation    02/16/2025   Dr Bates  patient remains oliguric with no signs of renal recovery, will continue watch and monitor closely but will keep on intermittent dialysis for now  Next dialysis is going to be on Tuesday but we will reassess tomorrow  Acute kidney injury most likely due to acute tubular necrosis in settings shock  Had ASHLEY in October 2024 with creatinine peaked to 4 with some improvement close to 2  - As needed potassium replacement  - Monitor for renal recovery closely  - Strict intake and output record.  -Patient remains very high risk  - Please avoid any nephrotoxic agents, hypotension and adjust medications according to estimated GFR.   REVIEWED NOTES OF CLINICAL TEAMS INVOLVED WITH PATIENT CARE.  DISCUSSED IN DETAIL WITH PATIENT AND/OR FAMILY ABOUT CURRENT CLINICAL CONDITION AND RESPONSE TO ONGOING MANAGEMENT.   DISCUSSED IN DETAIL WITH PATIENT AND/OR FAMILY ABOUT RISKS ASSOCIATED WITH CURRENT CLINICAL CONDITION AND RISK INVOLVED WITH CURRENT MANAGEMENT.   DISCUSSED IN DETAIL WITH HOSPITALIST  CODE STATUS REVIEWED,     02/17/2025  Patient's urine output is picking up now creatinine is still 8.65 we will continue to watch urine output we will hold dialysis today and do dialysis in the morning if needed  Potassium is stable  Strict urine output record please     02/18/2025  Patient's creatinine is 9  still urine output 450 cc still on ventilator family to decide about further care they will dialyze the patient if family decides to continue the current management    02/19/2025  Patient's dialysis catheter is not functioning and we had only blood 100 cc so we will ask surgery to reevaluate the dialysis catheter and will do dialysis after  Family to decide about further management tomorrow      Discussed with RN    Prognosis critical    Reviewed hospitalist notes  Reviewed consultant notes  Reviewed the labs  Reviewed radiology    Please avoid nephrotoxic medication and pharmacy to adjust the medication according to the GFR.          RIKY Wick MD  02/19/25  06:57 EST

## 2025-02-19 NOTE — PROGRESS NOTES
"DOS: 2025  NAME: Phan Daniels   : 1955  PCP: Cesia Diaz APRN  Chief Complaint   Patient presents with    Respiratory Distress       Chief complaint: Altered mental status    Subjective: No acute events reported overnight.  Remains sedated with Precedex and ketamine, neurological exam unchanged.  Family discussed with palliative care yesterday, current plan is to give him a couple more days and will consider comfort measures at that time if there has been no improvement.     Objective:  Vital signs: /73   Pulse 99   Temp 98.1 °F (36.7 °C) (Esophageal)   Resp 27   Ht 182.9 cm (72.01\")   Wt 113 kg (248 lb 3.8 oz)   SpO2 94%   BMI 33.66 kg/m²    Gen: NAD, vitals reviewed  MS: Does not open eyes to voice, does not follow commands  CN: no blink to threat b/l, pupils 3 mm and reactive bilaterally, conjugate gaze cough and gag absent  Motor: no response to pain all 4 ext, normal tone throughout. No spontaneous movements  Sensory: no response to pain        Laboratory results:  Lab Results   Component Value Date    GLUCOSE 176 (H) 2025    CALCIUM 8.2 (L) 2025     2025    K 4.2 2025    CO2 22.5 2025    CL 98 2025     (H) 2025    CREATININE 7.56 (H) 2025    BCR 13.4 2025    ANIONGAP 18.5 (H) 2025     Lab Results   Component Value Date    WBC 12.08 (H) 2025    HGB 8.3 (L) 2025    HCT 26.1 (L) 2025    .2 (H) 2025     2025     No results found for: \"LDL\"         Vitals  Systolic blood pressure 120s to 130  Respiratory rate 27 to 30  Heart rate 80- 107  Afebrile    Review of labs:   Sodium 139  Creatinine 7.56    Blood glucose 176  AST 69 ALT 35  Ammonia 23    A1c 6.4  B12 2000 and folate 10    WBC 12.08  Hemoglobin 8.3 and platelets 189    Urine analysis showed blood protein bilirubin and WBC bacteria and yeast  Respiratory panel positive for influenza  Blood " cultures showing staph coagulase-negative since 2/8, last culture 2/13/2025 also positive  Respiratory cultures also growing staph agalactiae  Tox negative    Review and interpretation of imaging:     EEG pending 2/17/2025  EEG 2/14/2025 showed diffuse slowing no epileptiform discharges or seizure    CT head 2/13/2025 no acute hypodensity or hyperdensity could not appreciate any old infarcts    CT head 2/16/2025 no acute hypodensity or hyperdensity, no interval change    CT from 2/8/2025 did not show any vegetations 55 to 60% EF normal LAE    Diagnoses:  Acute metabolic encephalopathy  Acute hypoxic respiratory failure  Acute viral influenza infection  Sepsis secondary to bacteremia from staph coagulase-negative  End-stage renal disease on dialysis  Atrial fibrillation with rapid ventricular response  Electrolyte derangement secondary to end-stage renal disease  COPD  History of alcoholism    Patient is a 69-year-old with history of GERD hypertension hyperlipidemia atrial fibrillation alcohol abuse COPD was found at home unresponsive.  Neurology has been consulted for altered mental status.    Acute metabolic encephalopathy  Etiology multifactorial given new influenza viral infection, sepsis, uremic encephalopathy  Continue medical management of influenza he is on Tamiflu  Continue medical management of bacteremia he is on antibiotics  Less concerned about CNS etiology as CT head negative, no concern for underlying seizures per EEG  MRI brain with and without contrast when able depending on goals of care  History of alcohol abuse continue thiamine replacement    Overall critical illness; patient has multiple acute on chronic ongoing medical comorbidities.  Palliative care discussion appropriate, scheduled 2/18.  Neurology will continue to follow.    The case was discussed with Dr. Arambula.  Tele-Neurology will continue to follow peripherally for now.    Please note that portions of this note were completed with a  voice recognition program.     Jhoan Way, APRN

## 2025-02-19 NOTE — CASE MANAGEMENT/SOCIAL WORK
Discharge Planning Assessment   [place]     Patient Name: Phan Daniels  MRN: [id number]  Today's Date: 2/19/2025    Admit Date: 2/8/2025     Discharge Plan       Row Name 02/19/25 1253       Plan    Plan Pt remains intubated. Pt lives alone prior to admission. Pt does not utilize home health services. [facility]. Pt  [facility] wheelchair, walker and cane [facility]. [facility] (not [facility]), no POA. Per family pt is independent with all ADL's prior to admission. SS to follow.               LEOBARDO Garcia

## 2025-02-19 NOTE — PROGRESS NOTES
Adult Nutrition  Assessment/PES    Patient Name:  Phan Daniels  YOB: 1955  MRN: [id number]  Admit Date:  2/8/2025    Assessment Date:  2/19/2025    Comments:  follow up TF    Pt on Nepro 50 ml/hr    Noted HD cath not working.  Noted pallative discussion.    No free water   Recommend: clarify if free water goals with nephrology  Will cont to follow and monitor.      Reason for Assessment       Row Name 02/19/25 1615          Reason for Assessment    Reason For Assessment follow-up protocol  [place], [place]     Diagnosis infection/sepsis;pulmonary disease  sepsis, acute hypoxic resp failure, FLU, HD, possible cholecystitis hx ETOH                    Nutrition/Diet History       Row Name 02/19/25 1616          Nutrition/Diet History    Typical Intake (Food/Fluid/EN/PN) pt on vent with with EN, HD cath no functioning, noted pallative discussion family want to see how next couple of days go                    Labs/Tests/Procedures/Meds       Row Name 02/19/25 1616          Labs/Procedures/Meds    Lab Results Reviewed reviewed     Lab Results Comments /creat 7.5 H        Diagnostic Tests/Procedures    Diagnostic Test/Procedure Reviewed reviewed        Medications    Pertinent Medications Reviewed reviewed     Pertinent Medications Comments B12, thiamine, humlin, folic acid                        Nutrition Prescription Ordered       Row Name 02/19/25 1617          Nutrition Prescription PO    Current PO Diet NPO        Nutrition Prescription EN    Product Novasource Renal (Nepro)     Continuous TF Goal Rate (mL/hr) 50 mL/hr                    Evaluation of Received Nutrient/Fluid Intake       Row Name 02/19/25 1620          EN Evaluation    Number of Days EN Intake Evaluated 3 days     EN Average Volume Delivered (mL/day) 720 mL/day                       Problem/Interventions:   Problem 1       Row Name 02/19/25 1621          Nutrition Diagnoses Problem 1    Problem 1 Other (comment)   Increased nutrient needs related to sepsis, resp failu, HD as evidenced by enteral nutrition                          Intervention Goal       Row Name 02/19/25 1621          Intervention Goal    General Nutrition support treatment     TF/PN Tolerate TF at goal                    Nutrition Intervention       Row Name 02/19/25 1621          Nutrition Intervention    RD/Tech Action Follow Tx progress                      Education/Evaluation       Row Name 02/19/25 1622          Education    Education Education not appropriate at this time        Monitor/Evaluation    Monitor Per protocol;I&O;Pertinent labs;TF delivery/tolerance;Weight;Skin status                     Electronically signed by:  Alessandra Singer RD  02/19/25 16:22 EST

## 2025-02-19 NOTE — PROGRESS NOTES
"    [facility] HOSPITALIST PROGRESS NOTE     Patient Identification:  Name:  Phan Daniels  Age:  69 y.o.  Sex:  male  :  1955  MRN:  [id number]  Visit Number:  [id number]  ROOM: [address]     Primary Care Provider:  Cesia Diaz APRN    Length of stay in inpatient status:  11    Subjective     Chief Compliant:    Chief Complaint   Patient presents with   • Respiratory Distress       History of Presenting Illness:    Yesterday afternoon I had a long goals of care discussion with patient's sibling with palliative care present. Expressed guarded prognosis in setting of patient not waking up and only being agitated during SBT. Patient also now with fevers being started again on abx. Family expressed understanding and noted patient would not want to \"die on artificial life support\". They wanted to give patient a couple of more days to see if he would recover and then discuss again at that juncture what they think Phan would want.     No events overnight. This morning patient only on precedex with planned SBT. Nursing staff reported there is concern about dialysis catheter flow and surgery was going to evaluate. Patient continues to make urine. Patient still having intermittent fevers. BP has been on higher side off pressor support.     ROS:  Otherwise 10 point ROS negative other than documented above in HPI.     Objective     Current Hospital Meds:amiodarone, 400 mg, Oral, Q24H   Followed by  [START ON 2025] amiodarone, 200 mg, Oral, Q24H  [START ON 2025] cefTRIAXone, 2,000 mg, Intravenous, Q24H  cetirizine, 5 mg, Oral, Daily  chlorhexidine, 15 mL, Mouth/Throat, Q12H  doxycycline, 100 mg, Intravenous, Q12H  finasteride, 5 mg, Oral, Daily  folic acid, 1 mg, Oral, Daily  heparin (porcine), 5,000 Units, Subcutaneous, Q8H  insulin regular, 2-7 Units, Subcutaneous, Q6H  ipratropium-albuterol, 3 mL, Nebulization, 4x Daily - RT  midodrine, 5 mg, Oral, TID AC  oseltamivir, 30 mg, Oral, " Once per day on Monday Wednesday Friday  pantoprazole, 40 mg, Intravenous, BID AC  [Held by provider] rosuvastatin, 10 mg, Oral, Nightly  sodium chloride, 10 mL, Intravenous, Q12H  sodium chloride, 10 mL, Intravenous, Q12H  sodium chloride, 10 mL, Intravenous, Q12H  sodium chloride, 10 mL, Intravenous, Q12H  sodium chloride, 10 mL, Intravenous, Q12H  thiamine, 200 mg, Per G Tube, Daily  vitamin B-12, 1,000 mcg, Oral, Daily    dexmedetomidine, 0.2-1.5 mcg/kg/hr (Dosing Weight), Last Rate: 1.5 mcg/kg/hr (02/19/25 0354)  ketamine, 0.06-2.5 mg/kg/hr (Dosing Weight), Last Rate: Stopped (02/19/25 1000)        Current Antimicrobial Therapy:  Anti-Infectives (From admission, onward)      Ordered     Dose/Rate Route Frequency Start Stop    02/19/25 0926  cefTRIAXone (ROCEPHIN) 2,000 mg in sodium chloride 0.9 % 100 mL IVPB-VTB        Ordering Provider: Mignon Hanley PharmD    2,000 mg  200 mL/hr over 30 Minutes Intravenous Every 24 Hours 02/20/25 0900 02/23/25 0859    02/18/25 0815  cefTRIAXone (ROCEPHIN) 1,000 mg in sodium chloride 0.9 % 100 mL IVPB-VTB  Status:  Discontinued        Ordering Provider: Talat Blankenship MD    1,000 mg  200 mL/hr over 30 Minutes Intravenous Every 24 Hours 02/18/25 0900 02/19/25 0926    02/18/25 0815  doxycycline (VIBRAMYCIN) 100 mg in sodium chloride 0.9 % 100 mL IVPB-VTB        Ordering Provider: Talat Blankenship MD    100 mg  over 60 Minutes Intravenous Every 12 Hours 02/18/25 0900 02/23/25 0859    02/14/25 1023  oseltamivir (TAMIFLU) capsule 30 mg        Ordering Provider: Kendall Irby MD    30 mg Oral Once per day on Monday Wednesday Friday 02/14/25 1200 02/21/25 1159    02/11/25 1404  cefTRIAXone (ROCEPHIN) 2,000 mg in sodium chloride 0.9 % 100 mL IVPB-VTB        Ordering Provider: Dangelo Ledezma MD    2,000 mg  200 mL/hr over 30 Minutes Intravenous Every 24 Hours 02/12/25 0600 02/15/25 0634    02/11/25 1404  metroNIDAZOLE (FLAGYL) IVPB 500 mg        Ordering Provider: [place]  MD Dangelo    500 mg  200 mL/hr over 30 Minutes Intravenous Every 8 Hours 02/11/25 2000 02/11/25 2106    02/08/25 2054  Vancomycin Pharmacy Intermittent/Pulse Dosing        Ordering Provider: Mignon Hanley, PharmD     Not Applicable Daily 02/09/25 1200 02/13/25 0859    02/08/25 1846  cefepime 1000 mg IVPB in 100 mL NS (VTB)  Status:  Discontinued        Ordering Provider: Dangelo Ledezma MD    1,000 mg  over 30 Minutes Intravenous Every 24 Hours 02/09/25 0600 02/11/25 1404    02/08/25 1853  vancomycin IVPB 2000 mg in 0.9% Sodium Chloride 500 mL        Ordering Provider: Talat Blankenship MD    20 mg/kg × 98.8 kg  250 mL/hr over 120 Minutes Intravenous Once 02/08/25 2100 02/08/25 2345    02/08/25 1831  metroNIDAZOLE (FLAGYL) IVPB 500 mg  Status:  Discontinued        Ordering Provider: Talat Blankenship MD    500 mg  200 mL/hr over 30 Minutes Intravenous Every 8 Hours 02/08/25 2000 02/11/25 1404    02/08/25 1846  cefepime 1000 mg IVPB in 100 mL NS (VTB)        Ordering Provider: Talat Blankenship MD    1,000 mg  over 30 Minutes Intravenous Once 02/08/25 1945 02/08/25 2215    02/08/25 1831  Pharmacy to dose vancomycin  Status:  Discontinued        Ordering Provider: Talat Blankenship MD     Not Applicable Continuous PRN 02/08/25 1831 02/09/25 1406    02/08/25 1331  cefepime 2000 mg IVPB in 100 mL NS (VTB)        Ordering Provider: Darren Bowman MD    2,000 mg  over 30 Minutes Intravenous Once 02/08/25 1347 02/08/25 1501    02/08/25 1331  metroNIDAZOLE (FLAGYL) IVPB 500 mg        Ordering Provider: Darren Bowman MD    500 mg  200 mL/hr over 30 Minutes Intravenous Once 02/08/25 1347 02/08/25 1458    02/08/25 1331  vancomycin IVPB 1750 mg in 0.9% Sodium Chloride (premix) 500 mL        Ordering Provider: Darren Bowman MD    20 mg/kg × 86.7 kg (Adjusted)  285.7 mL/hr over 105 Minutes Intravenous Once 02/08/25 1347 02/08/25 3478          Current Diuretic Therapy:  Diuretics (From admission, onward)      None           ----------------------------------------------------------------------------------------------------------------------  Vital Signs:  Temp:  [98.1 °F (36.7 °C)-101 °F (38.3 °C)] 98.1 °F (36.7 °C)  Heart Rate:  [] 99  Resp:  [27-34] 27  BP: ()/() 118/73  FiO2 (%):  [40 %] 40 %  SpO2:  [91 %-98 %] 94 %  on   ;   Device (Oxygen Therapy): ventilator  Body mass index is 33.66 kg/m².    Wt Readings from Last 3 Encounters:   02/15/25 113 kg (248 lb 3.8 oz)   06/06/24 102 kg (225 lb 10.3 oz)     Intake & Output (last 3 days)         02/16 0701  02/17 0700 02/17 0701  02/18 0700 02/18 0701  02/19 0700 02/19 0701  02/20 0700    I.V. (mL/kg) 1529.7 (15) 2144.3 (21) 1489.9 (14.6)     Other 180 150 155     NG/ 487 3671     IV Piggyback   300     Total Intake(mL/kg) 2115.7 (20.7) 2955.3 (29) 3039.9 (29.8)     Urine (mL/kg/hr) 750 (0.3) 1500 (0.6) 1100 (0.4)     Emesis/NG output 0 0 0     Stool 150 100 100     Dialysis   1550     Total Output 900 1600 2750     Net +1215.7 +1355.3 +289.9             Emesis Unmeasured Occurrence 0 x             No diet orders on file  ----------------------------------------------------------------------------------------------------------------------  Physical exam:  GENERAL:  Patient intubated and sedated.   SKIN:  Warm, dry without rashes, purpura or petechiae.  EYES:  Pupils equal, round and reactive to light.  Conjunctivae normal.  HEAD:  Normocephalic. Atraumatic.   EARS/NOSE/MOUTH/THROAT:  Septum midline.  No excoriations or nasal discharge. No stomatitis or ulcers.  Lips normal. Oropharynx without lesions or exudates.  NECK:  Supple with good range of motion; no thyromegaly or masses  LYMPHATICS:  No cervical, supraclavicular, axillary adenopathy.  RESP:  Lungs clear to auscultation. Good airflow. Intubated receiving mechanical ventilation.   CARDIAC:  Regular rate and rhythm without murmurs, rubs or gallops. Normal S1,S2. No edema  GI:  Soft, nontender, no  hepatosplenomegaly, normal bowel sounds  MSK:  No clubbing or cyanosis, No joint swelling noted in hands  NEUROLOGICAL:  No focal deficit. Pupils equal and reactive.   ----------------------------------------------------------------------------------------------------------------------  Tele:    ----------------------------------------------------------------------------------------------------------------------  Results from last 7 days   Lab Units 02/19/25  0102 02/18/25  0715 02/18/25  0159 02/16/25  2234 02/15/25  1041 02/14/25  0331 02/13/25  0157   LACTATE mmol/L  --  0.8  --   --   --   --  0.9   WBC 10*3/mm3 12.08*  --  12.76* 18.19*  --    < >  --    HEMOGLOBIN g/dL 8.3*  --  8.5* 9.4*  --    < >  --    HEMATOCRIT % 26.1*  --  27.0* 29.8*  --    < >  --    MCV fL 101.2*  --  102.7* 103.5*  --    < >  --    MCHC g/dL 31.8  --  31.5 31.5  --    < >  --    PLATELETS 10*3/mm3 189  --  179 190  --    < >  --    INR   --   --   --   --  0.98  --   --     < > = values in this interval not displayed.     Results from last 7 days   Lab Units 02/19/25  0426   PH, ARTERIAL pH units 7.369   PO2 ART mm Hg 74.8*   PCO2, ARTERIAL mm Hg 43.9   HCO3 ART mmol/L 25.3     Results from last 7 days   Lab Units 02/19/25  0102 02/18/25  0159 02/16/25  2234 02/13/25  1150 02/13/25  0157   SODIUM mmol/L 139 141 136   < > 140   POTASSIUM mmol/L 4.2 4.1 4.8   < > 3.4*  3.4*   CHLORIDE mmol/L 98 98 93*   < > 96*   CO2 mmol/L 22.5 22.2 21.4*   < > 24.8   BUN mg/dL 101* 111* 96*   < > 73*   CREATININE mg/dL 7.56* 9.12* 8.65*   < > 9.19*   CALCIUM mg/dL 8.2* 8.0* 7.6*   < > 7.0*   PHOSPHORUS mg/dL  --   --   --   --  6.7*   GLUCOSE mg/dL 176* 137* 132*   < > 308*   ALBUMIN g/dL 2.4* 2.4*  --   --  3.0*   BILIRUBIN mg/dL 0.6 0.6  --   --  0.5   ALK PHOS U/L 158* 127*  --   --  50   AST (SGOT) U/L 69* 57*  --   --  72*   ALT (SGPT) U/L 35 31  --   --  37    < > = values in this interval not displayed.   Estimated Creatinine Clearance: 12.1  "mL/min (A) (by C-G formula based on SCr of 7.56 mg/dL (H)).  No results found for: \"AMMONIA\"              Glucose   Date/Time Value Ref Range Status   02/19/2025 0547 112 70 - 130 mg/dL Final   02/18/2025 2333 168 (H) 70 - 130 mg/dL Final   02/18/2025 1815 131 (H) 70 - 130 mg/dL Final   02/18/2025 1149 170 (H) 70 - 130 mg/dL Final   02/18/2025 0506 110 70 - 130 mg/dL Final   02/17/2025 2310 154 (H) 70 - 130 mg/dL Final   02/17/2025 1732 157 (H) 70 - 130 mg/dL Final   02/17/2025 1137 178 (H) 70 - 130 mg/dL Final     Lab Results   Component Value Date    TSH 0.196 (L) 02/09/2025    FREET4 0.85 (L) 02/09/2025     No results found for: \"PREGTESTUR\", \"PREGSERUM\", \"HCG\", \"HCGQUANT\"  Pain Management Panel          Latest Ref Rng & Units 2/8/2025   Pain Management Panel   Amphetamine, Urine Qual Negative Negative    Barbiturates Screen, Urine Negative Negative    Benzodiazepine Screen, Urine Negative Negative    Buprenorphine, Screen, Urine Negative Negative    Cocaine Screen, Urine Negative Negative    Fentanyl, Urine Negative Negative    Methadone Screen , Urine Negative Negative    Methamphetamine, Ur Negative Negative       Details                 Brief Urine Lab Results  (Last result in the past 365 days)        Color   Clarity   Blood   Leuk Est   Nitrite   Protein   CREAT   Urine HCG        02/17/25 1704 Yellow   Clear   Large (3+)   Trace   Negative   100 mg/dL (2+)                 Blood Culture   Date Value Ref Range Status   02/17/2025 No growth at 24 hours  Preliminary   02/17/2025 No growth at 24 hours  Preliminary   02/13/2025 No growth at 5 days  Final   02/13/2025 Staphylococcus, coagulase negative (C)  Final     Urine Culture   Date Value Ref Range Status   02/17/2025 No growth  Final     No results found for: \"WOUNDCX\"  No results found for: \"STOOLCX\"  No results found for: \"RESPCX\"  No results found for: \"AFBCX\"  Results from last 7 days   Lab Units 02/18/25  0715 02/13/25  0157   LACTATE mmol/L 0.8 0.9 "       I have personally looked at the labs and they are summarized above.  ----------------------------------------------------------------------------------------------------------------------  Detailed radiology reports for the last 24 hours:    Imaging Results (Last 24 Hours)       Procedure Component Value Units Date/Time    XR Chest 1 View [[id number]] Collected: 02/19/25 0812     Updated: 02/19/25 0815    Narrative:      EXAM:    XR Chest, 1 View     EXAM DATE:    2/19/2025 7:51 AM     CLINICAL HISTORY:    Check temporary hemodialysis catheter placement; A41.9-Sepsis,  unspecified organism; N17.9-Acute kidney failure, unspecified;  R79.89-Other specified abnormal findings of blood chemistry;  J18.9-Pneumonia, unspecified organism     TECHNIQUE:    Frontal view of the chest.     COMPARISON:    2/17/2025     FINDINGS:    Lungs and pleural spaces:  Bilateral interstitial opacities have  increased from the previous exam.  No pneumothorax.    Heart:  Cardiomegaly slightly improved.    Mediastinum:  Unremarkable.  Normal mediastinal contour.    Bones/joints:  Unremarkable.  No acute fracture.    Vasculature:  Right IJ deep line with tip in the distal SVC.  Left IJ  deep line with tip in the distal SVC.    Tubes, lines and devices:  ET tube with tip at level of clavicles.  NG  tube extends below diaphragm into stomach.       Impression:      1.  Increasing interstitial opacities and vascular markings suggesting  slight increasing volume overload/CHF.  2.  Support devices as above.     This report was finalized on 2/19/2025 8:13 AM by Dr. Jhoan Farias MD.             Assessment & Plan    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 complicated by possible ARDS, HAGMA,  Mild Rhabdomyolysis, Now with Viral Upper Respiratory Infection due to Influenza A  #Atrial Fibrillation with Rapid Ventricular Rate   #Elevated HS Troponins, suspected NSTEMI Type II due to shock  - Pulmonary consulted and following   - Nephrology consulted and following, temporary catheter placed, hemodialysis per Nephrology, patient is making some urine.  Dialysis catheter being evaluated by surgery today for low flow.   - General Surgery consulted and following, don't suspect cholecystitis   - TeleNeurology consulted and following. EEG obtained on 2/17 with slowing.   - Palliative Care consulted and following   - Completed tamiflu x 5 days   - Continue pain and sedation drips for comfort  - Continue oral Amiodarone now  - Bleeding worse with heparin gtt. Will stop and order subQ heparin for now.   - Continue IV pressors for SBP < 90 or MAPs consistently < 65. Currently not requiring.   - Continue IV PPI for Gi prophylaxis  - Continue Tylenol as needed for fevers, Duonebs as needed  - Admitted to CCU, monitor on telemetry, continue 02 but wean as able, spontaneous awakening trial/spontaneous breathing trial as appropriate   - Patient on ketamine and precedex for sedation. Will add oral oxycodone for analgesia. Versed pushes switched to IV fentanyl pushes.      #Electrolyte Abnormalities  - Acute Severe Hypokalemia - Replacing, on protocol  - Acute Severe Hypomagnesemia - Replacing, on protocol     #Pre-Diabetes with steroid induced hyperglycemia  - Hgb A1c = 6.4%  - Continue FSBG and SSI, add long acting if indicated.     #Alcohol Use Disorder, Severe, with Dependence complicated   - Continue cardiac monitoring, seizure precautions, monitor for withdrawal      #Obesity by BMI  - Body mass index is 30.41 kg/m².; complicates all aspects of care      #Sepsis 2/2 recently diagnosed influenza A vs. L perihilar bacterial pneumonia   - Blood cultures from 2/13 growing 1/2 coag negative staph which I suspect is  contaminant. Repeat ordered.   - Will follow urine culture.   - Patient tested positive for influenza A on 2/14.   - Plain film of chest with worsening L sided perihilar infiltrate which may be bacterial pneumonia. Started  ceftriaxone/doxycyline and ordered respiratory culture. Day 2 of abx.      F: TFs  A: PO oxycodone.   S: Precedex, ketamine  T: SubQ heparin while heparin gtt on hold.   H: HOB elevated   U: Protonix   G: PRN  S: Daily   B: SubQ insulin   I: R IJ central line and ETT placed on 2/8, Hemodialysis catheter placed 2/11  D: None      Code status: DNR, otherwise full      Dispo: Pending clinical improvement. Guarded prognosis. Palliative care consulted and will continue to have GOC with family.  Siblings are NOK and patient has involved nephew.        Talat Blankenship MD  [facility] Hospitalist  02/19/25  10:42 EST

## 2025-02-19 NOTE — PROGRESS NOTES
Progress Note Critical Care Pulmonary        Subjective  On vent , sedated.  Was given Versed as needed overnight for asynchrony with the ventilator.    Oxygen requirement down to 40% after PEEP increased to 10  Hemodialysis was not completed because of malfunctioning dialysis catheter.        Interval History: event overnight: As described above        Review of Systems:    On vent   Vital Signs  Temp:  [98.1 °F (36.7 °C)-101 °F (38.3 °C)] 98.1 °F (36.7 °C)  Heart Rate:  [] 99  Resp:  [27-34] 27  BP: ()/() 118/73  FiO2 (%):  [40 %] 40 %  Body mass index is 33.66 kg/m².    Intake/Output Summary (Last 24 hours) at 2/19/2025 1049  Last data filed at 2/19/2025 0548  Gross per 24 hour   Intake 3039.94 ml   Output 2750 ml   Net 289.94 ml     No intake/output data recorded.    Physical Exam:  General- normal in appearance, sedated  HEENT- pupils equally reactive to light, normal in size, no scleral icterus    Neck-supple    Respiratory-bilateral air entry, few basal rales  Cardiovascular-  Normal S1 and S2. No S3, S4 or murmurs. No JVD, no carotid bruit and no edema, pulses normal bilaterally     GI-nontender nondistended bowel sounds positive    CNS- grossly nonfocal    Extremities-no clubbing .  2 Plus bipedal edema      Results Review:      Results from last 7 days   Lab Units 02/19/25  0102 02/18/25  0159 02/16/25  2234   WBC 10*3/mm3 12.08* 12.76* 18.19*   HEMOGLOBIN g/dL 8.3* 8.5* 9.4*   PLATELETS 10*3/mm3 189 179 190     Results from last 7 days   Lab Units 02/19/25  0102 02/18/25  0159 02/16/25  2234   SODIUM mmol/L 139 141 136   POTASSIUM mmol/L 4.2 4.1 4.8   CHLORIDE mmol/L 98 98 93*   CO2 mmol/L 22.5 22.2 21.4*   BUN mg/dL 101* 111* 96*   CREATININE mg/dL 7.56* 9.12* 8.65*   CALCIUM mg/dL 8.2* 8.0* 7.6*   GLUCOSE mg/dL 176* 137* 132*     Lab Results   Component Value Date    INR 0.98 02/15/2025    INR 1.16 (H) 02/10/2025    INR 1.21 (H) 02/08/2025    PROTIME 13.1 02/15/2025    PROTIME 15.0  02/10/2025    PROTIME 15.4 (H) 02/08/2025     Results from last 7 days   Lab Units 02/19/25  0102 02/18/25  0159 02/13/25  0157   ALK PHOS U/L 158* 127* 50   BILIRUBIN mg/dL 0.6 0.6 0.5   BILIRUBIN DIRECT mg/dL  --   --  0.4*   ALT (SGPT) U/L 35 31 37   AST (SGOT) U/L 69* 57* 72*     Results from last 7 days   Lab Units 02/19/25  0426   PH, ARTERIAL pH units 7.369   PO2 ART mm Hg 74.8*   PCO2, ARTERIAL mm Hg 43.9   HCO3 ART mmol/L 25.3     Imaging Results (Last 24 Hours)       Procedure Component Value Units Date/Time    XR Chest 1 View [[id number]] Collected: 02/19/25 0812     Updated: 02/19/25 0815    Narrative:      EXAM:    XR Chest, 1 View     EXAM DATE:    2/19/2025 7:51 AM     CLINICAL HISTORY:    Check temporary hemodialysis catheter placement; A41.9-Sepsis,  unspecified organism; N17.9-Acute kidney failure, unspecified;  R79.89-Other specified abnormal findings of blood chemistry;  J18.9-Pneumonia, unspecified organism     TECHNIQUE:    Frontal view of the chest.     COMPARISON:    2/17/2025     FINDINGS:    Lungs and pleural spaces:  Bilateral interstitial opacities have  increased from the previous exam.  No pneumothorax.    Heart:  Cardiomegaly slightly improved.    Mediastinum:  Unremarkable.  Normal mediastinal contour.    Bones/joints:  Unremarkable.  No acute fracture.    Vasculature:  Right IJ deep line with tip in the distal SVC.  Left IJ  deep line with tip in the distal SVC.    Tubes, lines and devices:  ET tube with tip at level of clavicles.  NG  tube extends below diaphragm into stomach.       Impression:      1.  Increasing interstitial opacities and vascular markings suggesting  slight increasing volume overload/CHF.  2.  Support devices as above.     This report was finalized on 2/19/2025 8:13 AM by Dr. Jhoan Farias MD.                    amiodarone, 400 mg, Oral, Q24H   Followed by  [START ON 2/28/2025] amiodarone, 200 mg, Oral, Q24H  [START ON 2/20/2025] cefTRIAXone, 2,000 mg,  Intravenous, Q24H  cetirizine, 5 mg, Oral, Daily  chlorhexidine, 15 mL, Mouth/Throat, Q12H  doxycycline, 100 mg, Intravenous, Q12H  finasteride, 5 mg, Oral, Daily  folic acid, 1 mg, Oral, Daily  heparin (porcine), 5,000 Units, Subcutaneous, Q8H  insulin regular, 2-7 Units, Subcutaneous, Q6H  ipratropium-albuterol, 3 mL, Nebulization, 4x Daily - RT  midodrine, 5 mg, Oral, TID AC  oseltamivir, 30 mg, Oral, Once per day on Monday Wednesday Friday  pantoprazole, 40 mg, Intravenous, BID AC  [Held by provider] rosuvastatin, 10 mg, Oral, Nightly  sodium chloride, 10 mL, Intravenous, Q12H  sodium chloride, 10 mL, Intravenous, Q12H  sodium chloride, 10 mL, Intravenous, Q12H  sodium chloride, 10 mL, Intravenous, Q12H  sodium chloride, 10 mL, Intravenous, Q12H  thiamine, 200 mg, Per G Tube, Daily  vitamin B-12, 1,000 mcg, Oral, Daily      dexmedetomidine, 0.2-1.5 mcg/kg/hr (Dosing Weight), Last Rate: 1.5 mcg/kg/hr (02/19/25 0354)  ketamine, 0.06-2.5 mg/kg/hr (Dosing Weight), Last Rate: Stopped (02/19/25 1000)        Medication Review:     Assessment & Plan      #1: Acute hypoxic and hypercarbic respiratory failure-likely due to aspiration pneumonia/influenza A.    Given new temperature spike yesterday.  Ceftriaxone and doxycycline was added.    Respiratory cultures reviewed.     Continue [facility]  Microbiology reviewed.    Transthoracic echo did not show any vegetations  VAP- precautions  Head end - 30 %  Mouth care   COPD-longtime smoker.  Continue systemic steroid and DuoNeb via nebulizer  Continue to titrate FiO2 down to maintain saturation 88 to 92%.            Cardiology- hemodynamically -unstable.  Continue midodrine.     Vasopressor requirement reviewed and adjusted for a MAP of 65 and above.  He is on midodrine on 10 mg every 8          Nephrology-dialysis catheter to be adjusted by surgery.  Hemodialysis will be resumed after that.      GI- PPI IV twice daily-        Will use fentanyl as needed for vent tolerance.  Had a  family meeting yesterday with .  They are on another again tomorrow due to hide above goal of care.  Seems to be inclined for comfort measures  Hematology- latest CBC reviewed.  Transfuse for hemoglobin less than 7     ID-  Endrocrinology- Maintain Blood sugar 140 -180     DVT prophylaxis-continue               Vent Settings          Resp Rate (Set): 24  Pressure Support (cm H2O): 8 cm H20  FiO2 (%): 40 %  PEEP/CPAP (cm H2O): 12 cm H20    Minute Ventilation (L/min) (Obs): 16.2 L/min  Resp Rate (Observed) Vent: 27     I:E Ratio (Obs): 1:2.2    PIP Observed (cm H2O): 26 cm H2O         Plateau pressure is 14 I have personally reviewed x-ray chest, labs, medication list.      Critical Care time spent in direct patient care: 35 minutes (excluding procedure time, if applicable) including high complexity decision making to assess, manipulate, and support vital organ system failure in this individual who has impairment of one or more vital organ systems such that there is a high probability of imminent or life threatening deterioration in the patient’s condition.  Patient is critically ill and is at higher risk for further mortality/morbidity. Continue ICU care .      Donavan Hernandez MD  02/19/25  10:49 EST

## 2025-02-19 NOTE — PLAN OF CARE
Problem: Adult Inpatient Plan of Care  Goal: Plan of Care Review  Outcome: Not Progressing  Flowsheets (Taken 2/19/2025 1624)  Progress: no change  Outcome Evaluation: pt remains intubated and failed sat, and pt recieved dialysis getting 2 L off  Plan of Care Reviewed With: family  Goal: Patient-Specific Goal (Individualized)  Outcome: Not Progressing  Goal: Absence of Hospital-Acquired Illness or Injury  Outcome: Not Progressing  Intervention: Identify and Manage Fall Risk  Description: Perform standard risk assessment on admission using a validated tool or comprehensive approach appropriate to the patient; reassess fall risk frequently, with change in status or transfer to another level of care.  Communicate risk to interprofessional healthcare team; ensure fall risk visible cue.  Determine need for increased observation, equipment and environmental modification, as well as use of supportive, nonskid footwear.  Adjust safety measures to individual needs and identified risk factors.  Reinforce the importance of active participation with fall risk prevention, safety, and physical activity with the patient and family.  Perform regular intentional rounding to assess need for position change, pain assessment and personal needs, including assistance with toileting.  Recent Flowsheet Documentation  Taken 2/19/2025 1600 by David Sargent, RN  Safety Promotion/Fall Prevention:   safety round/check completed   fall prevention program maintained  Taken 2/19/2025 1500 by David Sargent, RN  Safety Promotion/Fall Prevention:   safety round/check completed   fall prevention program maintained  Taken 2/19/2025 1400 by David Sargent, RN  Safety Promotion/Fall Prevention:   safety round/check completed   fall prevention program maintained  Taken 2/19/2025 1300 by David Sargent, RN  Safety Promotion/Fall Prevention:   safety round/check completed   fall prevention program maintained  Taken 2/19/2025 1200 by Arie  David COX RN  Safety Promotion/Fall Prevention:   safety round/check completed   fall prevention program maintained  Taken 2/19/2025 1100 by David Sargent RN  Safety Promotion/Fall Prevention:   safety round/check completed   fall prevention program maintained  Taken 2/19/2025 1000 by David Sargent RN  Safety Promotion/Fall Prevention:   safety round/check completed   fall prevention program maintained  Taken 2/19/2025 0900 by David Sargent RN  Safety Promotion/Fall Prevention:   safety round/check completed   fall prevention program maintained  Taken 2/19/2025 0800 by David Sargent RN  Safety Promotion/Fall Prevention:   safety round/check completed   fall prevention program maintained  Taken 2/19/2025 0700 by David Sargent RN  Safety Promotion/Fall Prevention:   safety round/check completed   fall prevention program maintained  Intervention: Prevent Skin Injury  Description: Perform a screening for skin injury risk, such as pressure or moisture-associated skin damage on admission and at regular intervals throughout hospital stay.  Keep all areas of skin (especially folds) clean and dry.  Maintain adequate skin hydration.  Relieve and redistribute pressure and protect bony prominences and skin at risk for injury; implement measures based on patient-specific risk factors.  Match turning and repositioning schedule to clinical condition.  Encourage weight shift frequently; assist with reposition if unable to complete independently.  Float heels off bed; avoid pressure on the Achilles tendon.  Keep skin free from extended contact with medical devices.  Optimize nutrition and hydration.  Encourage functional activity and mobility, as early as tolerated.  Use aids (e.g., slide boards, mechanical lift) during transfer.  Recent Flowsheet Documentation  Taken 2/19/2025 1600 by David Sargent RN  Body Position:   left   side-lying  Taken 2/19/2025 1400 by David Sargent RN  Body Position:    right   side-lying  Taken 2/19/2025 1200 by David Sargent, RN  Body Position:   left   side-lying  Taken 2/19/2025 1000 by David Sargent RN  Body Position:   right   side-lying  Taken 2/19/2025 0800 by David Sargent, RN  Body Position:   left   side-lying  Intervention: Prevent and Manage VTE (Venous Thromboembolism) Risk  Description: Assess for VTE (venous thromboembolism) risk.  Promote early mobilization; encourage both active and passive leg exercises, if unable to ambulate.  Initiate and maintain compression or other therapy, as indicated, based on identified risk in accordance with organizational protocol and provider order.  Recognize the patient's individual risk for bleeding before initiating pharmacologic thromboprophylaxis.  Recent Flowsheet Documentation  Taken 2/19/2025 0800 by David Sargent RN  VTE Prevention/Management: (se mar) --  Goal: Optimal Comfort and Wellbeing  Outcome: Not Progressing  Intervention: Provide Person-Centered Care  Description: Use a family-focused approach to care; encourage support system presence and participation.  Develop trust and rapport by proactively providing information, encouraging questions, addressing concerns and offering reassurance.  Acknowledge emotional response to hospitalization.  Recognize and utilize personal coping strategies and strengths; develop goals via shared decision-making.  Honor spiritual and cultural preferences.  Recent Flowsheet Documentation  Taken 2/19/2025 1400 by David Sargent, RN  Trust Relationship/Rapport:   care explained   reassurance provided  Taken 2/19/2025 0800 by David Sargent, RN  Trust Relationship/Rapport:   care explained   reassurance provided  Goal: Readiness for Transition of Care  Outcome: Not Progressing   Goal Outcome Evaluation:  Plan of Care Reviewed With: family        Progress: no change  Outcome Evaluation: pt remains intubated and failed sat, and pt recieved dialysis getting 2 L off

## 2025-02-20 NOTE — PROGRESS NOTES
Progress Note Critical Care Pulmonary        Subjective  On vent , off sedation.  Asynchrony noted.        Oxygen requirement down to 40% after PEEP reduced to 8.  I increase the tidal volume to 550 to see if it helps with ventilator patient asynchrony.      Family is having a meeting today about possible withdrawal of care/comfort measure.     dialysis done yesterday.            Interval History: event overnight: As described above        Review of Systems:    On vent   Vital Signs  Temp:  [98.8 °F (37.1 °C)-100.4 °F (38 °C)] 98.8 °F (37.1 °C)  Heart Rate:  [] 109  Resp:  [29-61] 29  BP: (127-170)/() 155/94  FiO2 (%):  [40 %] 40 %  Body mass index is 33.6 kg/m².    Intake/Output Summary (Last 24 hours) at 2/20/2025 1003  Last data filed at 2/20/2025 0530  Gross per 24 hour   Intake 3775.79 ml   Output 3000 ml   Net 775.79 ml     No intake/output data recorded.    Physical Exam:  General- normal in appearance, sedated  HEENT- pupils equally reactive to light, normal in size, no scleral icterus    Neck-supple    Respiratory-bilateral air entry, few basal rales  Cardiovascular-  Normal S1 and S2. No S3, S4 or murmurs. No JVD, no carotid bruit and no edema, pulses normal bilaterally     GI-nontender nondistended bowel sounds positive    CNS- grossly nonfocal    Extremities-no clubbing .  2 Plus bipedal edema      Results Review:      Results from last 7 days   Lab Units 02/20/25  0038 02/19/25  0102 02/18/25  0159   WBC 10*3/mm3 13.98* 12.08* 12.76*   HEMOGLOBIN g/dL 9.4* 8.3* 8.5*   PLATELETS 10*3/mm3 180 189 179     Results from last 7 days   Lab Units 02/20/25  0038 02/19/25  0102 02/18/25  0159   SODIUM mmol/L 134* 139 141   POTASSIUM mmol/L 4.3 4.2 4.1   CHLORIDE mmol/L 94* 98 98   CO2 mmol/L 22.4 22.5 22.2   BUN mg/dL 89* 101* 111*   CREATININE mg/dL 5.72* 7.56* 9.12*   CALCIUM mg/dL 8.4* 8.2* 8.0*   GLUCOSE mg/dL 121* 176* 137*     Lab Results   Component Value Date    INR 0.98 02/15/2025    INR 1.16  (H) 02/10/2025    INR 1.21 (H) 02/08/2025    PROTIME 13.1 02/15/2025    PROTIME 15.0 02/10/2025    PROTIME 15.4 (H) 02/08/2025     Results from last 7 days   Lab Units 02/20/25  0038 02/19/25  0102 02/18/25  0159   ALK PHOS U/L 160* 158* 127*   BILIRUBIN mg/dL 0.8 0.6 0.6   ALT (SGPT) U/L 31 35 31   AST (SGOT) U/L 52* 69* 57*     Results from last 7 days   Lab Units 02/20/25  0415   PH, ARTERIAL pH units 7.413   PO2 ART mm Hg 72.9*   PCO2, ARTERIAL mm Hg 38.6   HCO3 ART mmol/L 24.6     Imaging Results (Last 24 Hours)       Procedure Component Value Units Date/Time    XR Chest 1 View [[id number]] Collected: 02/19/25 1148     Updated: 02/19/25 1152    Narrative:      EXAM:    XR Chest, 1 View     EXAM DATE:    2/19/2025 11:02 AM     CLINICAL HISTORY:    hemodialysis placement after readjustment; A41.9-Sepsis, unspecified  organism; N17.9-Acute kidney failure, unspecified; R79.89-Other  specified abnormal findings of blood chemistry; J18.9-Pneumonia,  unspecified organism     TECHNIQUE:    Frontal view of the chest.     COMPARISON:    2/19/2025     FINDINGS:    Lungs and pleural spaces:  Improvement in bilateral interstitial  thickening likely improving edema.  Stable right basilar airspace  disease noted.  No pneumothorax.    Heart:  Cardiomegaly.    Mediastinum:  Unremarkable.  Normal mediastinal contour.    Bones/joints:  Unremarkable.  No acute fracture.    Vasculature:  Right IJ deep line placement is stable with tip in the  SVC region.  Left IJ deep line with tip in the distal SVC region,  stable.    Tubes, lines and devices:  ET tube tip at level of clavicles.  NG tube  extends just below the diaphragm into the proximal stomach region.       Impression:      1.  Positioning of support devices detailed above.  2.  Slight interval improvement in CHF/volume overload.  3.  Stable right basilar airspace disease.     This report was finalized on 2/19/2025 11:49 AM by Dr. Jhoan Farias MD.                     amiodarone, 400 mg, Oral, Q24H   Followed by  [START ON 2/28/2025] amiodarone, 200 mg, Oral, Q24H  cefTRIAXone, 2,000 mg, Intravenous, Q24H  cetirizine, 5 mg, Oral, Daily  chlorhexidine, 15 mL, Mouth/Throat, Q12H  doxycycline, 100 mg, Intravenous, Q12H  finasteride, 5 mg, Oral, Daily  folic acid, 1 mg, Oral, Daily  heparin (porcine), 5,000 Units, Subcutaneous, Q8H  insulin regular, 2-7 Units, Subcutaneous, Q6H  ipratropium-albuterol, 3 mL, Nebulization, 4x Daily - RT  pantoprazole, 40 mg, Intravenous, BID AC  [Held by provider] rosuvastatin, 10 mg, Oral, Nightly  sodium chloride, 10 mL, Intravenous, Q12H  sodium chloride, 10 mL, Intravenous, Q12H  sodium chloride, 10 mL, Intravenous, Q12H  sodium chloride, 10 mL, Intravenous, Q12H  sodium chloride, 10 mL, Intravenous, Q12H  thiamine, 200 mg, Per G Tube, Daily  vitamin B-12, 1,000 mcg, Oral, Daily      dexmedetomidine, 0.2-1.5 mcg/kg/hr (Dosing Weight), Last Rate: Stopped (02/20/25 0800)  ketamine, 0.06-2.5 mg/kg/hr (Dosing Weight), Last Rate: Stopped (02/20/25 0717)        Medication Review:     Assessment & Plan      #1: Acute hypoxic and hypercarbic respiratory failure-likely due to aspiration pneumonia/influenza A.  Low-grade fever.  Continue ceftriaxone and doxycycline was added.    Respiratory cultures reviewed.     Continue nebs  Microbiology reviewed.    Transthoracic echo did not show any vegetations  VAP- precautions  Head end - 30 %  Mouth care   COPD-longtime smoker.  Continue systemic steroid and DuoNeb via nebulizer  Continue to titrate FiO2 down to maintain saturation 88 to 92%.            Cardiology- hemodynamically -unstable.  Continue midodrine.     Vasopressor requirement reviewed and adjusted for a MAP of 65 and above.  He is on midodrine on 10 mg every 8          Nephrology-dialysis catheter to be adjusted by surgery.  Hemodialysis will be resumed after that.      GI- PPI IV twice daily-        Will use fentanyl as needed for vent  tolerance.  Had a family meeting yesterday with .  They are on another again tomorrow due to hide above goal of care.  Seems to be inclined for comfort measures  Hematology- latest CBC reviewed.  Transfuse for hemoglobin less than 7     ID-  Endrocrinology- Maintain Blood sugar 140 -180     DVT prophylaxis-continue               Vent Settings          Resp Rate (Set): 24  Pressure Support (cm H2O): 8 cm H20  FiO2 (%): 40 %  PEEP/CPAP (cm H2O): 10 cm H20    Minute Ventilation (L/min) (Obs): 17.3 L/min  Resp Rate (Observed) Vent: 29     I:E Ratio (Obs): 1:2.2    PIP Observed (cm H2O): 36 cm H2O         Plateau pressure is 14 I have personally reviewed x-ray chest, labs, medication list.  Adjusted ventilator setting as described above.    Family meeting today for possible comfort measure    Critical Care time spent in direct patient care: 35 minutes (excluding procedure time, if applicable) including high complexity decision making to assess, manipulate, and support vital organ system failure in this individual who has impairment of one or more vital organ systems such that there is a high probability of imminent or life threatening deterioration in the patient’s condition.  Patient is critically ill and is at higher risk for further mortality/morbidity. Continue ICU care .      Donavan Hernandez MD  02/20/25  10:03 EST

## 2025-02-20 NOTE — PLAN OF CARE
Goal Outcome Evaluation:  Plan of Care Reviewed With: family        Progress: no change  Outcome Evaluation: VSS. Pt remains intubated and sedated. No complaints or distress at this time.

## 2025-02-20 NOTE — PLAN OF CARE
Problem: Adult Inpatient Plan of Care  Goal: Plan of Care Review  Outcome: Not Progressing  Flowsheets (Taken 2/20/2025 1729)  Progress: no change  Outcome Evaluation: pt remains intubated and failed his sat and after palliative talks with family the goal is to withdraw care tomorrow at 1400  Plan of Care Reviewed With: patient  Goal: Patient-Specific Goal (Individualized)  Outcome: Not Progressing  Goal: Absence of Hospital-Acquired Illness or Injury  Outcome: Not Progressing  Intervention: Identify and Manage Fall Risk  Description: Perform standard risk assessment on admission using a validated tool or comprehensive approach appropriate to the patient; reassess fall risk frequently, with change in status or transfer to another level of care.  Communicate risk to interprofessional healthcare team; ensure fall risk visible cue.  Determine need for increased observation, equipment and environmental modification, as well as use of supportive, nonskid footwear.  Adjust safety measures to individual needs and identified risk factors.  Reinforce the importance of active participation with fall risk prevention, safety, and physical activity with the patient and family.  Perform regular intentional rounding to assess need for position change, pain assessment and personal needs, including assistance with toileting.  Recent Flowsheet Documentation  Taken 2/20/2025 1700 by David Sargent, RN  Safety Promotion/Fall Prevention:   safety round/check completed   fall prevention program maintained  Taken 2/20/2025 1600 by David Sargent, RN  Safety Promotion/Fall Prevention:   safety round/check completed   fall prevention program maintained  Taken 2/20/2025 1500 by David Sargent, RN  Safety Promotion/Fall Prevention:   safety round/check completed   fall prevention program maintained  Taken 2/20/2025 1400 by David Sargent, RN  Safety Promotion/Fall Prevention:   safety round/check completed   fall prevention  program maintained  Taken 2/20/2025 1300 by David Sargent, RN  Safety Promotion/Fall Prevention:   safety round/check completed   fall prevention program maintained  Taken 2/20/2025 1200 by David Sargent RN  Safety Promotion/Fall Prevention:   safety round/check completed   fall prevention program maintained  Taken 2/20/2025 1100 by David Sargent RN  Safety Promotion/Fall Prevention:   safety round/check completed   fall prevention program maintained  Taken 2/20/2025 1000 by David Sargent RN  Safety Promotion/Fall Prevention:   safety round/check completed   fall prevention program maintained  Taken 2/20/2025 0900 by David Sargent RN  Safety Promotion/Fall Prevention:   safety round/check completed   fall prevention program maintained  Taken 2/20/2025 0800 by David Sargent RN  Safety Promotion/Fall Prevention:   safety round/check completed   fall prevention program maintained  Taken 2/20/2025 0700 by David Sargent RN  Safety Promotion/Fall Prevention:   safety round/check completed   fall prevention program maintained  Intervention: Prevent Skin Injury  Description: Perform a screening for skin injury risk, such as pressure or moisture-associated skin damage on admission and at regular intervals throughout hospital stay.  Keep all areas of skin (especially folds) clean and dry.  Maintain adequate skin hydration.  Relieve and redistribute pressure and protect bony prominences and skin at risk for injury; implement measures based on patient-specific risk factors.  Match turning and repositioning schedule to clinical condition.  Encourage weight shift frequently; assist with reposition if unable to complete independently.  Float heels off bed; avoid pressure on the Achilles tendon.  Keep skin free from extended contact with medical devices.  Optimize nutrition and hydration.  Encourage functional activity and mobility, as early as tolerated.  Use aids (e.g., slide boards, mechanical  lift) during transfer.  Recent Flowsheet Documentation  Taken 2/20/2025 1600 by David Sargent RN  Body Position:   left   side-lying  Taken 2/20/2025 1400 by David Sargent RN  Body Position:   right   side-lying  Taken 2/20/2025 1200 by David Sargent RN  Body Position:   left   side-lying  Taken 2/20/2025 1000 by David Sargent RN  Body Position:   right   side-lying  Taken 2/20/2025 0800 by David Sargent RN  Body Position:   left   side-lying  Goal: Optimal Comfort and Wellbeing  Outcome: Not Progressing  Intervention: Provide Person-Centered Care  Description: Use a family-focused approach to care; encourage support system presence and participation.  Develop trust and rapport by proactively providing information, encouraging questions, addressing concerns and offering reassurance.  Acknowledge emotional response to hospitalization.  Recognize and utilize personal coping strategies and strengths; develop goals via shared decision-making.  Honor spiritual and cultural preferences.  Recent Flowsheet Documentation  Taken 2/20/2025 1400 by David Sargent RN  Trust Relationship/Rapport:   care explained   reassurance provided  Taken 2/20/2025 0800 by David Sargent RN  Trust Relationship/Rapport:   care explained   reassurance provided  Goal: Readiness for Transition of Care  Outcome: Not Progressing   Goal Outcome Evaluation:  Plan of Care Reviewed With: patient        Progress: no change  Outcome Evaluation: pt remains intubated and failed his sat and after palliative talks with family the goal is to withdraw care tomorrow at 1400

## 2025-02-20 NOTE — PROGRESS NOTES
NEPHROLOGY PROGRESS NOTE      INTERVAL HISTORY:    02/17/2025 patient still on ventilator urine output is picking up blood pressure stable FiO2 is 35%    02/18/2025 patient still on ventilator family to decide further care    02/19/2025 patient still on ventilator noted change family has decided to continue with current management and will decide further care on Thursday or Friday 02/20/2025 patient still on ventilator blood pressure stable    Intake/Output                         02/18/25 0701 - 02/19/25 0700 02/19/25 0701 - 02/20/25 0700     8488-0490 3218-2520 Total 6689-2216 0865-8223 Total                 Intake    P.O.  --  -- --  --  0 0    I.V.  825.4  664.5 1489.9  --  3020.8 3020.8    Other  125  30 155  --  55 55    Intake (mL) ([REMOVED] Rectal Tube With balloon) 100 -- 100 -- -- --    Flush/ Irrigation Intake (mL) (NG/OG Tube Orogastric 16 Fr Left mouth) 25 30 55 -- 55 55    NG/GT  545  550 1095  --  600 600    IV Piggyback  --  300 300  --  100 100    Total Intake 1495.4 1544.5 3039.9 -- 3775.8 3775.8       Output    Urine  650  450 1100  --  1000 1000    Emesis/NG output  --  0 0  --  0 0    Stool  100  0 100  --  0 0    Dialysis  --  1550 1550  2000  -- 2000    Total Output 750 2000 2750 2000 1000 3000             VITAL SIGNS :     Temp:  [98.1 °F (36.7 °C)-100.4 °F (38 °C)] 98.8 °F (37.1 °C)  Heart Rate:  [] 109  Resp:  [27-61] 31  BP: (114-170)/() 155/94  FiO2 (%):  [40 %] 40 %    02/17/2025 examined and reviewed  02/18/2025 examined and reviewed  02/19/2025 examined and reviewed  02/20/2025 examined and reviewed    PHYSICAL EXAMINATION:    GENERAL EXAM  Laying in bed, intubated  Comfortable on mechanical ventilation    MENTAL STATUS EXAM  Sedated    NECK EXAM  JVP is not elevated, carotid exam normal    CARDIOVASCULAR EXAM  Regular rate and rhythm, no murmurs noted, no added heart sounds, normal apical pulsation  no edema in the lower extremities      MUSCULOSKELETAL EXAM  No  "cyanosis or clubbing noted in the digits    RESPIRATORY EXAM  Lungs clear to auscultation bilaterally, respiratory effort normal    ABDOMINAL EXAM  Abdomen soft  normal bowel sounds    SKIN EXAM  No new rashes    Access  Left IJ temporary dialysis catheter    Laboratory Data :     Albumin Albumin   Date Value Ref Range Status   02/20/2025 2.4 (L) 3.5 - 5.2 g/dL Final   02/19/2025 2.4 (L) 3.5 - 5.2 g/dL Final   02/18/2025 2.4 (L) 3.5 - 5.2 g/dL Final        Magnesium No results found for: \"MG\"         PTH               No results found for: \"PTH\"    CBC and coagulation:  Results from last 7 days   Lab Units 02/20/25  0038 02/19/25  0102 02/18/25  0715 02/18/25  0159 02/16/25  2234 02/15/25  1041   LACTATE mmol/L  --   --  0.8  --   --   --    WBC 10*3/mm3 13.98* 12.08*  --  12.76*   < >  --    HEMOGLOBIN g/dL 9.4* 8.3*  --  8.5*   < >  --    HEMATOCRIT % 29.6* 26.1*  --  27.0*   < >  --    MCV fL 103.1* 101.2*  --  102.7*   < >  --    MCHC g/dL 31.8 31.8  --  31.5   < >  --    PLATELETS 10*3/mm3 180 189  --  179   < >  --    INR   --   --   --   --   --  0.98   D DIMER QUANT MCGFEU/mL  --   --   --   --   --  10.03*    < > = values in this interval not displayed.     Acid/base balance:  Results from last 7 days   Lab Units 02/20/25  0415 02/19/25  0426 02/18/25  0433   PH, ARTERIAL pH units 7.413 7.369 7.348*   PO2 ART mm Hg 72.9* 74.8* 90.9   PCO2, ARTERIAL mm Hg 38.6 43.9 42.2   HCO3 ART mmol/L 24.6 25.3 23.2     Renal and electrolytes:    Results from last 7 days   Lab Units 02/20/25  0038 02/19/25  0102 02/18/25  0159 02/16/25  2234 02/16/25  0156   SODIUM mmol/L 134* 139 141 136 139   POTASSIUM mmol/L 4.3 4.2 4.1 4.8 4.1   CHLORIDE mmol/L 94* 98 98 93* 99   CO2 mmol/L 22.4 22.5 22.2 21.4* 22.2   BUN mg/dL 89* 101* 111* 96* 77*   CREATININE mg/dL 5.72* 7.56* 9.12* 8.65* 7.66*   CALCIUM mg/dL 8.4* 8.2* 8.0* 7.6* 6.9*     Estimated Creatinine Clearance: 16 mL/min (A) (by C-G formula based on SCr of 5.72 mg/dL " (H)).  @GFRCG:3@   Liver and pancreatic function:  Results from last 7 days   Lab Units 02/20/25  0038 02/19/25  0102 02/18/25  0159 02/15/25  0416   ALBUMIN g/dL 2.4* 2.4* 2.4*  --    BILIRUBIN mg/dL 0.8 0.6 0.6  --    ALK PHOS U/L 160* 158* 127*  --    AST (SGOT) U/L 52* 69* 57*  --    ALT (SGPT) U/L 31 35 31  --    AMMONIA umol/L  --   --   --  23         Cardiac:      Liver and pancreatic function:  Results from last 7 days   Lab Units 02/20/25  0038 02/19/25  0102 02/18/25  0159 02/15/25  0416   ALBUMIN g/dL 2.4* 2.4* 2.4*  --    BILIRUBIN mg/dL 0.8 0.6 0.6  --    ALK PHOS U/L 160* 158* 127*  --    AST (SGOT) U/L 52* 69* 57*  --    ALT (SGPT) U/L 31 35 31  --    AMMONIA umol/L  --   --   --  23         CLINICAL DATA REVIEW  CBC, Renal functions, Serum electrolytes, Acid base labs reviewed 1  Telemetry was reviewed and demonstrated sinus rhythm rate   Discussed the labs with Dr. Irby    MEDICINES:     amiodarone, 400 mg, Oral, Q24H   Followed by  [START ON 2/28/2025] amiodarone, 200 mg, Oral, Q24H  cefTRIAXone, 2,000 mg, Intravenous, Q24H  cetirizine, 5 mg, Oral, Daily  chlorhexidine, 15 mL, Mouth/Throat, Q12H  doxycycline, 100 mg, Intravenous, Q12H  finasteride, 5 mg, Oral, Daily  folic acid, 1 mg, Oral, Daily  heparin (porcine), 5,000 Units, Subcutaneous, Q8H  insulin regular, 2-7 Units, Subcutaneous, Q6H  ipratropium-albuterol, 3 mL, Nebulization, 4x Daily - RT  midodrine, 5 mg, Oral, TID AC  pantoprazole, 40 mg, Intravenous, BID AC  [Held by provider] rosuvastatin, 10 mg, Oral, Nightly  sodium chloride, 10 mL, Intravenous, Q12H  sodium chloride, 10 mL, Intravenous, Q12H  sodium chloride, 10 mL, Intravenous, Q12H  sodium chloride, 10 mL, Intravenous, Q12H  sodium chloride, 10 mL, Intravenous, Q12H  thiamine, 200 mg, Per G Tube, Daily  vitamin B-12, 1,000 mcg, Oral, Daily      dexmedetomidine, 0.2-1.5 mcg/kg/hr (Dosing Weight), Last Rate: 1.5 mcg/kg/hr (02/20/25 0151)  ketamine, 0.06-2.5 mg/kg/hr  (Dosing Weight), Last Rate: 1.46 mg/kg/hr (02/20/25 0344)          Assessment & Plan     02/17/2025 reviewed and updated  02/18/2025 reviewed and updated  02/19/2025 reviewed and updated  02/20/2025 reviewed and updated    -Acute kidney injury  - Chronic kidney disease unspecified stage  -Complication of access  - Acute hypercapnic hypoxic respiratory failure  - Acute metabolic acidosis  - Severe sepsis with septic shock  - Rhabdomyolysis  - Paroxysmal atrial fibrillation    02/16/2025   Dr Bates  patient remains oliguric with no signs of renal recovery, will continue watch and monitor closely but will keep on intermittent dialysis for now  Next dialysis is going to be on Tuesday but we will reassess tomorrow  Acute kidney injury most likely due to acute tubular necrosis in settings shock  Had ASHLEY in October 2024 with creatinine peaked to 4 with some improvement close to 2  - As needed potassium replacement  - Monitor for renal recovery closely  - Strict intake and output record.  -Patient remains very high risk  - Please avoid any nephrotoxic agents, hypotension and adjust medications according to estimated GFR.   REVIEWED NOTES OF CLINICAL TEAMS INVOLVED WITH PATIENT CARE.  DISCUSSED IN DETAIL WITH PATIENT AND/OR FAMILY ABOUT CURRENT CLINICAL CONDITION AND RESPONSE TO ONGOING MANAGEMENT.   DISCUSSED IN DETAIL WITH PATIENT AND/OR FAMILY ABOUT RISKS ASSOCIATED WITH CURRENT CLINICAL CONDITION AND RISK INVOLVED WITH CURRENT MANAGEMENT.   DISCUSSED IN DETAIL WITH HOSPITALIST  CODE STATUS REVIEWED,     02/17/2025  Patient's urine output is picking up now creatinine is still 8.65 we will continue to watch urine output we will hold dialysis today and do dialysis in the morning if needed  Potassium is stable  Strict urine output record please     02/18/2025  Patient's creatinine is 9 still urine output 450 cc still on ventilator family to decide about further care they will dialyze the patient if family decides to continue  the current management    02/19/2025  Patient's dialysis catheter is not functioning and we had only blood 100 cc so we will ask surgery to reevaluate the dialysis catheter and will do dialysis after  Family to decide about further management tomorrow    02/28/2025  Patient tolerated dialysis well yesterday we will plan to continue Monday Wednesday Friday schedule of dialysis will family decide about further management tomorrow      Discussed with RN    Prognosis critical    Reviewed hospitalist notes  Reviewed consultant notes  Reviewed the labs  Reviewed radiology    Please avoid nephrotoxic medication and pharmacy to adjust the medication according to the GFR.          RIKY Wick MD  02/20/25  07:19 EST

## 2025-02-20 NOTE — PLAN OF CARE
Problem: Mechanical Ventilation Invasive  Goal: Effective Communication  Outcome: Progressing  Goal: Optimal Device Function  Outcome: Progressing  Intervention: Optimize Device Care and Function  Recent Flowsheet Documentation  Taken 2/20/2025 1224 by Sofy Denton RRT  Airway/Ventilation Management: airway patency maintained  Airway Safety Measures:   manual resuscitator/mask at bedside   oxygen flowmeter at bedside   suction at bedside  Taken 2/20/2025 1010 by Sofy Denton RRT  Airway/Ventilation Management: airway patency maintained  Airway Safety Measures:   manual resuscitator/mask at bedside   oxygen flowmeter at bedside   suction at bedside  Taken 2/20/2025 0845 by Sofy Denton RRT  Airway/Ventilation Management: airway patency maintained  Airway Safety Measures:   manual resuscitator/mask at bedside   oxygen flowmeter at bedside   suction at bedside  Taken 2/20/2025 0705 by Sofy Denton RRT  Airway/Ventilation Management: airway patency maintained  Airway Safety Measures:   manual resuscitator/mask at bedside   oxygen flowmeter at bedside   suction at bedside   Goal Outcome Evaluation:

## 2025-02-20 NOTE — PROGRESS NOTES
"Palliative Care Daily Progress Note     S: Medical record reviewed, followed up with Primary RN Gregorio and Dr Blankenship regarding patient's condition. When I saw Phan today he was sedated on ketamine, Precedex, Fentanyl. Gregorio RN stated that he failed his sedation vacation his work of breathing worsened, he became tachycardia, hypertensive and hypoxic. Today he looked uncomfortable, facial grimacing, red faced. No family at bedside.      O:   Palliative Performance Scale Score:     /77   Pulse 107   Temp 100.3 °F (37.9 °C) (Esophageal)   Resp 28   Ht 182.9 cm (72.01\")   Wt 112 kg (247 lb 12.8 oz)   SpO2 97%   BMI 33.60 kg/m²     Intake/Output Summary (Last 24 hours) at 2/20/2025 1602  Last data filed at 2/20/2025 0530  Gross per 24 hour   Intake 3775.79 ml   Output 1000 ml   Net 2775.79 ml       PE:  General Appearance:    Chronically ill appearing, intubated and sedated, NAD   HEENT:    NC/AT, without obvious abnormality, EOMI, anicteric    Neck:   supple, trachea midline, no JVD   Lungs:     Sedated off vent at 50% and 12 of peep, (on breathing trial)diminished breath sounds, coarse breath sounds    Heart:    Irregular rate 107, no M/R/G   Abdomen:     Soft, NT, ND, NABS    Extremities:   Moves all extremities weakly , trace bilateral lower extremity edema   Pulses:   Pulses palpable and equal bilaterally   Skin:   Warm, dry   Neurologic: Sedated on vent   Psych: Sedated on vent         Meds: Reviewed and changes noted    Labs:   Results from last 7 days   Lab Units 02/20/25  0038   WBC 10*3/mm3 13.98*   HEMOGLOBIN g/dL 9.4*   HEMATOCRIT % 29.6*   PLATELETS 10*3/mm3 180     Results from last 7 days   Lab Units 02/20/25  0038   SODIUM mmol/L 134*   POTASSIUM mmol/L 4.3   CHLORIDE mmol/L 94*   CO2 mmol/L 22.4   BUN mg/dL 89*   CREATININE mg/dL 5.72*   GLUCOSE mg/dL 121*   CALCIUM mg/dL 8.4*     Results from last 7 days   Lab Units 02/20/25  0038   SODIUM mmol/L 134*   POTASSIUM mmol/L 4.3   CHLORIDE mmol/L " 94*   CO2 mmol/L 22.4   BUN mg/dL 89*   CREATININE mg/dL 5.72*   CALCIUM mg/dL 8.4*   BILIRUBIN mg/dL 0.8   ALK PHOS U/L 160*   ALT (SGPT) U/L 31   AST (SGOT) U/L 52*   GLUCOSE mg/dL 121*     Imaging Results (Last 72 Hours)       Procedure Component Value Units Date/Time    XR Chest 1 View [[id number]] Collected: 02/19/25 1148     Updated: 02/19/25 1152    Narrative:      EXAM:    XR Chest, 1 View     EXAM DATE:    2/19/2025 11:02 AM     CLINICAL HISTORY:    hemodialysis placement after readjustment; A41.9-Sepsis, unspecified  organism; N17.9-Acute kidney failure, unspecified; R79.89-Other  specified abnormal findings of blood chemistry; J18.9-Pneumonia,  unspecified organism     TECHNIQUE:    Frontal view of the chest.     COMPARISON:    2/19/2025     FINDINGS:    Lungs and pleural spaces:  Improvement in bilateral interstitial  thickening likely improving edema.  Stable right basilar airspace  disease noted.  No pneumothorax.    Heart:  Cardiomegaly.    Mediastinum:  Unremarkable.  Normal mediastinal contour.    Bones/joints:  Unremarkable.  No acute fracture.    Vasculature:  Right IJ deep line placement is stable with tip in the  SVC region.  Left IJ deep line with tip in the distal SVC region,  stable.    Tubes, lines and devices:  ET tube tip at level of clavicles.  NG tube  extends just below the diaphragm into the proximal stomach region.       Impression:      1.  Positioning of support devices detailed above.  2.  Slight interval improvement in CHF/volume overload.  3.  Stable right basilar airspace disease.     This report was finalized on 2/19/2025 11:49 AM by Dr. Jhoan Farias MD.       XR Chest 1 View [[id number]] Collected: 02/19/25 0812     Updated: 02/19/25 0815    Narrative:      EXAM:    XR Chest, 1 View     EXAM DATE:    2/19/2025 7:51 AM     CLINICAL HISTORY:    Check temporary hemodialysis catheter placement; A41.9-Sepsis,  unspecified organism; N17.9-Acute kidney failure,  unspecified;  R79.89-Other specified abnormal findings of blood chemistry;  J18.9-Pneumonia, unspecified organism     TECHNIQUE:    Frontal view of the chest.     COMPARISON:    2/17/2025     FINDINGS:    Lungs and pleural spaces:  Bilateral interstitial opacities have  increased from the previous exam.  No pneumothorax.    Heart:  Cardiomegaly slightly improved.    Mediastinum:  Unremarkable.  Normal mediastinal contour.    Bones/joints:  Unremarkable.  No acute fracture.    Vasculature:  Right IJ deep line with tip in the distal SVC.  Left IJ  deep line with tip in the distal SVC.    Tubes, lines and devices:  ET tube with tip at level of clavicles.  NG  tube extends below diaphragm into stomach.       Impression:      1.  Increasing interstitial opacities and vascular markings suggesting  slight increasing volume overload/CHF.  2.  Support devices as above.     This report was finalized on 2/19/2025 8:13 AM by Dr. Jhoan Farias MD.       XR Chest 1 View [[id number]] Collected: 02/17/25 1652     Updated: 02/17/25 1655    Narrative:      EXAM:    XR Chest, 1 View     EXAM DATE:    2/17/2025 4:48 PM     CLINICAL HISTORY:    SIRS; A41.9-Sepsis, unspecified organism; N17.9-Acute kidney failure,  unspecified; R79.89-Other specified abnormal findings of blood  chemistry; J18.9-Pneumonia, unspecified organism     TECHNIQUE:    Frontal view of the chest.     COMPARISON:    2/16/2025     FINDINGS:    Lungs and pleural spaces:  Improvement in right perihilar airspace  disease.  Increase in left perihilar airspace disease.  No  consolidation.  No pneumothorax.    Heart:  Unremarkable.  No cardiomegaly.    Mediastinum:  Unremarkable.  Normal mediastinal contour.    Bones/joints:  Unremarkable.  No acute fracture.    Tubes, lines and devices:  Right IJ deep line positioning is stable.   ET tube positioning is stable.  NG tube extends into the stomach region.   Left IJ deep line positioning is stable.       Impression:      1.   Improvement in right perihilar airspace disease.  2.  Increase in left perihilar airspace disease.  3. Support devices as above.     This report was finalized on 2/17/2025 4:53 PM by Dr. Jhoan Farias MD.                 Diagnostics: Reviewed    A: Phan Daniels is a 69 y.o.  male admitted on 2/8/2025 due to respiratory distress. Phan has a medical history of afib?, COPD, HTN, HLD, GERD, ETOH abuse who presented after being found unresponsive. Per family at bedside Phan resides in [place] however recently came to [place] to stay with his new girlfriend. Also per noted he drank a 1/5 of liquor/day, and has had multiple recent hospital stays for pneumonia per family. EMS was called to the home and Phan was found unresponsive with no one else in the home, Phan apparently had dried feces and appeared to have been down for some time. Phan was intubated upon arrival to emergency department and was also in shock and started on levophed. Patient admitted to CCU on vent at FiO2 80% and PEEP of 10. This morning when I saw Phan he was on vent at 65% and 10 of peep on propofol, fentanyl, heparin , bicarb and phenylephrine. Palliative care consulted for GOC/ACP and support for pt and family.      Today   T 100.3, , BP of 125/77, RR 26, sedated on vent at 60/10    P:  Palliative was able to be present with Dr Blankenship for conference call with Phan's siblings, Adolph, Dada, Ivy and Baylee to discuss Phan's lack of progress/improvement up to this point, that he has been failing breathing trials and has not been able to follow commands. We discussed what they felt Phan would want for himself, would he want to have a tracheostomy and peg tube placed or would he want to be removed from ventilator and just to be made comfortable. We discussed further what comfort measures entailed, being extubated from the ventilator, given supplemental O2 and have prn's in the event that phan was SOA, in pain, anxious etc. After  discussion his siblings agreed that Phan would not want a trach and peg and to go to a L TACH and have decided on extubation at 2 pm tomorrow Friday at which time he would be made comfort measures. I discussed the conclusion of this conversation with Dr Blankenship and Gregorio PERKINS.      We will continue to follow along. Please do not hesitate to contact us regarding further sx mgmt or GOC needs, including after hours or on weekends via our on call provider at [phone].     Lynnette Campa, APRN    2/20/2025

## 2025-02-21 NOTE — PROGRESS NOTES
"Palliative Care Daily Progress Note     S: Medical record reviewed, followed up with GREGORIO Portillo and Dr Blankenship regarding patient's condition. When I saw Phan this morning he was sedated on Ketamine and was on Precedex. He did not appear to be in distress, was not having labored respirations/overbreathing vent and did not appear to be in pain. Nephar Boykin at bedside.      O:   Palliative Performance Scale Score:     /79   Pulse 104   Temp 98.8 °F (37.1 °C) (Esophageal)   Resp 28   Ht 182.9 cm (72.01\")   Wt 112 kg (247 lb 12.8 oz)   SpO2 (!) 88%   BMI 33.60 kg/m²     Intake/Output Summary (Last 24 hours) at 2/21/2025 1509  Last data filed at 2/21/2025 0908  Gross per 24 hour   Intake 4595.05 ml   Output 1000 ml   Net 3595.05 ml       PE:  General Appearance:    Chronically ill appearing, intubated and sedated, NAD   HEENT:    NC/AT, without obvious abnormality, EOMI, anicteric    Neck:   supple, trachea midline, no JVD   Lungs:     Sedated off vent at 40% and 8 of peep,diminished breath sounds, coarse breath sounds    Heart:    Irregular rate 107, no M/R/G   Abdomen:     Soft, NT, ND, NABS    Extremities:   Moves all extremities weakly , trace bilateral lower extremity edema   Pulses:   Pulses palpable and equal bilaterally   Skin:   Warm, dry   Neurologic: Sedated on vent   Psych: Sedated on vent          Meds: Reviewed and changes noted    Labs:   Results from last 7 days   Lab Units 02/20/25  0038   WBC 10*3/mm3 13.98*   HEMOGLOBIN g/dL 9.4*   HEMATOCRIT % 29.6*   PLATELETS 10*3/mm3 180     Results from last 7 days   Lab Units 02/21/25  0127   SODIUM mmol/L 138   POTASSIUM mmol/L 3.9   CHLORIDE mmol/L 99   CO2 mmol/L 24.3   BUN mg/dL 110*   CREATININE mg/dL 6.25*   GLUCOSE mg/dL 132*   CALCIUM mg/dL 8.4*     Results from last 7 days   Lab Units 02/21/25  0127   SODIUM mmol/L 138   POTASSIUM mmol/L 3.9   CHLORIDE mmol/L 99   CO2 mmol/L 24.3   BUN mg/dL 110*   CREATININE mg/dL 6.25*   CALCIUM mg/dL 8.4* "   BILIRUBIN mg/dL 0.5   ALK PHOS U/L 204*   ALT (SGPT) U/L 24   AST (SGOT) U/L 44*   GLUCOSE mg/dL 132*     Imaging Results (Last 72 Hours)       Procedure Component Value Units Date/Time    XR Chest 1 View [[id number]] Collected: 02/19/25 1148     Updated: 02/19/25 1152    Narrative:      EXAM:    XR Chest, 1 View     EXAM DATE:    2/19/2025 11:02 AM     CLINICAL HISTORY:    hemodialysis placement after readjustment; A41.9-Sepsis, unspecified  organism; N17.9-Acute kidney failure, unspecified; R79.89-Other  specified abnormal findings of blood chemistry; J18.9-Pneumonia,  unspecified organism     TECHNIQUE:    Frontal view of the chest.     COMPARISON:    2/19/2025     FINDINGS:    Lungs and pleural spaces:  Improvement in bilateral interstitial  thickening likely improving edema.  Stable right basilar airspace  disease noted.  No pneumothorax.    Heart:  Cardiomegaly.    Mediastinum:  Unremarkable.  Normal mediastinal contour.    Bones/joints:  Unremarkable.  No acute fracture.    Vasculature:  Right IJ deep line placement is stable with tip in the  SVC region.  Left IJ deep line with tip in the distal SVC region,  stable.    Tubes, lines and devices:  ET tube tip at level of clavicles.  NG tube  extends just below the diaphragm into the proximal stomach region.       Impression:      1.  Positioning of support devices detailed above.  2.  Slight interval improvement in CHF/volume overload.  3.  Stable right basilar airspace disease.     This report was finalized on 2/19/2025 11:49 AM by Dr. Jhoan Farias MD.       XR Chest 1 View [[id number]] Collected: 02/19/25 0812     Updated: 02/19/25 0815    Narrative:      EXAM:    XR Chest, 1 View     EXAM DATE:    2/19/2025 7:51 AM     CLINICAL HISTORY:    Check temporary hemodialysis catheter placement; A41.9-Sepsis,  unspecified organism; N17.9-Acute kidney failure, unspecified;  R79.89-Other specified abnormal findings of blood chemistry;  J18.9-Pneumonia, unspecified  organism     TECHNIQUE:    Frontal view of the chest.     COMPARISON:    2/17/2025     FINDINGS:    Lungs and pleural spaces:  Bilateral interstitial opacities have  increased from the previous exam.  No pneumothorax.    Heart:  Cardiomegaly slightly improved.    Mediastinum:  Unremarkable.  Normal mediastinal contour.    Bones/joints:  Unremarkable.  No acute fracture.    Vasculature:  Right IJ deep line with tip in the distal SVC.  Left IJ  deep line with tip in the distal SVC.    Tubes, lines and devices:  ET tube with tip at level of clavicles.  NG  tube extends below diaphragm into stomach.       Impression:      1.  Increasing interstitial opacities and vascular markings suggesting  slight increasing volume overload/CHF.  2.  Support devices as above.     This report was finalized on 2/19/2025 8:13 AM by Dr. Jhoan Farias MD.                 Diagnostics: Reviewed    A: Phan Daniels is a 69 y.o. male admitted on 2/8/2025 due to respiratory distress. Phan has a medical history of afib?, COPD, HTN, HLD, GERD, ETOH abuse who presented after being found unresponsive. Per family at bedside Phan resides in [place] however recently came to [place] to stay with his new girlfriend. Also per noted he drank a 1/5 of liquor/day, and has had multiple recent hospital stays for pneumonia per family. EMS was called to the home and Phan was found unresponsive with no one else in the home, Phan apparently had dried feces and appeared to have been down for some time. Phan was intubated upon arrival to emergency department and was also in shock and started on levophed. Patient admitted to CCU on vent at FiO2 80% and PEEP of 10. This morning when I saw Phan he was on vent at 65% and 10 of peep on propofol, fentanyl, heparin , bicarb and phenylephrine. Palliative care consulted for GOC/ACP and support for pt and family.     Today 2/21/25  T 98, HR 93, BP of 132/74, RR 26, with O2 saturation of 92% on vent at 40% FiO2 was on  Ketamine gtt and Precedex gtt    P:  Called patients siblings to verify plan of extubating Phan at 2pm, they stated that they would be here prior to 2pm. Dr Blankenship was able to speak with pts family at bedside, we discussed extubation and comfort measures and what that entailed, discussed that we would have prn symptom management medications should he need them for shortness of breath, anxiety, agitation, pain or nausea and vomiting. Ketamine turned off at 14:15 and was extubated at 14:49 to 2L NC, his family was brought back to the room and Phan was resting quietly and appeared comfortable at this time.      We will continue to follow along. Please do not hesitate to contact us regarding further sx mgmt or GOC needs, including after hours or on weekends via our on call provider at [phone].     Lynnette Campa, APRN    2/21/2025

## 2025-02-21 NOTE — PLAN OF CARE
Goal Outcome Evaluation:  Plan of Care Reviewed With: family  Pt extubated and transitioned to comfort care. Family at bedside.

## 2025-02-21 NOTE — PROGRESS NOTES
NEPHROLOGY PROGRESS NOTE      INTERVAL HISTORY:    02/17/2025 patient still on ventilator urine output is picking up blood pressure stable FiO2 is 35%    02/18/2025 patient still on ventilator family to decide further care    02/19/2025 patient still on ventilator noted change family has decided to continue with current management and will decide further care on Thursday or Friday 02/20/2025 patient still on ventilator blood pressure stable    02/21/2025 patient still on ventilator blood pressures towards lower side patient's family is coming at 2 PM to decide on comfort measures    Intake/Output                         02/19/25 0701 - 02/20/25 0700 02/20/25 0701 - 02/21/25 0700     [id number] 8487-5947 Total 6826-7197 0431-2659 Total                 Intake    P.O.  --  0 0  --  -- --    I.V.  --  3020.8 3020.8  --  4373.8 4373.8    Other  --  55 55  --  -- --    Flush/ Irrigation Intake (mL) (NG/OG Tube Orogastric 16 Fr Left mouth) -- 55 55 -- -- --    NG/GT  --  600 600  --  -- --    IV Piggyback  --  100 100  --  -- --    Total Intake -- 3775.8 3775.8 -- 4373.8 4373.8       Output    Urine  --  1000 1000  --  1000 1000    Emesis/NG output  --  0 0  --  -- --    Stool  --  0 0  --  -- --    Dialysis  2000  -- 2000  --  -- --    Total Output 2000 1000 3000 -- 1000 1000             VITAL SIGNS :     Temp:  [99.1 °F (37.3 °C)-100.3 °F (37.9 °C)] 100.3 °F (37.9 °C)  Heart Rate:  [] 81  Resp:  [25-34] 28  BP: ()/() 87/59  FiO2 (%):  [40 %-60 %] 40 %    02/17/2025 examined and reviewed  02/18/2025 examined and reviewed  02/19/2025 examined and reviewed  02/20/2025 examined and reviewed  02/21/2025 examined and reviewed    PHYSICAL EXAMINATION:    GENERAL EXAM  Laying in bed, intubated  Comfortable on mechanical ventilation    MENTAL STATUS EXAM  Sedated    NECK EXAM  JVP is not elevated, carotid exam normal    CARDIOVASCULAR EXAM  Regular rate and rhythm, no murmurs noted, no added heart sounds,  "normal apical pulsation  no edema in the lower extremities      MUSCULOSKELETAL EXAM  No cyanosis or clubbing noted in the digits    RESPIRATORY EXAM  Lungs clear to auscultation bilaterally, respiratory effort normal    ABDOMINAL EXAM  Abdomen soft  normal bowel sounds    SKIN EXAM  No new rashes    Access  Left IJ temporary dialysis catheter    Laboratory Data :     Albumin Albumin   Date Value Ref Range Status   02/21/2025 2.2 (L) 3.5 - 5.2 g/dL Final   02/20/2025 2.4 (L) 3.5 - 5.2 g/dL Final   02/19/2025 2.4 (L) 3.5 - 5.2 g/dL Final        Magnesium No results found for: \"MG\"         PTH               No results found for: \"PTH\"    CBC and coagulation:  Results from last 7 days   Lab Units 02/20/25  0038 02/19/25  0102 02/18/25  0715 02/18/25  0159 02/16/25  2234 02/15/25  1041   LACTATE mmol/L  --   --  0.8  --   --   --    WBC 10*3/mm3 13.98* 12.08*  --  12.76*   < >  --    HEMOGLOBIN g/dL 9.4* 8.3*  --  8.5*   < >  --    HEMATOCRIT % 29.6* 26.1*  --  27.0*   < >  --    MCV fL 103.1* 101.2*  --  102.7*   < >  --    MCHC g/dL 31.8 31.8  --  31.5   < >  --    PLATELETS 10*3/mm3 180 189  --  179   < >  --    INR   --   --   --   --   --  0.98   D DIMER QUANT MCGFEU/mL  --   --   --   --   --  10.03*    < > = values in this interval not displayed.     Acid/base balance:  Results from last 7 days   Lab Units 02/21/25  0417 02/20/25  0415 02/19/25  0426   PH, ARTERIAL pH units 7.283* 7.413 7.369   PO2 ART mm Hg 68.0* 72.9* 74.8*   PCO2, ARTERIAL mm Hg 49.2* 38.6 43.9   HCO3 ART mmol/L 23.3 24.6 25.3     Renal and electrolytes:    Results from last 7 days   Lab Units 02/21/25  0127 02/20/25  0038 02/19/25  0102 02/18/25  0159 02/16/25  2234   SODIUM mmol/L 138 134* 139 141 136   POTASSIUM mmol/L 3.9 4.3 4.2 4.1 4.8   CHLORIDE mmol/L 99 94* 98 98 93*   CO2 mmol/L 24.3 22.4 22.5 22.2 21.4*   BUN mg/dL 110* 89* 101* 111* 96*   CREATININE mg/dL 6.25* 5.72* 7.56* 9.12* 8.65*   CALCIUM mg/dL 8.4* 8.4* 8.2* 8.0* 7.6* "     Estimated Creatinine Clearance: 14.6 mL/min (A) (by C-G formula based on SCr of 6.25 mg/dL (H)).  @GFRCG:3@   Liver and pancreatic function:  Results from last 7 days   Lab Units 02/21/25 0127 02/20/25  0038 02/19/25  0102 02/18/25  0159 02/15/25  0416   ALBUMIN g/dL 2.2* 2.4* 2.4*   < >  --    BILIRUBIN mg/dL 0.5 0.8 0.6   < >  --    ALK PHOS U/L 204* 160* 158*   < >  --    AST (SGOT) U/L 44* 52* 69*   < >  --    ALT (SGPT) U/L 24 31 35   < >  --    AMMONIA umol/L  --   --   --   --  23    < > = values in this interval not displayed.         Cardiac:      Liver and pancreatic function:  Results from last 7 days   Lab Units 02/21/25 0127 02/20/25 0038 02/19/25 0102 02/18/25  0159 02/15/25  0416   ALBUMIN g/dL 2.2* 2.4* 2.4*   < >  --    BILIRUBIN mg/dL 0.5 0.8 0.6   < >  --    ALK PHOS U/L 204* 160* 158*   < >  --    AST (SGOT) U/L 44* 52* 69*   < >  --    ALT (SGPT) U/L 24 31 35   < >  --    AMMONIA umol/L  --   --   --   --  23    < > = values in this interval not displayed.         CLINICAL DATA REVIEW  CBC, Renal functions, Serum electrolytes, Acid base labs reviewed 1  Telemetry was reviewed and demonstrated sinus rhythm rate   Discussed the labs with Dr. Irby    MEDICINES:     amiodarone, 400 mg, Oral, Q24H   Followed by  [START ON 2/28/2025] amiodarone, 200 mg, Oral, Q24H  cefTRIAXone, 2,000 mg, Intravenous, Q24H  cetirizine, 5 mg, Oral, Daily  chlorhexidine, 15 mL, Mouth/Throat, Q12H  doxycycline, 100 mg, Intravenous, Q12H  finasteride, 5 mg, Oral, Daily  folic acid, 1 mg, Oral, Daily  heparin (porcine), 5,000 Units, Subcutaneous, Q8H  insulin regular, 2-7 Units, Subcutaneous, Q6H  ipratropium-albuterol, 3 mL, Nebulization, 4x Daily - RT  pantoprazole, 40 mg, Intravenous, BID AC  [Held by provider] rosuvastatin, 10 mg, Oral, Nightly  sodium chloride, 10 mL, Intravenous, Q12H  sodium chloride, 10 mL, Intravenous, Q12H  sodium chloride, 10 mL, Intravenous, Q12H  sodium chloride, 10 mL,  Intravenous, Q12H  sodium chloride, 10 mL, Intravenous, Q12H  thiamine, 200 mg, Per G Tube, Daily  vitamin B-12, 1,000 mcg, Oral, Daily      dexmedetomidine, 0.2-1.5 mcg/kg/hr (Dosing Weight), Last Rate: 1.5 mcg/kg/hr (02/21/25 0602)  ketamine, 0.06-2.5 mg/kg/hr (Dosing Weight), Last Rate: 2 mg/kg/hr (02/21/25 0627)  midazolam, 1-10 mg/hr, Last Rate: 8 mg/hr (02/20/25 2343)          Assessment & Plan     02/17/2025 reviewed and updated  02/18/2025 reviewed and updated  02/19/2025 reviewed and updated  02/20/2025 reviewed and updated  02/21/2025 reviewed and updated    -Acute kidney injury  - Chronic kidney disease unspecified stage  -Complication of access  - Acute hypercapnic hypoxic respiratory failure  - Acute metabolic acidosis  - Severe sepsis with septic shock  - Rhabdomyolysis  - Paroxysmal atrial fibrillation    02/16/2025   Dr Bates  patient remains oliguric with no signs of renal recovery, will continue watch and monitor closely but will keep on intermittent dialysis for now  Next dialysis is going to be on Tuesday but we will reassess tomorrow  Acute kidney injury most likely due to acute tubular necrosis in settings shock  Had ASHLEY in October 2024 with creatinine peaked to 4 with some improvement close to 2  - As needed potassium replacement  - Monitor for renal recovery closely  - Strict intake and output record.  -Patient remains very high risk  - Please avoid any nephrotoxic agents, hypotension and adjust medications according to estimated GFR.   REVIEWED NOTES OF CLINICAL TEAMS INVOLVED WITH PATIENT CARE.  DISCUSSED IN DETAIL WITH PATIENT AND/OR FAMILY ABOUT CURRENT CLINICAL CONDITION AND RESPONSE TO ONGOING MANAGEMENT.   DISCUSSED IN DETAIL WITH PATIENT AND/OR FAMILY ABOUT RISKS ASSOCIATED WITH CURRENT CLINICAL CONDITION AND RISK INVOLVED WITH CURRENT MANAGEMENT.   DISCUSSED IN DETAIL WITH HOSPITALIST  CODE STATUS REVIEWED,     02/17/2025  Patient's urine output is picking up now creatinine is still  8.65 we will continue to watch urine output we will hold dialysis today and do dialysis in the morning if needed  Potassium is stable  Strict urine output record please     02/18/2025  Patient's creatinine is 9 still urine output 450 cc still on ventilator family to decide about further care they will dialyze the patient if family decides to continue the current management    02/19/2025  Patient's dialysis catheter is not functioning and we had only blood 100 cc so we will ask surgery to reevaluate the dialysis catheter and will do dialysis after  Family to decide about further management tomorrow    02/20/2025  Patient tolerated dialysis well yesterday we will plan to continue Monday Wednesday Friday schedule of dialysis will family decide about further management tomorrow    02/21/2025  Patient's family is going to decide on comfort measures at 2 PM patient blood pressures towards lower side so suggest starting Levophed to keep systolic blood pressure above 90  Will wait for patient's family to decide about comfort measures if not then we will do dialysis today      Discussed with RN    Prognosis critical    Reviewed hospitalist notes  Reviewed consultant notes  Reviewed the labs  Reviewed radiology    Please avoid nephrotoxic medication and pharmacy to adjust the medication according to the GFR.          RIKY Wick MD  02/21/25  07:23 EST

## 2025-02-21 NOTE — PLAN OF CARE
Goal Outcome Evaluation:            Patient extubated per family's wishes

## 2025-02-21 NOTE — PROGRESS NOTES
Antimicrobial Length of Therapy:    Day 4 of 5 ceftriaxone  Day 4 of 5 doxycycline    Previously completed:  7 days cefepime followed by ceftriaxone  5 days vancomycin  4 days metronidazole  5 days Tamiflu (2/19)    Thank you.  Mignon Hanley, Pharm.D.  2/21/2025  08:58 EST

## 2025-02-21 NOTE — CASE MANAGEMENT/SOCIAL WORK
Discharge Planning Assessment  MICHAEL Mahmood     Patient Name: Phan Daniels  MRN: [id number]  Today's Date: 2/21/2025    Admit Date: 2/8/2025     Discharge Plan       Row Name 02/21/25 1513       Plan    Plan Pt was transitioned to comfort measures on this date and extubated. SS to follow.        SONIA GarciaW

## 2025-02-21 NOTE — PROGRESS NOTES
Progress Note Critical Care Pulmonary        Subjective  On vent , sedated with Precedex    Oxygen requirement down to 40% after PEEP reduced to 8.                Interval History: event overnight: As described above        Review of Systems:    On vent   Vital Signs  Temp:  [98 °F (36.7 °C)-100.3 °F (37.9 °C)] 98 °F (36.7 °C)  Heart Rate:  [] 81  Resp:  [25-33] 28  BP: ()/() 90/51  FiO2 (%):  [40 %-60 %] 40 %  Body mass index is 33.6 kg/m².    Intake/Output Summary (Last 24 hours) at 2/21/2025 0844  Last data filed at 2/21/2025 0832  Gross per 24 hour   Intake 4573.78 ml   Output 1000 ml   Net 3573.78 ml     I/O this shift:  In: 200 [IV Piggyback:200]  Out: -     Physical Exam:  General- normal in appearance, sedated  HEENT- pupils equally reactive to light, normal in size, no scleral icterus    Neck-supple    Respiratory-bilateral air entry, few basal rales  Cardiovascular-  Normal S1 and S2. No S3, S4 or murmurs. No JVD, no carotid bruit and no edema, pulses normal bilaterally     GI-nontender nondistended bowel sounds positive    CNS- grossly nonfocal    Extremities-no clubbing .  2 Plus bipedal edema      Results Review:      Results from last 7 days   Lab Units 02/20/25  0038 02/19/25  0102 02/18/25  0159   WBC 10*3/mm3 13.98* 12.08* 12.76*   HEMOGLOBIN g/dL 9.4* 8.3* 8.5*   PLATELETS 10*3/mm3 180 189 179     Results from last 7 days   Lab Units 02/21/25  0127 02/20/25  0038 02/19/25  0102   SODIUM mmol/L 138 134* 139   POTASSIUM mmol/L 3.9 4.3 4.2   CHLORIDE mmol/L 99 94* 98   CO2 mmol/L 24.3 22.4 22.5   BUN mg/dL 110* 89* 101*   CREATININE mg/dL 6.25* 5.72* 7.56*   CALCIUM mg/dL 8.4* 8.4* 8.2*   GLUCOSE mg/dL 132* 121* 176*     Lab Results   Component Value Date    INR 0.98 02/15/2025    INR 1.16 (H) 02/10/2025    INR 1.21 (H) 02/08/2025    PROTIME 13.1 02/15/2025    PROTIME 15.0 02/10/2025    PROTIME 15.4 (H) 02/08/2025     Results from last 7 days   Lab Units 02/21/25  0127 02/20/25  0038  02/19/25  0102   ALK PHOS U/L 204* 160* 158*   BILIRUBIN mg/dL 0.5 0.8 0.6   ALT (SGPT) U/L 24 31 35   AST (SGOT) U/L 44* 52* 69*     Results from last 7 days   Lab Units 02/21/25  0417   PH, ARTERIAL pH units 7.283*   PO2 ART mm Hg 68.0*   PCO2, ARTERIAL mm Hg 49.2*   HCO3 ART mmol/L 23.3     Imaging Results (Last 24 Hours)       ** No results found for the last 24 hours. **                 amiodarone, 400 mg, Oral, Q24H   Followed by  [START ON 2/28/2025] amiodarone, 200 mg, Oral, Q24H  cefTRIAXone, 2,000 mg, Intravenous, Q24H  cetirizine, 5 mg, Oral, Daily  chlorhexidine, 15 mL, Mouth/Throat, Q12H  doxycycline, 100 mg, Intravenous, Q12H  finasteride, 5 mg, Oral, Daily  folic acid, 1 mg, Oral, Daily  heparin (porcine), 5,000 Units, Subcutaneous, Q8H  insulin regular, 2-7 Units, Subcutaneous, Q6H  ipratropium-albuterol, 3 mL, Nebulization, 4x Daily - RT  midodrine, 5 mg, Oral, TID AC  pantoprazole, 40 mg, Intravenous, BID AC  [Held by provider] rosuvastatin, 10 mg, Oral, Nightly  sodium chloride, 10 mL, Intravenous, Q12H  sodium chloride, 10 mL, Intravenous, Q12H  sodium chloride, 10 mL, Intravenous, Q12H  sodium chloride, 10 mL, Intravenous, Q12H  sodium chloride, 10 mL, Intravenous, Q12H  thiamine, 200 mg, Per G Tube, Daily  vitamin B-12, 1,000 mcg, Oral, Daily      dexmedetomidine, 0.2-1.5 mcg/kg/hr (Dosing Weight), Last Rate: 1.5 mcg/kg/hr (02/21/25 0602)  ketamine, 0.06-2.5 mg/kg/hr (Dosing Weight), Last Rate: 2 mg/kg/hr (02/21/25 0627)  midazolam, 1-10 mg/hr, Last Rate: 8 mg/hr (02/20/25 6985)  norepinephrine, 0.02-0.3 mcg/kg/min (Dosing Weight), Last Rate: 0.02 mcg/kg/min (02/21/25 0829)        Medication Review:     Assessment & Plan      #1: Acute hypoxic and hypercarbic respiratory failure-likely due to aspiration pneumonia/influenza A.  Family is coming today to make him comfort measure        Continue ceftriaxone and doxycycline      Respiratory cultures reviewed.     Continue nebs  Microbiology  reviewed.    Transthoracic echo did not show any vegetations  VAP- precautions  Head end - 30 %  Mouth care   COPD-longtime smoker.  Continue systemic steroid and DuoNeb via nebulizer  Continue to titrate FiO2 down to maintain saturation 88 to 92%.            Cardiology- hemodynamically -unstable.  Continue midodrine.     Vasopressor requirement reviewed and adjusted for a MAP of 65 and above.  He is on midodrine on 10 mg every 8          Nephrology-dialysis catheter to be adjusted by surgery.  Hemodialysis will be resumed after that.      GI- PPI IV twice daily-        Will use fentanyl as needed for vent tolerance.        Hematology- latest CBC reviewed.  Transfuse for hemoglobin less than 7     ID-  Endrocrinology- Maintain Blood sugar 140 -180     DVT prophylaxis-continue               Vent Settings          Resp Rate (Set): 28  Pressure Support (cm H2O): 8 cm H20  FiO2 (%): 40 %  PEEP/CPAP (cm H2O): 8 cm H20    Minute Ventilation (L/min) (Obs): 15.8 L/min  Resp Rate (Observed) Vent: 28     I:E Ratio (Obs): 1:1.5    PIP Observed (cm H2O): 45 cm H2O         Plateau pressure is 14       I have personally reviewed x-ray chest, labs, medication list.  Comfort measure at 2:00.  Pm     today as per family decision.  I will sign off.  Reconsult as needed      Donavan Hernandez MD  02/21/25  08:44 EST

## 2025-02-21 NOTE — PROGRESS NOTES
[facility] HOSPITALIST PROGRESS NOTE     Patient Identification:  Name:  Phan Daniels  Age:  69 y.o.  Sex:  male  :  1955  MRN:  [id number]  Visit Number:  [id number]  ROOM: [address]     Primary Care Provider:  Cesia Diaz APRN    Length of stay in inpatient status:  12    Subjective     Chief Compliant:    Chief Complaint   Patient presents with   • Respiratory Distress       History of Presenting Illness:    Patient remains intubated, sedated. Nursing staff noted he appeared startled during weaning trial that was again limited by agitation but did not look at them or follow commands.     [facility] conversation had with palliative care and patient's family via phone call. Family has opted to make patient comfort measures tomorrow at 2 pm.     ROS:  Otherwise 10 point ROS negative other than documented above in HPI.     Objective     Current Hospital Meds:amiodarone, 400 mg, Oral, Q24H   Followed by  [START ON 2025] amiodarone, 200 mg, Oral, Q24H  cefTRIAXone, 2,000 mg, Intravenous, Q24H  cetirizine, 5 mg, Oral, Daily  chlorhexidine, 15 mL, Mouth/Throat, Q12H  doxycycline, 100 mg, Intravenous, Q12H  finasteride, 5 mg, Oral, Daily  folic acid, 1 mg, Oral, Daily  heparin (porcine), 5,000 Units, Subcutaneous, Q8H  insulin regular, 2-7 Units, Subcutaneous, Q6H  ipratropium-albuterol, 3 mL, Nebulization, 4x Daily - RT  pantoprazole, 40 mg, Intravenous, BID AC  [Held by provider] rosuvastatin, 10 mg, Oral, Nightly  sodium chloride, 10 mL, Intravenous, Q12H  sodium chloride, 10 mL, Intravenous, Q12H  sodium chloride, 10 mL, Intravenous, Q12H  sodium chloride, 10 mL, Intravenous, Q12H  sodium chloride, 10 mL, Intravenous, Q12H  thiamine, 200 mg, Per G Tube, Daily  vitamin B-12, 1,000 mcg, Oral, Daily    dexmedetomidine, 0.2-1.5 mcg/kg/hr (Dosing Weight), Last Rate: 1.3 mcg/kg/hr (25 485)  ketamine, 0.06-2.5 mg/kg/hr (Dosing Weight), Last Rate: 1.66 mg/kg/hr (25  1842)        Current Antimicrobial Therapy:  Anti-Infectives (From admission, onward)      Ordered     Dose/Rate Route Frequency Start Stop    02/19/25 0926  cefTRIAXone (ROCEPHIN) 2,000 mg in sodium chloride 0.9 % 100 mL IVPB-VTB        Ordering Provider: Mignon Hanley PharmD    2,000 mg  200 mL/hr over 30 Minutes Intravenous Every 24 Hours 02/20/25 0900 02/23/25 0859    02/18/25 0815  cefTRIAXone (ROCEPHIN) 1,000 mg in sodium chloride 0.9 % 100 mL IVPB-VTB  Status:  Discontinued        Ordering Provider: Talat Blankenship MD    1,000 mg  200 mL/hr over 30 Minutes Intravenous Every 24 Hours 02/18/25 0900 02/19/25 0926    02/18/25 0815  doxycycline (VIBRAMYCIN) 100 mg in sodium chloride 0.9 % 100 mL IVPB-VTB        Ordering Provider: aTlat Blankenship MD    100 mg  over 60 Minutes Intravenous Every 12 Hours 02/18/25 0900 02/23/25 0859    02/14/25 1023  oseltamivir (TAMIFLU) capsule 30 mg        Ordering Provider: Kendall Irby MD    30 mg Oral Once per day on Monday Wednesday Friday 02/14/25 1200 02/19/25 1551    02/11/25 1404  cefTRIAXone (ROCEPHIN) 2,000 mg in sodium chloride 0.9 % 100 mL IVPB-VTB        Ordering Provider: Dangelo Ledezma MD    2,000 mg  200 mL/hr over 30 Minutes Intravenous Every 24 Hours 02/12/25 0600 02/15/25 0634    02/11/25 1404  metroNIDAZOLE (FLAGYL) IVPB 500 mg        Ordering Provider: Dangelo Ledezma MD    500 mg  200 mL/hr over 30 Minutes Intravenous Every 8 Hours 02/11/25 2000 02/11/25 2106    02/08/25 2054  Vancomycin Pharmacy Intermittent/Pulse Dosing        Ordering Provider: Mignon Hanley PharmD     Not Applicable Daily 02/09/25 1200 02/13/25 0859    02/08/25 1846  cefepime 1000 mg IVPB in 100 mL NS (VTB)  Status:  Discontinued        Ordering Provider: Dangelo Ledezma MD    1,000 mg  over 30 Minutes Intravenous Every 24 Hours 02/09/25 0600 02/11/25 1404    02/08/25 1853  vancomycin IVPB 2000 mg in 0.9% Sodium Chloride 500 mL        Ordering Provider: Talat Blankenship MD     20 mg/kg × 98.8 kg  250 mL/hr over 120 Minutes Intravenous Once 02/08/25 2100 02/08/25 2345    02/08/25 1831  metroNIDAZOLE (FLAGYL) IVPB 500 mg  Status:  Discontinued        Ordering Provider: Talat Blankenship MD    500 mg  200 mL/hr over 30 Minutes Intravenous Every 8 Hours 02/08/25 2000 02/11/25 1404    02/08/25 1846  cefepime 1000 mg IVPB in 100 mL NS (VTB)        Ordering Provider: Talat Blankenship MD    1,000 mg  over 30 Minutes Intravenous Once 02/08/25 1945 02/08/25 2215    02/08/25 1831  Pharmacy to dose vancomycin  Status:  Discontinued        Ordering Provider: Talat Blankenship MD     Not Applicable Continuous PRN 02/08/25 1831 02/09/25 1406    02/08/25 1331  cefepime 2000 mg IVPB in 100 mL NS (VTB)        Ordering Provider: Darren Bowman MD    2,000 mg  over 30 Minutes Intravenous Once 02/08/25 1347 02/08/25 1501    02/08/25 1331  metroNIDAZOLE (FLAGYL) IVPB 500 mg        Ordering Provider: Darren Bowman MD    500 mg  200 mL/hr over 30 Minutes Intravenous Once 02/08/25 1347 02/08/25 1458    02/08/25 1331  vancomycin IVPB 1750 mg in 0.9% Sodium Chloride (premix) 500 mL        Ordering Provider: Darren Bowman MD    20 mg/kg × 86.7 kg (Adjusted)  285.7 mL/hr over 105 Minutes Intravenous Once 02/08/25 1347 02/08/25 1751          Current Diuretic Therapy:  Diuretics (From admission, onward)      None          ----------------------------------------------------------------------------------------------------------------------  Vital Signs:  Temp:  [98.8 °F (37.1 °C)-100.3 °F (37.9 °C)] 100.3 °F (37.9 °C)  Heart Rate:  [] 113  Resp:  [25-40] 29  BP: (104-186)/() 151/86  FiO2 (%):  [40 %-60 %] 60 %  SpO2:  [87 %-98 %] 97 %  on   ;   Device (Oxygen Therapy): ventilator  Body mass index is 33.6 kg/m².    Wt Readings from Last 3 Encounters:   02/20/25 112 kg (247 lb 12.8 oz)   06/06/24 102 kg (225 lb 10.3 oz)     Intake & Output (last 3 days)         02/18 0701  02/19 0700 02/19  0701  02/20 0700 02/20 0701  02/21 0700    P.O.  0     I.V. (mL/kg) 1489.9 (14.6) 3020.8 (29.6)     Other 155 55     NG/GT 1095 600     IV Piggyback 300 100     Total Intake(mL/kg) 3039.9 (29.8) 3775.8 (37)     Urine (mL/kg/hr) 1100 (0.4) 1000 (0.4)     Emesis/NG output 0 0     Stool 100 0     Dialysis 1550 2000     Total Output 2750 3000     Net +289.9 +775.8            Stool Unmeasured Occurrence  0 x     Emesis Unmeasured Occurrence  0 x           No diet orders on file  ----------------------------------------------------------------------------------------------------------------------  Physical exam:  GENERAL:  Patient intubated and sedated.   SKIN:  Warm, dry without rashes, purpura or petechiae.  EYES:  Pupils equal, round and reactive to light.  Conjunctivae normal.  HEAD:  Normocephalic. Atraumatic.   EARS/NOSE/MOUTH/THROAT:  Septum midline.  No excoriations or nasal discharge. No stomatitis or ulcers.  Lips normal. Oropharynx without lesions or exudates.  NECK:  Supple with good range of motion; no thyromegaly or masses  LYMPHATICS:  No cervical, supraclavicular, axillary adenopathy.  RESP:  Lungs clear to auscultation. Good airflow. Intubated receiving mechanical ventilation.   CARDIAC:  Regular rate and rhythm without murmurs, rubs or gallops. Normal S1,S2. No edema  GI:  Soft, nontender, no hepatosplenomegaly, normal bowel sounds  MSK:  No clubbing or cyanosis, No joint swelling noted in hands  NEUROLOGICAL:  No focal deficit. Pupils equal and reactive.   ----------------------------------------------------------------------------------------------------------------------  Tele:    ----------------------------------------------------------------------------------------------------------------------  Results from last 7 days   Lab Units 02/20/25  0038 02/19/25  0102 02/18/25  0715 02/18/25  0159 02/16/25  2234 02/15/25  1041   LACTATE mmol/L  --   --  0.8  --   --   --    WBC 10*3/mm3 13.98* 12.08*  --   "12.76*   < >  --    HEMOGLOBIN g/dL 9.4* 8.3*  --  8.5*   < >  --    HEMATOCRIT % 29.6* 26.1*  --  27.0*   < >  --    MCV fL 103.1* 101.2*  --  102.7*   < >  --    MCHC g/dL 31.8 31.8  --  31.5   < >  --    PLATELETS 10*3/mm3 180 189  --  179   < >  --    INR   --   --   --   --   --  0.98    < > = values in this interval not displayed.     Results from last 7 days   Lab Units 02/20/25  0415   PH, ARTERIAL pH units 7.413   PO2 ART mm Hg 72.9*   PCO2, ARTERIAL mm Hg 38.6   HCO3 ART mmol/L 24.6     Results from last 7 days   Lab Units 02/20/25  0038 02/19/25  0102 02/18/25  0159   SODIUM mmol/L 134* 139 141   POTASSIUM mmol/L 4.3 4.2 4.1   CHLORIDE mmol/L 94* 98 98   CO2 mmol/L 22.4 22.5 22.2   BUN mg/dL 89* 101* 111*   CREATININE mg/dL 5.72* 7.56* 9.12*   CALCIUM mg/dL 8.4* 8.2* 8.0*   GLUCOSE mg/dL 121* 176* 137*   ALBUMIN g/dL 2.4* 2.4* 2.4*   BILIRUBIN mg/dL 0.8 0.6 0.6   ALK PHOS U/L 160* 158* 127*   AST (SGOT) U/L 52* 69* 57*   ALT (SGPT) U/L 31 35 31   Estimated Creatinine Clearance: 16 mL/min (A) (by C-G formula based on SCr of 5.72 mg/dL (H)).  No results found for: \"AMMONIA\"              Glucose   Date/Time Value Ref Range Status   02/20/2025 1841 117 70 - 130 mg/dL Final   02/20/2025 1212 152 (H) 70 - 130 mg/dL Final   02/20/2025 0528 125 70 - 130 mg/dL Final   02/19/2025 2330 123 70 - 130 mg/dL Final   02/19/2025 1757 126 70 - 130 mg/dL Final   02/19/2025 1226 112 70 - 130 mg/dL Final   02/19/2025 0547 112 70 - 130 mg/dL Final   02/18/2025 2333 168 (H) 70 - 130 mg/dL Final     Lab Results   Component Value Date    TSH 0.196 (L) 02/09/2025    FREET4 0.85 (L) 02/09/2025     No results found for: \"PREGTESTUR\", \"PREGSERUM\", \"HCG\", \"HCGQUANT\"  Pain Management Panel          Latest Ref Rng & Units 2/8/2025   Pain Management Panel   Amphetamine, Urine Qual Negative Negative    Barbiturates Screen, Urine Negative Negative    Benzodiazepine Screen, Urine Negative Negative    Buprenorphine, Screen, Urine Negative " "Negative    Cocaine Screen, Urine Negative Negative    Fentanyl, Urine Negative Negative    Methadone Screen , Urine Negative Negative    Methamphetamine, Ur Negative Negative       Details                 Brief Urine Lab Results  (Last result in the past 365 days)        Color   Clarity   Blood   Leuk Est   Nitrite   Protein   CREAT   Urine HCG        02/17/25 1704 Yellow   Clear   Large (3+)   Trace   Negative   100 mg/dL (2+)                 Blood Culture   Date Value Ref Range Status   02/17/2025 No growth at 3 days  Preliminary   02/17/2025 No growth at 3 days  Preliminary     Urine Culture   Date Value Ref Range Status   02/17/2025 No growth  Final     No results found for: \"WOUNDCX\"  No results found for: \"STOOLCX\"  Respiratory Culture   Date Value Ref Range Status   02/18/2025 Scant growth (1+) Staphylococcus aureus, MRSA (A)  Final     Comment:       Methicillin resistant Staphylococcus aureus, Patient may be an isolation risk.   02/18/2025 No Normal Respiratory Ni (A)  Final     No results found for: \"AFBCX\"  Results from last 7 days   Lab Units 02/18/25  0715   LACTATE mmol/L 0.8       I have personally looked at the labs and they are summarized above.  ----------------------------------------------------------------------------------------------------------------------  Detailed radiology reports for the last 24 hours:    Imaging Results (Last 24 Hours)       ** No results found for the last 24 hours. 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 L perihilar bacterial pneumonia   #Atrial Fibrillation with Rapid Ventricular Rate   #Elevated HS Troponins, suspected NSTEMI Type II due to shock  #Electrolyte Abnormalities  #Pre-Diabetes with steroid induced hyperglycemia  - Hgb A1c = 6.4%  #Alcohol Use Disorder, Severe, with Dependence complicated   #Obesity by BMI    Family has reasonably decided to make patient comfort measures tomorrow at 2 pm. Will continue IV abx, ventilator, and hemodynamic support until then. Continue dialysis as per nephrology.       F: TFs  A: PO oxycodone.   S: Precedex, ketamine  T: SubQ heparin while heparin gtt on hold.   H: HOB elevated   U: Protonix   G: PRN  S: Daily   B: SubQ insulin   I: R IJ central line and ETT placed on 2/8, Hemodialysis catheter placed 2/11  D: None      Code status: DNR, otherwise full      Dispo: Comfort measures tomorrow at 2 pm planned by family.        Talat Blankenship MD  [facility] Hospitalist  02/20/25  19:43 EST

## 2025-02-22 NOTE — PROGRESS NOTES
[facility] HOSPITALIST PROGRESS NOTE     Patient Identification:  Name:  Phan Daniels  Age:  69 y.o.  Sex:  male  :  1955  MRN:  [id number]  Visit Number:  [id number]  ROOM: [address]     Primary Care Provider:  Cesia Diaz APRN    Length of stay in inpatient status:  13    Subjective     Chief Compliant:    Chief Complaint   Patient presents with   • Respiratory Distress       History of Presenting Illness:    Family at bedside at 2 pm as planned. I went bedside to discuss comfort measures. Family agreeable and patient terminally extubated. Still not interactive and appeared comfortable.     ROS:  Otherwise 10 point ROS negative other than documented above in HPI.     Objective     Current Hospital Meds:chlorhexidine, 15 mL, Mouth/Throat, Q12H  Scopolamine, 1 patch, Transdermal, Q72H  sodium chloride, 10 mL, Intravenous, Q12H  sodium chloride, 10 mL, Intravenous, Q12H  sodium chloride, 10 mL, Intravenous, Q12H    dexmedetomidine, 0.2-1.5 mcg/kg/hr (Dosing Weight), Last Rate: 1.5 mcg/kg/hr (25)        Current Antimicrobial Therapy:  Anti-Infectives (From admission, onward)      Ordered     Dose/Rate Route Frequency Start Stop    25  cefTRIAXone (ROCEPHIN) 2,000 mg in sodium chloride 0.9 % 100 mL IVPB-VTB  Status:  Discontinued        Ordering Provider: Mignon Hanley PharmD    2,000 mg  200 mL/hr over 30 Minutes Intravenous Every 24 Hours 25 0900 25 18125 0815  cefTRIAXone (ROCEPHIN) 1,000 mg in sodium chloride 0.9 % 100 mL IVPB-VTB  Status:  Discontinued        Ordering Provider: Talat Blankenship MD    1,000 mg  200 mL/hr over 30 Minutes Intravenous Every 24 Hours 25 0900 25 0925 0815  doxycycline (VIBRAMYCIN) 100 mg in sodium chloride 0.9 % 100 mL IVPB-VTB  Status:  Discontinued        Ordering Provider: Talat Blankenship MD    100 mg  over 60 Minutes Intravenous Every 12 Hours 25 0900 25     02/14/25 1023  oseltamivir (TAMIFLU) capsule 30 mg        Ordering Provider: Kendall Irby MD    30 mg Oral Once per day on Monday Wednesday Friday 02/14/25 1200 02/19/25 1551    02/11/25 1404  cefTRIAXone (ROCEPHIN) 2,000 mg in sodium chloride 0.9 % 100 mL IVPB-VTB        Ordering Provider: Dangelo Ledezma MD    2,000 mg  200 mL/hr over 30 Minutes Intravenous Every 24 Hours 02/12/25 0600 02/15/25 0634    02/11/25 1404  metroNIDAZOLE (FLAGYL) IVPB 500 mg        Ordering Provider: Dangelo Ledezma MD    500 mg  200 mL/hr over 30 Minutes Intravenous Every 8 Hours 02/11/25 2000 02/11/25 2106    02/08/25 2054  Vancomycin Pharmacy Intermittent/Pulse Dosing        Ordering Provider: Mignon Hanley, PharmD     Not Applicable Daily 02/09/25 1200 02/13/25 0859    02/08/25 1846  cefepime 1000 mg IVPB in 100 mL NS (VTB)  Status:  Discontinued        Ordering Provider: Dangelo Ledezma MD    1,000 mg  over 30 Minutes Intravenous Every 24 Hours 02/09/25 0600 02/11/25 1404    02/08/25 1853  vancomycin IVPB 2000 mg in 0.9% Sodium Chloride 500 mL        Ordering Provider: Talat Blankenship MD    20 mg/kg × 98.8 kg  250 mL/hr over 120 Minutes Intravenous Once 02/08/25 2100 02/08/25 2345    02/08/25 1831  metroNIDAZOLE (FLAGYL) IVPB 500 mg  Status:  Discontinued        Ordering Provider: Talat Blankenship MD    500 mg  200 mL/hr over 30 Minutes Intravenous Every 8 Hours 02/08/25 2000 02/11/25 1404    02/08/25 1846  cefepime 1000 mg IVPB in 100 mL NS (VTB)        Ordering Provider: Talat Blankenship MD    1,000 mg  over 30 Minutes Intravenous Once 02/08/25 1945 02/08/25 2215    02/08/25 1831  Pharmacy to dose vancomycin  Status:  Discontinued        Ordering Provider: Talat Blankenship MD     Not Applicable Continuous PRN 02/08/25 1831 02/09/25 1406    02/08/25 1331  cefepime 2000 mg IVPB in 100 mL NS (VTB)        Ordering Provider: Darren Bowman MD    2,000 mg  over 30 Minutes Intravenous Once 02/08/25 1347 02/08/25 1501     02/08/25 1331  metroNIDAZOLE (FLAGYL) IVPB 500 mg        Ordering Provider: Darren Bowman MD    500 mg  200 mL/hr over 30 Minutes Intravenous Once 02/08/25 1347 02/08/25 1458    02/08/25 1331  vancomycin IVPB 1750 mg in 0.9% Sodium Chloride (premix) 500 mL        Ordering Provider: Darren Bowman MD    20 mg/kg × 86.7 kg (Adjusted)  285.7 mL/hr over 105 Minutes Intravenous Once 02/08/25 1347 02/08/25 1751          Current Diuretic Therapy:  Diuretics (From admission, onward)      None          ----------------------------------------------------------------------------------------------------------------------  Vital Signs:  Temp:  [98 °F (36.7 °C)-98.8 °F (37.1 °C)] 98.8 °F (37.1 °C)  Heart Rate:  [] 110  Resp:  [26-28] 28  BP: ()/() 146/77  FiO2 (%):  [40 %-50 %] 40 %  SpO2:  [86 %-99 %] 86 %  on  Flow (L/min) (Oxygen Therapy):  [2] 2;   Device (Oxygen Therapy): nasal cannula  Body mass index is 33.6 kg/m².    Wt Readings from Last 3 Encounters:   02/20/25 112 kg (247 lb 12.8 oz)   06/06/24 102 kg (225 lb 10.3 oz)     Intake & Output (last 3 days)         02/19 0701 02/20 0700 02/20 0701 02/21 0700 02/21 0701 02/22 0700    P.O. 0      I.V. (mL/kg) 3020.8 (29.6) 4373.8 (42.9) 2135 (20.9)    Other 55      NG/      IV Piggyback 100  200    Total Intake(mL/kg) 3775.8 (37) 4373.8 (42.9) 2335 (22.9)    Urine (mL/kg/hr) 1000 (0.4) 1000 (0.4) 900 (0.7)    Emesis/NG output 0      Stool 0  0    Dialysis 2000      Total Output 3000 1000 900    Net +775.8 +3373.8 +1435           Stool Unmeasured Occurrence 0 x  0 x    Emesis Unmeasured Occurrence 0 x            NPO Diet NPO Type: Strict NPO  ----------------------------------------------------------------------------------------------------------------------  Physical exam:  Constitutional:  Chronically ill appearing   HENT:  Head:  Normocephalic and atraumatic.  Mouth:  Moist mucous membranes.    Eyes:  Conjunctivae and EOM are normal.  "No scleral icterus.    Neck:  Neck supple.  No JVD present.    ----------------------------------------------------------------------------------------------------------------------  Tele:    ----------------------------------------------------------------------------------------------------------------------  Results from last 7 days   Lab Units 02/21/25  0727 02/20/25  0038 02/19/25  0102 02/18/25  0715 02/18/25  0159 02/16/25  2234 02/15/25  1041   LACTATE mmol/L 0.8  --   --  0.8  --   --   --    WBC 10*3/mm3  --  13.98* 12.08*  --  12.76*   < >  --    HEMOGLOBIN g/dL  --  9.4* 8.3*  --  8.5*   < >  --    HEMATOCRIT %  --  29.6* 26.1*  --  27.0*   < >  --    MCV fL  --  103.1* 101.2*  --  102.7*   < >  --    MCHC g/dL  --  31.8 31.8  --  31.5   < >  --    PLATELETS 10*3/mm3  --  180 189  --  179   < >  --    INR   --   --   --   --   --   --  0.98    < > = values in this interval not displayed.     Results from last 7 days   Lab Units 02/21/25  0417   PH, ARTERIAL pH units 7.283*   PO2 ART mm Hg 68.0*   PCO2, ARTERIAL mm Hg 49.2*   HCO3 ART mmol/L 23.3     Results from last 7 days   Lab Units 02/21/25  0127 02/20/25  0038 02/19/25  0102   SODIUM mmol/L 138 134* 139   POTASSIUM mmol/L 3.9 4.3 4.2   CHLORIDE mmol/L 99 94* 98   CO2 mmol/L 24.3 22.4 22.5   BUN mg/dL 110* 89* 101*   CREATININE mg/dL 6.25* 5.72* 7.56*   CALCIUM mg/dL 8.4* 8.4* 8.2*   GLUCOSE mg/dL 132* 121* 176*   ALBUMIN g/dL 2.2* 2.4* 2.4*   BILIRUBIN mg/dL 0.5 0.8 0.6   ALK PHOS U/L 204* 160* 158*   AST (SGOT) U/L 44* 52* 69*   ALT (SGPT) U/L 24 31 35   Estimated Creatinine Clearance: 14.6 mL/min (A) (by C-G formula based on SCr of 6.25 mg/dL (H)).  No results found for: \"AMMONIA\"              Glucose   Date/Time Value Ref Range Status   02/21/2025 1125 156 (H) 70 - 130 mg/dL Final   02/21/2025 0531 131 (H) 70 - 130 mg/dL Final   02/21/2025 0027 136 (H) 70 - 130 mg/dL Final   02/20/2025 1841 117 70 - 130 mg/dL Final   02/20/2025 1212 152 (H) 70 - " "130 mg/dL Final   02/20/2025 0528 125 70 - 130 mg/dL Final   02/19/2025 2330 123 70 - 130 mg/dL Final   02/19/2025 1757 126 70 - 130 mg/dL Final     Lab Results   Component Value Date    TSH 0.196 (L) 02/09/2025    FREET4 0.85 (L) 02/09/2025     No results found for: \"PREGTESTUR\", \"PREGSERUM\", \"HCG\", \"HCGQUANT\"  Pain Management Panel          Latest Ref Rng & Units 2/8/2025   Pain Management Panel   Amphetamine, Urine Qual Negative Negative    Barbiturates Screen, Urine Negative Negative    Benzodiazepine Screen, Urine Negative Negative    Buprenorphine, Screen, Urine Negative Negative    Cocaine Screen, Urine Negative Negative    Fentanyl, Urine Negative Negative    Methadone Screen , Urine Negative Negative    Methamphetamine, Ur Negative Negative       Details                 Brief Urine Lab Results  (Last result in the past 365 days)        Color   Clarity   Blood   Leuk Est   Nitrite   Protein   CREAT   Urine HCG        02/17/25 1704 Yellow   Clear   Large (3+)   Trace   Negative   100 mg/dL (2+)                 Blood Culture   Date Value Ref Range Status   02/17/2025 No growth at 4 days  Preliminary   02/17/2025 No growth at 4 days  Preliminary     Urine Culture   Date Value Ref Range Status   02/17/2025 No growth  Final     No results found for: \"WOUNDCX\"  No results found for: \"STOOLCX\"  Respiratory Culture   Date Value Ref Range Status   02/18/2025 Scant growth (1+) Staphylococcus aureus, MRSA (A)  Final     Comment:       Methicillin resistant Staphylococcus aureus, Patient may be an isolation risk.   02/18/2025 No Normal Respiratory Ni (A)  Final     No results found for: \"AFBCX\"  Results from last 7 days   Lab Units 02/21/25  0727 02/18/25  0715   LACTATE mmol/L 0.8 0.8       I have personally looked at the labs and they are summarized above.  ----------------------------------------------------------------------------------------------------------------------  Detailed radiology reports for the last " 24 hours:    Imaging Results (Last 24 Hours)       ** No results found for the last 24 hours. 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 L perihilar bacterial pneumonia   #Atrial Fibrillation with Rapid Ventricular Rate   #Elevated HS Troponins, suspected NSTEMI Type II due to shock  #Electrolyte Abnormalities  #Pre-Diabetes with steroid induced hyperglycemia  - Hgb A1c = 6.4%  #Alcohol Use Disorder, Severe, with Dependence complicated   #Obesity by BMI     Family has reasonably decided to make patient comfort measures. PRN dilaudid, PRN ativan, PRN robinul. Titrate as needed.     Code status: Comfort    Dispo: Inpatient comfort measures.     Talat Blankenship MD  [facility] Hospitalist  02/21/25  19:11 EST

## 2025-03-07 NOTE — DISCHARGE SUMMARY
[facility] HOSPITALISTS DISCHARGE SUMMARY    Patient Identification:  Name:  Phan Daniels  Age:  69 y.o.  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 L perihilar bacterial pneumonia   #Atrial Fibrillation with Rapid Ventricular Rate   #Elevated HS Troponins, suspected NSTEMI Type II due to shock  #Electrolyte Abnormalities  #Pre-Diabetes with steroid induced hyperglycemia    CONSULTS   Nephrology   Neurology   Pulmonology   Cardiology   Pallitive care   General surgery     PROCEDURES PERFORMED  Dialysis catheter placement 2025    HOSPITAL COURSE  Patient is a 69 y.o. male presented on  to [facility] after being found unresponsive for unknown amount of time.  Please see the admitting history and physical for further details.      Mr. Daniels was our 68 yo M with hx of afib, COPD, HTN, HLD, GERD, ETOH abuse who presents after being found unresponsive. Patient was recently released from longterm where he was for 44 years. His residence is in [place] but he has been visiting the last few weeks with his girlfriend who lives in [place]. Patient drinks heavily with reports of over 1/5 of liquor per day. Family also reports that he has frequent hospital visits for pneumonias since being released from longterm. Unfortunately none of the family  have talked to patient in last 2 weeks. Patient tried to call his nephew 3 days ago. EMS called today and found patient unresponsive. Patient down for unknown amount of time as no other people were present when EMS arrived per report. Patient with dried feces and appeared to have been down for some time. Patient intubated on arrival to ED for airway protection and hypoxia. Patient also started on levophed for shock. Central line placed in ED as well, personally reviewed chest x-ray appears to be in appropriate location. Sepsis bolus given in ED as well. No reported seizure activity. Nephrology evaluated patient in ED and recommended to our facility based on current hemodynamics. Patient admitted to CCU. Renal function did not improve and started on dialysis. Hospitalization complicated by ARDS, multiorgan failure, afib w/ RVR. Patient found to have influenza A. Patient did not pass weaning trials and in setting of poor prognosis family reasonably opted for comfort measures and withdrew care. Patient passed away 0518 on 2/22/25.     VITAL SIGNS:        on   ;        Body mass index is 33.6 kg/m².  Wt Readings from Last 3 Encounters:   02/20/25 112 kg (247 lb 12.8 oz)   06/06/24 102 kg (225 lb 10.3 oz)       PHYSICAL EXAM:  TOD exam performed by neight team.     DISCHARGE DISPOSITION   Stable    DISCHARGE MEDICATIONS:     Discharge Medications        ASK your doctor about these medications        Instructions Start Date   acetaminophen 325 MG tablet  Commonly known as: TYLENOL   650 mg, Oral, 3 Times Daily PRN      albuterol sulfate  (90 Base) MCG/ACT inhaler  Commonly known as: PROVENTIL HFA;VENTOLIN HFA;PROAIR HFA  Ask about: Which instructions should I use?   2 puffs, Inhalation, Every 6 Hours PRN      amLODIPine 5 MG tablet  Commonly known as: NORVASC   5 mg, Oral, Every Morning      aspirin 81 MG EC tablet   81 mg, Oral, Daily      carvedilol 25 MG tablet  Commonly known as: COREG   12.5 mg, Oral, 2 Times  Daily With Meals      Diclofenac Sodium 1 % gel gel  Commonly known as: VOLTAREN   4 g, Topical, Every 6 Hours PRN      DULoxetine 30 MG capsule  Commonly known as: CYMBALTA   30 mg, Oral, Daily, For MOOD      finasteride 5 MG tablet  Commonly known as: PROSCAR   5 mg, Oral, Daily      flecainide 150 MG tablet  Commonly known as: TAMBOCOR   0.5 tablets, Oral, 2 Times Daily      fluticasone 50 MCG/ACT nasal spray  Commonly known as: FLONASE   1 spray, Nasal, 2 Times Daily PRN      Fluticasone-Salmeterol 250-50 MCG/ACT DISKUS  Commonly known as: ADVAIR/WIXELA   1 puff, Inhalation, 2 Times Daily - RT      furosemide 20 MG tablet  Commonly known as: LASIX   1 tablet, Oral, Daily PRN      gabapentin 600 MG tablet  Commonly known as: NEURONTIN  Ask about: Which instructions should I use?   1,200 mg, Oral, 3 times daily      loratadine 10 MG tablet  Commonly known as: CLARITIN   10 mg, Oral, Daily PRN      losartan 50 MG tablet  Commonly known as: COZAAR  Ask about: Which instructions should I use?   50 mg, Oral, Daily      methocarbamol 750 MG tablet  Commonly known as: ROBAXIN   750 mg, Oral, 2 Times Daily PRN      nicotine 10 MG/ML solution nasal solution  Commonly known as: NICOTROL   10 sprays, Each Nare, Every 1 Hour PRN, MAX OF 10 sprays per hour and 80 sprays per day      omeprazole 20 MG capsule  Commonly known as: priLOSEC  Ask about: Which instructions should I use?   40 mg, Oral, 2 Times Daily Before Meals      polyvinyl alcohol 1.4 % ophthalmic solution  Commonly known as: LIQUIFILM   1 drop, Both Eyes, 4 Times Daily PRN      rosuvastatin 40 MG tablet  Commonly known as: CRESTOR   1 tablet, Oral, Nightly      senna 8.6 MG tablet  Commonly known as: SENOKOT   1 tablet, Oral, Daily PRN      tiotropium bromide monohydrate 2.5 MCG/ACT aerosol solution inhaler  Commonly known as: SPIRIVA RESPIMAT   2 puffs, Inhalation, Daily - RT      traMADol 50 MG tablet  Commonly known as: ULTRAM   50 mg, Oral, 3 Times Daily  PRN      vitamin B-12 1000 MCG tablet  Commonly known as: CYANOCOBALAMIN   1,000 mcg, Oral, Daily                  Follow-up Information       Cesia Diaz APRN .    Specialty: Nurse Practitioner  Contact information:  [facility]  [address]  [place] [address]  [phone]                              TEST  RESULTS PENDING AT DISCHARGE       CODE STATUS  Code Status and Medical Interventions: No CPR (Do Not Attempt to Resuscitate); Comfort Measures; siblings Adolph Garland CHarlotte. Baylee   Ordered at: 02/21/25 1428     Level Of Support Discussed With:    Next of Kin (If No Surrogate)     Code Status (Patient has no pulse and is not breathing):    No CPR (Do Not Attempt to Resuscitate)     Medical Interventions (Patient has pulse or is breathing):    Comfort Measures     Comments:    siblings Adolph Garland CHarlotte. Baylee Blankenship MD  [facility] Hospitalist  03/06/25  19:46 EST    Please note that this discharge summary required more than 30 minutes to complete.

## (undated) DEVICE — ENDOGATOR HYBRID TUBING KIT FOR USE WITH ENDOGATOR IRRIGATION PUMP, OLYMPUS PUMP, GI4000 ESU, AND TORRENT IRRIGATION PUMP.: Brand: ENDOGATOR KIT

## (undated) DEVICE — INTRO ACCSR BLNT TP

## (undated) DEVICE — THE BITE BLOCK MAXI, LATEX FREE STRAP IS USED TO PROTECT THE ENDOSCOPE INSERTION TUBE FROM BEING BITTEN BY THE PATIENT.

## (undated) DEVICE — BALN DIL ELATION FIX WR 7.5F 180CM 18X19X20MM 1P/U

## (undated) DEVICE — SYR LUERLOK 50ML

## (undated) DEVICE — Device: Brand: DEFENDO AIR/WATER/SUCTION AND BIOPSY VALVE

## (undated) DEVICE — TUBING, SUCTION, 1/4" X 10', STRAIGHT: Brand: MEDLINE

## (undated) DEVICE — SAFELINER SUCTION CANISTER 1000CC: Brand: DEROYAL

## (undated) DEVICE — DEV INFL CRE STERIFLATE 60CC DISP

## (undated) DEVICE — FIRST STEP BEDSIDE ADD WATER KIT - RESEALABLE STAND-UP POUCH, ENDOSCOPIC CLEANING PAD - 1 POUCH: Brand: FIRST STEP BEDSIDE ADD WATER KIT - RESEALABLE STAND-UP POUCH, ENDOSCOPIC CLEANIN

## (undated) DEVICE — HYBRID CO2 TUBING/CAP SET FOR OLYMPUS® SCOPES & CO2 SOURCE: Brand: ERBE

## (undated) DEVICE — LUBE JELLY FOIL PACK 1.4 OZ: Brand: MEDLINE INDUSTRIES, INC.

## (undated) DEVICE — ST LINER SAFECAP GRN RED CP STRL

## (undated) DEVICE — SOL IRR H2O BTL 1000ML STRL

## (undated) DEVICE — ADAPT CLN LUM OLYMP AIR/H20

## (undated) DEVICE — "MH-443 SUCTION VALVE F/EVIS140 EVIS160": Brand: SUCTION VALVE

## (undated) DEVICE — CONTN GRAD MEAS TRIANG 32OZ BLK

## (undated) DEVICE — KT ORCA ORCAPOD DISP STRL

## (undated) DEVICE — SOLIDIFIER LIQ PREMISORB 1500CC